# Patient Record
Sex: FEMALE | Race: WHITE | NOT HISPANIC OR LATINO | Employment: UNEMPLOYED | URBAN - METROPOLITAN AREA
[De-identification: names, ages, dates, MRNs, and addresses within clinical notes are randomized per-mention and may not be internally consistent; named-entity substitution may affect disease eponyms.]

---

## 2017-01-12 ENCOUNTER — OFFICE VISIT (OUTPATIENT)
Dept: INTERNAL MEDICINE | Facility: CLINIC | Age: 52
End: 2017-01-12

## 2017-01-12 VITALS
WEIGHT: 293 LBS | DIASTOLIC BLOOD PRESSURE: 82 MMHG | SYSTOLIC BLOOD PRESSURE: 126 MMHG | HEART RATE: 80 BPM | HEIGHT: 67 IN | OXYGEN SATURATION: 95 % | BODY MASS INDEX: 45.99 KG/M2

## 2017-01-12 DIAGNOSIS — E03.9 ACQUIRED HYPOTHYROIDISM: ICD-10-CM

## 2017-01-12 DIAGNOSIS — Z12.39 SCREENING FOR BREAST CANCER: ICD-10-CM

## 2017-01-12 DIAGNOSIS — F41.9 ANXIETY: ICD-10-CM

## 2017-01-12 DIAGNOSIS — R60.0 BILATERAL EDEMA OF LOWER EXTREMITY: ICD-10-CM

## 2017-01-12 DIAGNOSIS — I10 ESSENTIAL HYPERTENSION: ICD-10-CM

## 2017-01-12 DIAGNOSIS — Z00.00 PE (PHYSICAL EXAM), ANNUAL: Primary | ICD-10-CM

## 2017-01-12 DIAGNOSIS — K21.9 GASTROESOPHAGEAL REFLUX DISEASE, ESOPHAGITIS PRESENCE NOT SPECIFIED: ICD-10-CM

## 2017-01-12 DIAGNOSIS — Z11.59 SCREENING FOR VIRAL DISEASE: ICD-10-CM

## 2017-01-12 DIAGNOSIS — D50.9 MICROCYTIC ANEMIA: ICD-10-CM

## 2017-01-12 LAB
CHOLEST SERPL-MCNC: 225 MG/DL (ref 0–200)
FERRITIN SERPL-MCNC: 152.2 NG/ML (ref 13–150)
HDLC SERPL-MCNC: 54 MG/DL (ref 40–81)
IRON SATN MFR SERPL: 22 % (ref 20–50)
IRON SERPL-MCNC: 83 MCG/DL (ref 37–145)
LDLC SERPL CALC-MCNC: 141 MG/DL (ref 0–100)
LDLC/HDLC SERPL: 2.62 {RATIO}
LOPINAVIR ISLT PHENOTYP: 299 MCG/DL
TIBC SERPL-MCNC: 382 MCG/DL
TRIGL SERPL-MCNC: 148 MG/DL (ref 0–150)
TSH SERPL DL<=0.05 MIU/L-ACNC: 1.2 MIU/ML (ref 0.4–4.2)
VLDLC SERPL-MCNC: 29.6 MG/DL

## 2017-01-12 PROCEDURE — 99396 PREV VISIT EST AGE 40-64: CPT | Performed by: NURSE PRACTITIONER

## 2017-01-12 PROCEDURE — 84443 ASSAY THYROID STIM HORMONE: CPT | Performed by: NURSE PRACTITIONER

## 2017-01-12 PROCEDURE — 80061 LIPID PANEL: CPT | Performed by: NURSE PRACTITIONER

## 2017-01-12 RX ORDER — BUPROPION HYDROCHLORIDE 300 MG/1
300 TABLET ORAL NIGHTLY
Qty: 90 TABLET | Refills: 1 | Status: SHIPPED | OUTPATIENT
Start: 2017-01-12 | End: 2017-11-06 | Stop reason: SDUPTHER

## 2017-01-12 RX ORDER — OMEPRAZOLE 20 MG/1
20 CAPSULE, DELAYED RELEASE ORAL DAILY
Qty: 90 CAPSULE | Refills: 3 | Status: SHIPPED | OUTPATIENT
Start: 2017-01-12 | End: 2017-08-07 | Stop reason: SDUPTHER

## 2017-01-12 RX ORDER — LEVOTHYROXINE SODIUM 175 UG/1
175 TABLET ORAL DAILY
Qty: 90 TABLET | Refills: 1 | Status: SHIPPED | OUTPATIENT
Start: 2017-01-12 | End: 2017-10-01 | Stop reason: SDUPTHER

## 2017-01-12 RX ORDER — SERTRALINE HYDROCHLORIDE 100 MG/1
100 TABLET, FILM COATED ORAL NIGHTLY
Qty: 90 TABLET | Refills: 1 | Status: SHIPPED | OUTPATIENT
Start: 2017-01-12 | End: 2017-11-06 | Stop reason: SDUPTHER

## 2017-01-12 RX ORDER — LISINOPRIL 10 MG/1
10 TABLET ORAL DAILY
Qty: 90 TABLET | Refills: 3 | Status: SHIPPED | OUTPATIENT
Start: 2017-01-12 | End: 2017-07-10 | Stop reason: SDUPTHER

## 2017-01-12 NOTE — MR AVS SNAPSHOT
Carli Garcia   1/12/2017 1:00 PM   Office Visit    Dept Phone:  [phone]   Encounter #:  [id number]    Provider:  JANINE Tobar   Department:  [facility] INTERNAL MEDICINE                Your Full Care Plan              Today's Medication Changes          These changes are accurate as of: 1/12/17  1:46 PM.  If you have any questions, ask your nurse or doctor.               Stop taking medication(s)listed here:     amLODIPine 5 MG tablet   Commonly known as:  NORVASC   Stopped by:  JANINE Tobar                Where to Get Your Medications      These medications were sent to [facility] - [place], [place] - [address] [place] [address] - [phone]  - [phone] [address] [address]     Phone:  [phone]     buPROPion  MG 24 hr tablet    levothyroxine 175 MCG tablet    lisinopril 10 MG tablet    omeprazole 20 MG capsule    sertraline 100 MG tablet                  Your Updated Medication List          This list is accurate as of: 1/12/17  1:46 PM.  Always use your most recent med list.                buPROPion  MG 24 hr tablet   Commonly known as:  WELLBUTRIN XL   Take 1 tablet by mouth Every Night.       cetirizine 10 MG tablet   Commonly known as:  zyrTEC       ethacrynic acid 25 MG tablet   Commonly known as:  EDECRIN   Takes 2 and half tablet daily       ferrous sulfate 325 (65 FE) MG tablet   Take 1 tablet by mouth 2 (two) times a day.       levothyroxine 175 MCG tablet   Commonly known as:  SYNTHROID, LEVOTHROID   Take 1 tablet by mouth Daily.       lisinopril 10 MG tablet   Commonly known as:  PRINIVIL,ZESTRIL   Take 1 tablet by mouth Daily.       omeprazole 20 MG capsule   Commonly known as:  PRILOSEC   Take 1 capsule by mouth Daily.       potassium chloride 10 MEQ CR tablet   Commonly known as:  KLOR-CON   Take 1 tablet by mouth 2 (two) times a day.       sertraline 100 MG tablet   Commonly  known as:  ZOLOFT   Take 1 tablet by mouth Every Night.       warfarin 7.5 MG tablet   Commonly known as:  COUMADIN   TAKE 2 TABLETS (15 MG) EVERY DAY OR AS DIRECTED               You Were Diagnosed With        Codes Comments    PE (physical exam), annual    -  Primary ICD-10-CM: Z00.00  ICD-9-CM: V70.0     Screening for breast cancer     ICD-10-CM: Z12.39  ICD-9-CM: V76.10     Bilateral edema of lower extremity     ICD-10-CM: R60.0  ICD-9-CM: 782.3 increase Edecrin dose    Microcytic anemia     ICD-10-CM: D50.9  ICD-9-CM: 280.9     Acquired hypothyroidism     ICD-10-CM: E03.9  ICD-9-CM: 244.9     Screening for viral disease     ICD-10-CM: Z11.59  ICD-9-CM: V73.99     Gastroesophageal reflux disease, esophagitis presence not specified     ICD-10-CM: K21.9  ICD-9-CM: 530.81     Essential hypertension     ICD-10-CM: I10  ICD-9-CM: 401.9     Anxiety     ICD-10-CM: F41.9  ICD-9-CM: 300.00       Instructions     None    Patient Instructions History      Upcoming Appointments     Visit Type Date Time Department    PHYSICAL 1/12/2017  1:00 PM [facility]    COAG RN APPT 1/16/2017  3:30 PM [facility] OP INFU [facility]    LAB 1/16/2017  3:00 PM [facility] ONC CBC LAB [facility]    LAB 2/27/2017  3:00 PM [facility] ONC CBC LAB [facility]    COAG RN APPT 2/27/2017  3:30 PM [facility] OP INFU CBC [facility]    COAG RN APPT 4/10/2017  3:30 PM [facility] OP INFU CBC [facility]    LAB 4/10/2017  3:00 PM [facility] ONC CBC LAB [facility]    FOLLOW UP 4/21/2017  3:20 PM [facility] L[facility] [place]    FOLLOW UP 2 UNIT 6/5/2017  9:20 AM [facility] ONC CBC [place]    LAB 6/5/2017  8:40 AM [facility] ONC CBC LAB [facility]    FOLLOW UP 6/13/2017  1:00 PM [facility]      [facility] Sign[facility]     Our records indicate that you have an active [employer] account.    You can view your After Visit Summary by going to [employer] and logging in with your [employer] username and password.  If you don't have a [employer] username and password but a parent or guardian has access to your record, the parent or  "guardian should login with their own [employer] username and password and access your record to view the After Visit Summary.    If you have questions, you can email [email or web address] or call [phone] to talk to our [employer] staff.  Remember, [employer] is NOT to be used for urgent needs.  For medical emergencies, dial 911.               Other Info from Your Visit           Your Appointments     Jan 16, 2017  3:00 PM EST   Lab with LAB CHAIR 5 [facility] ONCOLOGY CBC LAB ([place])    [address] [place], [place] [id number] [phone]            Jan 16, 2017  3:30 PM EST   COAGULATION with COAG RN CBC [facility] INFUSION CBC ([place])    [address] [id number] [phone]            Feb 27, 2017  3:00 PM EST   Lab with LAB CHAIR 6 [facility] ONCOLOGY CBC LAB ([place])    [address] [place], [place] [id number] [phone]            Feb 27, 2017  3:30 PM EST   COAGULATION with COAG RN CBC [facility] INFUSION CBC ([place])    [address] [id number] [phone]            Apr 10, 2017  3:30 PM EDT   COAGULATION with COAG RN CBC [facility] INFUSION CBC ([place])    [facility] [id number] [phone]              Allergies     Dust Mite Extract      Losartan  Angioedema    Sulfa Antibiotics        Reason for Visit     Annual Exam           Vital Signs     Blood Pressure Pulse Height Weight Oxygen Saturation Body Mass Index    126/82 (BP Location: Left arm, Patient Position: Sitting, Cuff Size: Adult) 80 66.5\" (168.9 cm) 357 lb (162 kg) 95% 56.76 kg/m2    Smoking Status                   Never Smoker           Problems and Diagnoses Noted     Acid reflux disease    High blood pressure    Underactive thyroid    Microcytic anemia    PE (physical exam), annual    -  Primary    Breast " cancer screening        Edema of both legs        Screening for viral disease        Anxiety problem

## 2017-01-13 LAB — HCV AB S/CO SERPL IA: 0.1 S/CO RATIO (ref 0–0.9)

## 2017-01-13 NOTE — PROGRESS NOTES
Subjective   Carli Garcia is a 51 y.o. female who presents for a physical exam.    HPI Comments: She is feeling well and has started increasing her activity level, now exercising at the gym at work and walking up a flight of stairs. She reports a more positive outlook and requesting to stop Zoloft and Wellbutrin.       The following portions of the patient's history were reviewed and updated as appropriate: allergies, current medications, past social history and problem list.    Past Medical History   Diagnosis Date   • Anemia    • Angioedema      Lower lip   • Arthritis    • Depression    • Diastolic dysfunction    • DVT (deep venous thrombosis)    • GERD (gastroesophageal reflux disease)    • H/O antiphospholipid syndrome    • Hiatal hernia    • Hypertension    • Hypothyroidism    • Lupus anticoagulant disorder    • Lupus anticoagulant disorder    • Morbid obesity    • CRISTI (obstructive sleep apnea)    • PE (pulmonary embolism)      Bilateral   • Right ventricular dilation    • Thrombocytopenia          Current Outpatient Prescriptions:   •  buPROPion XL (WELLBUTRIN XL) 300 MG 24 hr tablet, Take 1 tablet by mouth Every Night., Disp: 90 tablet, Rfl: 1  •  cetirizine (ZyrTEC) 10 MG tablet, Take 10 mg by mouth daily., Disp: , Rfl:   •  ethacrynic acid (EDECRIN) 25 MG tablet, Takes 2 and half tablet daily, Disp: 225 tablet, Rfl: 1  •  ferrous sulfate 325 (65 FE) MG tablet, Take 1 tablet by mouth 2 (two) times a day., Disp: 180 tablet, Rfl: 1  •  levothyroxine (SYNTHROID, LEVOTHROID) 175 MCG tablet, Take 1 tablet by mouth Daily., Disp: 90 tablet, Rfl: 1  •  lisinopril (PRINIVIL,ZESTRIL) 10 MG tablet, Take 1 tablet by mouth Daily., Disp: 90 tablet, Rfl: 3  •  omeprazole (PRILOSEC) 20 MG capsule, Take 1 capsule by mouth Daily., Disp: 90 capsule, Rfl: 3  •  potassium chloride (KLOR-CON) 10 MEQ CR tablet, Take 1 tablet by mouth 2 (two) times a day., Disp: 90 tablet, Rfl: 3  •  sertraline (ZOLOFT) 100 MG tablet, Take 1  tablet by mouth Every Night., Disp: 90 tablet, Rfl: 1  •  warfarin (COUMADIN) 7.5 MG tablet, TAKE 2 TABLETS (15 MG) EVERY DAY OR AS DIRECTED, Disp: 90 tablet, Rfl: 0    Allergies   Allergen Reactions   • Dust Mite Extract    • Losartan Angioedema   • Sulfa Antibiotics        Review of Systems   Constitutional: Negative for activity change, appetite change, chills, diaphoresis, fatigue, fever and unexpected weight change.   HENT: Negative for congestion, dental problem, drooling, ear discharge, ear pain, facial swelling, hearing loss, mouth sores, nosebleeds, postnasal drip, rhinorrhea, sinus pressure, sore throat, tinnitus and trouble swallowing.    Eyes: Negative for photophobia, pain, discharge, redness, itching and visual disturbance.   Respiratory: Positive for shortness of breath. Negative for apnea, cough, choking, chest tightness and wheezing.    Cardiovascular: Negative for chest pain, palpitations and leg swelling.        No orthopnea, PND, VALERIO   Gastrointestinal: Negative for abdominal pain, blood in stool, constipation, diarrhea, nausea and vomiting.   Endocrine: Negative for cold intolerance, heat intolerance, polydipsia and polyuria.   Genitourinary: Negative for decreased urine volume, dysuria, enuresis, flank pain, frequency, hematuria and urgency.   Musculoskeletal: Positive for arthralgias. Negative for back pain, gait problem, joint swelling, myalgias, neck pain and neck stiffness.   Skin: Negative for color change and rash.        No hair changes, no nail changes   Allergic/Immunologic: Negative for environmental allergies, food allergies and immunocompromised state.   Neurological: Negative for dizziness, tremors, seizures, syncope, speech difficulty, weakness, light-headedness, numbness and headaches.   Hematological: Negative for adenopathy. Does not bruise/bleed easily.   Psychiatric/Behavioral: Negative for agitation, confusion, decreased concentration, dysphoric mood, sleep disturbance and  "suicidal ideas. The patient is not nervous/anxious.        Objective   Vitals:    01/12/17 1250   BP: 126/82   BP Location: Left arm   Patient Position: Sitting   Cuff Size: Adult   Pulse: 80   SpO2: 95%   Weight: (!) 357 lb (162 kg)   Height: 66.5\" (168.9 cm)     Physical Exam   Constitutional: She is oriented to person, place, and time. She appears well-developed and well-nourished. No distress.   HENT:   Right Ear: Hearing, tympanic membrane, external ear and ear canal normal.   Left Ear: Hearing, tympanic membrane, external ear and ear canal normal.   Nose: Right sinus exhibits no maxillary sinus tenderness and no frontal sinus tenderness. Left sinus exhibits no maxillary sinus tenderness and no frontal sinus tenderness.   Eyes: Conjunctivae, EOM and lids are normal. Pupils are equal, round, and reactive to light.   Neck: Trachea normal and phonation normal. Neck supple. Normal carotid pulses and no JVD present. Carotid bruit is not present. No thyroid mass and no thyromegaly present.   Cardiovascular: Normal rate, regular rhythm, S1 normal and S2 normal.  PMI is not displaced.  Exam reveals no gallop and no friction rub.    No murmur heard.  Pulses:       Carotid pulses are 2+ on the right side, and 2+ on the left side.       Radial pulses are 2+ on the right side, and 2+ on the left side.        Dorsalis pedis pulses are 2+ on the right side, and 2+ on the left side.   Pulmonary/Chest: Effort normal and breath sounds normal. She has no wheezes. She has no rhonchi. She has no rales. Chest wall is not dull to percussion.   Abdominal: Soft. Normal appearance, normal aorta and bowel sounds are normal. She exhibits no abdominal bruit and no mass. There is no hepatosplenomegaly. There is no tenderness.   Musculoskeletal: Normal range of motion. She exhibits no edema or tenderness.   Lymphadenopathy:     She has no cervical adenopathy.     She has no axillary adenopathy.        Left: No supraclavicular adenopathy " present.   Neurological: She is alert and oriented to person, place, and time. She has normal strength and normal reflexes. No cranial nerve deficit or sensory deficit. Coordination normal.   Skin: Skin is warm and dry. No rash noted.   Psychiatric: She has a normal mood and affect. Her speech is normal and behavior is normal. Judgment and thought content normal. Cognition and memory are normal. She is attentive.   Nursing note and vitals reviewed.      Assessment/Plan   Carli was seen today for annual exam.    Diagnoses and all orders for this visit:    PE (physical exam), annual  -     Lipid Panel; Future  -     Lipid Panel    Screening for breast cancer  -     Mammo Screening Bilateral With CAD; Future    Bilateral edema of lower extremity  Comments:  using Edecrin only rarely    Microcytic anemia  Comments:  she has decreased Ferrous Sulfate to 1 tablet daily  Orders:  -     Iron Profile; Future  -     Ferritin; Future  -     Iron Profile  -     Ferritin    Acquired hypothyroidism  -     TSH; Future  -     levothyroxine (SYNTHROID, LEVOTHROID) 175 MCG tablet; Take 1 tablet by mouth Daily.  -     TSH    Gastroesophageal reflux disease, esophagitis presence not specified  -     omeprazole (PRILOSEC) 20 MG capsule; Take 1 capsule by mouth Daily.    Screening for viral disease  -     Hepatitis C Antibody; Future  -     Hepatitis C Antibody    Essential hypertension  Comments:  stable on current meds  Orders:  -     lisinopril (PRINIVIL,ZESTRIL) 10 MG tablet; Take 1 tablet by mouth Daily.    Anxiety  Comments:  discussed recent meds, she may decrease Zoloft to 1/2 daily but continue Wellbutrin for now  Orders:  -     buPROPion XL (WELLBUTRIN XL) 300 MG 24 hr tablet; Take 1 tablet by mouth Every Night.  -     sertraline (ZOLOFT) 100 MG tablet; Take 1 tablet by mouth Every Night.    Will complete paperwork for [facility] and fax pending lab results.   Discussed exercise program, she has a steady weight  loss which she is encouraged to continue.

## 2017-01-16 ENCOUNTER — LAB (OUTPATIENT)
Dept: LAB | Facility: HOSPITAL | Age: 52
End: 2017-01-16

## 2017-01-16 ENCOUNTER — CLINICAL SUPPORT (OUTPATIENT)
Dept: ONCOLOGY | Facility: HOSPITAL | Age: 52
End: 2017-01-16

## 2017-01-16 DIAGNOSIS — I26.99 OTHER ACUTE PULMONARY EMBOLISM WITHOUT ACUTE COR PULMONALE (HCC): ICD-10-CM

## 2017-01-16 DIAGNOSIS — I82.409 DEEP VEIN THROMBOSIS (DVT) OF LOWER EXTREMITY, UNSPECIFIED CHRONICITY, UNSPECIFIED LATERALITY, UNSPECIFIED VEIN (HCC): Primary | ICD-10-CM

## 2017-01-16 LAB
INR PPP: 1.8 (ref 0.9–1.1)
PROTHROMBIN TIME: 22 SECONDS (ref 11–13.5)

## 2017-01-16 PROCEDURE — 85610 PROTHROMBIN TIME: CPT | Performed by: INTERNAL MEDICINE

## 2017-01-16 RX ORDER — WARFARIN SODIUM 7.5 MG/1
15 TABLET ORAL
Qty: 240 TABLET | Refills: 3 | Status: SHIPPED | OUTPATIENT
Start: 2017-01-16 | End: 2018-01-30 | Stop reason: SDUPTHER

## 2017-01-20 ENCOUNTER — TELEPHONE (OUTPATIENT)
Dept: INTERNAL MEDICINE | Facility: CLINIC | Age: 52
End: 2017-01-20

## 2017-01-20 NOTE — TELEPHONE ENCOUNTER
She c/o a dry, nonproductive cough with clear rhinorrhea. She is taking Claritin 10mg daily, advised to add Delsym at night if cough is dry and Mucinex throughout the day (bid) if cough becomes looser. RTC if sx persist/worsen.

## 2017-01-20 NOTE — TELEPHONE ENCOUNTER
----- Message from Brigid Odell sent at 1/20/2017  1:07 PM EST -----  Contact: Pt  Pt would like to be recommended something to take for a cough. Not coughing anything up, just a dry deep annoying cough.   Would like to have recommendations to take that are okay with her current ones.    Pt# [phone]

## 2017-02-27 ENCOUNTER — TELEPHONE (OUTPATIENT)
Dept: ONCOLOGY | Facility: CLINIC | Age: 52
End: 2017-02-27

## 2017-02-27 ENCOUNTER — CLINICAL SUPPORT (OUTPATIENT)
Dept: ONCOLOGY | Facility: HOSPITAL | Age: 52
End: 2017-02-27

## 2017-02-27 ENCOUNTER — LAB (OUTPATIENT)
Dept: LAB | Facility: HOSPITAL | Age: 52
End: 2017-02-27

## 2017-02-27 DIAGNOSIS — I26.99 OTHER PULMONARY EMBOLISM WITHOUT ACUTE COR PULMONALE (HCC): Primary | ICD-10-CM

## 2017-02-27 LAB
INR PPP: 4.1 (ref 0.9–1.1)
PROTHROMBIN TIME: 49.1 SECONDS (ref 11–13.5)

## 2017-02-27 PROCEDURE — 85610 PROTHROMBIN TIME: CPT | Performed by: INTERNAL MEDICINE

## 2017-02-27 NOTE — PROGRESS NOTES
Patient labs reviewed with Priya ETIENNE and SS.  Patient will take 15mg coumadin Sun,Wed. Fri and 11.25 all other days for INR 4.1.  Patient will come back in one week to have INR rechecked.  Patient SHELLY.

## 2017-03-06 ENCOUNTER — CLINICAL SUPPORT (OUTPATIENT)
Dept: ONCOLOGY | Facility: HOSPITAL | Age: 52
End: 2017-03-06

## 2017-03-06 ENCOUNTER — ANTICOAGULATION VISIT (OUTPATIENT)
Dept: LAB | Facility: HOSPITAL | Age: 52
End: 2017-03-06

## 2017-03-06 DIAGNOSIS — I82.401 ACUTE DEEP VEIN THROMBOSIS (DVT) OF RIGHT LOWER EXTREMITY, UNSPECIFIED VEIN (HCC): Primary | ICD-10-CM

## 2017-03-06 LAB
INR PPP: 2.4 (ref 0.9–1.1)
PROTHROMBIN TIME: 28.2 SECONDS (ref 11–13.5)

## 2017-03-06 PROCEDURE — 85610 PROTHROMBIN TIME: CPT | Performed by: INTERNAL MEDICINE

## 2017-03-06 NOTE — PROGRESS NOTES
INR 2.4 today.  Patient was 4.1 last week.  States she forgot her dose on Thursday.  Adjusted in ss and gave new dosing schedule.  Patient to take 11.25mg daily.  She requested to not return sooner than 1 month.  Says it is getting too hard on her to come so frequently.  Discussed s/s of a blood clot/PE and when to call.  Patient v/u.

## 2017-04-10 ENCOUNTER — APPOINTMENT (OUTPATIENT)
Dept: LAB | Facility: HOSPITAL | Age: 52
End: 2017-04-10

## 2017-04-10 ENCOUNTER — APPOINTMENT (OUTPATIENT)
Dept: ONCOLOGY | Facility: HOSPITAL | Age: 52
End: 2017-04-10

## 2017-04-11 ENCOUNTER — LAB (OUTPATIENT)
Dept: LAB | Facility: HOSPITAL | Age: 52
End: 2017-04-11

## 2017-04-11 ENCOUNTER — TELEPHONE (OUTPATIENT)
Dept: ONCOLOGY | Facility: CLINIC | Age: 52
End: 2017-04-11

## 2017-04-11 ENCOUNTER — CLINICAL SUPPORT (OUTPATIENT)
Dept: ONCOLOGY | Facility: HOSPITAL | Age: 52
End: 2017-04-11

## 2017-04-11 DIAGNOSIS — Z79.01 LONG TERM (CURRENT) USE OF ANTICOAGULANTS: Primary | ICD-10-CM

## 2017-04-11 DIAGNOSIS — I82.401 DEEP VEIN THROMBOSIS (DVT) OF RIGHT LOWER EXTREMITY, UNSPECIFIED CHRONICITY, UNSPECIFIED VEIN (HCC): Primary | ICD-10-CM

## 2017-04-11 LAB
INR PPP: 1.7 (ref 0.9–1.1)
PROTHROMBIN TIME: 20.6 SECONDS (ref 11–13.5)

## 2017-04-11 PROCEDURE — 85610 PROTHROMBIN TIME: CPT

## 2017-04-11 NOTE — PROGRESS NOTES
Pt's INR 1.7 today. Pt thinks she may have missed a dose last week but is not sure. She knows she has missed a few doses over the last few weeks. Placed one missed dose in SS. Advised pt to take same dose of 11.25 daily and we need to recheck her in 2 weeks. She v/u. Pt sent to appt desk to make appt.

## 2017-04-11 NOTE — TELEPHONE ENCOUNTER
----- Message from Karey Ospina, SHAZIA sent at 2017  3:26 PM EDT -----  : [birth year]    PLEASE SCHEDULE PT/INR AND COAG RN IN 2 WEEKS

## 2017-04-24 ENCOUNTER — ANTICOAGULATION VISIT (OUTPATIENT)
Dept: LAB | Facility: HOSPITAL | Age: 52
End: 2017-04-24

## 2017-04-24 ENCOUNTER — TELEPHONE (OUTPATIENT)
Dept: ONCOLOGY | Facility: CLINIC | Age: 52
End: 2017-04-24

## 2017-04-24 ENCOUNTER — CLINICAL SUPPORT (OUTPATIENT)
Dept: ONCOLOGY | Facility: HOSPITAL | Age: 52
End: 2017-04-24

## 2017-04-24 DIAGNOSIS — Z79.01 LONG TERM (CURRENT) USE OF ANTICOAGULANTS: ICD-10-CM

## 2017-04-24 LAB
INR PPP: 1.5 (ref 0.9–1.1)
PROTHROMBIN TIME: 18 SECONDS (ref 11–13.5)

## 2017-04-24 PROCEDURE — 85610 PROTHROMBIN TIME: CPT

## 2017-04-24 NOTE — TELEPHONE ENCOUNTER
----- Message from Karey Redd RN sent at 4/24/2017  3:38 PM EDT -----  PLEASE SCHEDULE PATIENT FOR INR WITH COAG REVIEW IN 1 WEEK

## 2017-04-24 NOTE — PROGRESS NOTES
INR 1.5 TODAY. PATIENT DENIES MISSING ANY DOSES AND NO NEW MEDICATIONS. REVIEWED WITH LUIS A MEHTA. OK TO INCREASE TO 15MG TUES/THURS/SAT AND 11.25 ALL OTHER DAYS. PATIENT TO RETURN IN 1 WEEK TO RECHECK. SENT PATIENT TO SCHEDULING TO SET UP APPT.

## 2017-05-01 ENCOUNTER — ANTICOAGULATION VISIT (OUTPATIENT)
Dept: LAB | Facility: HOSPITAL | Age: 52
End: 2017-05-01
Attending: INTERNAL MEDICINE

## 2017-05-01 ENCOUNTER — CLINICAL SUPPORT (OUTPATIENT)
Dept: ONCOLOGY | Facility: HOSPITAL | Age: 52
End: 2017-05-01

## 2017-05-01 DIAGNOSIS — I26.99 OTHER PULMONARY EMBOLISM WITHOUT ACUTE COR PULMONALE, UNSPECIFIED CHRONICITY (HCC): Primary | ICD-10-CM

## 2017-05-01 LAB
INR PPP: 2.2 (ref 0.9–1.1)
PROTHROMBIN TIME: 25.9 SECONDS (ref 11–13.5)

## 2017-05-01 PROCEDURE — 85610 PROTHROMBIN TIME: CPT | Performed by: INTERNAL MEDICINE

## 2017-06-05 ENCOUNTER — OFFICE VISIT (OUTPATIENT)
Dept: ONCOLOGY | Facility: CLINIC | Age: 52
End: 2017-06-05
Attending: INTERNAL MEDICINE

## 2017-06-05 ENCOUNTER — LAB (OUTPATIENT)
Dept: LAB | Facility: HOSPITAL | Age: 52
End: 2017-06-05
Attending: INTERNAL MEDICINE

## 2017-06-05 VITALS
HEART RATE: 68 BPM | TEMPERATURE: 98 F | BODY MASS INDEX: 45.99 KG/M2 | RESPIRATION RATE: 18 BRPM | WEIGHT: 293 LBS | OXYGEN SATURATION: 98 % | SYSTOLIC BLOOD PRESSURE: 130 MMHG | DIASTOLIC BLOOD PRESSURE: 70 MMHG | HEIGHT: 67 IN

## 2017-06-05 DIAGNOSIS — I26.99 OTHER PULMONARY EMBOLISM WITHOUT ACUTE COR PULMONALE, UNSPECIFIED CHRONICITY (HCC): Primary | ICD-10-CM

## 2017-06-05 LAB
BASOPHILS # BLD AUTO: 0.05 10*3/MM3 (ref 0–0.1)
BASOPHILS NFR BLD AUTO: 0.8 % (ref 0–1.1)
DEPRECATED RDW RBC AUTO: 40.5 FL (ref 37–49)
EOSINOPHIL # BLD AUTO: 0.14 10*3/MM3 (ref 0–0.36)
EOSINOPHIL NFR BLD AUTO: 2.1 % (ref 1–5)
ERYTHROCYTE [DISTWIDTH] IN BLOOD BY AUTOMATED COUNT: 13.8 % (ref 11.7–14.5)
HCT VFR BLD AUTO: 44.7 % (ref 34–45)
HGB BLD-MCNC: 14.3 G/DL (ref 11.5–14.9)
IMM GRANULOCYTES # BLD: 0.04 10*3/MM3 (ref 0–0.03)
IMM GRANULOCYTES NFR BLD: 0.6 % (ref 0–0.5)
INR PPP: 3.4 (ref 0.9–1.1)
LYMPHOCYTES # BLD AUTO: 1.3 10*3/MM3 (ref 1–3.5)
LYMPHOCYTES NFR BLD AUTO: 19.7 % (ref 20–49)
MCH RBC QN AUTO: 26.2 PG (ref 27–33)
MCHC RBC AUTO-ENTMCNC: 32 G/DL (ref 32–35)
MCV RBC AUTO: 81.9 FL (ref 83–97)
MONOCYTES # BLD AUTO: 0.56 10*3/MM3 (ref 0.25–0.8)
MONOCYTES NFR BLD AUTO: 8.5 % (ref 4–12)
NEUTROPHILS # BLD AUTO: 4.51 10*3/MM3 (ref 1.5–7)
NEUTROPHILS NFR BLD AUTO: 68.3 % (ref 39–75)
NRBC BLD MANUAL-RTO: 0 /100 WBC (ref 0–0)
PLATELET # BLD AUTO: 268 10*3/MM3 (ref 150–375)
PMV BLD AUTO: 10.1 FL (ref 8.9–12.1)
PROTHROMBIN TIME: 40.2 SECONDS (ref 11–13.5)
RBC # BLD AUTO: 5.46 10*6/MM3 (ref 3.9–5)
WBC NRBC COR # BLD: 6.6 10*3/MM3 (ref 4–10)

## 2017-06-05 PROCEDURE — 99214 OFFICE O/P EST MOD 30 MIN: CPT | Performed by: INTERNAL MEDICINE

## 2017-06-05 PROCEDURE — 85025 COMPLETE CBC W/AUTO DIFF WBC: CPT | Performed by: INTERNAL MEDICINE

## 2017-06-05 PROCEDURE — 36416 COLLJ CAPILLARY BLOOD SPEC: CPT | Performed by: INTERNAL MEDICINE

## 2017-06-05 PROCEDURE — 85610 PROTHROMBIN TIME: CPT | Performed by: INTERNAL MEDICINE

## 2017-06-05 NOTE — PROGRESS NOTES
REASONS FOR FOLLOWUP:  Antiphospholipid syndrome    History of Present Illness  Mrs. Garcia returns today slightly supra therapeutic Coumadin which is to be life long l she will have some repeat antiphospholipid antibodies assayed prior to her next visit in part to for that assessment..    Past Medical History:   Diagnosis Date   • Anemia    • Angioedema     Lower lip   • Arthritis    • Depression    • Diastolic dysfunction    • DVT (deep venous thrombosis)    • GERD (gastroesophageal reflux disease)    • H/O antiphospholipid syndrome    • Hiatal hernia    • Hypertension    • Hypothyroidism    • Lupus anticoagulant disorder    • Lupus anticoagulant disorder    • Morbid obesity    • CRISTI (obstructive sleep apnea)    • PE (pulmonary embolism)     Bilateral   • Right ventricular dilation    • Thrombocytopenia        ONCOLOGIC HISTORY:  (History from previous dates can be found in the separate document.)    Current Outpatient Prescriptions on File Prior to Visit   Medication Sig Dispense Refill   • buPROPion XL (WELLBUTRIN XL) 300 MG 24 hr tablet Take 1 tablet by mouth Every Night. 90 tablet 1   • cetirizine (ZyrTEC) 10 MG tablet Take 10 mg by mouth daily.     • levothyroxine (SYNTHROID, LEVOTHROID) 175 MCG tablet Take 1 tablet by mouth Daily. 90 tablet 1   • lisinopril (PRINIVIL,ZESTRIL) 10 MG tablet Take 1 tablet by mouth Daily. 90 tablet 3   • omeprazole (PRILOSEC) 20 MG capsule Take 1 capsule by mouth Daily. 90 capsule 3   • sertraline (ZOLOFT) 100 MG tablet Take 1 tablet by mouth Every Night. 90 tablet 1   • warfarin (COUMADIN) 7.5 MG tablet TAKE 2 TABLETS (15 MG) EVERY DAY OR AS DIRECTED 90 tablet 0   • warfarin (COUMADIN) 7.5 MG tablet Take 2 tablets by mouth Daily. 240 tablet 3   • [DISCONTINUED] ethacrynic acid (EDECRIN) 25 MG tablet Takes 2 and half tablet daily 225 tablet 1   • [DISCONTINUED] ferrous sulfate 325 (65 FE) MG tablet Take 1 tablet by mouth 2 (two) times a day. 180 tablet 1   • [DISCONTINUED]  "potassium chloride (KLOR-CON) 10 MEQ CR tablet Take 1 tablet by mouth 2 (two) times a day. 90 tablet 3     No current facility-administered medications on file prior to visit.        ALLERGIES:     Allergies   Allergen Reactions   • Dust Mite Extract    • Losartan Angioedema   • Sulfa Antibiotics        Social History     Social History   • Marital status: Single     Spouse name: N/A   • Number of children: 0   • Years of education: N/A     Occupational History   • Desk Work      Social History Main Topics   • Smoking status: Never Smoker   • Smokeless tobacco: Never Used   • Alcohol use Yes      Comment: occ   • Drug use: No   • Sexual activity: Not on file     Other Topics Concern   • Not on file     Social History Narrative         Cancer-related family history includes Uterine cancer in her mother.     Review of Systems  A comprehensive 14 point review of systems was performed and was negative except as mentioned.    Objective      Vitals:    06/05/17 0901   BP: 130/70   Pulse: 68   Resp: 18   Temp: 98 °F (36.7 °C)   SpO2: 98%   Weight: (!) 353 lb 6.4 oz (160 kg)   Height: 66.54\" (169 cm)   PainSc: 0-No pain     Current Status 6/5/2017   ECOG score 0       Physical Exam   GENERAL: Well-developed, well-nourished in no acute distress.   SKIN: Warm, dry without rashes, purpura or petechiae.  HEAD: Normocephalic.  EYES: Pupils equal, round and reactive to light. EOMs intact. Conjunctivae normal.  EARS: Hearing intact.  NOSE: Septum midline. No excoriations or nasal discharge.  MOUTH: Tongue is well-papillated; no stomatitis or ulcers. Lips normal.  THROAT: Oropharynx without lesions or exudates.  NECK: Supple with good range of motion; no thyromegaly or masses, no JVD.  LYMPHATICS: No cervical, supraclavicular, axillary or inguinal adenopathy.  CHEST: Lungs clear to percussion and auscultation. Good airflow.  CARDIAC: Regular rate and rhythm without murmurs, rubs or gallops. Normal S1,S2.  ABDOMEN: Soft, nontender " with no organomegaly or masses.  EXTREMITIES: No clubbing, cyanosis or edema.  NEUROLOGICAL: Cranial Nerves II-XII grossly intact. No focal neurological deficits.   PSYCHIATRIC: Normal affect and mood.      RECENT LABS:  Hematology WBC   Date Value Ref Range Status   06/05/2017 6.60 4.00 - 10.00 10*3/mm3 Final     RBC   Date Value Ref Range Status   06/05/2017 5.46 (H) 3.90 - 5.00 10*6/mm3 Final     Hemoglobin   Date Value Ref Range Status   06/05/2017 14.3 11.5 - 14.9 g/dL Final     Hematocrit   Date Value Ref Range Status   06/05/2017 44.7 34.0 - 45.0 % Final     Platelets   Date Value Ref Range Status   06/05/2017 268 150 - 375 10*3/mm3 Final        Assessment/Plan     Antiphospholipid syndrome:I have advised her to take 3 times weekly 15 mg  With the remainder at 11.25 mg)daily.  In her 1 month return for laboratory studies she will undergo anticardiolipin antibody survey as well as anti-phospholipid antibody panel and this will include lupus anticoagulant.

## 2017-06-06 ENCOUNTER — HOSPITAL ENCOUNTER (OUTPATIENT)
Dept: HOSPITAL 23 - CECH | Age: 52
Discharge: HOME | End: 2017-06-06
Attending: INTERNAL MEDICINE
Payer: COMMERCIAL

## 2017-06-06 DIAGNOSIS — G47.33: ICD-10-CM

## 2017-06-06 DIAGNOSIS — I51.7: ICD-10-CM

## 2017-06-06 DIAGNOSIS — J96.11: Primary | ICD-10-CM

## 2017-06-06 DIAGNOSIS — I26.99: ICD-10-CM

## 2017-07-10 ENCOUNTER — OFFICE VISIT (OUTPATIENT)
Dept: CARDIOLOGY | Facility: CLINIC | Age: 52
End: 2017-07-10

## 2017-07-10 ENCOUNTER — APPOINTMENT (OUTPATIENT)
Dept: LAB | Facility: HOSPITAL | Age: 52
End: 2017-07-10
Attending: INTERNAL MEDICINE

## 2017-07-10 ENCOUNTER — CLINICAL SUPPORT (OUTPATIENT)
Dept: ONCOLOGY | Facility: HOSPITAL | Age: 52
End: 2017-07-10
Attending: INTERNAL MEDICINE

## 2017-07-10 ENCOUNTER — APPOINTMENT (OUTPATIENT)
Dept: ONCOLOGY | Facility: HOSPITAL | Age: 52
End: 2017-07-10
Attending: INTERNAL MEDICINE

## 2017-07-10 ENCOUNTER — ANTICOAGULATION VISIT (OUTPATIENT)
Dept: LAB | Facility: HOSPITAL | Age: 52
End: 2017-07-10
Attending: INTERNAL MEDICINE

## 2017-07-10 VITALS
WEIGHT: 293 LBS | DIASTOLIC BLOOD PRESSURE: 98 MMHG | HEART RATE: 82 BPM | BODY MASS INDEX: 45.99 KG/M2 | HEIGHT: 67 IN | SYSTOLIC BLOOD PRESSURE: 152 MMHG

## 2017-07-10 DIAGNOSIS — K44.9 HIATAL HERNIA: ICD-10-CM

## 2017-07-10 DIAGNOSIS — I51.7 RIGHT VENTRICULAR DILATION: ICD-10-CM

## 2017-07-10 DIAGNOSIS — I10 ESSENTIAL HYPERTENSION: Primary | ICD-10-CM

## 2017-07-10 DIAGNOSIS — E66.01 MORBID OBESITY DUE TO EXCESS CALORIES (HCC): Chronic | ICD-10-CM

## 2017-07-10 DIAGNOSIS — D68.61 ANTIPHOSPHOLIPID SYNDROME (HCC): Primary | Chronic | ICD-10-CM

## 2017-07-10 DIAGNOSIS — I26.99 OTHER PULMONARY EMBOLISM WITHOUT ACUTE COR PULMONALE, UNSPECIFIED CHRONICITY (HCC): ICD-10-CM

## 2017-07-10 DIAGNOSIS — I10 ESSENTIAL HYPERTENSION: ICD-10-CM

## 2017-07-10 DIAGNOSIS — I51.89 DIASTOLIC DYSFUNCTION: ICD-10-CM

## 2017-07-10 DIAGNOSIS — I82.401 DEEP VEIN THROMBOSIS (DVT) OF RIGHT LOWER EXTREMITY, UNSPECIFIED CHRONICITY, UNSPECIFIED VEIN (HCC): ICD-10-CM

## 2017-07-10 LAB
INR PPP: 2.6 (ref 0.9–1.1)
PROTHROMBIN TIME: 30.6 SECONDS (ref 11–13.5)

## 2017-07-10 PROCEDURE — 85610 PROTHROMBIN TIME: CPT

## 2017-07-10 PROCEDURE — 93000 ELECTROCARDIOGRAM COMPLETE: CPT | Performed by: INTERNAL MEDICINE

## 2017-07-10 PROCEDURE — 99214 OFFICE O/P EST MOD 30 MIN: CPT | Performed by: INTERNAL MEDICINE

## 2017-07-10 PROCEDURE — 36415 COLL VENOUS BLD VENIPUNCTURE: CPT

## 2017-07-10 RX ORDER — LISINOPRIL 20 MG/1
20 TABLET ORAL DAILY
Qty: 90 TABLET | Refills: 3 | Status: SHIPPED | OUTPATIENT
Start: 2017-07-10 | End: 2018-08-12 | Stop reason: SDUPTHER

## 2017-07-10 NOTE — PROGRESS NOTES
Carli Garcia  1965  Date of Office Visit: 10/20/2016  Encounter Provider: Sonido Arroyo MD  Place of Service: [facility] CARDIOLOGY      CHIEF COMPLAINT:   1. Right ventricular dilation.  2. Diastolic dysfunction.     HISTORY OF PRESENT ILLNESS:  Dr. Elizondo,   I had the pleasure of seeing Carli Garcia back in follow-up today.  As you'll know she is a very pleasant 52-year-old female with morbid obesity, hypertension, hypothyroidism, and depression who presented with acute-on-chronic complaint of dyspnea 3/2016. The patient stated that over the 2 months prior that she has been dyspneic, both with exertion initially and more recently at rest. She came in for evaluation and was noted to have small pulmonary emboli bilaterally and acute hypoxic respiratory failure along with sinus tachycardia.  Her cardiac biomarkers were negative and proBNP was normal. She was admitted and started on a heparin drip. Her lower extremity Doppler did show that she  had an acute DVT in the right femoral, popliteal and calf vein.      She had an echocardiogram with mild right ventricular dilation with normal function and grade 2 diastolic dysfunction. She was placed on Coumadin therapy and was not felt to be appropriate for catheter directed therapy.  Since our last visit she has undergone pulmonary evaluation and also has underwent a transthoracic echocardiogram repeat.  Her transthoracic echocardiogram report is available, however I don't have the images at this time.  It is in a limited study but on my review of the report she has a normal ejection fraction.  Her right ventricle was poorly visualized.  Her estimated PA systolic pressure however is normal.  It is only 20 mmHg.  Since our last visit she has been noncompliant with her oxygen therapy only wears it intermittently.  She states that she does not want to wear it at all secondary to the cost.  She also reports that her blood  pressure has been mildly elevated.  She denies any headache or chest pain with that.  She still does complain of moderate dyspnea on exertion.  This comes on when she walks greater than 200 feet in distance.       Review of Systems   Constitution: Positive for malaise/fatigue. Negative for fever and weakness.   HENT: Negative for nosebleeds and sore throat.    Eyes: Negative for blurred vision and double vision.   Cardiovascular: Positive for dyspnea on exertion and leg swelling. Negative for chest pain, claudication, palpitations and syncope.   Respiratory: Negative for cough, shortness of breath and snoring.    Endocrine: Negative for cold intolerance, heat intolerance and polydipsia.   Skin: Negative for itching, poor wound healing and rash.   Musculoskeletal: Negative for joint pain, joint swelling, muscle weakness and myalgias.   Gastrointestinal: Negative for abdominal pain, melena, nausea and vomiting.   Neurological: Negative for light-headedness, loss of balance, seizures and vertigo.   Psychiatric/Behavioral: Negative for altered mental status and depression.          Past Medical History:   Diagnosis Date   • Anemia    • Angioedema     Lower lip   • Arthritis    • Depression    • Diastolic dysfunction    • DVT (deep venous thrombosis)    • GERD (gastroesophageal reflux disease)    • H/O antiphospholipid syndrome    • Hiatal hernia    • Hypertension    • Hypothyroidism    • Lupus anticoagulant disorder    • Lupus anticoagulant disorder    • Morbid obesity    • CRISTI (obstructive sleep apnea)    • PE (pulmonary embolism)     Bilateral   • Right ventricular dilation    • Thrombocytopenia        The following portions of the patient's history were reviewed and updated as appropriate: Social history , Family history and Surgical history     Current Outpatient Prescriptions on File Prior to Visit   Medication Sig Dispense Refill   • buPROPion XL (WELLBUTRIN XL) 300 MG 24 hr tablet Take 1 tablet by mouth Every  "Night. 90 tablet 1   • cetirizine (ZyrTEC) 10 MG tablet Take 10 mg by mouth daily.     • levothyroxine (SYNTHROID, LEVOTHROID) 175 MCG tablet Take 1 tablet by mouth Daily. 90 tablet 1   • omeprazole (PRILOSEC) 20 MG capsule Take 1 capsule by mouth Daily. 90 capsule 3   • sertraline (ZOLOFT) 100 MG tablet Take 1 tablet by mouth Every Night. 90 tablet 1   • warfarin (COUMADIN) 7.5 MG tablet TAKE 2 TABLETS (15 MG) EVERY DAY OR AS DIRECTED 90 tablet 0   • warfarin (COUMADIN) 7.5 MG tablet Take 2 tablets by mouth Daily. 240 tablet 3   • [DISCONTINUED] lisinopril (PRINIVIL,ZESTRIL) 10 MG tablet Take 1 tablet by mouth Daily. 90 tablet 3     No current facility-administered medications on file prior to visit.        Allergies   Allergen Reactions   • Dust Mite Extract    • Losartan Angioedema   • Sulfa Antibiotics        Vitals:    07/10/17 1240   BP: 152/98   Pulse: 82   Weight: (!) 356 lb (161 kg)   Height: 66.5\" (168.9 cm)     Physical Exam   Constitutional: She is oriented to person, place, and time. She appears well-developed and well-nourished.   HENT:   Head: Normocephalic and atraumatic.   Eyes: Conjunctivae and EOM are normal. No scleral icterus.   Neck: Normal range of motion. Neck supple. Normal carotid pulses, no hepatojugular reflux and no JVD present. Carotid bruit is not present. No tracheal deviation present. No thyromegaly present.   Cardiovascular: Normal rate and regular rhythm.  Exam reveals no gallop and no friction rub.    No murmur heard.  Pulses:       Carotid pulses are 2+ on the right side, and 2+ on the left side.       Radial pulses are 2+ on the right side, and 2+ on the left side.        Femoral pulses are 2+ on the right side, and 2+ on the left side.       Dorsalis pedis pulses are 2+ on the right side, and 2+ on the left side.        Posterior tibial pulses are 2+ on the right side, and 2+ on the left side.   Pulmonary/Chest: Breath sounds normal. No respiratory distress. She has no " decreased breath sounds. She has no wheezes. She has no rhonchi. She has no rales. She exhibits no tenderness.   Abdominal: Soft. Bowel sounds are normal. She exhibits no distension. There is no tenderness. There is no rebound.   Musculoskeletal: She exhibits no edema or deformity.   Neurological: She is alert and oriented to person, place, and time. She has normal strength. No sensory deficit.   Skin: No rash noted. No erythema.   Psychiatric: She has a normal mood and affect. Her behavior is normal.       No components found for: CBC  No results found for: CMP  No components found for: LIPID  No results found for: BMP      ECG 12 Lead  Date/Time: 7/10/2017 3:14 PM  Performed by: REBECCA AVILA  Authorized by: REBECCA AVILA   Comparison: compared with previous ECG from 10/20/2016  Similar to previous ECG  Rhythm: sinus rhythm  Rate: normal  ST Segments: ST segments normal  QRS axis: normal  Other findings: PRWP               DISCUSSION/SUMMARY  52-year-old female with a medical history of pulmonary emboli, right ventricular dilation, diastolic dysfunction, hypertension, obstructive sleep apnea, and noncompliance with CPAP who presents back for follow-up.  She also had a deep venous thrombosis.  She continues on anticoagulation and is following with [facility].  At this time, long-term anticoagulation has been recommended.  Since our last visit she has been doing well.    1.  Pulmonary emboli, DVT, hypercoagulable state:    -Continue Coumadin therapy lifelong   -Repeat transthoracic echocardiogram report from June of this year has been reviewed.  There is no evidence of pulmonary hypertension with a PA systolic pressure of 20 mmHg.  The right ventricle is reportedly not well visualized   -Patient has no clear evidence of pulmonary hypertension based on her non-invasive evaluation.  I don't think there is any benefit in repeat echocardiogram at this point in time.    2. Hypertension not at goal.  I have  recommended that she increase her lisinopril therapy to 20 mg daily.    3. Lower extremity edema as above.  I have recommended compression stockings again.  Patient does not want to wear

## 2017-07-12 LAB
B2 GLYCOPROT1 IGA SER-ACNC: 28 GPI IGA UNITS (ref 0–25)
B2 GLYCOPROT1 IGG SER-ACNC: 44 GPI IGG UNITS (ref 0–20)
B2 GLYCOPROT1 IGM SER-ACNC: <9 GPI IGM UNITS (ref 0–32)
CARDIOLIPIN IGA SER IA-ACNC: 14 APL U/ML (ref 0–11)
CARDIOLIPIN IGG SER IA-ACNC: 83 GPL U/ML (ref 0–14)
CARDIOLIPIN IGM SER IA-ACNC: <9 MPL U/ML (ref 0–12)

## 2017-07-13 LAB
PS IGA SER-ACNC: 10 APS IGA (ref 0–20)
PS IGG SER-ACNC: >100 GPS IGG (ref 0–11)
PS IGM SER-ACNC: 4 MPS IGM (ref 0–25)

## 2017-07-18 LAB
APTT HEX PL PPP: 87 SEC (ref 0–11)
DRVVT IMM 1:2 NP PPP: 87.8 SEC (ref 0–47)
LA NT DPL PPP: >200 SEC (ref 0–55)
LA NT DPL/LA NT HPL PPP-RTO: ABNORMAL RATIO
LA NT PLATELET PPP: 91.6 SEC (ref 0–51.9)
LUPUS ANTICOAGULANT REFLEX: ABNORMAL
PTT LA MIX: 75.5 SEC (ref 0–48.9)
SCREEN DRVVT: 133.2 SEC (ref 0–47)
SCREEN DRVVT: 2.1 RATIO (ref 0.8–1.2)
THROMBIN TIME: 20.3 SEC (ref 0–23)

## 2017-08-06 DIAGNOSIS — K21.9 GASTROESOPHAGEAL REFLUX DISEASE, ESOPHAGITIS PRESENCE NOT SPECIFIED: ICD-10-CM

## 2017-08-07 ENCOUNTER — OFFICE VISIT (OUTPATIENT)
Dept: ONCOLOGY | Facility: CLINIC | Age: 52
End: 2017-08-07
Attending: INTERNAL MEDICINE

## 2017-08-07 ENCOUNTER — ANTICOAGULATION VISIT (OUTPATIENT)
Dept: LAB | Facility: HOSPITAL | Age: 52
End: 2017-08-07
Attending: INTERNAL MEDICINE

## 2017-08-07 VITALS
HEART RATE: 62 BPM | WEIGHT: 293 LBS | SYSTOLIC BLOOD PRESSURE: 128 MMHG | HEIGHT: 67 IN | RESPIRATION RATE: 18 BRPM | DIASTOLIC BLOOD PRESSURE: 82 MMHG | TEMPERATURE: 97.8 F | BODY MASS INDEX: 45.99 KG/M2 | OXYGEN SATURATION: 95 %

## 2017-08-07 DIAGNOSIS — I26.92 ACUTE SADDLE PULMONARY EMBOLISM WITHOUT ACUTE COR PULMONALE (HCC): Primary | ICD-10-CM

## 2017-08-07 DIAGNOSIS — D68.61 ANTIPHOSPHOLIPID SYNDROME (HCC): Chronic | ICD-10-CM

## 2017-08-07 LAB
BASOPHILS # BLD AUTO: 0.06 10*3/MM3 (ref 0–0.1)
BASOPHILS NFR BLD AUTO: 0.8 % (ref 0–1.1)
DEPRECATED RDW RBC AUTO: 39.9 FL (ref 37–49)
EOSINOPHIL # BLD AUTO: 0.2 10*3/MM3 (ref 0–0.36)
EOSINOPHIL NFR BLD AUTO: 2.7 % (ref 1–5)
ERYTHROCYTE [DISTWIDTH] IN BLOOD BY AUTOMATED COUNT: 13.6 % (ref 11.7–14.5)
HCT VFR BLD AUTO: 42.5 % (ref 34–45)
HGB BLD-MCNC: 13.8 G/DL (ref 11.5–14.9)
IMM GRANULOCYTES # BLD: 0.05 10*3/MM3 (ref 0–0.03)
IMM GRANULOCYTES NFR BLD: 0.7 % (ref 0–0.5)
INR PPP: 1.9 (ref 0.9–1.1)
LYMPHOCYTES # BLD AUTO: 1.32 10*3/MM3 (ref 1–3.5)
LYMPHOCYTES NFR BLD AUTO: 17.8 % (ref 20–49)
MCH RBC QN AUTO: 26.3 PG (ref 27–33)
MCHC RBC AUTO-ENTMCNC: 32.5 G/DL (ref 32–35)
MCV RBC AUTO: 81.1 FL (ref 83–97)
MONOCYTES # BLD AUTO: 0.54 10*3/MM3 (ref 0.25–0.8)
MONOCYTES NFR BLD AUTO: 7.3 % (ref 4–12)
NEUTROPHILS # BLD AUTO: 5.25 10*3/MM3 (ref 1.5–7)
NEUTROPHILS NFR BLD AUTO: 70.7 % (ref 39–75)
NRBC BLD MANUAL-RTO: 0 /100 WBC (ref 0–0)
PLATELET # BLD AUTO: 241 10*3/MM3 (ref 150–375)
PMV BLD AUTO: 10.3 FL (ref 8.9–12.1)
PROTHROMBIN TIME: 23.2 SECONDS (ref 11–13.5)
RBC # BLD AUTO: 5.24 10*6/MM3 (ref 3.9–5)
WBC NRBC COR # BLD: 7.42 10*3/MM3 (ref 4–10)

## 2017-08-07 PROCEDURE — 85610 PROTHROMBIN TIME: CPT | Performed by: INTERNAL MEDICINE

## 2017-08-07 PROCEDURE — 36416 COLLJ CAPILLARY BLOOD SPEC: CPT | Performed by: INTERNAL MEDICINE

## 2017-08-07 PROCEDURE — 85025 COMPLETE CBC W/AUTO DIFF WBC: CPT | Performed by: INTERNAL MEDICINE

## 2017-08-07 PROCEDURE — 99213 OFFICE O/P EST LOW 20 MIN: CPT | Performed by: INTERNAL MEDICINE

## 2017-08-07 RX ORDER — OMEPRAZOLE 20 MG/1
CAPSULE, DELAYED RELEASE ORAL
Qty: 90 CAPSULE | Refills: 0 | Status: SHIPPED | OUTPATIENT
Start: 2017-08-07 | End: 2017-12-03 | Stop reason: SDUPTHER

## 2017-08-07 NOTE — PROGRESS NOTES
REASONS FOR FOLLOWUP:  Antiphospholipid antibody syndrome with history of pulmonary embolism March 2016    History of Present Illness    Mrs. Garcia is a 52-year-old woman with history of pulmonary embolism March 2016.  She was found to have positive lupus anticoagulant and cardiolipin antibodies at the time of her pulmonary embolism and has been on anticoagulation with Coumadin since diagnosis.  She has had no recurrent thromboembolic events while anticoagulated.  She has no bleeding problems.    She comes in today feeling well.  She denies leg swelling or shortness of breath.  She is adherent to Coumadin.  She denies bleeding.    Repeat antiphospholipid antibody labs were performed July 2017 and persistently positive for lupus anticoagulant, cardiolipin IgG, and beta-2 glycoprotein 1 IgG and low level IgA.    Past Medical History:   Diagnosis Date   • Anemia    • Angioedema     Lower lip   • Arthritis    • Depression    • Diastolic dysfunction    • DVT (deep venous thrombosis)    • GERD (gastroesophageal reflux disease)    • H/O antiphospholipid syndrome    • Hiatal hernia    • Hypertension    • Hypothyroidism    • Lupus anticoagulant disorder    • Lupus anticoagulant disorder    • Morbid obesity    • CRISTI (obstructive sleep apnea)    • PE (pulmonary embolism)     Bilateral   • Right ventricular dilation    • Thrombocytopenia        ONCOLOGIC HISTORY:  (History from previous dates can be found in the separate document.)    Current Outpatient Prescriptions on File Prior to Visit   Medication Sig Dispense Refill   • buPROPion XL (WELLBUTRIN XL) 300 MG 24 hr tablet Take 1 tablet by mouth Every Night. 90 tablet 1   • cetirizine (ZyrTEC) 10 MG tablet Take 10 mg by mouth daily.     • levothyroxine (SYNTHROID, LEVOTHROID) 175 MCG tablet Take 1 tablet by mouth Daily. 90 tablet 1   • lisinopril (PRINIVIL,ZESTRIL) 20 MG tablet Take 1 tablet by mouth Daily. 90 tablet 3   • omeprazole (priLOSEC) 20 MG capsule TAKE 1 CAPSULE  "DAILY 90 capsule 0   • sertraline (ZOLOFT) 100 MG tablet Take 1 tablet by mouth Every Night. 90 tablet 1   • warfarin (COUMADIN) 7.5 MG tablet Take 2 tablets by mouth Daily. 240 tablet 3   • [DISCONTINUED] omeprazole (PRILOSEC) 20 MG capsule Take 1 capsule by mouth Daily. 90 capsule 3   • [DISCONTINUED] warfarin (COUMADIN) 7.5 MG tablet TAKE 2 TABLETS (15 MG) EVERY DAY OR AS DIRECTED 90 tablet 0     No current facility-administered medications on file prior to visit.        ALLERGIES:     Allergies   Allergen Reactions   • Dust Mite Extract    • Losartan Angioedema   • Sulfa Antibiotics        Social History     Social History   • Marital status: Single     Spouse name: N/A   • Number of children: 0   • Years of education: N/A     Occupational History   • Desk Work      Social History Main Topics   • Smoking status: Never Smoker   • Smokeless tobacco: Never Used   • Alcohol use Yes      Comment: occ   • Drug use: No   • Sexual activity: Not on file     Other Topics Concern   • Not on file     Social History Narrative         Cancer-related family history includes Uterine cancer in her mother.     Review of Systems  A comprehensive 14 point review of systems was performed and was negative except as mentioned.    Objective      Vitals:    08/07/17 0940   BP: 128/82   Pulse: 62   Resp: 18   Temp: 97.8 °F (36.6 °C)   SpO2: 95%   Weight: (!) 358 lb 14.4 oz (163 kg)   Height: 66.54\" (169 cm)   PainSc: 0-No pain     Current Status 8/7/2017   ECOG score 0       Physical Exam   GENERAL: Well-developed, well-nourished in no acute distress.   SKIN: Warm, dry without rashes, purpura or petechiae.  NECK: Supple with good range of motion; no thyromegaly or masses, no JVD.  LYMPHATICS: No cervical, supraclavicular, axillary or inguinal adenopathy.  CHEST: Lungs clear to percussion and auscultation. Good airflow.  CARDIAC: Regular rate and rhythm without murmurs, rubs or gallops. Normal S1,S2.  ABDOMEN: Soft, nontender with no " organomegaly or masses.  EXTREMITIES: No clubbing, cyanosis or edema..   PSYCHIATRIC: Normal affect and mood.      RECENT LABS:  Hematology WBC   Date Value Ref Range Status   08/07/2017 7.42 4.00 - 10.00 10*3/mm3 Final     RBC   Date Value Ref Range Status   08/07/2017 5.24 (H) 3.90 - 5.00 10*6/mm3 Final     Hemoglobin   Date Value Ref Range Status   08/07/2017 13.8 11.5 - 14.9 g/dL Final     Hematocrit   Date Value Ref Range Status   08/07/2017 42.5 34.0 - 45.0 % Final     Platelets   Date Value Ref Range Status   08/07/2017 241 150 - 375 10*3/mm3 Final      INR. 1.9    Assessment/Plan    This patient has history of pulmonary embolism in March 2016 secondary to antiphospholipid antibody syndrome.  She has been anticoagulated with Coumadin, goal INR 2.0-3.0 since diagnosis.  She has had no recurrence of thromboembolic events on anticoagulation.  Repeat antiphospholipid antibody labs were performed July 2017 and persistently positive for lupus anticoagulant, cardiolipin IgG, and beta-2 glycoprotein 1 IgG and low level IgA.  I explained to the patient that with her persistent positive antibody titers, she will require likely lifelong anticoagulation.  She prefers Coumadin over newer generation anticoagulants.  She is interested in a home monitoring system which I will have our lab supervisor investigate with her insurance.  I have scheduled her for monthly INR until she receives a home monitoring system if feasible.  Six-month follow-up requested.

## 2017-09-06 ENCOUNTER — CLINICAL SUPPORT (OUTPATIENT)
Dept: ONCOLOGY | Facility: HOSPITAL | Age: 52
End: 2017-09-06

## 2017-09-06 ENCOUNTER — ANTICOAGULATION VISIT (OUTPATIENT)
Dept: LAB | Facility: HOSPITAL | Age: 52
End: 2017-09-06

## 2017-09-06 DIAGNOSIS — I26.92 ACUTE SADDLE PULMONARY EMBOLISM WITHOUT ACUTE COR PULMONALE (HCC): ICD-10-CM

## 2017-09-06 DIAGNOSIS — D68.61 ANTIPHOSPHOLIPID SYNDROME (HCC): Chronic | ICD-10-CM

## 2017-09-06 LAB
INR PPP: 2.6 (ref 0.9–1.1)
PROTHROMBIN TIME: 31.5 SECONDS (ref 11–13.5)

## 2017-09-06 PROCEDURE — 85610 PROTHROMBIN TIME: CPT

## 2017-10-01 DIAGNOSIS — E03.9 ACQUIRED HYPOTHYROIDISM: ICD-10-CM

## 2017-10-02 RX ORDER — LEVOTHYROXINE SODIUM 175 UG/1
TABLET ORAL
Qty: 90 TABLET | Refills: 1 | Status: SHIPPED | OUTPATIENT
Start: 2017-10-02 | End: 2018-03-13 | Stop reason: SDUPTHER

## 2017-10-04 ENCOUNTER — CLINICAL SUPPORT (OUTPATIENT)
Dept: ONCOLOGY | Facility: HOSPITAL | Age: 52
End: 2017-10-04

## 2017-10-04 ENCOUNTER — ANTICOAGULATION VISIT (OUTPATIENT)
Dept: LAB | Facility: HOSPITAL | Age: 52
End: 2017-10-04

## 2017-10-04 DIAGNOSIS — D68.61 ANTIPHOSPHOLIPID SYNDROME (HCC): Chronic | ICD-10-CM

## 2017-10-04 DIAGNOSIS — I26.92 ACUTE SADDLE PULMONARY EMBOLISM WITHOUT ACUTE COR PULMONALE (HCC): ICD-10-CM

## 2017-10-04 LAB
INR PPP: 3.5 (ref 0.9–1.1)
PROTHROMBIN TIME: 41.6 SECONDS (ref 11–13.5)

## 2017-10-04 PROCEDURE — 85610 PROTHROMBIN TIME: CPT

## 2017-11-01 ENCOUNTER — APPOINTMENT (OUTPATIENT)
Dept: LAB | Facility: HOSPITAL | Age: 52
End: 2017-11-01

## 2017-11-01 ENCOUNTER — APPOINTMENT (OUTPATIENT)
Dept: ONCOLOGY | Facility: HOSPITAL | Age: 52
End: 2017-11-01

## 2017-11-06 DIAGNOSIS — F41.9 ANXIETY: ICD-10-CM

## 2017-11-06 RX ORDER — BUPROPION HYDROCHLORIDE 300 MG/1
TABLET ORAL
Qty: 90 TABLET | Refills: 1 | Status: SHIPPED | OUTPATIENT
Start: 2017-11-06 | End: 2018-05-06 | Stop reason: SDUPTHER

## 2017-11-06 RX ORDER — SERTRALINE HYDROCHLORIDE 100 MG/1
TABLET, FILM COATED ORAL
Qty: 90 TABLET | Refills: 1 | Status: SHIPPED | OUTPATIENT
Start: 2017-11-06 | End: 2018-05-06 | Stop reason: SDUPTHER

## 2017-11-08 ENCOUNTER — ANTICOAGULATION VISIT (OUTPATIENT)
Dept: LAB | Facility: HOSPITAL | Age: 52
End: 2017-11-08

## 2017-11-08 ENCOUNTER — CLINICAL SUPPORT (OUTPATIENT)
Dept: ONCOLOGY | Facility: HOSPITAL | Age: 52
End: 2017-11-08

## 2017-11-08 DIAGNOSIS — D68.61 ANTIPHOSPHOLIPID SYNDROME (HCC): Chronic | ICD-10-CM

## 2017-11-08 DIAGNOSIS — I26.92 ACUTE SADDLE PULMONARY EMBOLISM WITHOUT ACUTE COR PULMONALE (HCC): ICD-10-CM

## 2017-11-08 LAB
INR PPP: 3.3 (ref 0.9–1.1)
PROTHROMBIN TIME: 39.1 SECONDS (ref 11–13.5)

## 2017-11-08 PROCEDURE — 85610 PROTHROMBIN TIME: CPT

## 2017-12-03 DIAGNOSIS — K21.9 GASTROESOPHAGEAL REFLUX DISEASE, ESOPHAGITIS PRESENCE NOT SPECIFIED: ICD-10-CM

## 2017-12-04 RX ORDER — OMEPRAZOLE 20 MG/1
CAPSULE, DELAYED RELEASE ORAL
Qty: 90 CAPSULE | Refills: 0 | Status: SHIPPED | OUTPATIENT
Start: 2017-12-04 | End: 2018-03-13 | Stop reason: SDUPTHER

## 2017-12-06 ENCOUNTER — ANTICOAGULATION VISIT (OUTPATIENT)
Dept: LAB | Facility: HOSPITAL | Age: 52
End: 2017-12-06

## 2017-12-06 ENCOUNTER — CLINICAL SUPPORT (OUTPATIENT)
Dept: ONCOLOGY | Facility: HOSPITAL | Age: 52
End: 2017-12-06

## 2017-12-06 ENCOUNTER — APPOINTMENT (OUTPATIENT)
Dept: ONCOLOGY | Facility: HOSPITAL | Age: 52
End: 2017-12-06

## 2017-12-06 DIAGNOSIS — D68.61 ANTIPHOSPHOLIPID SYNDROME (HCC): Chronic | ICD-10-CM

## 2017-12-06 DIAGNOSIS — I26.92 ACUTE SADDLE PULMONARY EMBOLISM WITHOUT ACUTE COR PULMONALE (HCC): ICD-10-CM

## 2017-12-06 LAB
INR PPP: 2.3 (ref 0.9–1.1)
PROTHROMBIN TIME: 27.2 SECONDS (ref 11–13.5)

## 2017-12-06 PROCEDURE — 85610 PROTHROMBIN TIME: CPT

## 2018-01-03 ENCOUNTER — APPOINTMENT (OUTPATIENT)
Dept: ONCOLOGY | Facility: HOSPITAL | Age: 53
End: 2018-01-03

## 2018-01-03 ENCOUNTER — APPOINTMENT (OUTPATIENT)
Dept: LAB | Facility: HOSPITAL | Age: 53
End: 2018-01-03

## 2018-01-10 ENCOUNTER — LAB (OUTPATIENT)
Dept: LAB | Facility: HOSPITAL | Age: 53
End: 2018-01-10

## 2018-01-10 ENCOUNTER — INFUSION (OUTPATIENT)
Dept: ONCOLOGY | Facility: HOSPITAL | Age: 53
End: 2018-01-10

## 2018-01-10 DIAGNOSIS — D68.61 ANTIPHOSPHOLIPID SYNDROME (HCC): Chronic | ICD-10-CM

## 2018-01-10 DIAGNOSIS — I26.92 ACUTE SADDLE PULMONARY EMBOLISM WITHOUT ACUTE COR PULMONALE (HCC): ICD-10-CM

## 2018-01-10 LAB
INR PPP: 3.3 (ref 0.9–1.1)
PROTHROMBIN TIME: 39.4 SECONDS (ref 11–13.5)

## 2018-01-10 PROCEDURE — 85610 PROTHROMBIN TIME: CPT

## 2018-01-10 NOTE — PROGRESS NOTES
Pt here for INR review. INR 3.3. Pt denies missed doses and has no new meds. Coumadin adjusted per SS. Copy of labs and new schedule given to pt. Pt v/u.

## 2018-01-23 ENCOUNTER — OFFICE VISIT (OUTPATIENT)
Dept: INTERNAL MEDICINE | Facility: CLINIC | Age: 53
End: 2018-01-23

## 2018-01-23 ENCOUNTER — APPOINTMENT (OUTPATIENT)
Dept: WOMENS IMAGING | Facility: HOSPITAL | Age: 53
End: 2018-01-23

## 2018-01-23 VITALS
SYSTOLIC BLOOD PRESSURE: 130 MMHG | OXYGEN SATURATION: 96 % | BODY MASS INDEX: 45.99 KG/M2 | HEIGHT: 67 IN | DIASTOLIC BLOOD PRESSURE: 82 MMHG | WEIGHT: 293 LBS | HEART RATE: 77 BPM

## 2018-01-23 DIAGNOSIS — Z12.11 SCREENING FOR COLON CANCER: ICD-10-CM

## 2018-01-23 DIAGNOSIS — Z00.00 PE (PHYSICAL EXAM), ANNUAL: Primary | ICD-10-CM

## 2018-01-23 DIAGNOSIS — Z12.31 ENCOUNTER FOR SCREENING MAMMOGRAM FOR BREAST CANCER: Primary | ICD-10-CM

## 2018-01-23 LAB
ALBUMIN SERPL-MCNC: 4.2 G/DL (ref 3.5–5.2)
ALBUMIN/GLOB SERPL: 1.1 G/DL
ALP SERPL-CCNC: 77 U/L (ref 39–117)
ALT SERPL W P-5'-P-CCNC: 19 U/L (ref 1–33)
ANION GAP SERPL CALCULATED.3IONS-SCNC: 17 MMOL/L
AST SERPL-CCNC: 16 U/L (ref 1–32)
BASOPHILS # BLD AUTO: 0.03 10*3/MM3 (ref 0–0.2)
BASOPHILS NFR BLD AUTO: 0.4 % (ref 0–2)
BILIRUB SERPL-MCNC: 0.5 MG/DL (ref 0.1–1.2)
BILIRUB UR QL STRIP: NEGATIVE
BUN BLD-MCNC: 10 MG/DL (ref 6–20)
BUN/CREAT SERPL: 13.2 (ref 7–25)
CALCIUM SPEC-SCNC: 9.5 MG/DL (ref 8.6–10.5)
CHLORIDE SERPL-SCNC: 98 MMOL/L (ref 98–107)
CHOLEST SERPL-MCNC: 212 MG/DL (ref 0–200)
CLARITY UR: CLEAR
CO2 SERPL-SCNC: 25 MMOL/L (ref 22–29)
COLOR UR: YELLOW
CREAT BLD-MCNC: 0.76 MG/DL (ref 0.57–1)
DEPRECATED RDW RBC AUTO: 40.2 FL (ref 37–54)
EOSINOPHIL # BLD AUTO: 0.17 10*3/MM3 (ref 0–0.7)
EOSINOPHIL NFR BLD AUTO: 2.2 % (ref 0–5)
ERYTHROCYTE [DISTWIDTH] IN BLOOD BY AUTOMATED COUNT: 13.5 % (ref 11.5–15)
GFR SERPL CREATININE-BSD FRML MDRD: 80 ML/MIN/1.73
GLOBULIN UR ELPH-MCNC: 3.8 GM/DL
GLUCOSE BLD-MCNC: 71 MG/DL (ref 65–99)
GLUCOSE UR STRIP-MCNC: NEGATIVE MG/DL
HCT VFR BLD AUTO: 42.2 % (ref 34.1–44.9)
HDLC SERPL-MCNC: 44 MG/DL (ref 40–60)
HGB BLD-MCNC: 13.6 G/DL (ref 11.2–15.7)
HGB UR QL STRIP.AUTO: NEGATIVE
KETONES UR QL STRIP: NEGATIVE
LDLC SERPL CALC-MCNC: 138 MG/DL (ref 0–100)
LDLC/HDLC SERPL: 3.15 {RATIO}
LEUKOCYTE ESTERASE UR QL STRIP.AUTO: NEGATIVE
LYMPHOCYTES # BLD AUTO: 1.6 10*3/MM3 (ref 0.8–7)
LYMPHOCYTES NFR BLD AUTO: 20.4 % (ref 10–60)
MCH RBC QN AUTO: 26.6 PG (ref 26–34)
MCHC RBC AUTO-ENTMCNC: 32.2 G/DL (ref 31–37)
MCV RBC AUTO: 82.6 FL (ref 80–100)
MONOCYTES # BLD AUTO: 0.75 10*3/MM3 (ref 0–1)
MONOCYTES NFR BLD AUTO: 9.6 % (ref 0–13)
NEUTROPHILS # BLD AUTO: 5.29 10*3/MM3 (ref 1–11)
NEUTROPHILS NFR BLD AUTO: 67.4 % (ref 30–85)
NITRITE UR QL STRIP: NEGATIVE
PH UR STRIP.AUTO: 5.5 [PH] (ref 5–8)
PLATELET # BLD AUTO: 291 10*3/MM3 (ref 150–450)
PMV BLD AUTO: 10.2 FL (ref 6–12)
POTASSIUM BLD-SCNC: 4 MMOL/L (ref 3.5–5.2)
PROT SERPL-MCNC: 8 G/DL (ref 6–8.5)
PROT UR QL STRIP: NEGATIVE
RBC # BLD AUTO: 5.11 10*6/MM3 (ref 3.93–5.22)
SODIUM BLD-SCNC: 140 MMOL/L (ref 136–145)
SP GR UR STRIP: 1.01 (ref 1–1.03)
TRIGL SERPL-MCNC: 148 MG/DL (ref 0–150)
TSH SERPL-ACNC: 0.97 MIU/ML (ref 0.27–4.2)
UROBILINOGEN UR QL STRIP: NORMAL
VLDLC SERPL-MCNC: 29.6 MG/DL (ref 5–40)
WBC NRBC COR # BLD: 7.84 10*3/MM3 (ref 5–10)

## 2018-01-23 PROCEDURE — 99396 PREV VISIT EST AGE 40-64: CPT | Performed by: NURSE PRACTITIONER

## 2018-01-23 PROCEDURE — 93000 ELECTROCARDIOGRAM COMPLETE: CPT | Performed by: NURSE PRACTITIONER

## 2018-01-23 PROCEDURE — 80053 COMPREHEN METABOLIC PANEL: CPT | Performed by: NURSE PRACTITIONER

## 2018-01-23 PROCEDURE — 80061 LIPID PANEL: CPT | Performed by: NURSE PRACTITIONER

## 2018-01-23 PROCEDURE — 81003 URINALYSIS AUTO W/O SCOPE: CPT | Performed by: NURSE PRACTITIONER

## 2018-01-23 PROCEDURE — 85025 COMPLETE CBC W/AUTO DIFF WBC: CPT | Performed by: NURSE PRACTITIONER

## 2018-01-23 PROCEDURE — 77067 SCR MAMMO BI INCL CAD: CPT | Performed by: NURSE PRACTITIONER

## 2018-01-23 NOTE — PROGRESS NOTES
Subjective   Carli Garcia is a 52 y.o. female.         HPI Comments: She is feeling well, has tapered off O2. She is maintained on Coumadin due to hx DVT & PE.       The following portions of the patient's history were reviewed and updated as appropriate: allergies, current medications, past social history and problem list.    Past Medical History:   Diagnosis Date   • Anemia    • Angioedema     Lower lip   • Arthritis    • Depression    • Diastolic dysfunction    • DVT (deep venous thrombosis)    • GERD (gastroesophageal reflux disease)    • H/O antiphospholipid syndrome    • Hiatal hernia    • Hypertension    • Hypothyroidism    • Lupus anticoagulant disorder    • Lupus anticoagulant disorder    • Morbid obesity    • CRISTI (obstructive sleep apnea)    • PE (pulmonary embolism)     Bilateral   • Right ventricular dilation    • Thrombocytopenia          Current Outpatient Prescriptions:   •  buPROPion XL (WELLBUTRIN XL) 300 MG 24 hr tablet, TAKE 1 TABLET EVERY NIGHT, Disp: 90 tablet, Rfl: 1  •  cetirizine (ZyrTEC) 10 MG tablet, Take 10 mg by mouth daily., Disp: , Rfl:   •  levothyroxine (SYNTHROID, LEVOTHROID) 175 MCG tablet, TAKE 1 TABLET DAILY, Disp: 90 tablet, Rfl: 1  •  lisinopril (PRINIVIL,ZESTRIL) 20 MG tablet, Take 1 tablet by mouth Daily., Disp: 90 tablet, Rfl: 3  •  omeprazole (priLOSEC) 20 MG capsule, TAKE 1 CAPSULE DAILY, Disp: 90 capsule, Rfl: 0  •  sertraline (ZOLOFT) 100 MG tablet, TAKE 1 TABLET EVERY NIGHT, Disp: 90 tablet, Rfl: 1  •  warfarin (COUMADIN) 7.5 MG tablet, Take 2 tablets by mouth Daily., Disp: 240 tablet, Rfl: 3    Allergies   Allergen Reactions   • Dust Mite Extract    • Losartan Angioedema   • Sulfa Antibiotics        Review of Systems   Constitutional: Negative for activity change, appetite change, chills, diaphoresis, fatigue, fever and unexpected weight change.   HENT: Negative for congestion, dental problem, drooling, ear discharge, ear pain, facial swelling, hearing loss, mouth  "sores, nosebleeds, postnasal drip, rhinorrhea, sinus pressure, sore throat, tinnitus and trouble swallowing.    Eyes: Negative for photophobia, pain, discharge, redness, itching and visual disturbance.   Respiratory: Negative for apnea, cough, choking, chest tightness, shortness of breath and wheezing.    Cardiovascular: Negative for chest pain, palpitations and leg swelling.        No orthopnea, PND, VALERIO   Gastrointestinal: Negative for abdominal pain, blood in stool, constipation, diarrhea, nausea and vomiting.   Endocrine: Negative for cold intolerance, heat intolerance, polydipsia and polyuria.   Genitourinary: Negative for decreased urine volume, dysuria, enuresis, flank pain, frequency, hematuria and urgency.   Musculoskeletal: Negative for arthralgias, back pain, gait problem, joint swelling, myalgias, neck pain and neck stiffness.   Skin: Negative for color change and rash.        No hair changes, no nail changes   Allergic/Immunologic: Negative for environmental allergies, food allergies and immunocompromised state.   Neurological: Negative for dizziness, tremors, seizures, syncope, speech difficulty, weakness, light-headedness, numbness and headaches.   Hematological: Negative for adenopathy. Does not bruise/bleed easily.   Psychiatric/Behavioral: Negative for agitation, confusion, decreased concentration, dysphoric mood, sleep disturbance and suicidal ideas. The patient is not nervous/anxious.        Objective   Vitals:    01/23/18 1311   BP: 130/82   BP Location: Left arm   Patient Position: Sitting   Cuff Size: Large Adult   Pulse: 77   SpO2: 96%   Weight: (!) 160 kg (352 lb)   Height: 169 cm (66.54\")     Physical Exam   Constitutional: She is oriented to person, place, and time. She appears well-developed and well-nourished. No distress.   HENT:   Right Ear: Hearing, tympanic membrane, external ear and ear canal normal.   Left Ear: Hearing, tympanic membrane, external ear and ear canal normal.   Nose: " Right sinus exhibits no maxillary sinus tenderness and no frontal sinus tenderness. Left sinus exhibits no maxillary sinus tenderness and no frontal sinus tenderness.   Eyes: Conjunctivae, EOM and lids are normal. Pupils are equal, round, and reactive to light.   Neck: Trachea normal and phonation normal. Neck supple. Normal carotid pulses and no JVD present. Carotid bruit is not present. No thyroid mass and no thyromegaly present.   Cardiovascular: Normal rate, regular rhythm, S1 normal and S2 normal.  PMI is not displaced.  Exam reveals no gallop and no friction rub.    No murmur heard.  Pulses:       Carotid pulses are 2+ on the right side, and 2+ on the left side.       Radial pulses are 2+ on the right side, and 2+ on the left side.        Dorsalis pedis pulses are 2+ on the right side, and 2+ on the left side.   Pulmonary/Chest: Effort normal and breath sounds normal. She has no wheezes. She has no rhonchi. She has no rales. Chest wall is not dull to percussion. Right breast exhibits no inverted nipple, no mass, no nipple discharge, no skin change and no tenderness. Left breast exhibits no inverted nipple, no mass, no nipple discharge, no skin change and no tenderness.   Abdominal: Soft. Normal appearance, normal aorta and bowel sounds are normal. She exhibits no abdominal bruit and no mass. There is no hepatosplenomegaly. There is no tenderness.   Musculoskeletal: Normal range of motion. She exhibits no edema or tenderness.   Lymphadenopathy:     She has no cervical adenopathy.        Right: No supraclavicular adenopathy present.        Left: No supraclavicular adenopathy present.   Neurological: She is alert and oriented to person, place, and time. She has normal strength and normal reflexes. No cranial nerve deficit or sensory deficit. Coordination normal.   Skin: Skin is warm and dry. No rash noted.   Psychiatric: She has a normal mood and affect. Her speech is normal and behavior is normal. Judgment and  thought content normal. Cognition and memory are normal. She is attentive.   Nursing note and vitals reviewed.      Assessment/Plan   Carli was seen today for annual exam.    Diagnoses and all orders for this visit:    PE (physical exam), annual  -     CBC Auto Differential; Future  -     Comprehensive Metabolic Panel; Future  -     Lipid Panel; Future  -     TSH; Future  -     Urinalysis With / Microscopic If Indicated - Urine, Clean Catch; Future  -     Nicotine Screen, Urine - Urine, Clean Catch; Future  -     CBC Auto Differential  -     Comprehensive Metabolic Panel  -     Lipid Panel  -     TSH  -     Urinalysis With / Microscopic If Indicated - Urine, Clean Catch  -     Nicotine Screen, Urine - Urine, Clean Catch    Screening for colon cancer  Comments:  she has declined screening colonoscopy despite known risks, will check home stool studies    Other orders  -     ECG 12 Lead        ECG 12 Lead  Date/Time: 1/23/2018 1:50 AM  Performed by: YAIR GREGORIO  Authorized by: ULYSSES ENGLAND   Comparison: compared with previous ECG from 7/10/2017  Similar to previous ECG  Rhythm: sinus rhythm  Rate: normal  Rate comments: 68  Conduction: conduction normal  ST Segments: ST segments normal  T Waves: T waves normal  QRS axis: normal (QRS Duration 88)  Other: no other findings  Clinical impression: normal ECG  Comments: QT Interval 406  Corrected QT Interval 432

## 2018-01-24 ENCOUNTER — TELEPHONE (OUTPATIENT)
Dept: INTERNAL MEDICINE | Facility: CLINIC | Age: 53
End: 2018-01-24

## 2018-01-24 DIAGNOSIS — Z12.11 SCREENING FOR COLON CANCER: Primary | ICD-10-CM

## 2018-01-24 LAB
COTININE UR-MCNC: NEGATIVE NG/ML
Lab: NORMAL

## 2018-01-24 NOTE — TELEPHONE ENCOUNTER
Please call patient and ask her to  hemoccult cards to check for blood in her stool. She has an appt with [facility] so she could pick them up then if she would like, please place up front for her. Thanks.

## 2018-01-25 NOTE — TELEPHONE ENCOUNTER
Faxed Health Provider Screening Form faxed to [facility].  Fax confirmation received at 8:04 a.m.   Original form up for scanning.

## 2018-01-30 RX ORDER — WARFARIN SODIUM 7.5 MG/1
TABLET ORAL
Qty: 240 TABLET | Refills: 0 | Status: SHIPPED | OUTPATIENT
Start: 2018-01-30 | End: 2018-05-06 | Stop reason: SDUPTHER

## 2018-02-12 ENCOUNTER — TELEPHONE (OUTPATIENT)
Dept: INTERNAL MEDICINE | Facility: CLINIC | Age: 53
End: 2018-02-12

## 2018-02-12 NOTE — TELEPHONE ENCOUNTER
----- Message from Aylin Mittal sent at 2/12/2018  2:19 PM EST -----  Contact: pt - Dr Elizondo' pt - RE: WYATT  Pt calling and would like a return call regarding pt in hospital 02/10/2018 for gallbladder attack. Could you please call to discuss? Please advise. Thanks        Pt # [phone]

## 2018-02-12 NOTE — TELEPHONE ENCOUNTER
Lupe-I spoke with pt.  She said she does not need a call back unless you have anything you would like her to do.  She states she feels okay today.  Was given scripts for Omeprazole, Zofran, and Hydrocodone 5-325 mg, but she has not filled any of them yet.  States she already takes Omeprazole 20 mg.    She was seen at [facility] on [facility] and I have left them a message to fax her records to clinic fax.

## 2018-02-13 NOTE — TELEPHONE ENCOUNTER
Reviewed records and called patient, she is feeling fine now (c/o epigastric pain after eating lasagna). CT abd/pelvis did not show any acute abnormalities. If sx return will consider further evaluation of gallbladder.

## 2018-02-14 ENCOUNTER — ANTICOAGULATION VISIT (OUTPATIENT)
Dept: LAB | Facility: HOSPITAL | Age: 53
End: 2018-02-14

## 2018-02-14 ENCOUNTER — OFFICE VISIT (OUTPATIENT)
Dept: ONCOLOGY | Facility: CLINIC | Age: 53
End: 2018-02-14

## 2018-02-14 VITALS
OXYGEN SATURATION: 94 % | HEART RATE: 77 BPM | WEIGHT: 293 LBS | RESPIRATION RATE: 18 BRPM | TEMPERATURE: 97.5 F | DIASTOLIC BLOOD PRESSURE: 76 MMHG | HEIGHT: 67 IN | BODY MASS INDEX: 45.99 KG/M2 | SYSTOLIC BLOOD PRESSURE: 124 MMHG

## 2018-02-14 DIAGNOSIS — Z79.01 ANTICOAGULANT LONG-TERM USE: ICD-10-CM

## 2018-02-14 DIAGNOSIS — D68.61 ANTIPHOSPHOLIPID SYNDROME (HCC): Primary | Chronic | ICD-10-CM

## 2018-02-14 DIAGNOSIS — D68.61 ANTIPHOSPHOLIPID SYNDROME (HCC): ICD-10-CM

## 2018-02-14 DIAGNOSIS — I26.92 ACUTE SADDLE PULMONARY EMBOLISM WITHOUT ACUTE COR PULMONALE (HCC): ICD-10-CM

## 2018-02-14 LAB
BASOPHILS # BLD AUTO: 0.07 10*3/MM3 (ref 0–0.1)
BASOPHILS NFR BLD AUTO: 0.8 % (ref 0–1.1)
DEPRECATED RDW RBC AUTO: 38.7 FL (ref 37–49)
EOSINOPHIL # BLD AUTO: 0.25 10*3/MM3 (ref 0–0.36)
EOSINOPHIL NFR BLD AUTO: 2.8 % (ref 1–5)
ERYTHROCYTE [DISTWIDTH] IN BLOOD BY AUTOMATED COUNT: 13.2 % (ref 11.7–14.5)
HCT VFR BLD AUTO: 43.4 % (ref 34–45)
HGB BLD-MCNC: 14 G/DL (ref 11.5–14.9)
IMM GRANULOCYTES # BLD: 0.06 10*3/MM3 (ref 0–0.03)
IMM GRANULOCYTES NFR BLD: 0.7 % (ref 0–0.5)
INR PPP: 1.7 (ref 0.9–1.1)
LYMPHOCYTES # BLD AUTO: 1.65 10*3/MM3 (ref 1–3.5)
LYMPHOCYTES NFR BLD AUTO: 18.5 % (ref 20–49)
MCH RBC QN AUTO: 26.2 PG (ref 27–33)
MCHC RBC AUTO-ENTMCNC: 32.3 G/DL (ref 32–35)
MCV RBC AUTO: 81.1 FL (ref 83–97)
MONOCYTES # BLD AUTO: 0.72 10*3/MM3 (ref 0.25–0.8)
MONOCYTES NFR BLD AUTO: 8.1 % (ref 4–12)
NEUTROPHILS # BLD AUTO: 6.18 10*3/MM3 (ref 1.5–7)
NEUTROPHILS NFR BLD AUTO: 69.1 % (ref 39–75)
NRBC BLD MANUAL-RTO: 0 /100 WBC (ref 0–0)
PLATELET # BLD AUTO: 241 10*3/MM3 (ref 150–375)
PMV BLD AUTO: 10.7 FL (ref 8.9–12.1)
PROTHROMBIN TIME: 20.5 SECONDS (ref 11–13.5)
RBC # BLD AUTO: 5.35 10*6/MM3 (ref 3.9–5)
WBC NRBC COR # BLD: 8.93 10*3/MM3 (ref 4–10)

## 2018-02-14 PROCEDURE — 99213 OFFICE O/P EST LOW 20 MIN: CPT | Performed by: INTERNAL MEDICINE

## 2018-02-14 PROCEDURE — 85610 PROTHROMBIN TIME: CPT | Performed by: INTERNAL MEDICINE

## 2018-02-14 PROCEDURE — 36416 COLLJ CAPILLARY BLOOD SPEC: CPT | Performed by: INTERNAL MEDICINE

## 2018-02-14 PROCEDURE — 85025 COMPLETE CBC W/AUTO DIFF WBC: CPT | Performed by: INTERNAL MEDICINE

## 2018-02-14 NOTE — PROGRESS NOTES
REASONS FOR FOLLOWUP:  Antiphospholipid antibody syndrome with history of pulmonary embolism March 2016    History of Present Illness    Mrs. Garcia is a 52-year-old woman with history of pulmonary embolism March 2016.  She was found to have positive lupus anticoagulant and cardiolipin antibodies at the time of her pulmonary embolism which have been persistent and has been on anticoagulation with Coumadin since diagnosis.  She has had no recurrent thromboembolic events while anticoagulated.  She has no bleeding problems.    She returns today doing fine with no significant shortness of breath or chest pain.  She tolerates Coumadin without bleeding difficulty.  We investigated a home monitoring system for the patient but financially was not feasible.    Past Medical History:   Diagnosis Date   • Anemia    • Angioedema     Lower lip   • Arthritis    • Depression    • Diastolic dysfunction    • DVT (deep venous thrombosis)    • GERD (gastroesophageal reflux disease)    • H/O antiphospholipid syndrome    • Hiatal hernia    • Hypertension    • Hypothyroidism    • Lupus anticoagulant disorder    • Lupus anticoagulant disorder    • Morbid obesity    • CRISTI (obstructive sleep apnea)    • PE (pulmonary embolism)     Bilateral   • Right ventricular dilation    • Thrombocytopenia        ONCOLOGIC HISTORY:  (History from previous dates can be found in the separate document.)    Current Outpatient Prescriptions on File Prior to Visit   Medication Sig Dispense Refill   • buPROPion XL (WELLBUTRIN XL) 300 MG 24 hr tablet TAKE 1 TABLET EVERY NIGHT 90 tablet 1   • cetirizine (ZyrTEC) 10 MG tablet Take 10 mg by mouth daily.     • levothyroxine (SYNTHROID, LEVOTHROID) 175 MCG tablet TAKE 1 TABLET DAILY 90 tablet 1   • lisinopril (PRINIVIL,ZESTRIL) 20 MG tablet Take 1 tablet by mouth Daily. 90 tablet 3   • omeprazole (priLOSEC) 20 MG capsule TAKE 1 CAPSULE DAILY 90 capsule 0   • sertraline (ZOLOFT) 100 MG tablet TAKE 1 TABLET EVERY  "NIGHT 90 tablet 1   • warfarin (COUMADIN) 7.5 MG tablet TAKE 2 TABLETS DAILY 240 tablet 0     No current facility-administered medications on file prior to visit.        ALLERGIES:     Allergies   Allergen Reactions   • Dust Mite Extract    • Losartan Angioedema   • Sulfa Antibiotics        Social History     Social History   • Marital status: Single     Spouse name: N/A   • Number of children: 0   • Years of education: N/A     Occupational History   • Desk Work [employer]     Social History Main Topics   • Smoking status: Never Smoker   • Smokeless tobacco: Never Used   • Alcohol use Yes      Comment: occ   • Drug use: No   • Sexual activity: Not on file     Other Topics Concern   • Not on file     Social History Narrative         Cancer-related family history includes Uterine cancer in her mother.     Review of Systems   Constitutional: Negative for unexpected weight change.   Respiratory: Negative for shortness of breath.    Cardiovascular: Negative for leg swelling.   Neurological: Negative for weakness.   Hematological: Does not bruise/bleed easily.     A comprehensive 14 point review of systems was performed and was negative except as mentioned.    Objective      Vitals:    02/14/18 1549   Height: 169 cm (66.54\")     Current Status 8/7/2017   ECOG score 0       Physical Exam   GENERAL: Well-developed, well-nourished in no acute distress. She is obese.  SKIN: Warm, dry without rashes, purpura or petechiae.  NECK: Supple with good range of motion; no thyromegaly or masses, no JVD.  LYMPHATICS: No cervical, supraclavicular, axillary or inguinal adenopathy.  CHEST: Lungs clear to percussion and auscultation. Good airflow.  CARDIAC: Regular rate and rhythm without murmurs, rubs or gallops. Normal S1,S2.  ABDOMEN: Soft, nontender with no organomegaly or masses.  EXTREMITIES: No clubbing, cyanosis or edema..   PSYCHIATRIC: Normal affect and mood.      RECENT LABS:  Hematology WBC   Date Value Ref Range Status "   01/23/2018 7.84 5.00 - 10.00 10*3/mm3 Final     RBC   Date Value Ref Range Status   01/23/2018 5.11 3.93 - 5.22 10*6/mm3 Final     Hemoglobin   Date Value Ref Range Status   01/23/2018 13.6 11.2 - 15.7 g/dL Final     Hematocrit   Date Value Ref Range Status   01/23/2018 42.2 34.1 - 44.9 % Final     Platelets   Date Value Ref Range Status   01/23/2018 291 150 - 450 10*3/mm3 Final      INR. 1.7    Assessment/Plan    1.   history of pulmonary embolism in March 2016 secondary to antiphospholipid antibody syndrome.  She has been anticoagulated with Coumadin, goal INR 2.0-3.0 since diagnosis.  She has had no recurrence of thromboembolic events on anticoagulation.  Repeat antiphospholipid antibody labs were performed July 2017 and persistently positive for lupus anticoagulant, cardiolipin IgG, and beta-2 glycoprotein 1 IgG and low level IgA.   Lifelong anticoagulation is therefore planned.    2.  Anticoagulation management:  The INR was subtherapeutic today 1.7 and her Coumadin was adjusted accordingly.  I recommended she continue to come monthly for INR and Coumadin adjustment.      I will see her back in 6 months.  The patient is on a high risk medication which requires frequent monitoring.

## 2018-03-13 DIAGNOSIS — E03.9 ACQUIRED HYPOTHYROIDISM: ICD-10-CM

## 2018-03-13 DIAGNOSIS — K21.9 GASTROESOPHAGEAL REFLUX DISEASE, ESOPHAGITIS PRESENCE NOT SPECIFIED: ICD-10-CM

## 2018-03-14 ENCOUNTER — INFUSION (OUTPATIENT)
Dept: ONCOLOGY | Facility: HOSPITAL | Age: 53
End: 2018-03-14

## 2018-03-14 ENCOUNTER — ANTICOAGULATION VISIT (OUTPATIENT)
Dept: LAB | Facility: HOSPITAL | Age: 53
End: 2018-03-14

## 2018-03-14 DIAGNOSIS — I26.92 ACUTE SADDLE PULMONARY EMBOLISM WITHOUT ACUTE COR PULMONALE (HCC): ICD-10-CM

## 2018-03-14 DIAGNOSIS — Z79.01 ANTICOAGULANT LONG-TERM USE: ICD-10-CM

## 2018-03-14 DIAGNOSIS — D68.61 ANTIPHOSPHOLIPID SYNDROME (HCC): Chronic | ICD-10-CM

## 2018-03-14 LAB
INR PPP: 1.8 (ref 0.9–1.1)
PROTHROMBIN TIME: 21.2 SECONDS (ref 11–13.5)

## 2018-03-14 PROCEDURE — 85610 PROTHROMBIN TIME: CPT

## 2018-03-14 RX ORDER — LEVOTHYROXINE SODIUM 175 UG/1
TABLET ORAL
Qty: 90 TABLET | Refills: 1 | Status: SHIPPED | OUTPATIENT
Start: 2018-03-14 | End: 2018-10-17 | Stop reason: SDUPTHER

## 2018-03-14 RX ORDER — OMEPRAZOLE 20 MG/1
CAPSULE, DELAYED RELEASE ORAL
Qty: 90 CAPSULE | Refills: 1 | Status: SHIPPED | OUTPATIENT
Start: 2018-03-14 | End: 2018-08-26 | Stop reason: SDUPTHER

## 2018-04-11 ENCOUNTER — INFUSION (OUTPATIENT)
Dept: ONCOLOGY | Facility: HOSPITAL | Age: 53
End: 2018-04-11

## 2018-04-11 ENCOUNTER — ANTICOAGULATION VISIT (OUTPATIENT)
Dept: LAB | Facility: HOSPITAL | Age: 53
End: 2018-04-11

## 2018-04-11 DIAGNOSIS — Z79.01 ANTICOAGULANT LONG-TERM USE: ICD-10-CM

## 2018-04-11 DIAGNOSIS — D68.61 ANTIPHOSPHOLIPID SYNDROME (HCC): Chronic | ICD-10-CM

## 2018-04-11 DIAGNOSIS — I26.92 ACUTE SADDLE PULMONARY EMBOLISM WITHOUT ACUTE COR PULMONALE (HCC): ICD-10-CM

## 2018-04-11 LAB
INR PPP: 3.2 (ref 0.9–1.1)
PROTHROMBIN TIME: 38.4 SECONDS (ref 11–13.5)

## 2018-04-11 PROCEDURE — 85610 PROTHROMBIN TIME: CPT

## 2018-04-25 ENCOUNTER — TELEPHONE (OUTPATIENT)
Dept: INTERNAL MEDICINE | Facility: CLINIC | Age: 53
End: 2018-04-25

## 2018-04-25 NOTE — TELEPHONE ENCOUNTER
----- Message from Brigid Odell sent at 4/25/2018 10:22 AM EDT -----  Contact: pt  Pt would like a call regarding right shoulder.  No injury.  Pain from repetitive motion.   Has full motion.  Has had a Cortizone injections in the past.  Not swollen or red.  More in elbow towards shoulder, more in muscle and joints.    Please call,  Pt# [phone]

## 2018-04-25 NOTE — TELEPHONE ENCOUNTER
Called pt for more info, advised pt that she would need to be seen in office to be evaluated. Pt states she will come into acute clinic this week, due to work schedule.

## 2018-04-27 ENCOUNTER — OFFICE VISIT (OUTPATIENT)
Dept: INTERNAL MEDICINE | Facility: CLINIC | Age: 53
End: 2018-04-27

## 2018-04-27 VITALS
WEIGHT: 293 LBS | HEART RATE: 83 BPM | BODY MASS INDEX: 45.99 KG/M2 | SYSTOLIC BLOOD PRESSURE: 128 MMHG | OXYGEN SATURATION: 95 % | HEIGHT: 67 IN | TEMPERATURE: 97.9 F | DIASTOLIC BLOOD PRESSURE: 82 MMHG

## 2018-04-27 DIAGNOSIS — M77.8 TENDONITIS OF ELBOW, RIGHT: Primary | ICD-10-CM

## 2018-04-27 PROCEDURE — 99213 OFFICE O/P EST LOW 20 MIN: CPT | Performed by: NURSE PRACTITIONER

## 2018-04-29 NOTE — PROGRESS NOTES
Subjective   Carli Garcia is a 53 y.o. female who presents due to right elbow pain.    She c/o pain in the left medial elbow which began ketan 2 months ago and has persisted, denies acute injury or trauma. She has a history of tendonitis and responded well in the past to an injection by ortho (states >10 years ago). She requests referral for repeat injection.  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      Arm Pain    There was no injury mechanism. The pain is present in the right elbow. The quality of the pain is described as aching. The pain radiates to the right arm. The pain is moderate. The pain has been constant since the incident. Pertinent negatives include no chest pain, numbness or tingling. The symptoms are aggravated by movement, palpation and lifting. She has tried ice for the symptoms.        The following portions of the patient's history were reviewed and updated as appropriate: allergies, current medications, past social history and problem list.    Past Medical History:   Diagnosis Date   • Anemia    • Angioedema     Lower lip   • Arthritis    • Depression    • Diastolic dysfunction    • DVT (deep venous thrombosis)    • GERD (gastroesophageal reflux disease)    • H/O antiphospholipid syndrome    • Hiatal hernia    • Hypertension    • Hypothyroidism    • Lupus anticoagulant disorder    • Lupus anticoagulant disorder    • Morbid obesity    • CRISTI (obstructive sleep apnea)    • PE (pulmonary embolism)     Bilateral   • Right ventricular dilation    • Thrombocytopenia          Current Outpatient Prescriptions:   •  buPROPion XL (WELLBUTRIN XL) 300 MG 24 hr tablet, TAKE 1 TABLET EVERY NIGHT, Disp: 90 tablet, Rfl: 1  •  cetirizine (ZyrTEC) 10 MG tablet, Take 10 mg by mouth daily., Disp: , Rfl:   •  levothyroxine (SYNTHROID, LEVOTHROID) 175 MCG tablet, TAKE 1 TABLET DAILY, Disp: 90 tablet, Rfl: 1  •  lisinopril (PRINIVIL,ZESTRIL)  "20 MG tablet, Take 1 tablet by mouth Daily., Disp: 90 tablet, Rfl: 3  •  omeprazole (priLOSEC) 20 MG capsule, TAKE 1 CAPSULE DAILY, Disp: 90 capsule, Rfl: 1  •  sertraline (ZOLOFT) 100 MG tablet, TAKE 1 TABLET EVERY NIGHT, Disp: 90 tablet, Rfl: 1  •  warfarin (COUMADIN) 7.5 MG tablet, TAKE 2 TABLETS DAILY, Disp: 240 tablet, Rfl: 0    Allergies   Allergen Reactions   • Dust Mite Extract    • Eggs Or Egg-Derived Products Nausea And Vomiting   • Losartan Angioedema   • Sulfa Antibiotics        Review of Systems   Constitutional: Negative for chills, fatigue, fever and unexpected weight change.   HENT: Negative for congestion, ear pain, postnasal drip, sinus pressure, sore throat and trouble swallowing.    Eyes: Negative for visual disturbance.   Respiratory: Negative for cough, chest tightness and wheezing.    Cardiovascular: Negative for chest pain, palpitations and leg swelling.   Gastrointestinal: Negative for abdominal pain, blood in stool, nausea and vomiting.   Genitourinary: Negative for dysuria, frequency and urgency.   Musculoskeletal: Positive for arthralgias. Negative for joint swelling.   Skin: Negative for color change.   Neurological: Negative for tingling, syncope, weakness, numbness and headaches.   Hematological: Does not bruise/bleed easily.       Objective   Vitals:    04/27/18 1420   BP: 128/82   BP Location: Left arm   Patient Position: Sitting   Cuff Size: Adult   Pulse: 83   Temp: 97.9 °F (36.6 °C)   TempSrc: Oral   SpO2: 95%   Weight: (!) 156 kg (344 lb)   Height: 168.9 cm (66.5\")     Physical Exam   Constitutional: She appears well-developed and well-nourished. She is cooperative. She does not have a sickly appearance. She does not appear ill.   HENT:   Head: Normocephalic.   Right Ear: Hearing, tympanic membrane and external ear normal. No drainage, swelling or tenderness. No mastoid tenderness. Tympanic membrane is not injected, not scarred, not erythematous and not bulging. Tympanic membrane " mobility is normal. No middle ear effusion. No decreased hearing is noted.   Left Ear: Hearing, tympanic membrane and external ear normal.   Nose: Nose normal. No mucosal edema, rhinorrhea, sinus tenderness or nasal deformity. Right sinus exhibits no maxillary sinus tenderness and no frontal sinus tenderness. Left sinus exhibits no maxillary sinus tenderness and no frontal sinus tenderness.   Mouth/Throat: Oropharynx is clear and moist and mucous membranes are normal. Normal dentition.   Eyes: Conjunctivae and lids are normal. Pupils are equal, round, and reactive to light. Right eye exhibits no discharge and no exudate. Left eye exhibits no discharge and no exudate.   Neck: Trachea normal and normal range of motion. Carotid bruit is not present. No edema present. No thyroid mass and no thyromegaly present.   Cardiovascular: Regular rhythm, normal heart sounds and normal pulses.    No murmur heard.  Pulmonary/Chest: Breath sounds normal. No respiratory distress. She has no decreased breath sounds. She has no wheezes. She has no rhonchi. She has no rales.   Musculoskeletal:        Right elbow: She exhibits normal range of motion and no swelling. Tenderness found. Medial epicondyle and lateral epicondyle tenderness noted.   Lymphadenopathy:        Head (right side): No submental, no submandibular, no tonsillar, no preauricular, no posterior auricular and no occipital adenopathy present.        Head (left side): No submental, no submandibular, no tonsillar, no preauricular, no posterior auricular and no occipital adenopathy present.   Neurological: She is alert.   Skin: Skin is warm, dry and intact. No cyanosis. Nails show no clubbing.       Assessment/Plan   Carli was seen today for arm pain.    Diagnoses and all orders for this visit:    Tendonitis of elbow, right  Comments:  discussed use of Tylenol (avoid NSAIDs due to Coumadin use) and heat, will f/u with ortho as well  Orders:  -     Ambulatory Referral to  Orthopedic Surgery    Discussed importance of minimizing overuse of right elbow; not carrying heavy purse, heavy lifting, etc.

## 2018-05-06 DIAGNOSIS — F41.9 ANXIETY: ICD-10-CM

## 2018-05-07 RX ORDER — BUPROPION HYDROCHLORIDE 300 MG/1
TABLET ORAL
Qty: 90 TABLET | Refills: 1 | Status: SHIPPED | OUTPATIENT
Start: 2018-05-07 | End: 2018-11-03 | Stop reason: SDUPTHER

## 2018-05-07 RX ORDER — WARFARIN SODIUM 7.5 MG/1
TABLET ORAL
Qty: 240 TABLET | Refills: 0 | Status: SHIPPED | OUTPATIENT
Start: 2018-05-07 | End: 2018-08-12 | Stop reason: SDUPTHER

## 2018-05-07 RX ORDER — SERTRALINE HYDROCHLORIDE 100 MG/1
TABLET, FILM COATED ORAL
Qty: 90 TABLET | Refills: 1 | Status: SHIPPED | OUTPATIENT
Start: 2018-05-07 | End: 2018-11-03 | Stop reason: SDUPTHER

## 2018-05-08 ENCOUNTER — INFUSION (OUTPATIENT)
Dept: ONCOLOGY | Facility: HOSPITAL | Age: 53
End: 2018-05-08

## 2018-05-08 ENCOUNTER — ANTICOAGULATION VISIT (OUTPATIENT)
Dept: LAB | Facility: HOSPITAL | Age: 53
End: 2018-05-08

## 2018-05-08 ENCOUNTER — APPOINTMENT (OUTPATIENT)
Dept: LAB | Facility: HOSPITAL | Age: 53
End: 2018-05-08

## 2018-05-08 ENCOUNTER — APPOINTMENT (OUTPATIENT)
Dept: ONCOLOGY | Facility: HOSPITAL | Age: 53
End: 2018-05-08

## 2018-05-08 DIAGNOSIS — I26.92 ACUTE SADDLE PULMONARY EMBOLISM WITHOUT ACUTE COR PULMONALE (HCC): ICD-10-CM

## 2018-05-08 DIAGNOSIS — Z79.01 ANTICOAGULANT LONG-TERM USE: ICD-10-CM

## 2018-05-08 DIAGNOSIS — D68.61 ANTIPHOSPHOLIPID SYNDROME (HCC): Chronic | ICD-10-CM

## 2018-05-08 LAB
INR PPP: 2.8 (ref 0.9–1.1)
PROTHROMBIN TIME: 33.5 SECONDS (ref 11–13.5)

## 2018-05-08 PROCEDURE — 85610 PROTHROMBIN TIME: CPT

## 2018-05-08 NOTE — PROGRESS NOTES
Patient currently taking 15 mg on Sunday, Tuesday, and Thursday and 11.25 all other days  INR 2.8.  Lab Results   Component Value Date    INR 2.80 (H) 05/08/2018    INR 3.20 (H) 04/11/2018    INR 1.80 (H) 03/14/2018    PROTIME 33.5 (H) 05/08/2018    PROTIME 38.4 (H) 04/11/2018    PROTIME 21.2 (H) 03/14/2018   Patient denies bleeding, missing any doses, or taking any new medications.  Dose change reviewed with   Patient.  See Coag assessment.  No s/s bleeding.  Offered patient print out of new dosing.  Pt refused.    Reviewed dosing.  Pt v/u.

## 2018-05-09 ENCOUNTER — APPOINTMENT (OUTPATIENT)
Dept: LAB | Facility: HOSPITAL | Age: 53
End: 2018-05-09

## 2018-05-09 ENCOUNTER — APPOINTMENT (OUTPATIENT)
Dept: ONCOLOGY | Facility: HOSPITAL | Age: 53
End: 2018-05-09

## 2018-06-06 ENCOUNTER — INFUSION (OUTPATIENT)
Dept: ONCOLOGY | Facility: HOSPITAL | Age: 53
End: 2018-06-06

## 2018-06-06 ENCOUNTER — ANTICOAGULATION VISIT (OUTPATIENT)
Dept: LAB | Facility: HOSPITAL | Age: 53
End: 2018-06-06

## 2018-06-06 DIAGNOSIS — I26.92 ACUTE SADDLE PULMONARY EMBOLISM WITHOUT ACUTE COR PULMONALE (HCC): ICD-10-CM

## 2018-06-06 DIAGNOSIS — Z79.01 ANTICOAGULANT LONG-TERM USE: ICD-10-CM

## 2018-06-06 DIAGNOSIS — D68.61 ANTIPHOSPHOLIPID SYNDROME (HCC): Chronic | ICD-10-CM

## 2018-06-06 LAB
INR PPP: 3 (ref 0.9–1.1)
PROTHROMBIN TIME: 36 SECONDS (ref 11–13.5)

## 2018-06-06 PROCEDURE — 85610 PROTHROMBIN TIME: CPT

## 2018-06-28 ENCOUNTER — TELEPHONE (OUTPATIENT)
Dept: INTERNAL MEDICINE | Facility: CLINIC | Age: 53
End: 2018-06-28

## 2018-06-29 ENCOUNTER — OFFICE VISIT (OUTPATIENT)
Dept: INTERNAL MEDICINE | Facility: CLINIC | Age: 53
End: 2018-06-29

## 2018-06-29 VITALS
BODY MASS INDEX: 45.99 KG/M2 | HEIGHT: 67 IN | TEMPERATURE: 98 F | HEART RATE: 75 BPM | WEIGHT: 293 LBS | OXYGEN SATURATION: 94 % | DIASTOLIC BLOOD PRESSURE: 80 MMHG | SYSTOLIC BLOOD PRESSURE: 122 MMHG

## 2018-06-29 DIAGNOSIS — J34.89 SINUS PRESSURE: Primary | ICD-10-CM

## 2018-06-29 DIAGNOSIS — I10 ESSENTIAL HYPERTENSION: ICD-10-CM

## 2018-06-29 DIAGNOSIS — R42 DIZZINESS: ICD-10-CM

## 2018-06-29 DIAGNOSIS — E03.9 ACQUIRED HYPOTHYROIDISM: ICD-10-CM

## 2018-06-29 LAB
ANION GAP SERPL CALCULATED.3IONS-SCNC: 14.4 MMOL/L
BASOPHILS # BLD AUTO: 0.03 10*3/MM3 (ref 0–0.2)
BASOPHILS NFR BLD AUTO: 0.4 % (ref 0–2)
BUN BLD-MCNC: 13 MG/DL (ref 6–20)
BUN/CREAT SERPL: 16 (ref 7–25)
CALCIUM SPEC-SCNC: 9.7 MG/DL (ref 8.6–10.5)
CHLORIDE SERPL-SCNC: 101 MMOL/L (ref 98–107)
CO2 SERPL-SCNC: 25.6 MMOL/L (ref 22–29)
CREAT BLD-MCNC: 0.81 MG/DL (ref 0.57–1)
DEPRECATED RDW RBC AUTO: 43.3 FL (ref 37–54)
EOSINOPHIL # BLD AUTO: 0.12 10*3/MM3 (ref 0–0.7)
EOSINOPHIL NFR BLD AUTO: 1.5 % (ref 0–5)
ERYTHROCYTE [DISTWIDTH] IN BLOOD BY AUTOMATED COUNT: 14.4 % (ref 11.5–15)
GFR SERPL CREATININE-BSD FRML MDRD: 74 ML/MIN/1.73
GLUCOSE BLD-MCNC: 121 MG/DL (ref 65–99)
HCT VFR BLD AUTO: 43.2 % (ref 34.1–44.9)
HGB BLD-MCNC: 13.8 G/DL (ref 11.2–15.7)
LYMPHOCYTES # BLD AUTO: 1.39 10*3/MM3 (ref 0.8–7)
LYMPHOCYTES NFR BLD AUTO: 17.6 % (ref 10–60)
MCH RBC QN AUTO: 26.3 PG (ref 26–34)
MCHC RBC AUTO-ENTMCNC: 31.9 G/DL (ref 31–37)
MCV RBC AUTO: 82.4 FL (ref 80–100)
MONOCYTES # BLD AUTO: 0.42 10*3/MM3 (ref 0–1)
MONOCYTES NFR BLD AUTO: 5.3 % (ref 0–13)
NEUTROPHILS # BLD AUTO: 5.92 10*3/MM3 (ref 1–11)
NEUTROPHILS NFR BLD AUTO: 75.2 % (ref 30–85)
PLATELET # BLD AUTO: 294 10*3/MM3 (ref 150–450)
PMV BLD AUTO: 10.3 FL (ref 6–12)
POTASSIUM BLD-SCNC: 4.4 MMOL/L (ref 3.5–5.2)
RBC # BLD AUTO: 5.24 10*6/MM3 (ref 3.93–5.22)
SODIUM BLD-SCNC: 141 MMOL/L (ref 136–145)
TSH SERPL DL<=0.05 MIU/L-ACNC: 1.73 MIU/ML (ref 0.27–4.2)
WBC NRBC COR # BLD: 7.88 10*3/MM3 (ref 5–10)

## 2018-06-29 PROCEDURE — 84443 ASSAY THYROID STIM HORMONE: CPT | Performed by: NURSE PRACTITIONER

## 2018-06-29 PROCEDURE — 80048 BASIC METABOLIC PNL TOTAL CA: CPT | Performed by: NURSE PRACTITIONER

## 2018-06-29 PROCEDURE — 99999 PR OFFICE/OUTPT VISIT,PROCEDURE ONLY: CPT | Performed by: NURSE PRACTITIONER

## 2018-06-29 PROCEDURE — 99213 OFFICE O/P EST LOW 20 MIN: CPT | Performed by: NURSE PRACTITIONER

## 2018-06-29 PROCEDURE — 85025 COMPLETE CBC W/AUTO DIFF WBC: CPT | Performed by: NURSE PRACTITIONER

## 2018-06-29 RX ORDER — GUAIFENESIN 600 MG/1
600 TABLET, EXTENDED RELEASE ORAL EVERY 12 HOURS SCHEDULED
Qty: 14 TABLET
Start: 2018-06-29 | End: 2018-07-06

## 2018-06-29 RX ORDER — FLUTICASONE PROPIONATE 50 MCG
2 SPRAY, SUSPENSION (ML) NASAL DAILY
Qty: 1 BOTTLE | Refills: 3 | Status: SHIPPED | OUTPATIENT
Start: 2018-06-29 | End: 2022-04-01 | Stop reason: SDUPTHER

## 2018-06-29 RX ORDER — CETIRIZINE HYDROCHLORIDE 10 MG/1
10 TABLET ORAL DAILY
Qty: 10 TABLET
Start: 2018-06-29 | End: 2018-07-09

## 2018-06-29 RX ORDER — MECLIZINE HYDROCHLORIDE 25 MG/1
25 TABLET ORAL 3 TIMES DAILY PRN
Qty: 30 TABLET | Refills: 0 | Status: SHIPPED | OUTPATIENT
Start: 2018-06-29 | End: 2019-04-23 | Stop reason: HOSPADM

## 2018-06-30 NOTE — PROGRESS NOTES
Subjective   Carli Garcia is a 53 y.o. female who presents due to dizziness.    She was bending over Thursday morning and when she stood upright she c/o sudden onset of feeling lightheaded. She c/o persistence of symptoms since then, mild improvement today. She c/o sensation that she is veering to the left with walking.  She does c/o persistent sinus drainage with pressure in bilateral ears. Denies fever/chills.      Dizziness   This is a new problem. The current episode started in the past 7 days. The problem occurs constantly. The problem has been unchanged. Associated symptoms include congestion, fatigue and a sore throat. Pertinent negatives include no abdominal pain, chest pain, chills, coughing, fever, headaches, nausea, neck pain, rash, visual change, vomiting or weakness. Exacerbated by: turning head, walking.        The following portions of the patient's history were reviewed and updated as appropriate: allergies, current medications, past social history and problem list.    Past Medical History:   Diagnosis Date   • Anemia    • Angioedema     Lower lip   • Arthritis    • Depression    • Diastolic dysfunction    • DVT (deep venous thrombosis)    • GERD (gastroesophageal reflux disease)    • H/O antiphospholipid syndrome    • Hiatal hernia    • Hypertension    • Hypothyroidism    • Lupus anticoagulant disorder    • Lupus anticoagulant disorder    • Morbid obesity    • CRISTI (obstructive sleep apnea)    • PE (pulmonary embolism)     Bilateral   • Right ventricular dilation    • Thrombocytopenia          Current Outpatient Prescriptions:   •  buPROPion XL (WELLBUTRIN XL) 300 MG 24 hr tablet, TAKE 1 TABLET EVERY NIGHT, Disp: 90 tablet, Rfl: 1  •  levothyroxine (SYNTHROID, LEVOTHROID) 175 MCG tablet, TAKE 1 TABLET DAILY, Disp: 90 tablet, Rfl: 1  •  lisinopril (PRINIVIL,ZESTRIL) 20 MG tablet, Take 1 tablet by mouth Daily., Disp: 90 tablet, Rfl: 3  •  omeprazole (priLOSEC) 20 MG capsule, TAKE 1 CAPSULE DAILY,  "Disp: 90 capsule, Rfl: 1  •  sertraline (ZOLOFT) 100 MG tablet, TAKE 1 TABLET EVERY NIGHT, Disp: 90 tablet, Rfl: 1  •  warfarin (COUMADIN) 7.5 MG tablet, TAKE 2 TABLETS DAILY, Disp: 240 tablet, Rfl: 0  •  cetirizine (zyrTEC) 10 MG tablet, Take 1 tablet by mouth Daily for 10 days., Disp: 10 tablet, Rfl:   •  fluticasone (FLONASE) 50 MCG/ACT nasal spray, 2 sprays into each nostril Daily for 30 days., Disp: 1 bottle, Rfl: 3  •  guaiFENesin (MUCINEX) 600 MG 12 hr tablet, Take 1 tablet by mouth Every 12 (Twelve) Hours for 7 days., Disp: 14 tablet, Rfl:   •  meclizine (ANTIVERT) 25 MG tablet, Take 1 tablet by mouth 3 (Three) Times a Day As Needed for dizziness., Disp: 30 tablet, Rfl: 0    Allergies   Allergen Reactions   • Dust Mite Extract    • Eggs Or Egg-Derived Products Nausea And Vomiting   • Losartan Angioedema   • Sulfa Antibiotics        Review of Systems   Constitutional: Positive for fatigue. Negative for appetite change, chills and fever.   HENT: Positive for congestion, ear pain, postnasal drip, rhinorrhea, sinus pressure and sore throat. Negative for ear discharge, facial swelling, sneezing and tinnitus.    Respiratory: Negative for cough, chest tightness, shortness of breath and wheezing.    Cardiovascular: Negative for chest pain, palpitations and leg swelling.   Gastrointestinal: Negative for abdominal pain, diarrhea, nausea and vomiting.   Musculoskeletal: Negative for neck pain and neck stiffness.   Skin: Negative for rash.   Neurological: Positive for dizziness. Negative for weakness and headaches.   Hematological: Negative for adenopathy.       Objective   Vitals:    06/29/18 0958   BP: 122/80   BP Location: Left arm   Patient Position: Sitting   Cuff Size: Adult   Pulse: 75   Temp: 98 °F (36.7 °C)   TempSrc: Oral   SpO2: 94%   Weight: (!) 154 kg (339 lb)   Height: 168.9 cm (66.5\")     Physical Exam   Constitutional: She appears well-developed and well-nourished. She is cooperative. She does not have a " sickly appearance. She does not appear ill.   HENT:   Head: Normocephalic.   Right Ear: Hearing and external ear normal. No drainage, swelling or tenderness. Tympanic membrane is bulging. Tympanic membrane is not erythematous. No middle ear effusion. No decreased hearing is noted.   Left Ear: Hearing and external ear normal. No drainage, swelling or tenderness. Tympanic membrane is bulging. Tympanic membrane is not erythematous.  No middle ear effusion. No decreased hearing is noted.   Nose: Nose normal. No mucosal edema, rhinorrhea, sinus tenderness or nasal deformity. Right sinus exhibits no maxillary sinus tenderness and no frontal sinus tenderness. Left sinus exhibits no maxillary sinus tenderness and no frontal sinus tenderness.   Mouth/Throat: Mucous membranes are normal. Posterior oropharyngeal erythema present.   Eyes: Conjunctivae and lids are normal.   Neck: Trachea normal.   Cardiovascular: Regular rhythm, normal heart sounds and normal pulses.    No murmur heard.  Pulmonary/Chest: Breath sounds normal. No respiratory distress. She has no decreased breath sounds. She has no wheezes. She has no rhonchi. She has no rales.   Lymphadenopathy:     She has no cervical adenopathy.   Neurological: She is alert.   Skin: Skin is warm, dry and intact.   Nursing note and vitals reviewed.      Assessment/Plan   Carli was seen today for dizziness.    Diagnoses and all orders for this visit:    Sinus pressure  -     fluticasone (FLONASE) 50 MCG/ACT nasal spray; 2 sprays into each nostril Daily for 30 days.  -     cetirizine (zyrTEC) 10 MG tablet; Take 1 tablet by mouth Daily for 10 days.  -     guaiFENesin (MUCINEX) 600 MG 12 hr tablet; Take 1 tablet by mouth Every 12 (Twelve) Hours for 7 days.    Dizziness  -     CBC Auto Differential; Future  -     Basic Metabolic Panel; Future  -     meclizine (ANTIVERT) 25 MG tablet; Take 1 tablet by mouth 3 (Three) Times a Day As Needed for dizziness.  -     CBC Auto  Differential  -     Basic Metabolic Panel    Acquired hypothyroidism  Comments:  recheck TSH  Orders:  -     TSH; Future  -     TSH    Essential hypertension  Comments:  stable on current meds    Sx appear more related to sinus/inner ear drainage, will treat and continue to monitor. Will also check labs to ensure Synthroid is at therapeutic level. Discussed PT evaluation which she has declined for now, continue to monitor.

## 2018-08-12 DIAGNOSIS — I10 ESSENTIAL HYPERTENSION: ICD-10-CM

## 2018-08-13 RX ORDER — WARFARIN SODIUM 7.5 MG/1
TABLET ORAL
Qty: 240 TABLET | Refills: 0 | Status: SHIPPED | OUTPATIENT
Start: 2018-08-13 | End: 2018-11-03 | Stop reason: SDUPTHER

## 2018-08-13 RX ORDER — LISINOPRIL 20 MG/1
TABLET ORAL
Qty: 90 TABLET | Refills: 3 | Status: SHIPPED | OUTPATIENT
Start: 2018-08-13 | End: 2019-01-25 | Stop reason: SDUPTHER

## 2018-08-16 ENCOUNTER — APPOINTMENT (OUTPATIENT)
Dept: ONCOLOGY | Facility: CLINIC | Age: 53
End: 2018-08-16

## 2018-08-16 ENCOUNTER — APPOINTMENT (OUTPATIENT)
Dept: LAB | Facility: HOSPITAL | Age: 53
End: 2018-08-16

## 2018-08-20 ENCOUNTER — OFFICE VISIT (OUTPATIENT)
Dept: ONCOLOGY | Facility: CLINIC | Age: 53
End: 2018-08-20

## 2018-08-20 ENCOUNTER — ANTICOAGULATION VISIT (OUTPATIENT)
Dept: LAB | Facility: HOSPITAL | Age: 53
End: 2018-08-20

## 2018-08-20 VITALS
WEIGHT: 293 LBS | RESPIRATION RATE: 16 BRPM | DIASTOLIC BLOOD PRESSURE: 78 MMHG | TEMPERATURE: 98.6 F | OXYGEN SATURATION: 94 % | SYSTOLIC BLOOD PRESSURE: 116 MMHG | HEIGHT: 66 IN | BODY MASS INDEX: 47.09 KG/M2 | HEART RATE: 76 BPM

## 2018-08-20 DIAGNOSIS — D68.61 ANTIPHOSPHOLIPID SYNDROME (HCC): ICD-10-CM

## 2018-08-20 DIAGNOSIS — I26.99 OTHER ACUTE PULMONARY EMBOLISM WITHOUT ACUTE COR PULMONALE (HCC): Primary | ICD-10-CM

## 2018-08-20 DIAGNOSIS — D50.9 MICROCYTIC ANEMIA: ICD-10-CM

## 2018-08-20 DIAGNOSIS — Z79.01 ANTICOAGULANT LONG-TERM USE: ICD-10-CM

## 2018-08-20 DIAGNOSIS — I26.92 ACUTE SADDLE PULMONARY EMBOLISM WITHOUT ACUTE COR PULMONALE (HCC): ICD-10-CM

## 2018-08-20 LAB
BASOPHILS # BLD AUTO: 0.05 10*3/MM3 (ref 0–0.1)
BASOPHILS NFR BLD AUTO: 0.5 % (ref 0–1.1)
DEPRECATED RDW RBC AUTO: 41.7 FL (ref 37–49)
EOSINOPHIL # BLD AUTO: 0.13 10*3/MM3 (ref 0–0.36)
EOSINOPHIL NFR BLD AUTO: 1.4 % (ref 1–5)
ERYTHROCYTE [DISTWIDTH] IN BLOOD BY AUTOMATED COUNT: 13.9 % (ref 11.7–14.5)
HCT VFR BLD AUTO: 42 % (ref 34–45)
HGB BLD-MCNC: 13.2 G/DL (ref 11.5–14.9)
IMM GRANULOCYTES # BLD: 0.04 10*3/MM3 (ref 0–0.03)
IMM GRANULOCYTES NFR BLD: 0.4 % (ref 0–0.5)
INR PPP: 1.8 (ref 0.9–1.1)
LYMPHOCYTES # BLD AUTO: 1.97 10*3/MM3 (ref 1–3.5)
LYMPHOCYTES NFR BLD AUTO: 21.1 % (ref 20–49)
MCH RBC QN AUTO: 25.9 PG (ref 27–33)
MCHC RBC AUTO-ENTMCNC: 31.4 G/DL (ref 32–35)
MCV RBC AUTO: 82.4 FL (ref 83–97)
MONOCYTES # BLD AUTO: 0.62 10*3/MM3 (ref 0.25–0.8)
MONOCYTES NFR BLD AUTO: 6.6 % (ref 4–12)
NEUTROPHILS # BLD AUTO: 6.53 10*3/MM3 (ref 1.5–7)
NEUTROPHILS NFR BLD AUTO: 70 % (ref 39–75)
NRBC BLD MANUAL-RTO: 0 /100 WBC (ref 0–0)
PLATELET # BLD AUTO: 235 10*3/MM3 (ref 150–375)
PMV BLD AUTO: 10.7 FL (ref 8.9–12.1)
PROTHROMBIN TIME: 21.4 SECONDS (ref 11–13.5)
RBC # BLD AUTO: 5.1 10*6/MM3 (ref 3.9–5)
WBC NRBC COR # BLD: 9.34 10*3/MM3 (ref 4–10)

## 2018-08-20 PROCEDURE — 36415 COLL VENOUS BLD VENIPUNCTURE: CPT | Performed by: INTERNAL MEDICINE

## 2018-08-20 PROCEDURE — 99213 OFFICE O/P EST LOW 20 MIN: CPT | Performed by: INTERNAL MEDICINE

## 2018-08-20 PROCEDURE — 85610 PROTHROMBIN TIME: CPT | Performed by: INTERNAL MEDICINE

## 2018-08-20 PROCEDURE — 85025 COMPLETE CBC W/AUTO DIFF WBC: CPT | Performed by: INTERNAL MEDICINE

## 2018-08-20 NOTE — PROGRESS NOTES
REASONS FOR FOLLOWUP:  Antiphospholipid antibody syndrome with history of pulmonary embolism March 2016    History of Present Illness    Mrs. Garcia is a 52-year-old woman with history of pulmonary embolism March 2016.  She was found to have positive lupus anticoagulant and cardiolipin antibodies at the time of her pulmonary embolism which have been persistent and has been on anticoagulation with Coumadin since diagnosis.  She has had no recurrent thromboembolic events while anticoagulated.  She has no bleeding problems.    The patient returned today for follow-up.  She is doing well.  She reports dyspnea with exertion when she ambulates to work, probably more related to her habitus than history of PE.  The patient reports never having had a colonoscopy.    Past Medical History:   Diagnosis Date   • Anemia    • Angioedema     Lower lip   • Arthritis    • Depression    • Diastolic dysfunction    • DVT (deep venous thrombosis) (CMS/HCC)    • GERD (gastroesophageal reflux disease)    • H/O antiphospholipid syndrome    • Hiatal hernia    • Hypertension    • Hypothyroidism    • Lupus anticoagulant disorder (CMS/HCC)    • Lupus anticoagulant disorder (CMS/HCC)    • Morbid obesity (CMS/HCC)    • CRISTI (obstructive sleep apnea)    • PE (pulmonary embolism)     Bilateral   • Right ventricular dilation    • Thrombocytopenia (CMS/HCC)        ONCOLOGIC HISTORY:  (History from previous dates can be found in the separate document.)    Current Outpatient Prescriptions on File Prior to Visit   Medication Sig Dispense Refill   • buPROPion XL (WELLBUTRIN XL) 300 MG 24 hr tablet TAKE 1 TABLET EVERY NIGHT 90 tablet 1   • levothyroxine (SYNTHROID, LEVOTHROID) 175 MCG tablet TAKE 1 TABLET DAILY 90 tablet 1   • lisinopril (PRINIVIL,ZESTRIL) 20 MG tablet TAKE 1 TABLET DAILY 90 tablet 3   • omeprazole (priLOSEC) 20 MG capsule TAKE 1 CAPSULE DAILY 90 capsule 1   • sertraline (ZOLOFT) 100 MG tablet TAKE 1 TABLET EVERY NIGHT 90 tablet 1   •  "warfarin (COUMADIN) 7.5 MG tablet TAKE 2 TABLETS DAILY 240 tablet 0   • meclizine (ANTIVERT) 25 MG tablet Take 1 tablet by mouth 3 (Three) Times a Day As Needed for dizziness. 30 tablet 0     No current facility-administered medications on file prior to visit.        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     Review of Systems   Constitutional: Negative for unexpected weight change.   HENT: Negative.    Respiratory: Positive for shortness of breath (Exertion, chronic).    Cardiovascular: Negative for leg swelling.   Neurological: Negative for weakness.   Hematological: Does not bruise/bleed easily.         Objective      Vitals:    08/20/18 1622   BP: 116/78   Pulse: 76   Resp: 16   Temp: 98.6 °F (37 °C)   TempSrc: Oral   SpO2: 94%   Weight: (!) 157 kg (346 lb 9.6 oz)   Height: 167 cm (65.75\")   PainSc: 0-No pain     Current Status 8/20/2018   ECOG score 0       Physical Exam   GENERAL: Well-developed, morbid obese woman  SKIN: Warm, dry without rashes, purpura or petechiae.  NECK: Supple with good range of motion; no thyromegaly or masses, no JVD.  LYMPHATICS: No cervical, supraclavicular, axillary or inguinal adenopathy.  CHEST: Lungs clear to percussion and auscultation. Good airflow.  CARDIAC: Regular rate and rhythm without murmurs, " rubs or gallops. Normal S1,S2.  ABDOMEN: Soft, nontender with no organomegaly or masses.  EXTREMITIES: No clubbing, cyanosis or edema..   PSYCHIATRIC: Normal affect and mood.      RECENT LABS:  Hematology WBC   Date Value Ref Range Status   08/20/2018 9.34 4.00 - 10.00 10*3/mm3 Final     RBC   Date Value Ref Range Status   08/20/2018 5.10 (H) 3.90 - 5.00 10*6/mm3 Final     Hemoglobin   Date Value Ref Range Status   08/20/2018 13.2 11.5 - 14.9 g/dL Final     Hematocrit   Date Value Ref Range Status   08/20/2018 42.0 34.0 - 45.0 % Final     Platelets   Date Value Ref Range Status   08/20/2018 235 150 - 375 10*3/mm3 Final      INR. 1.8    Assessment/Plan    1.   history of pulmonary embolism in March 2016 secondary to antiphospholipid antibody syndrome.  She has been anticoagulated with Coumadin, goal INR 2.0-3.0 since diagnosis.  She has had no recurrence of thromboembolic events on anticoagulation.  Repeat antiphospholipid antibody labs were performed July 2017 and persistently positive for lupus anticoagulant, cardiolipin IgG, and beta-2 glycoprotein 1 IgG and low level IgA.   Lifelong anticoagulation is therefore planned.    2.  Anticoagulation management:  The INR was subtherapeutic today 1.8 and her Coumadin was adjusted accordingly.  I recommended she continue to have a monthly  INR and Coumadin adjustment.     3.  Microcytosis: I will check a ferritin and iron profile her next visit.  Her menstrual cycles her light.  She reports never having had a colonoscopy and I recommended that she do so.  Given history of antiphospholipid antibody syndrome patient would need bridging with Lovenox while off Coumadin for procedures.  Once she has a date for her colonoscopy scheduled, I asked her to call our office for instructions regarding Coumadin management and Lovenox bridging.

## 2018-08-26 DIAGNOSIS — K21.9 GASTROESOPHAGEAL REFLUX DISEASE, ESOPHAGITIS PRESENCE NOT SPECIFIED: ICD-10-CM

## 2018-08-27 RX ORDER — OMEPRAZOLE 20 MG/1
CAPSULE, DELAYED RELEASE ORAL
Qty: 90 CAPSULE | Refills: 1 | Status: SHIPPED | OUTPATIENT
Start: 2018-08-27 | End: 2019-01-25 | Stop reason: SDUPTHER

## 2018-10-17 DIAGNOSIS — E03.9 ACQUIRED HYPOTHYROIDISM: ICD-10-CM

## 2018-10-17 RX ORDER — LEVOTHYROXINE SODIUM 175 UG/1
TABLET ORAL
Qty: 90 TABLET | Refills: 1 | Status: SHIPPED | OUTPATIENT
Start: 2018-10-17 | End: 2019-01-25 | Stop reason: SDUPTHER

## 2018-10-24 ENCOUNTER — TELEPHONE (OUTPATIENT)
Dept: ONCOLOGY | Facility: HOSPITAL | Age: 53
End: 2018-10-24

## 2018-10-24 LAB
EXTERNAL INR: 2 (ref 0.8–1.2)
EXTERNAL PROTHROMBIN TIME (PT): 20.2 SECONDS (ref 9.1–12)
EXTERNAL RESULTING LABORATORY: ABNORMAL

## 2018-10-24 NOTE — TELEPHONE ENCOUNTER
INr 2. Pt has not had her PT/INR taken since August. She denies any missed doses or new meds. Per SS pt is to take 11.25 mg on Mon, Fri and 15 mg all other days. She will have it checked again next week. Verbal order given to [facility]

## 2018-11-02 ENCOUNTER — TELEPHONE (OUTPATIENT)
Dept: ONCOLOGY | Facility: HOSPITAL | Age: 53
End: 2018-11-02

## 2018-11-02 LAB
EXTERNAL INR: 2
EXTERNAL PROTHROMBIN TIME (PT): 20.1 SECONDS
EXTERNAL RESULTING LABORATORY: NORMAL

## 2018-11-02 NOTE — TELEPHONE ENCOUNTER
INR 2.0. Message left for pt regarding coumadin dosing. Held in SS and waiting for a return call.

## 2018-11-02 NOTE — TELEPHONE ENCOUNTER
INR 2. Pt denies any missed doses, new meds or diet changes. Per Alanna LAO NP pt is to take 11.25 mg of Coumadin on Wed and 15 mg all other days. PT V/U

## 2018-11-03 DIAGNOSIS — F41.9 ANXIETY: ICD-10-CM

## 2018-11-05 RX ORDER — WARFARIN SODIUM 7.5 MG/1
TABLET ORAL
Qty: 180 TABLET | Refills: 0 | Status: SHIPPED | OUTPATIENT
Start: 2018-11-05 | End: 2019-01-25 | Stop reason: SDUPTHER

## 2018-11-05 RX ORDER — SERTRALINE HYDROCHLORIDE 100 MG/1
TABLET, FILM COATED ORAL
Qty: 90 TABLET | Refills: 1 | Status: SHIPPED | OUTPATIENT
Start: 2018-11-05 | End: 2019-01-25 | Stop reason: SDUPTHER

## 2018-11-05 RX ORDER — BUPROPION HYDROCHLORIDE 300 MG/1
TABLET ORAL
Qty: 90 TABLET | Refills: 1 | Status: SHIPPED | OUTPATIENT
Start: 2018-11-05 | End: 2019-01-25 | Stop reason: SDUPTHER

## 2018-11-12 ENCOUNTER — TELEPHONE (OUTPATIENT)
Dept: ONCOLOGY | Facility: HOSPITAL | Age: 53
End: 2018-11-12

## 2018-11-12 LAB
EXTERNAL INR: 1.9 (ref 0.8–1.2)
EXTERNAL PROTHROMBIN TIME (PT): 19.3 SECONDS (ref 9.1–12)
EXTERNAL RESULTING LABORATORY: ABNORMAL

## 2018-11-12 NOTE — TELEPHONE ENCOUNTER
INR 1.9. Pt denies any missed doses, new meds, or diet changes. Reviewed with Dr. Guerrier. Per Dr. Guerrier pt is to take 15 mg of coumadin daily.  With Pt's permission, dosing was left on VM.

## 2018-12-18 ENCOUNTER — TELEPHONE (OUTPATIENT)
Dept: ONCOLOGY | Facility: CLINIC | Age: 53
End: 2018-12-18

## 2018-12-18 LAB
EXTERNAL INR: 2.6
EXTERNAL PROTHROMBIN TIME (PT): 26.3 SECONDS
EXTERNAL RESULTING LABORATORY: NORMAL

## 2018-12-18 NOTE — TELEPHONE ENCOUNTER
INR 2.6. Pt denies any new meds, diet changes or missed doses. Per Ss pt is to take 15 mg of Coumadin daily. She will check again in one month. Pt V/U.

## 2019-01-09 ENCOUNTER — TELEPHONE (OUTPATIENT)
Dept: INTERNAL MEDICINE | Facility: CLINIC | Age: 54
End: 2019-01-09

## 2019-01-18 ENCOUNTER — TELEPHONE (OUTPATIENT)
Dept: ONCOLOGY | Facility: HOSPITAL | Age: 54
End: 2019-01-18

## 2019-01-18 LAB
EXTERNAL INR: 2.1 (ref 0.8–1.2)
EXTERNAL PROTHROMBIN TIME (PT): 21.4 SECONDS (ref 9.1–12)
EXTERNAL RESULTING LABORATORY: ABNORMAL

## 2019-01-18 NOTE — TELEPHONE ENCOUNTER
Received INR from [facility]. INR 2.1 today. Pt confirms that she has been taking 15mg daily. Denies missed dose in the past week. Placed in SS. It wanted to increase more than what pt was comfortable with. D/W Alanna Romeo NP. Per Alanna, have pt take 18.75mg Friday and 15mg all other days. Pt to recheck in 2 weeks. Informed pt and she v/u.

## 2019-01-25 DIAGNOSIS — F41.9 ANXIETY: ICD-10-CM

## 2019-01-25 DIAGNOSIS — I10 ESSENTIAL HYPERTENSION: ICD-10-CM

## 2019-01-25 DIAGNOSIS — E03.9 ACQUIRED HYPOTHYROIDISM: ICD-10-CM

## 2019-01-25 DIAGNOSIS — K21.9 GASTROESOPHAGEAL REFLUX DISEASE, ESOPHAGITIS PRESENCE NOT SPECIFIED: ICD-10-CM

## 2019-01-25 RX ORDER — SERTRALINE HYDROCHLORIDE 100 MG/1
100 TABLET, FILM COATED ORAL DAILY
Qty: 90 TABLET | Refills: 1 | Status: SHIPPED | OUTPATIENT
Start: 2019-01-25 | End: 2019-07-26 | Stop reason: SDUPTHER

## 2019-01-25 RX ORDER — WARFARIN SODIUM 7.5 MG/1
TABLET ORAL
Qty: 180 TABLET | Refills: 0 | Status: SHIPPED | OUTPATIENT
Start: 2019-01-25 | End: 2019-04-17 | Stop reason: SDUPTHER

## 2019-01-25 RX ORDER — OMEPRAZOLE 20 MG/1
20 CAPSULE, DELAYED RELEASE ORAL DAILY
Qty: 90 CAPSULE | Refills: 1 | Status: SHIPPED | OUTPATIENT
Start: 2019-01-25 | End: 2019-07-26 | Stop reason: SDUPTHER

## 2019-01-25 RX ORDER — LISINOPRIL 20 MG/1
20 TABLET ORAL DAILY
Qty: 90 TABLET | Refills: 3 | Status: SHIPPED | OUTPATIENT
Start: 2019-01-25 | End: 2019-02-21 | Stop reason: SINTOL

## 2019-01-25 RX ORDER — BUPROPION HYDROCHLORIDE 300 MG/1
300 TABLET ORAL EVERY MORNING
Qty: 90 TABLET | Refills: 1 | Status: SHIPPED | OUTPATIENT
Start: 2019-01-25 | End: 2019-08-06 | Stop reason: SDUPTHER

## 2019-01-25 RX ORDER — LEVOTHYROXINE SODIUM 175 UG/1
175 TABLET ORAL DAILY
Qty: 90 TABLET | Refills: 1 | Status: SHIPPED | OUTPATIENT
Start: 2019-01-25 | End: 2019-07-26 | Stop reason: SDUPTHER

## 2019-01-29 ENCOUNTER — TELEPHONE (OUTPATIENT)
Dept: ONCOLOGY | Facility: HOSPITAL | Age: 54
End: 2019-01-29

## 2019-01-29 DIAGNOSIS — I26.99 OTHER ACUTE PULMONARY EMBOLISM WITHOUT ACUTE COR PULMONALE (HCC): Primary | ICD-10-CM

## 2019-01-29 LAB
EXTERNAL INR: 2.4
EXTERNAL PROTHROMBIN TIME (PT): 24.4 SECONDS
EXTERNAL RESULTING LABORATORY: NORMAL

## 2019-01-29 NOTE — TELEPHONE ENCOUNTER
Received INR from [facility]. INR 2.4 today. Pt confirms previous dosing. Denies missed doses or new medication. Placed in SS. Per SS, pt will remain on same dose of 18.75mg Fri and 15mg all other days. Informed pt and she v/u. We will recheck her when pt comes in for MD visit next month.

## 2019-02-15 ENCOUNTER — TRANSCRIBE ORDERS (OUTPATIENT)
Dept: ADMINISTRATIVE | Facility: HOSPITAL | Age: 54
End: 2019-02-15

## 2019-02-15 DIAGNOSIS — R94.2 RESTRICTIVE VENTILATORY DEFECT: Primary | ICD-10-CM

## 2019-02-15 DIAGNOSIS — R06.09 DYSPNEA ON EXERTION: ICD-10-CM

## 2019-02-21 ENCOUNTER — TELEPHONE (OUTPATIENT)
Dept: INTERNAL MEDICINE | Facility: CLINIC | Age: 54
End: 2019-02-21

## 2019-02-21 DIAGNOSIS — I10 ESSENTIAL HYPERTENSION: Primary | ICD-10-CM

## 2019-02-21 RX ORDER — AMLODIPINE BESYLATE 5 MG/1
5 TABLET ORAL DAILY
Qty: 30 TABLET | Refills: 3 | Status: SHIPPED | OUTPATIENT
Start: 2019-02-21 | End: 2019-03-11 | Stop reason: SDUPTHER

## 2019-02-21 NOTE — TELEPHONE ENCOUNTER
LMVM with pt that Lupe has sent in new RX into pharmacy for blood pressure, and for her to watch BP as she may need to adjust the dose. Advised to call with any questions or concerns.

## 2019-02-22 ENCOUNTER — OFFICE VISIT (OUTPATIENT)
Dept: ONCOLOGY | Facility: CLINIC | Age: 54
End: 2019-02-22

## 2019-02-22 ENCOUNTER — HOSPITAL ENCOUNTER (OUTPATIENT)
Dept: RESPIRATORY THERAPY | Facility: HOSPITAL | Age: 54
Discharge: HOME | End: 2019-02-22
Admitting: INTERNAL MEDICINE

## 2019-02-22 ENCOUNTER — HOSPITAL ENCOUNTER (OUTPATIENT)
Dept: GENERAL RADIOLOGY | Facility: HOSPITAL | Age: 54
Discharge: HOME | End: 2019-02-22

## 2019-02-22 ENCOUNTER — LAB (OUTPATIENT)
Dept: LAB | Facility: HOSPITAL | Age: 54
End: 2019-02-22

## 2019-02-22 ENCOUNTER — APPOINTMENT (OUTPATIENT)
Dept: ONCOLOGY | Facility: CLINIC | Age: 54
End: 2019-02-22

## 2019-02-22 ENCOUNTER — APPOINTMENT (OUTPATIENT)
Dept: LAB | Facility: HOSPITAL | Age: 54
End: 2019-02-22

## 2019-02-22 VITALS
DIASTOLIC BLOOD PRESSURE: 72 MMHG | WEIGHT: 293 LBS | BODY MASS INDEX: 47.09 KG/M2 | TEMPERATURE: 97.6 F | HEIGHT: 66 IN | HEART RATE: 105 BPM | SYSTOLIC BLOOD PRESSURE: 151 MMHG | RESPIRATION RATE: 20 BRPM | OXYGEN SATURATION: 90 %

## 2019-02-22 DIAGNOSIS — I26.99 OTHER ACUTE PULMONARY EMBOLISM WITHOUT ACUTE COR PULMONALE (HCC): ICD-10-CM

## 2019-02-22 DIAGNOSIS — D68.61 ANTIPHOSPHOLIPID SYNDROME (HCC): Chronic | ICD-10-CM

## 2019-02-22 DIAGNOSIS — D64.9 ANEMIA, UNSPECIFIED TYPE: ICD-10-CM

## 2019-02-22 DIAGNOSIS — R94.2 RESTRICTIVE VENTILATORY DEFECT: ICD-10-CM

## 2019-02-22 DIAGNOSIS — I26.99 OTHER ACUTE PULMONARY EMBOLISM WITHOUT ACUTE COR PULMONALE (HCC): Primary | ICD-10-CM

## 2019-02-22 DIAGNOSIS — Z79.01 ANTICOAGULANT LONG-TERM USE: ICD-10-CM

## 2019-02-22 DIAGNOSIS — R06.09 DYSPNEA ON EXERTION: ICD-10-CM

## 2019-02-22 DIAGNOSIS — D50.9 MICROCYTIC ANEMIA: ICD-10-CM

## 2019-02-22 LAB
ANION GAP SERPL CALCULATED.3IONS-SCNC: 11.9 MMOL/L
BASOPHILS # BLD AUTO: 0.06 10*3/MM3 (ref 0–0.2)
BASOPHILS NFR BLD AUTO: 0.8 % (ref 0–1.5)
BUN BLD-MCNC: 12 MG/DL (ref 6–20)
BUN/CREAT SERPL: 14 (ref 7.3–30)
CALCIUM SPEC-SCNC: 9.1 MG/DL (ref 8.5–10.2)
CHLORIDE SERPL-SCNC: 102 MMOL/L (ref 98–107)
CO2 SERPL-SCNC: 27.1 MMOL/L (ref 22–29)
CREAT BLD-MCNC: 0.86 MG/DL (ref 0.6–1.1)
DEPRECATED RDW RBC AUTO: 44.1 FL (ref 37–54)
EOSINOPHIL # BLD AUTO: 0.09 10*3/MM3 (ref 0–0.4)
EOSINOPHIL NFR BLD AUTO: 1.1 % (ref 0.3–6.2)
ERYTHROCYTE [DISTWIDTH] IN BLOOD BY AUTOMATED COUNT: 14.5 % (ref 12.3–15.4)
FERRITIN SERPL-MCNC: 56 NG/ML (ref 11–207)
GFR SERPL CREATININE-BSD FRML MDRD: 69 ML/MIN/1.73
GLUCOSE BLD-MCNC: 97 MG/DL (ref 74–124)
HCT VFR BLD AUTO: 42.6 % (ref 34–46.6)
HGB BLD-MCNC: 13.1 G/DL (ref 12–15.9)
IMM GRANULOCYTES # BLD AUTO: 0.03 10*3/MM3 (ref 0–0.05)
IMM GRANULOCYTES NFR BLD AUTO: 0.4 % (ref 0–0.5)
INR PPP: 3.8 (ref 0.9–1.1)
IRON 24H UR-MRATE: 64 MCG/DL (ref 37–145)
IRON SATN MFR SERPL: 18 % (ref 14–48)
LYMPHOCYTES # BLD AUTO: 1.58 10*3/MM3 (ref 0.7–3.1)
LYMPHOCYTES NFR BLD AUTO: 19.8 % (ref 19.6–45.3)
MCH RBC QN AUTO: 26 PG (ref 26.6–33)
MCHC RBC AUTO-ENTMCNC: 30.8 G/DL (ref 31.5–35.7)
MCV RBC AUTO: 84.5 FL (ref 79–97)
MONOCYTES # BLD AUTO: 0.75 10*3/MM3 (ref 0.1–0.9)
MONOCYTES NFR BLD AUTO: 9.4 % (ref 5–12)
NEUTROPHILS # BLD AUTO: 5.48 10*3/MM3 (ref 1.4–7)
NEUTROPHILS NFR BLD AUTO: 68.5 % (ref 42.7–76)
NRBC BLD AUTO-RTO: 0 /100 WBC (ref 0–0)
PLATELET # BLD AUTO: 296 10*3/MM3 (ref 140–450)
PMV BLD AUTO: 10.1 FL (ref 6–12)
POTASSIUM BLD-SCNC: 4 MMOL/L (ref 3.5–4.7)
PROTHROMBIN TIME: 45.1 SECONDS (ref 11–13.5)
RBC # BLD AUTO: 5.04 10*6/MM3 (ref 3.77–5.28)
SODIUM BLD-SCNC: 141 MMOL/L (ref 134–145)
TIBC SERPL-MCNC: 353 MCG/DL (ref 249–505)
TRANSFERRIN SERPL-MCNC: 252 MG/DL (ref 200–360)
WBC NRBC COR # BLD: 7.99 10*3/MM3 (ref 3.4–10.8)

## 2019-02-22 PROCEDURE — 83540 ASSAY OF IRON: CPT | Performed by: INTERNAL MEDICINE

## 2019-02-22 PROCEDURE — 94640 AIRWAY INHALATION TREATMENT: CPT

## 2019-02-22 PROCEDURE — 84466 ASSAY OF TRANSFERRIN: CPT | Performed by: INTERNAL MEDICINE

## 2019-02-22 PROCEDURE — 71046 X-RAY EXAM CHEST 2 VIEWS: CPT

## 2019-02-22 PROCEDURE — 94060 EVALUATION OF WHEEZING: CPT

## 2019-02-22 PROCEDURE — 94726 PLETHYSMOGRAPHY LUNG VOLUMES: CPT

## 2019-02-22 PROCEDURE — 80048 BASIC METABOLIC PNL TOTAL CA: CPT | Performed by: INTERNAL MEDICINE

## 2019-02-22 PROCEDURE — 99214 OFFICE O/P EST MOD 30 MIN: CPT | Performed by: INTERNAL MEDICINE

## 2019-02-22 PROCEDURE — 82728 ASSAY OF FERRITIN: CPT | Performed by: INTERNAL MEDICINE

## 2019-02-22 PROCEDURE — 36415 COLL VENOUS BLD VENIPUNCTURE: CPT | Performed by: INTERNAL MEDICINE

## 2019-02-22 PROCEDURE — 85025 COMPLETE CBC W/AUTO DIFF WBC: CPT | Performed by: INTERNAL MEDICINE

## 2019-02-22 PROCEDURE — 85610 PROTHROMBIN TIME: CPT | Performed by: INTERNAL MEDICINE

## 2019-02-22 PROCEDURE — 94729 DIFFUSING CAPACITY: CPT

## 2019-02-22 RX ORDER — ALBUTEROL SULFATE 2.5 MG/3ML
2.5 SOLUTION RESPIRATORY (INHALATION) ONCE
Status: COMPLETED | OUTPATIENT
Start: 2019-02-22 | End: 2019-02-22

## 2019-02-22 RX ORDER — HYDROCHLOROTHIAZIDE 25 MG/1
25 TABLET ORAL DAILY
Qty: 7 TABLET | Refills: 0 | Status: SHIPPED | OUTPATIENT
Start: 2019-02-22 | End: 2019-03-11

## 2019-02-22 RX ADMIN — ALBUTEROL SULFATE 2.5 MG: 2.5 SOLUTION RESPIRATORY (INHALATION) at 12:24

## 2019-02-22 NOTE — PROGRESS NOTES
REASONS FOR FOLLOWUP:  Antiphospholipid antibody syndrome with history of pulmonary embolism March 2016    History of Present Illness    Mrs. Garcia is a 53-year-old woman with history of pulmonary embolism March 2016.  She was found to have positive lupus anticoagulant and cardiolipin antibodies at the time of her pulmonary embolism which have been persistent and has been on anticoagulation with Coumadin since diagnosis.  She has had no recurrent thromboembolic events while anticoagulated.  She has no bleeding problems.    The patient returned today for follow-up.  For the past 2-3 weeks, the patient has noted increased dyspnea on exertion.  She has episodic chest pain.  She also complains of previously elevated heart rate.  Her oxygen sometimes drops to 88% when she checks on her own monitor.  She complains of increased lower extremity edema and weight gain.  She has seen pulmonary medicine and undergoing evaluation.    Past Medical History:   Diagnosis Date   • Anemia    • Angioedema     Lower lip   • Arthritis    • Depression    • Diastolic dysfunction    • DVT (deep venous thrombosis) (CMS/HCC)    • GERD (gastroesophageal reflux disease)    • H/O antiphospholipid syndrome    • Hiatal hernia    • Hypertension    • Hypothyroidism    • Lupus anticoagulant disorder (CMS/HCC)    • Lupus anticoagulant disorder (CMS/HCC)    • Morbid obesity (CMS/HCC)    • CRISTI (obstructive sleep apnea)    • PE (pulmonary embolism)     Bilateral   • Right ventricular dilation    • Thrombocytopenia (CMS/HCC)        ONCOLOGIC HISTORY:  (History from previous dates can be found in the separate document.)    Current Outpatient Medications on File Prior to Visit   Medication Sig Dispense Refill   • amLODIPine (NORVASC) 5 MG tablet Take 1 tablet by mouth Daily. 30 tablet 3   • buPROPion XL (WELLBUTRIN XL) 300 MG 24 hr tablet Take 1 tablet by mouth Every Morning. 90 tablet 1   • levothyroxine (SYNTHROID, LEVOTHROID) 175 MCG tablet Take 1  tablet by mouth Daily. 90 tablet 1   • meclizine (ANTIVERT) 25 MG tablet Take 1 tablet by mouth 3 (Three) Times a Day As Needed for dizziness. 30 tablet 0   • omeprazole (priLOSEC) 20 MG capsule Take 1 capsule by mouth Daily. 90 capsule 1   • sertraline (ZOLOFT) 100 MG tablet Take 1 tablet by mouth Daily. 90 tablet 1   • warfarin (COUMADIN) 7.5 MG tablet Take 11.25 mg on Wednesday and 15 mg the remaining days unless prescribed by physician. 180 tablet 0     Current Facility-Administered Medications on File Prior to Visit   Medication Dose Route Frequency Provider Last Rate Last Dose   • [COMPLETED] albuterol (PROVENTIL) nebulizer solution 0.083% 2.5 mg/3mL  2.5 mg Nebulization Once Sabas Lebron MD   2.5 mg at 02/22/19 1224       ALLERGIES:     Allergies   Allergen Reactions   • Dust Mite Extract    • Eggs Or Egg-Derived Products Nausea And Vomiting   • Losartan Angioedema   • Sulfa Antibiotics        Social History     Socioeconomic History   • Marital status: Single     Spouse name: Not on file   • Number of children: 0   • Years of education: Not on file   • Highest education level: Not on file   Social Needs   • Financial resource strain: Not on file   • Food insecurity - worry: Not on file   • Food insecurity - inability: Not on file   • Transportation needs - medical: Not on file   • Transportation needs - non-medical: Not on file   Occupational History   • Occupation: Desk Work     Employer: [employer]   Tobacco Use   • Smoking status: Never Smoker   • Smokeless tobacco: Never Used   Substance and Sexual Activity   • Alcohol use: Yes     Comment: occ   • Drug use: No   • Sexual activity: Not on file   Other Topics Concern   • Not on file   Social History Narrative   • Not on file         Cancer-related family history includes Uterine cancer in her mother.     Review of Systems   Constitutional: Positive for activity change and unexpected weight change (gain). Negative for appetite change.  "  HENT: Negative.    Respiratory: Positive for shortness of breath (Exertion, chronic).    Cardiovascular: Positive for palpitations and leg swelling.   Genitourinary: Negative.    Musculoskeletal: Negative.    Neurological: Negative for weakness.   Hematological: Does not bruise/bleed easily.   Psychiatric/Behavioral: Negative.          Objective      Vitals:    02/22/19 1535   BP: 151/72   Pulse: 105   Resp: 20   Temp: 97.6 °F (36.4 °C)   TempSrc: Oral   SpO2: 90%   Weight: (!) 172 kg (379 lb 1.6 oz)   Height: 167 cm (65.75\")   PainSc: 0-No pain     Current Status 2/22/2019   ECOG score 1       Physical Exam   GENERAL: Well-developed, morbid obese woman  SKIN: Warm, dry without rashes, purpura or petechiae.  NECK: Supple with good range of motion; no thyromegaly or masses, no JVD.  LYMPHATICS: No cervical, supraclavicular, axillary or inguinal adenopathy.  CHEST: Lungs clear to percussion and auscultation. Good airflow.  She, however is notably short of breath with minimal exertion  CARDIAC: Regular rate and rhythm without murmurs, rubs or gallops. Normal S1,S2.  ABDOMEN: Soft, nontender with no organomegaly or masses.  EXTREMITIES: No clubbing, cyanosis.  Trace to 1+ ankle edema bilaterally   PSYCHIATRIC: Normal affect and mood.      RECENT LABS:  Hematology WBC   Date Value Ref Range Status   02/22/2019 7.99 3.40 - 10.80 10*3/mm3 Final     RBC   Date Value Ref Range Status   02/22/2019 5.04 3.77 - 5.28 10*6/mm3 Final     Hemoglobin   Date Value Ref Range Status   02/22/2019 13.1 12.0 - 15.9 g/dL Final     Hematocrit   Date Value Ref Range Status   02/22/2019 42.6 34.0 - 46.6 % Final     Platelets   Date Value Ref Range Status   02/22/2019 296 140 - 450 10*3/mm3 Final      INR. 3.8    Assessment/Plan    1.   history of pulmonary embolism in March 2016 secondary to antiphospholipid antibody syndrome.  She has been anticoagulated with Coumadin, goal INR 2.0-3.0 since diagnosis.  She has had no recurrence of " thromboembolic events on anticoagulation.  Repeat antiphospholipid antibody labs were performed July 2017 and persistently positive for lupus anticoagulant, cardiolipin IgG, and beta-2 glycoprotein 1 IgG and low level IgA.   Lifelong anticoagulation is therefore planned.    2.  Anticoagulation management:  The INR was subtherapeutic today 3.8.  I changed her Coumadin dose to 7.5 mg tablets take 2 tablets every day except 1.5 tablets on Friday.  Repeat INR 1 week.    3.  Microcytosis: Iron profile and ferritin pending.      4.  Dyspnea: Chronic but worsening associated with lower extremity edema and palpitations.  She is short of breath with minimal activity.  Suspicion for recurrent PE low given the supratherapeutic INR but her O2 sat is lower than usual at 90% and given her history of antiphospholipid antibody syndrome I recommended and ordered a CT angiogram just to be sure there is no evidence of PE.  She just had a chest x-ray today which I personally reviewed and see no evidence of effusions or infiltrates.  The heart looks generous in size.  Given her dyspnea, palpitations and edema I recommended she follow-up with her cardiologist also.  I prescribed hydrochlorothiazide 25 mg daily for 7 days to see if a little diuresis might help.    Addendum: CT angiogram of the chest reviewed and discussed with interpreting radiologist.  There are new small peripheral pulmonary emboli age indeterminate compared to 2016.  The patient has at times been subtherapeutic on Coumadin.  I am hesitant to change her to a new generation anticoagulant b because of her BMI.  I called and discussed with the patient; I recommended we continue Coumadin but increase her goal INR up to 2.5-3.5.  Her CT shows evidence of pulmonary artery hypertension and elevated LV ratio.  I think this is most likely the cause of her increased shortness of breath recently.  She is already seeing a pulmonologist and I also recommended she see her  cardiologist Dr. Arroyo as soon as possible for further evaluation.  I will cc cardiology.

## 2019-02-26 ENCOUNTER — HOSPITAL ENCOUNTER (OUTPATIENT)
Dept: CT IMAGING | Facility: HOSPITAL | Age: 54
Discharge: HOME | End: 2019-02-26
Admitting: INTERNAL MEDICINE

## 2019-02-26 DIAGNOSIS — I26.99 OTHER ACUTE PULMONARY EMBOLISM WITHOUT ACUTE COR PULMONALE (HCC): ICD-10-CM

## 2019-02-26 DIAGNOSIS — D64.9 ANEMIA, UNSPECIFIED TYPE: ICD-10-CM

## 2019-02-26 DIAGNOSIS — D68.61 ANTIPHOSPHOLIPID SYNDROME (HCC): Chronic | ICD-10-CM

## 2019-02-26 DIAGNOSIS — Z79.01 ANTICOAGULANT LONG-TERM USE: ICD-10-CM

## 2019-02-26 LAB — CREAT BLDA-MCNC: 1 MG/DL (ref 0.6–1.3)

## 2019-02-26 PROCEDURE — 0 IOPAMIDOL PER 1 ML: Performed by: INTERNAL MEDICINE

## 2019-02-26 PROCEDURE — 82565 ASSAY OF CREATININE: CPT

## 2019-02-26 PROCEDURE — 71275 CT ANGIOGRAPHY CHEST: CPT

## 2019-02-26 RX ADMIN — IOPAMIDOL 100 ML: 755 INJECTION, SOLUTION INTRAVENOUS at 15:00

## 2019-02-27 ENCOUNTER — DOCUMENTATION (OUTPATIENT)
Dept: ONCOLOGY | Facility: HOSPITAL | Age: 54
End: 2019-02-27

## 2019-02-27 ENCOUNTER — TELEPHONE (OUTPATIENT)
Dept: CARDIOLOGY | Facility: CLINIC | Age: 54
End: 2019-02-27

## 2019-02-27 ENCOUNTER — TELEPHONE (OUTPATIENT)
Dept: ONCOLOGY | Facility: HOSPITAL | Age: 54
End: 2019-02-27

## 2019-02-27 NOTE — TELEPHONE ENCOUNTER
Ms. Garcia called and stated that Dr Guerrier @ [facility] wants her to get in to see Dr Dina JAMESON because she has 2 bloodclots in her lungs.  An appointment later in the day works best for her.  Thank you,  Nithya

## 2019-02-27 NOTE — TELEPHONE ENCOUNTER
Called pt advising. We put her on Friday 3/1/19 with CS. I asked for a call back about who her pulmonary doctor is, so I can get in contact with them.    Massiel

## 2019-02-27 NOTE — TELEPHONE ENCOUNTER
Dr. Pratt dictation from Friday 2/22/19 was faxed to Dr. Arroyo's office. INR goal range changes to 2.5-3.5.

## 2019-02-27 NOTE — TELEPHONE ENCOUNTER
She needs to see pulmonary.  We also should get her in to see either myself or Jenny in the next couple of weeks.  She is already on anticoagulation.

## 2019-02-27 NOTE — TELEPHONE ENCOUNTER
----- Message from Juanito Guerrier MD sent at 2/27/2019 10:32 AM EST -----  Please fax my office note from Friday to Dr. Aroryo cardiology.  She is on coumadin--please change her goal INR to 2.5-3.5.

## 2019-02-28 NOTE — TELEPHONE ENCOUNTER
I put Dr. Lebron's note in your inbox. She saw him the same day as the CT Chest, prior to having it. I did fax over the CT to his assistant when she faxed their note for him to see as well. She will be seeing us tomorrow.    Massiel

## 2019-03-01 ENCOUNTER — TELEPHONE (OUTPATIENT)
Dept: ONCOLOGY | Facility: HOSPITAL | Age: 54
End: 2019-03-01

## 2019-03-01 ENCOUNTER — LAB (OUTPATIENT)
Dept: LAB | Facility: HOSPITAL | Age: 54
End: 2019-03-01

## 2019-03-01 ENCOUNTER — OFFICE VISIT (OUTPATIENT)
Dept: CARDIOLOGY | Facility: CLINIC | Age: 54
End: 2019-03-01

## 2019-03-01 VITALS
DIASTOLIC BLOOD PRESSURE: 72 MMHG | HEIGHT: 67 IN | WEIGHT: 293 LBS | BODY MASS INDEX: 45.99 KG/M2 | HEART RATE: 101 BPM | SYSTOLIC BLOOD PRESSURE: 138 MMHG

## 2019-03-01 DIAGNOSIS — I26.99 OTHER ACUTE PULMONARY EMBOLISM WITHOUT ACUTE COR PULMONALE (HCC): ICD-10-CM

## 2019-03-01 DIAGNOSIS — D64.9 ANEMIA, UNSPECIFIED TYPE: ICD-10-CM

## 2019-03-01 DIAGNOSIS — I51.7 RIGHT VENTRICULAR DILATION: ICD-10-CM

## 2019-03-01 DIAGNOSIS — I26.92 ACUTE SADDLE PULMONARY EMBOLISM WITHOUT ACUTE COR PULMONALE (HCC): Primary | ICD-10-CM

## 2019-03-01 DIAGNOSIS — I10 ESSENTIAL HYPERTENSION: Chronic | ICD-10-CM

## 2019-03-01 LAB
BASOPHILS # BLD AUTO: 0.05 10*3/MM3 (ref 0–0.2)
BASOPHILS NFR BLD AUTO: 0.6 % (ref 0–1.5)
DEPRECATED RDW RBC AUTO: 42.6 FL (ref 37–54)
EOSINOPHIL # BLD AUTO: 0.1 10*3/MM3 (ref 0–0.4)
EOSINOPHIL NFR BLD AUTO: 1.1 % (ref 0.3–6.2)
ERYTHROCYTE [DISTWIDTH] IN BLOOD BY AUTOMATED COUNT: 14.1 % (ref 12.3–15.4)
HCT VFR BLD AUTO: 47.6 % (ref 34–46.6)
HGB BLD-MCNC: 14.5 G/DL (ref 12–15.9)
IMM GRANULOCYTES # BLD AUTO: 0.04 10*3/MM3 (ref 0–0.05)
IMM GRANULOCYTES NFR BLD AUTO: 0.5 % (ref 0–0.5)
INR PPP: 2.63 (ref 0.9–1.1)
LYMPHOCYTES # BLD AUTO: 1.45 10*3/MM3 (ref 0.7–3.1)
LYMPHOCYTES NFR BLD AUTO: 16.6 % (ref 19.6–45.3)
MCH RBC QN AUTO: 25.5 PG (ref 26.6–33)
MCHC RBC AUTO-ENTMCNC: 30.5 G/DL (ref 31.5–35.7)
MCV RBC AUTO: 83.8 FL (ref 79–97)
MONOCYTES # BLD AUTO: 0.76 10*3/MM3 (ref 0.1–0.9)
MONOCYTES NFR BLD AUTO: 8.7 % (ref 5–12)
NEUTROPHILS # BLD AUTO: 6.31 10*3/MM3 (ref 1.4–7)
NEUTROPHILS NFR BLD AUTO: 72.5 % (ref 42.7–76)
NRBC BLD AUTO-RTO: 0 /100 WBC (ref 0–0)
NT-PROBNP SERPL-MCNC: 6.2 PG/ML (ref 0–900)
PLATELET # BLD AUTO: 315 10*3/MM3 (ref 140–450)
PMV BLD AUTO: 10.2 FL (ref 6–12)
PROTHROMBIN TIME: 27.7 SECONDS (ref 11.7–14.2)
RBC # BLD AUTO: 5.68 10*6/MM3 (ref 3.77–5.28)
TROPONIN T SERPL-MCNC: <0.01 NG/ML (ref 0–0.03)
WBC NRBC COR # BLD: 8.71 10*3/MM3 (ref 3.4–10.8)

## 2019-03-01 PROCEDURE — 93000 ELECTROCARDIOGRAM COMPLETE: CPT | Performed by: INTERNAL MEDICINE

## 2019-03-01 PROCEDURE — 99214 OFFICE O/P EST MOD 30 MIN: CPT | Performed by: INTERNAL MEDICINE

## 2019-03-01 PROCEDURE — 85025 COMPLETE CBC W/AUTO DIFF WBC: CPT

## 2019-03-01 PROCEDURE — 36415 COLL VENOUS BLD VENIPUNCTURE: CPT | Performed by: INTERNAL MEDICINE

## 2019-03-01 PROCEDURE — 84484 ASSAY OF TROPONIN QUANT: CPT

## 2019-03-01 PROCEDURE — 83880 ASSAY OF NATRIURETIC PEPTIDE: CPT | Performed by: INTERNAL MEDICINE

## 2019-03-01 PROCEDURE — 85610 PROTHROMBIN TIME: CPT

## 2019-03-01 NOTE — PROGRESS NOTES
Carli Garcia  1965  Date of Office Visit: 3/1/19  Encounter Provider: Sonido Arroyo MD  Place of Service: [facility] CARDIOLOGY      CHIEF COMPLAINT:   1. Right ventricular dilation.  2. Diastolic dysfunction.     HISTORY OF PRESENT ILLNESS:  Dr. Elizondo,   I had the pleasure of seeing Carli Garcia back in follow-up today.  As you'll know she is a very pleasant 53-year-old female with morbid obesity, hypertension, hypothyroidism, and depression who presented with acute-on-chronic complaint of dyspnea 3/2016. The patient stated that over the 2 months prior that she has been dyspneic, both with exertion initially and more recently at rest. She came in for evaluation and was noted to have small pulmonary emboli bilaterally and acute hypoxic respiratory failure along with sinus tachycardia.  Her cardiac biomarkers were negative and proBNP was normal. She was admitted and started on a heparin drip. Her lower extremity Doppler did show that she had an acute DVT in the right femoral, popliteal and calf vein. She had an echocardiogram with mild right ventricular dilation with normal function and grade 2 diastolic dysfunction. She was placed on Coumadin therapy and was not felt to be appropriate for catheter directed therapy.      Since our last visit she has followed with both Dr. Guerrier and her pulmonary physician. She was noted to have increasing dyspnea on exertion over the past 2-3 months. This reportedly was moderate to severe in intensity. She also noted that her oxygen would drop to the high 80s on her own monitor. She was evaluated and did have a CT angiogram of her chest that was ordered which showed new small peripheral pulmonary emboli compared to her scan in 2016. She has been subtherapeutic intermittently with her Coumadin, however she has not been changed to a direct oral anticoagulant secondary to her size.       Review of Systems   Constitution: Positive for  malaise/fatigue. Negative for fever and weakness.   HENT: Negative for nosebleeds and sore throat.    Eyes: Negative for blurred vision and double vision.   Cardiovascular: Positive for dyspnea on exertion and leg swelling. Negative for chest pain, claudication, palpitations and syncope.   Respiratory: Negative for cough, shortness of breath and snoring.    Endocrine: Negative for cold intolerance, heat intolerance and polydipsia.   Skin: Negative for itching, poor wound healing and rash.   Musculoskeletal: Negative for joint pain, joint swelling, muscle weakness and myalgias.   Gastrointestinal: Negative for abdominal pain, melena, nausea and vomiting.   Neurological: Negative for light-headedness, loss of balance, seizures and vertigo.   Psychiatric/Behavioral: Negative for altered mental status and depression.          Past Medical History:   Diagnosis Date   • Anemia    • Angioedema     Lower lip   • Arthritis    • Depression    • Diastolic dysfunction    • DVT (deep venous thrombosis) (CMS/HCC)    • GERD (gastroesophageal reflux disease)    • H/O antiphospholipid syndrome    • Hiatal hernia    • Hypertension    • Hypothyroidism    • Lupus anticoagulant disorder (CMS/HCC)    • Lupus anticoagulant disorder (CMS/HCC)    • Morbid obesity (CMS/HCC)    • CRISTI (obstructive sleep apnea)    • PE (pulmonary embolism)     Bilateral   • Right ventricular dilation    • Thrombocytopenia (CMS/HCC)        The following portions of the patient's history were reviewed and updated as appropriate: Social history , Family history and Surgical history     Current Outpatient Medications on File Prior to Visit   Medication Sig Dispense Refill   • amLODIPine (NORVASC) 5 MG tablet Take 1 tablet by mouth Daily. 30 tablet 3   • buPROPion XL (WELLBUTRIN XL) 300 MG 24 hr tablet Take 1 tablet by mouth Every Morning. 90 tablet 1   • hydrochlorothiazide (HYDRODIURIL) 25 MG tablet Take 1 tablet by mouth Daily. 7 tablet 0   • levothyroxine  "(SYNTHROID, LEVOTHROID) 175 MCG tablet Take 1 tablet by mouth Daily. 90 tablet 1   • omeprazole (priLOSEC) 20 MG capsule Take 1 capsule by mouth Daily. 90 capsule 1   • sertraline (ZOLOFT) 100 MG tablet Take 1 tablet by mouth Daily. 90 tablet 1   • warfarin (COUMADIN) 7.5 MG tablet Take 11.25 mg on Wednesday and 15 mg the remaining days unless prescribed by physician. 180 tablet 0   • meclizine (ANTIVERT) 25 MG tablet Take 1 tablet by mouth 3 (Three) Times a Day As Needed for dizziness. 30 tablet 0     No current facility-administered medications on file prior to visit.        Allergies   Allergen Reactions   • Dust Mite Extract    • Eggs Or Egg-Derived Products Nausea And Vomiting   • Lasix [Furosemide] Other (See Comments)     Patient does not recall   • Losartan Angioedema   • Sulfa Antibiotics        Vitals:    03/01/19 1401   BP: 138/72   BP Location: Right arm   Patient Position: Sitting   Pulse: 101   Weight: (!) 168 kg (370 lb)   Height: 170.2 cm (67\")     Physical Exam   Constitutional: She is oriented to person, place, and time. She appears well-developed and well-nourished.   HENT:   Head: Normocephalic and atraumatic.   Eyes: Conjunctivae and EOM are normal. No scleral icterus.   Neck: Normal range of motion. Neck supple. Normal carotid pulses, no hepatojugular reflux and no JVD present. Carotid bruit is not present. No tracheal deviation present. No thyromegaly present.   Cardiovascular: Normal rate and regular rhythm. Exam reveals no gallop and no friction rub.   No murmur heard.  Pulses:       Carotid pulses are 2+ on the right side, and 2+ on the left side.       Radial pulses are 2+ on the right side, and 2+ on the left side.        Femoral pulses are 2+ on the right side, and 2+ on the left side.       Dorsalis pedis pulses are 2+ on the right side, and 2+ on the left side.        Posterior tibial pulses are 2+ on the right side, and 2+ on the left side.   Pulmonary/Chest: Breath sounds normal. No " respiratory distress. She has no decreased breath sounds. She has no wheezes. She has no rhonchi. She has no rales. She exhibits no tenderness.   Abdominal: Soft. Bowel sounds are normal. She exhibits no distension. There is no tenderness. There is no rebound.   Musculoskeletal: She exhibits no edema or deformity.   Neurological: She is alert and oriented to person, place, and time. She has normal strength. No sensory deficit.   Skin: No rash noted. No erythema.   Psychiatric: She has a normal mood and affect. Her behavior is normal.       No components found for: CBC  No results found for: CMP  No components found for: LIPID  No results found for: BMP      ECG 12 Lead  Date/Time: 3/1/2019 2:35 PM  Performed by: Sonido Arroyo MD  Authorized by: Sonido Arroyo MD   Comparison: compared with previous ECG from 7/10/2017  Comparison to previous ECG: Heart rate has increased  Rhythm: sinus tachycardia  Rate: tachycardic  QRS axis: normal    Clinical impression: abnormal EKG               DISCUSSION/SUMMARY  53-year-old female with a medical history of pulmonary emboli, right ventricular dilation, diastolic dysfunction, hypertension, obstructive sleep apnea, and noncompliance with CPAP who presents back for follow-up.  She also had a deep venous thrombosis.  She continues on anticoagulation and is following with [facility].    The patient had a new finding of pulmonary embolus on her more recent CAT scan.  This was not present on her 2016 scan.  She also has worsening shortness of breath.  It is likely that her morbid obesity and fluctuations in her therapeutic levels of her anticoagulant have led to her having this pulmonary embolus.  I am unclear whether she also is starting to develop CTEPH  but I will plan on starting out with an echocardiogram and assessing the right ventricular size, function and RVSP on that.  She may need a right heart catheterization at some point time    1.  Pulmonary emboli,  DVT, hypercoagulable state:    -Continue Coumadin therapy lifelong.  INR goal has been increased and a half.   -Repeat transthoracic echocardiogram.  If we get poor quality images or there is evidence of RVSP elevation we will plan on a right heart cath.  She has ongoing dyspnea and may need a more thorough  evaluation for chronic thromboembolic pulmonary hypertension    2. Hypertension not at goal.  Continue current therapy

## 2019-03-01 NOTE — TELEPHONE ENCOUNTER
INR received from hospital 2.63, called and spoke with pt, she denies missed doses or new meds.  She reports that on 2 days during the week she took one and one-half tablets, and the remaining days she took 2 tablets.  Dosing adjusted per SS, pt will now take 2 tablets all days except on Wednesday (pt preference) she will only take one and one-half tablets.  She is going to retest on Thursday as her labs are collected via [facility] with hopes that results will be to our office by Friday.

## 2019-03-08 ENCOUNTER — HOSPITAL ENCOUNTER (OUTPATIENT)
Dept: CARDIOLOGY | Facility: HOSPITAL | Age: 54
Discharge: HOME | End: 2019-03-08
Admitting: INTERNAL MEDICINE

## 2019-03-08 ENCOUNTER — TELEPHONE (OUTPATIENT)
Dept: CARDIOLOGY | Facility: CLINIC | Age: 54
End: 2019-03-08

## 2019-03-08 ENCOUNTER — TELEPHONE (OUTPATIENT)
Dept: ONCOLOGY | Facility: HOSPITAL | Age: 54
End: 2019-03-08

## 2019-03-08 ENCOUNTER — LAB (OUTPATIENT)
Dept: LAB | Facility: HOSPITAL | Age: 54
End: 2019-03-08

## 2019-03-08 DIAGNOSIS — I26.92 ACUTE SADDLE PULMONARY EMBOLISM WITHOUT ACUTE COR PULMONALE (HCC): ICD-10-CM

## 2019-03-08 DIAGNOSIS — I26.99 OTHER ACUTE PULMONARY EMBOLISM WITHOUT ACUTE COR PULMONALE (HCC): ICD-10-CM

## 2019-03-08 DIAGNOSIS — I51.7 RIGHT VENTRICULAR DILATION: ICD-10-CM

## 2019-03-08 DIAGNOSIS — I10 ESSENTIAL HYPERTENSION: ICD-10-CM

## 2019-03-08 LAB
BH CV LOW VAS RIGHT MID FEMORAL SPONT: 1
BH CV LOW VAS RIGHT POPLITEAL SPONT: 1
BH CV LOWER VASCULAR LEFT COMMON FEMORAL AUGMENT: NORMAL
BH CV LOWER VASCULAR LEFT COMMON FEMORAL COMPETENT: NORMAL
BH CV LOWER VASCULAR LEFT COMMON FEMORAL COMPRESS: NORMAL
BH CV LOWER VASCULAR LEFT COMMON FEMORAL PHASIC: NORMAL
BH CV LOWER VASCULAR LEFT COMMON FEMORAL SPONT: NORMAL
BH CV LOWER VASCULAR LEFT DISTAL FEMORAL COMPRESS: NORMAL
BH CV LOWER VASCULAR LEFT GASTRONEMIUS COMPRESS: NORMAL
BH CV LOWER VASCULAR LEFT GREATER SAPH AK COMPRESS: NORMAL
BH CV LOWER VASCULAR LEFT GREATER SAPH BK COMPRESS: NORMAL
BH CV LOWER VASCULAR LEFT LESSER SAPH COMPRESS: NORMAL
BH CV LOWER VASCULAR LEFT MID FEMORAL AUGMENT: NORMAL
BH CV LOWER VASCULAR LEFT MID FEMORAL COMPETENT: NORMAL
BH CV LOWER VASCULAR LEFT MID FEMORAL COMPRESS: NORMAL
BH CV LOWER VASCULAR LEFT MID FEMORAL PHASIC: NORMAL
BH CV LOWER VASCULAR LEFT MID FEMORAL SPONT: NORMAL
BH CV LOWER VASCULAR LEFT PERONEAL COMPRESS: NORMAL
BH CV LOWER VASCULAR LEFT POPLITEAL AUGMENT: NORMAL
BH CV LOWER VASCULAR LEFT POPLITEAL COMPETENT: NORMAL
BH CV LOWER VASCULAR LEFT POPLITEAL COMPRESS: NORMAL
BH CV LOWER VASCULAR LEFT POPLITEAL PHASIC: NORMAL
BH CV LOWER VASCULAR LEFT POPLITEAL SPONT: NORMAL
BH CV LOWER VASCULAR LEFT POSTERIOR TIBIAL COMPRESS: NORMAL
BH CV LOWER VASCULAR LEFT PROXIMAL FEMORAL COMPRESS: NORMAL
BH CV LOWER VASCULAR LEFT SAPHENOFEMORAL JUNCTION AUGMENT: NORMAL
BH CV LOWER VASCULAR LEFT SAPHENOFEMORAL JUNCTION COMPETENT: NORMAL
BH CV LOWER VASCULAR LEFT SAPHENOFEMORAL JUNCTION COMPRESS: NORMAL
BH CV LOWER VASCULAR LEFT SAPHENOFEMORAL JUNCTION PHASIC: NORMAL
BH CV LOWER VASCULAR LEFT SAPHENOFEMORAL JUNCTION SPONT: NORMAL
BH CV LOWER VASCULAR RIGHT COMMON FEMORAL AUGMENT: NORMAL
BH CV LOWER VASCULAR RIGHT COMMON FEMORAL COMPETENT: NORMAL
BH CV LOWER VASCULAR RIGHT COMMON FEMORAL COMPRESS: NORMAL
BH CV LOWER VASCULAR RIGHT COMMON FEMORAL PHASIC: NORMAL
BH CV LOWER VASCULAR RIGHT COMMON FEMORAL SPONT: NORMAL
BH CV LOWER VASCULAR RIGHT DISTAL FEMORAL COMPRESS: NORMAL
BH CV LOWER VASCULAR RIGHT GASTRONEMIUS COMPRESS: NORMAL
BH CV LOWER VASCULAR RIGHT GREATER SAPH AK COMPRESS: NORMAL
BH CV LOWER VASCULAR RIGHT GREATER SAPH BK COMPRESS: NORMAL
BH CV LOWER VASCULAR RIGHT LESSER SAPH COMPRESS: NORMAL
BH CV LOWER VASCULAR RIGHT MID FEMORAL AUGMENT: NORMAL
BH CV LOWER VASCULAR RIGHT MID FEMORAL COMPETENT: NORMAL
BH CV LOWER VASCULAR RIGHT MID FEMORAL COMPRESS: NORMAL
BH CV LOWER VASCULAR RIGHT MID FEMORAL PHASIC: NORMAL
BH CV LOWER VASCULAR RIGHT MID FEMORAL SPONT: NORMAL
BH CV LOWER VASCULAR RIGHT MID FEMORAL THROMBUS: NORMAL
BH CV LOWER VASCULAR RIGHT PERONEAL COMPRESS: NORMAL
BH CV LOWER VASCULAR RIGHT POPLITEAL AUGMENT: NORMAL
BH CV LOWER VASCULAR RIGHT POPLITEAL COMPETENT: NORMAL
BH CV LOWER VASCULAR RIGHT POPLITEAL COMPRESS: NORMAL
BH CV LOWER VASCULAR RIGHT POPLITEAL PHASIC: NORMAL
BH CV LOWER VASCULAR RIGHT POPLITEAL SPONT: NORMAL
BH CV LOWER VASCULAR RIGHT POPLITEAL THROMBUS: NORMAL
BH CV LOWER VASCULAR RIGHT POSTERIOR TIBIAL COMPRESS: NORMAL
BH CV LOWER VASCULAR RIGHT PROXIMAL FEMORAL COMPRESS: NORMAL
BH CV LOWER VASCULAR RIGHT SAPHENOFEMORAL JUNCTION AUGMENT: NORMAL
BH CV LOWER VASCULAR RIGHT SAPHENOFEMORAL JUNCTION COMPETENT: NORMAL
BH CV LOWER VASCULAR RIGHT SAPHENOFEMORAL JUNCTION COMPRESS: NORMAL
BH CV LOWER VASCULAR RIGHT SAPHENOFEMORAL JUNCTION PHASIC: NORMAL
BH CV LOWER VASCULAR RIGHT SAPHENOFEMORAL JUNCTION SPONT: NORMAL
INR PPP: 2.93 (ref 0.9–1.1)
PROTHROMBIN TIME: 30.1 SECONDS (ref 11.7–14.2)

## 2019-03-08 PROCEDURE — 36415 COLL VENOUS BLD VENIPUNCTURE: CPT

## 2019-03-08 PROCEDURE — 85610 PROTHROMBIN TIME: CPT

## 2019-03-08 PROCEDURE — 93970 EXTREMITY STUDY: CPT

## 2019-03-08 NOTE — TELEPHONE ENCOUNTER
03/08/19  3:20 PM  Carli Garcia  1965    Home Phone [phone]   Mobile [phone]       Carli Garcia is a patient of Dr. Arroyo, and vascular from [facility] called to inform that the pt  Is (+) for old RL DVT and (-) for LL DVT.    Dolores West  Triage RN

## 2019-03-08 NOTE — PROGRESS NOTES
bilateral lower venous doppler complete and preliminary is positive for old right leg dvt and left leg is negative for dvt to Dolores white

## 2019-03-08 NOTE — TELEPHONE ENCOUNTER
INR 2.93. Pt denies any new , diet changes or missed doses. Per SS pt is to continues current dose of 11.25 mg on Sun and 15 mg all other days, she will have her PT/INr rechecked in one week. Pt V/U.

## 2019-03-11 ENCOUNTER — OFFICE VISIT (OUTPATIENT)
Dept: INTERNAL MEDICINE | Facility: CLINIC | Age: 54
End: 2019-03-11

## 2019-03-11 ENCOUNTER — HOSPITAL ENCOUNTER (OUTPATIENT)
Dept: CARDIOLOGY | Facility: HOSPITAL | Age: 54
Discharge: HOME | End: 2019-03-11
Admitting: INTERNAL MEDICINE

## 2019-03-11 VITALS
WEIGHT: 293 LBS | BODY MASS INDEX: 45.99 KG/M2 | SYSTOLIC BLOOD PRESSURE: 170 MMHG | DIASTOLIC BLOOD PRESSURE: 80 MMHG | HEIGHT: 67 IN | HEART RATE: 113 BPM

## 2019-03-11 VITALS
BODY MASS INDEX: 45.99 KG/M2 | SYSTOLIC BLOOD PRESSURE: 130 MMHG | OXYGEN SATURATION: 97 % | HEIGHT: 67 IN | WEIGHT: 293 LBS | DIASTOLIC BLOOD PRESSURE: 82 MMHG | HEART RATE: 99 BPM

## 2019-03-11 DIAGNOSIS — I26.92 ACUTE SADDLE PULMONARY EMBOLISM WITHOUT ACUTE COR PULMONALE (HCC): ICD-10-CM

## 2019-03-11 DIAGNOSIS — I51.7 RIGHT VENTRICULAR DILATION: ICD-10-CM

## 2019-03-11 DIAGNOSIS — Z00.00 PE (PHYSICAL EXAM), ANNUAL: Primary | ICD-10-CM

## 2019-03-11 DIAGNOSIS — I10 ESSENTIAL HYPERTENSION: ICD-10-CM

## 2019-03-11 DIAGNOSIS — I26.99 OTHER ACUTE PULMONARY EMBOLISM WITHOUT ACUTE COR PULMONALE (HCC): ICD-10-CM

## 2019-03-11 DIAGNOSIS — Z12.11 SCREENING FOR COLON CANCER: ICD-10-CM

## 2019-03-11 DIAGNOSIS — I10 ESSENTIAL HYPERTENSION: Chronic | ICD-10-CM

## 2019-03-11 DIAGNOSIS — Z12.4 SCREENING FOR CERVICAL CANCER: ICD-10-CM

## 2019-03-11 LAB
ASCENDING AORTA: 2.9 CM
BH CV ECHO MEAS - ACS: 2 CM
BH CV ECHO MEAS - AO MAX PG (FULL): 4.8 MMHG
BH CV ECHO MEAS - AO MAX PG: 9.4 MMHG
BH CV ECHO MEAS - AO MEAN PG (FULL): 2.7 MMHG
BH CV ECHO MEAS - AO MEAN PG: 5.8 MMHG
BH CV ECHO MEAS - AO ROOT AREA (BSA CORRECTED): 1.1
BH CV ECHO MEAS - AO ROOT AREA: 6.5 CM^2
BH CV ECHO MEAS - AO ROOT DIAM: 2.9 CM
BH CV ECHO MEAS - AO V2 MAX: 153.2 CM/SEC
BH CV ECHO MEAS - AO V2 MEAN: 115.6 CM/SEC
BH CV ECHO MEAS - AO V2 VTI: 30.2 CM
BH CV ECHO MEAS - AVA(I,A): 1.8 CM^2
BH CV ECHO MEAS - AVA(I,D): 1.8 CM^2
BH CV ECHO MEAS - AVA(V,A): 1.8 CM^2
BH CV ECHO MEAS - AVA(V,D): 1.8 CM^2
BH CV ECHO MEAS - BSA(HAYCOCK): 2.9 M^2
BH CV ECHO MEAS - BSA: 2.6 M^2
BH CV ECHO MEAS - BZI_BMI: 58 KILOGRAMS/M^2
BH CV ECHO MEAS - BZI_METRIC_HEIGHT: 170.2 CM
BH CV ECHO MEAS - BZI_METRIC_WEIGHT: 167.8 KG
BH CV ECHO MEAS - EDV(MOD-SP2): 49 ML
BH CV ECHO MEAS - EDV(MOD-SP4): 78 ML
BH CV ECHO MEAS - EDV(TEICH): 74.3 ML
BH CV ECHO MEAS - EF(CUBED): 64.5 %
BH CV ECHO MEAS - EF(MOD-BP): 68 %
BH CV ECHO MEAS - EF(MOD-SP2): 71.4 %
BH CV ECHO MEAS - EF(MOD-SP4): 67.9 %
BH CV ECHO MEAS - EF(TEICH): 56.5 %
BH CV ECHO MEAS - ESV(MOD-SP2): 14 ML
BH CV ECHO MEAS - ESV(MOD-SP4): 25 ML
BH CV ECHO MEAS - ESV(TEICH): 32.3 ML
BH CV ECHO MEAS - FS: 29.2 %
BH CV ECHO MEAS - IVS/LVPW: 0.96
BH CV ECHO MEAS - IVSD: 1.1 CM
BH CV ECHO MEAS - LAT PEAK E' VEL: 11 CM/SEC
BH CV ECHO MEAS - LV DIASTOLIC VOL/BSA (35-75): 29.7 ML/M^2
BH CV ECHO MEAS - LV MASS(C)D: 157.3 GRAMS
BH CV ECHO MEAS - LV MASS(C)DI: 59.9 GRAMS/M^2
BH CV ECHO MEAS - LV MAX PG: 4.6 MMHG
BH CV ECHO MEAS - LV MEAN PG: 3.1 MMHG
BH CV ECHO MEAS - LV SYSTOLIC VOL/BSA (12-30): 9.5 ML/M^2
BH CV ECHO MEAS - LV V1 MAX: 106.7 CM/SEC
BH CV ECHO MEAS - LV V1 MEAN: 85.7 CM/SEC
BH CV ECHO MEAS - LV V1 VTI: 21.1 CM
BH CV ECHO MEAS - LVIDD: 4.1 CM
BH CV ECHO MEAS - LVIDS: 2.9 CM
BH CV ECHO MEAS - LVLD AP2: 7.3 CM
BH CV ECHO MEAS - LVLD AP4: 8 CM
BH CV ECHO MEAS - LVLS AP2: 6.4 CM
BH CV ECHO MEAS - LVLS AP4: 6.2 CM
BH CV ECHO MEAS - LVOT AREA (M): 2.5 CM^2
BH CV ECHO MEAS - LVOT AREA: 2.6 CM^2
BH CV ECHO MEAS - LVOT DIAM: 1.8 CM
BH CV ECHO MEAS - LVPWD: 1.2 CM
BH CV ECHO MEAS - MED PEAK E' VEL: 8 CM/SEC
BH CV ECHO MEAS - MV A DUR: 0.08 SEC
BH CV ECHO MEAS - MV A MAX VEL: 80.5 CM/SEC
BH CV ECHO MEAS - MV DEC SLOPE: 188.3 CM/SEC^2
BH CV ECHO MEAS - MV DEC TIME: 0.31 SEC
BH CV ECHO MEAS - MV E MAX VEL: 58.7 CM/SEC
BH CV ECHO MEAS - MV E/A: 0.73
BH CV ECHO MEAS - MV MAX PG: 3.2 MMHG
BH CV ECHO MEAS - MV MEAN PG: 1.4 MMHG
BH CV ECHO MEAS - MV P1/2T MAX VEL: 59.2 CM/SEC
BH CV ECHO MEAS - MV P1/2T: 92 MSEC
BH CV ECHO MEAS - MV V2 MAX: 89.2 CM/SEC
BH CV ECHO MEAS - MV V2 MEAN: 55.7 CM/SEC
BH CV ECHO MEAS - MV V2 VTI: 22.1 CM
BH CV ECHO MEAS - MVA P1/2T LCG: 3.7 CM^2
BH CV ECHO MEAS - MVA(P1/2T): 2.4 CM^2
BH CV ECHO MEAS - MVA(VTI): 2.4 CM^2
BH CV ECHO MEAS - PA ACC TIME: 0.11 SEC
BH CV ECHO MEAS - PA MAX PG (FULL): 2.7 MMHG
BH CV ECHO MEAS - PA MAX PG: 4.3 MMHG
BH CV ECHO MEAS - PA PR(ACCEL): 27.9 MMHG
BH CV ECHO MEAS - PA V2 MAX: 103.7 CM/SEC
BH CV ECHO MEAS - PULM A REVS DUR: 0.09 SEC
BH CV ECHO MEAS - PULM A REVS VEL: 49.3 CM/SEC
BH CV ECHO MEAS - PULM DIAS VEL: 44.7 CM/SEC
BH CV ECHO MEAS - PULM S/D: 1.7
BH CV ECHO MEAS - PULM SYS VEL: 74.6 CM/SEC
BH CV ECHO MEAS - PVA(V,A): 2.3 CM^2
BH CV ECHO MEAS - PVA(V,D): 2.3 CM^2
BH CV ECHO MEAS - QP/QS: 0.81
BH CV ECHO MEAS - RAP SYSTOLE: 3 MMHG
BH CV ECHO MEAS - RV MAX PG: 1.6 MMHG
BH CV ECHO MEAS - RV MEAN PG: 0.96 MMHG
BH CV ECHO MEAS - RV V1 MAX: 62.6 CM/SEC
BH CV ECHO MEAS - RV V1 MEAN: 46.3 CM/SEC
BH CV ECHO MEAS - RV V1 VTI: 11.6 CM
BH CV ECHO MEAS - RVOT AREA: 3.8 CM^2
BH CV ECHO MEAS - RVOT DIAM: 2.2 CM
BH CV ECHO MEAS - SI(AO): 74.5 ML/M^2
BH CV ECHO MEAS - SI(CUBED): 17 ML/M^2
BH CV ECHO MEAS - SI(LVOT): 20.6 ML/M^2
BH CV ECHO MEAS - SI(MOD-SP2): 13.3 ML/M^2
BH CV ECHO MEAS - SI(MOD-SP4): 20.2 ML/M^2
BH CV ECHO MEAS - SI(TEICH): 16 ML/M^2
BH CV ECHO MEAS - SUP REN AO DIAM: 2.2 CM
BH CV ECHO MEAS - SV(AO): 195.6 ML
BH CV ECHO MEAS - SV(CUBED): 44.5 ML
BH CV ECHO MEAS - SV(LVOT): 54 ML
BH CV ECHO MEAS - SV(MOD-SP2): 35 ML
BH CV ECHO MEAS - SV(MOD-SP4): 53 ML
BH CV ECHO MEAS - SV(RVOT): 43.8 ML
BH CV ECHO MEAS - SV(TEICH): 42 ML
BH CV ECHO MEAS - TAPSE (>1.6): 2.2 CM2
BH CV ECHO MEASUREMENTS AVERAGE E/E' RATIO: 6.18
BH CV XLRA - RV BASE: 3.2 CM
BH CV XLRA - TDI S': 15 CM/SEC
HEMOCCULT STL QL IA: NEGATIVE
LEFT ATRIUM VOLUME INDEX: 15 ML/M2
LV EF 2D ECHO EST: 68 %
MAXIMAL PREDICTED HEART RATE: 166 BPM
SINUS: 3 CM
STJ: 2.4 CM
STRESS TARGET HR: 141 BPM

## 2019-03-11 PROCEDURE — 99396 PREV VISIT EST AGE 40-64: CPT | Performed by: NURSE PRACTITIONER

## 2019-03-11 PROCEDURE — 93306 TTE W/DOPPLER COMPLETE: CPT | Performed by: INTERNAL MEDICINE

## 2019-03-11 PROCEDURE — 93306 TTE W/DOPPLER COMPLETE: CPT

## 2019-03-11 PROCEDURE — 82274 ASSAY TEST FOR BLOOD FECAL: CPT | Performed by: NURSE PRACTITIONER

## 2019-03-11 RX ORDER — HYDROCHLOROTHIAZIDE 25 MG/1
25 TABLET ORAL DAILY
Qty: 90 TABLET | Refills: 1 | Status: SHIPPED | OUTPATIENT
Start: 2019-03-11 | End: 2019-04-23 | Stop reason: HOSPADM

## 2019-03-11 RX ORDER — AMLODIPINE BESYLATE 5 MG/1
5 TABLET ORAL DAILY
Qty: 90 TABLET | Refills: 1 | Status: SHIPPED | OUTPATIENT
Start: 2019-03-11 | End: 2019-07-26 | Stop reason: SDUPTHER

## 2019-03-11 NOTE — PROGRESS NOTES
Subjective   Carli Garcia is a 53 y.o. female who presents for a physical exam.    Her Lisinopril was recently discontinued due to concerns over a recurrent cough, feels as though her BP may be elevated since starting Amlodipine. She recently had an echo which showed normal right ventricular function. Her CT angiogram of chest showed chronic PE along with some new PE, INR goal increased to 2.5 to 3.5. No new DVTs noted on doppler.         The following portions of the patient's history were reviewed and updated as appropriate: allergies, current medications, past social history and problem list.    Past Medical History:   Diagnosis Date   • Anemia    • Angioedema     Lower lip   • Arthritis    • Depression    • Diastolic dysfunction    • DVT (deep venous thrombosis) (CMS/HCC)    • GERD (gastroesophageal reflux disease)    • H/O antiphospholipid syndrome    • Hiatal hernia    • Hypertension    • Hypothyroidism    • Lupus anticoagulant disorder (CMS/HCC)    • Lupus anticoagulant disorder (CMS/HCC)    • Morbid obesity (CMS/HCC)    • CRISTI (obstructive sleep apnea)    • PE (pulmonary embolism)     Bilateral   • Right ventricular dilation    • Thrombocytopenia (CMS/HCC)          Current Outpatient Medications:   •  amLODIPine (NORVASC) 5 MG tablet, Take 1 tablet by mouth Daily., Disp: 90 tablet, Rfl: 1  •  buPROPion XL (WELLBUTRIN XL) 300 MG 24 hr tablet, Take 1 tablet by mouth Every Morning., Disp: 90 tablet, Rfl: 1  •  levothyroxine (SYNTHROID, LEVOTHROID) 175 MCG tablet, Take 1 tablet by mouth Daily., Disp: 90 tablet, Rfl: 1  •  meclizine (ANTIVERT) 25 MG tablet, Take 1 tablet by mouth 3 (Three) Times a Day As Needed for dizziness., Disp: 30 tablet, Rfl: 0  •  omeprazole (priLOSEC) 20 MG capsule, Take 1 capsule by mouth Daily., Disp: 90 capsule, Rfl: 1  •  sertraline (ZOLOFT) 100 MG tablet, Take 1 tablet by mouth Daily., Disp: 90 tablet, Rfl: 1  •  warfarin (COUMADIN) 7.5 MG tablet, Take 11.25 mg on Wednesday and 15  mg the remaining days unless prescribed by physician., Disp: 180 tablet, Rfl: 0  •  hydrochlorothiazide (HYDRODIURIL) 25 MG tablet, Take 1 tablet by mouth Daily., Disp: 90 tablet, Rfl: 1    Allergies   Allergen Reactions   • Dust Mite Extract    • Eggs Or Egg-Derived Products Nausea And Vomiting   • Lasix [Furosemide] Other (See Comments)     Patient does not recall   • Losartan Angioedema   • Sulfa Antibiotics        Review of Systems   Constitutional: Positive for fatigue. Negative for activity change, appetite change, chills, diaphoresis, fever and unexpected weight change.   HENT: Negative for congestion, dental problem, drooling, ear discharge, ear pain, facial swelling, hearing loss, mouth sores, nosebleeds, postnasal drip, rhinorrhea, sinus pressure, sore throat, tinnitus and trouble swallowing.    Eyes: Negative for photophobia, pain, discharge, redness, itching and visual disturbance.   Respiratory: Positive for cough and shortness of breath. Negative for apnea, choking, chest tightness and wheezing.    Cardiovascular: Positive for leg swelling. Negative for chest pain and palpitations.        No orthopnea, PND, VALERIO   Gastrointestinal: Negative for abdominal pain, blood in stool, constipation, diarrhea, nausea and vomiting.   Endocrine: Negative for cold intolerance, heat intolerance, polydipsia and polyuria.   Genitourinary: Negative for decreased urine volume, dysuria, enuresis, flank pain, frequency, hematuria and urgency.   Musculoskeletal: Negative for arthralgias, back pain, gait problem, joint swelling, myalgias, neck pain and neck stiffness.   Skin: Negative for color change and rash.        No hair changes, no nail changes   Allergic/Immunologic: Negative for environmental allergies, food allergies and immunocompromised state.   Neurological: Negative for dizziness, tremors, seizures, syncope, speech difficulty, weakness, light-headedness, numbness and headaches.   Hematological: Negative for  "adenopathy. Does not bruise/bleed easily.   Psychiatric/Behavioral: Negative for agitation, confusion, decreased concentration, dysphoric mood, sleep disturbance and suicidal ideas. The patient is not nervous/anxious.        Objective   Vitals:    03/11/19 1258   BP: 130/82   BP Location: Left arm   Patient Position: Sitting   Cuff Size: Adult   Pulse: 99   SpO2: 97%   Weight: (!) 170 kg (374 lb)   Height: 170.2 cm (67\")     Physical Exam   Constitutional: She appears well-developed and well-nourished.   HENT:   Right Ear: Hearing, tympanic membrane, external ear and ear canal normal.   Left Ear: Hearing, tympanic membrane, external ear and ear canal normal.   Nose: Right sinus exhibits no maxillary sinus tenderness and no frontal sinus tenderness. Left sinus exhibits no maxillary sinus tenderness and no frontal sinus tenderness.   Eyes: Conjunctivae, EOM and lids are normal. Pupils are equal, round, and reactive to light.   Neck: Trachea normal. Neck supple. No JVD present. Carotid bruit is not present. No tracheal deviation present. No thyroid mass and no thyromegaly present.   Cardiovascular: Normal rate, regular rhythm, S1 normal and S2 normal. Exam reveals no gallop and no friction rub.   No murmur heard.  Pulses:       Carotid pulses are 2+ on the right side, and 2+ on the left side.       Radial pulses are 2+ on the right side, and 2+ on the left side.        Dorsalis pedis pulses are 2+ on the right side, and 2+ on the left side.        Posterior tibial pulses are 2+ on the right side, and 2+ on the left side.   Pulmonary/Chest: Effort normal and breath sounds normal. Chest wall is not dull to percussion. Right breast exhibits no inverted nipple, no mass, no nipple discharge, no skin change and no tenderness. Left breast exhibits no inverted nipple, no mass, no nipple discharge, no skin change and no tenderness.   Abdominal: Soft. Normal aorta and bowel sounds are normal. She exhibits no abdominal bruit. " There is no hepatosplenomegaly. There is no tenderness. There is no rebound and no guarding. No hernia.   Genitourinary: Rectum normal, vagina normal and uterus normal. Rectal exam shows guaiac negative stool. No labial fusion. There is no rash, tenderness, lesion or injury on the right labia. There is no rash, tenderness, lesion or injury on the left labia. Cervix exhibits no discharge. Right adnexum displays no mass, no tenderness and no fullness. Left adnexum displays no mass, no tenderness and no fullness.   Musculoskeletal: Normal range of motion. She exhibits no edema.   Lymphadenopathy:     She has no cervical adenopathy.     She has no axillary adenopathy.        Right: No supraclavicular adenopathy present.        Left: No supraclavicular adenopathy present.   Neurological: She is alert. She has normal strength. No cranial nerve deficit or sensory deficit. She displays a negative Romberg sign.   Reflex Scores:       Patellar reflexes are 2+ on the right side and 2+ on the left side.  Skin: Skin is warm and dry.   Nursing note and vitals reviewed.      Assessment/Plan   Carli was seen today for annual exam.    Diagnoses and all orders for this visit:    PE (physical exam), annual  -     CBC Auto Differential; Future  -     Comprehensive Metabolic Panel; Future  -     Lipid Panel; Future  -     TSH; Future  -     Urinalysis With Microscopic If Indicated (No Culture) - Urine, Clean Catch; Future  -     Urinalysis With Microscopic If Indicated (No Culture) - Urine, Clean Catch; Future    Screening for colon cancer  -     POC FECAL OCCULT BLOOD BY IMMUNOASSAY    Essential hypertension  -     hydrochlorothiazide (HYDRODIURIL) 25 MG tablet; Take 1 tablet by mouth Daily.  -     amLODIPine (NORVASC) 5 MG tablet; Take 1 tablet by mouth Daily.    Screening for cervical cancer  -     Pap IG, HPV-hr; Future    Risk assessment:  She has a hx of antiphospholipid syndrome-IgG cardiolipin with chronic DVT & PE, new CT  angiogram showed new PE. She is currently managed on Coumadin. She does c/o dyspnea which is worse with exertion (O2 drops in high 80s) but has declined O2 therapy for now (has had in the past).  She had a hx of obesity, discussed diet and exercise program with patient. Declined Contrave therapy.    Prevention:  She has not had a screening colonoscopy (has declined in past) but will hold off for now due to new PE and need to hold Coumadin prior to scope. She requested info on ColoGuard, she will check with insurance regarding coverage.  She is due for a mammogram which she will schedule.  Discussed Shingrix vaccine, she will check with pharmacy regarding coverage and availability.  Tdap and influenza vaccines are current.

## 2019-03-15 ENCOUNTER — TELEPHONE (OUTPATIENT)
Dept: INTERNAL MEDICINE | Facility: CLINIC | Age: 54
End: 2019-03-15

## 2019-03-15 DIAGNOSIS — I10 ESSENTIAL HYPERTENSION: Primary | ICD-10-CM

## 2019-03-15 LAB
CHROM ANALY OVERALL INTERP-IMP: NORMAL
CONV .: NORMAL
CONV PERFORMED BY:: NORMAL
DX ICD CODE: NORMAL
HIV 1 & 2 AB SER-IMP: NORMAL
HPV I/H RISK 1 DNA CVX QL PROBE+SIG AMP: NORMAL
HPV I/H RISK 4 DNA CVX QL PROBE+SIG AMP: NEGATIVE
REF LAB TEST METHOD: NORMAL
STAT OF ADQ CVX/VAG CYTO-IMP: NORMAL

## 2019-03-15 RX ORDER — METOPROLOL SUCCINATE 50 MG/1
50 TABLET, EXTENDED RELEASE ORAL DAILY
Qty: 90 TABLET | Refills: 1 | Status: SHIPPED | OUTPATIENT
Start: 2019-03-15 | End: 2019-04-05 | Stop reason: SINTOL

## 2019-03-15 NOTE — TELEPHONE ENCOUNTER
Regarding: FW: Visit Follow-Up Question  Contact: [phone]  Please see message below thanks      ----- Message -----  From: Carli Garcia  Sent: 3/13/2019   8:33 AM  To: [facility]  Subject: Visit Follow-Up Question                         ----- Message from Mychart, Generic sent at 3/13/2019  8:33 AM EDT -----    Ajit Andrade,  I finally figured this out I think, you wanted to know about my B/P.  2/24/19-730a-152/82  2/25-6a-163/94  2/25-6p-162/84  2/26-8a-140/81  2/26-6p-161/92  2/27-8p-143/83  3/2-8a-140/85  3/3-5p-143/84  3/11-6p-146/91  these were all taken at resting stage, before eating and not after just waking up.  Take Care  Catherine    Discussed BP readings with patient-will add Toprol XL 50mg for further BP and heart rate lowering. F/u with BP readings in 2-3 weeks.

## 2019-03-22 ENCOUNTER — TELEPHONE (OUTPATIENT)
Dept: ONCOLOGY | Facility: HOSPITAL | Age: 54
End: 2019-03-22

## 2019-03-22 NOTE — TELEPHONE ENCOUNTER
Pt had her INR checked on 3/14. Faxed never given to RN for dosing. Pt called today for dosing. We cannot dose pt with a PT/INR from one week ago. Pt will schedule a PT/INR for Monday. Message sent to scheduling. Pt V/U.

## 2019-03-26 ENCOUNTER — CLINICAL SUPPORT (OUTPATIENT)
Dept: ONCOLOGY | Facility: HOSPITAL | Age: 54
End: 2019-03-26

## 2019-03-26 ENCOUNTER — LAB (OUTPATIENT)
Dept: LAB | Facility: HOSPITAL | Age: 54
End: 2019-03-26

## 2019-03-26 DIAGNOSIS — I82.402 ACUTE DEEP VEIN THROMBOSIS (DVT) OF LEFT LOWER EXTREMITY, UNSPECIFIED VEIN (HCC): Primary | ICD-10-CM

## 2019-03-26 LAB
INR PPP: 3.4 (ref 0.9–1.1)
PROTHROMBIN TIME: 40.5 SECONDS (ref 11–13.5)

## 2019-03-26 PROCEDURE — 85610 PROTHROMBIN TIME: CPT

## 2019-03-26 NOTE — PROGRESS NOTES
PT HAD LEFT OFFICE BEFORE SEEING RN. CALLED PT AND WENT OVER COUMADIN DOSING. ASKED PT TO RTN IN 3 WKS AS PT DOESN'T HAVE APPT TO RTN UNTIL 7 WKS FROM NOW. PT AGREED W/ PLAN AND HAD REQUESTED THAT WE LEAVE HER COUMADIN DOSING THE SAME AS PREVIOUS. APPT DESK MESSAGED TO SET UP RTN APPT IN 3 WKS.

## 2019-03-27 ENCOUNTER — APPOINTMENT (OUTPATIENT)
Dept: CT IMAGING | Facility: HOSPITAL | Age: 54
End: 2019-03-27

## 2019-03-27 ENCOUNTER — HOSPITAL ENCOUNTER (EMERGENCY)
Facility: HOSPITAL | Age: 54
Discharge: HOME | End: 2019-03-27
Attending: EMERGENCY MEDICINE | Admitting: NURSE PRACTITIONER

## 2019-03-27 ENCOUNTER — OFFICE VISIT (OUTPATIENT)
Dept: INTERNAL MEDICINE | Facility: CLINIC | Age: 54
End: 2019-03-27

## 2019-03-27 ENCOUNTER — TELEPHONE (OUTPATIENT)
Dept: INTERNAL MEDICINE | Facility: CLINIC | Age: 54
End: 2019-03-27

## 2019-03-27 VITALS
BODY MASS INDEX: 45.99 KG/M2 | TEMPERATURE: 97.6 F | SYSTOLIC BLOOD PRESSURE: 125 MMHG | DIASTOLIC BLOOD PRESSURE: 71 MMHG | OXYGEN SATURATION: 98 % | HEART RATE: 59 BPM | WEIGHT: 293 LBS | HEIGHT: 67 IN | RESPIRATION RATE: 18 BRPM

## 2019-03-27 VITALS
OXYGEN SATURATION: 99 % | HEIGHT: 67 IN | HEART RATE: 68 BPM | BODY MASS INDEX: 45.99 KG/M2 | DIASTOLIC BLOOD PRESSURE: 84 MMHG | SYSTOLIC BLOOD PRESSURE: 124 MMHG | TEMPERATURE: 97.5 F | WEIGHT: 293 LBS

## 2019-03-27 DIAGNOSIS — R07.89 ATYPICAL CHEST PAIN: Primary | ICD-10-CM

## 2019-03-27 DIAGNOSIS — R07.2 SUBSTERNAL CHEST PAIN: Primary | ICD-10-CM

## 2019-03-27 LAB
ALBUMIN SERPL-MCNC: 4.1 G/DL (ref 3.5–5.2)
ALBUMIN/GLOB SERPL: 1.2 G/DL
ALP SERPL-CCNC: 64 U/L (ref 39–117)
ALT SERPL W P-5'-P-CCNC: 23 U/L (ref 1–33)
ANION GAP SERPL CALCULATED.3IONS-SCNC: 10.2 MMOL/L
APTT PPP: 58.9 SECONDS (ref 22.7–35.4)
AST SERPL-CCNC: 19 U/L (ref 1–32)
BASOPHILS # BLD AUTO: 0.05 10*3/MM3 (ref 0–0.2)
BASOPHILS NFR BLD AUTO: 0.8 % (ref 0–1.5)
BILIRUB SERPL-MCNC: 0.3 MG/DL (ref 0.2–1.2)
BUN BLD-MCNC: 13 MG/DL (ref 6–20)
BUN/CREAT SERPL: 14.4 (ref 7–25)
CALCIUM SPEC-SCNC: 9.3 MG/DL (ref 8.6–10.5)
CHLORIDE SERPL-SCNC: 103 MMOL/L (ref 98–107)
CO2 SERPL-SCNC: 25.8 MMOL/L (ref 22–29)
CREAT BLD-MCNC: 0.9 MG/DL (ref 0.57–1)
DEPRECATED RDW RBC AUTO: 42.6 FL (ref 37–54)
EOSINOPHIL # BLD AUTO: 0.08 10*3/MM3 (ref 0–0.4)
EOSINOPHIL NFR BLD AUTO: 1.3 % (ref 0.3–6.2)
ERYTHROCYTE [DISTWIDTH] IN BLOOD BY AUTOMATED COUNT: 14 % (ref 12.3–15.4)
GFR SERPL CREATININE-BSD FRML MDRD: 65 ML/MIN/1.73
GLOBULIN UR ELPH-MCNC: 3.3 GM/DL
GLUCOSE BLD-MCNC: 101 MG/DL (ref 65–99)
HCT VFR BLD AUTO: 43.7 % (ref 34–46.6)
HGB BLD-MCNC: 13.1 G/DL (ref 12–15.9)
HOLD SPECIMEN: NORMAL
HOLD SPECIMEN: NORMAL
IMM GRANULOCYTES # BLD AUTO: 0.03 10*3/MM3 (ref 0–0.05)
IMM GRANULOCYTES NFR BLD AUTO: 0.5 % (ref 0–0.5)
INR PPP: 2.98 (ref 0.9–1.1)
LYMPHOCYTES # BLD AUTO: 1.69 10*3/MM3 (ref 0.7–3.1)
LYMPHOCYTES NFR BLD AUTO: 27.2 % (ref 19.6–45.3)
MCH RBC QN AUTO: 24.9 PG (ref 26.6–33)
MCHC RBC AUTO-ENTMCNC: 30 G/DL (ref 31.5–35.7)
MCV RBC AUTO: 83.1 FL (ref 79–97)
MONOCYTES # BLD AUTO: 0.59 10*3/MM3 (ref 0.1–0.9)
MONOCYTES NFR BLD AUTO: 9.5 % (ref 5–12)
NEUTROPHILS # BLD AUTO: 3.78 10*3/MM3 (ref 1.4–7)
NEUTROPHILS NFR BLD AUTO: 60.7 % (ref 42.7–76)
NRBC BLD AUTO-RTO: 0 /100 WBC (ref 0–0)
PLATELET # BLD AUTO: 334 10*3/MM3 (ref 140–450)
PMV BLD AUTO: 10.6 FL (ref 6–12)
POTASSIUM BLD-SCNC: 4.5 MMOL/L (ref 3.5–5.2)
PROT SERPL-MCNC: 7.4 G/DL (ref 6–8.5)
PROTHROMBIN TIME: 30.5 SECONDS (ref 11.7–14.2)
RBC # BLD AUTO: 5.26 10*6/MM3 (ref 3.77–5.28)
SODIUM BLD-SCNC: 139 MMOL/L (ref 136–145)
TROPONIN T SERPL-MCNC: <0.01 NG/ML (ref 0–0.03)
WBC NRBC COR # BLD: 6.22 10*3/MM3 (ref 3.4–10.8)
WHOLE BLOOD HOLD SPECIMEN: NORMAL
WHOLE BLOOD HOLD SPECIMEN: NORMAL

## 2019-03-27 PROCEDURE — 84484 ASSAY OF TROPONIN QUANT: CPT | Performed by: NURSE PRACTITIONER

## 2019-03-27 PROCEDURE — 99213 OFFICE O/P EST LOW 20 MIN: CPT | Performed by: NURSE PRACTITIONER

## 2019-03-27 PROCEDURE — 71275 CT ANGIOGRAPHY CHEST: CPT

## 2019-03-27 PROCEDURE — 93005 ELECTROCARDIOGRAM TRACING: CPT

## 2019-03-27 PROCEDURE — 80053 COMPREHEN METABOLIC PANEL: CPT | Performed by: NURSE PRACTITIONER

## 2019-03-27 PROCEDURE — 85610 PROTHROMBIN TIME: CPT | Performed by: NURSE PRACTITIONER

## 2019-03-27 PROCEDURE — 93010 ELECTROCARDIOGRAM REPORT: CPT | Performed by: INTERNAL MEDICINE

## 2019-03-27 PROCEDURE — 93005 ELECTROCARDIOGRAM TRACING: CPT | Performed by: EMERGENCY MEDICINE

## 2019-03-27 PROCEDURE — 85025 COMPLETE CBC W/AUTO DIFF WBC: CPT | Performed by: NURSE PRACTITIONER

## 2019-03-27 PROCEDURE — 0 IOPAMIDOL PER 1 ML: Performed by: EMERGENCY MEDICINE

## 2019-03-27 PROCEDURE — 85730 THROMBOPLASTIN TIME PARTIAL: CPT | Performed by: NURSE PRACTITIONER

## 2019-03-27 PROCEDURE — 93000 ELECTROCARDIOGRAM COMPLETE: CPT | Performed by: NURSE PRACTITIONER

## 2019-03-27 PROCEDURE — 99284 EMERGENCY DEPT VISIT MOD MDM: CPT

## 2019-03-27 RX ORDER — SODIUM CHLORIDE 0.9 % (FLUSH) 0.9 %
10 SYRINGE (ML) INJECTION AS NEEDED
Status: DISCONTINUED | OUTPATIENT
Start: 2019-03-27 | End: 2019-03-27 | Stop reason: HOSPADM

## 2019-03-27 RX ADMIN — IOPAMIDOL 95 ML: 755 INJECTION, SOLUTION INTRAVENOUS at 11:23

## 2019-03-27 NOTE — TELEPHONE ENCOUNTER
I left pt a message to see if she would like to come in at 11.  Gave her my direct number to call.

## 2019-03-27 NOTE — TELEPHONE ENCOUNTER
She had previously declined O2 but can get if needed. I can work her in sooner than the acute clinic this afternoon if needed (11?). Thanks.

## 2019-03-27 NOTE — PROGRESS NOTES
Subjective   Carli Garcia is a 54 y.o. female who presents due to substernal chest pain.    She presents due to substernal chest pressure with nausea and indigestion over the past 24 hours. She also c/o dyspnea.   She has a hx of DVT and bilateral PE which were originally diagnosed in 2016, last CT scan of chest 2/2019 showed bilateral PE (chronic and new). She is managed on Coumadin, INR yesterday was 3.4.         The following portions of the patient's history were reviewed and updated as appropriate: allergies, current medications, past social history and problem list.    Past Medical History:   Diagnosis Date   • Anemia    • Angioedema     Lower lip   • Arthritis    • Depression    • Diastolic dysfunction    • DVT (deep venous thrombosis) (CMS/HCC)    • GERD (gastroesophageal reflux disease)    • H/O antiphospholipid syndrome    • Hiatal hernia    • Hypertension    • Hypothyroidism    • Lupus anticoagulant disorder (CMS/HCC)    • Lupus anticoagulant disorder (CMS/HCC)    • Morbid obesity (CMS/HCC)    • CRISTI (obstructive sleep apnea)    • PE (pulmonary embolism)     Bilateral   • Right ventricular dilation    • Thrombocytopenia (CMS/HCC)          Current Outpatient Medications:   •  amLODIPine (NORVASC) 5 MG tablet, Take 1 tablet by mouth Daily., Disp: 90 tablet, Rfl: 1  •  buPROPion XL (WELLBUTRIN XL) 300 MG 24 hr tablet, Take 1 tablet by mouth Every Morning., Disp: 90 tablet, Rfl: 1  •  hydrochlorothiazide (HYDRODIURIL) 25 MG tablet, Take 1 tablet by mouth Daily., Disp: 90 tablet, Rfl: 1  •  levothyroxine (SYNTHROID, LEVOTHROID) 175 MCG tablet, Take 1 tablet by mouth Daily., Disp: 90 tablet, Rfl: 1  •  meclizine (ANTIVERT) 25 MG tablet, Take 1 tablet by mouth 3 (Three) Times a Day As Needed for dizziness., Disp: 30 tablet, Rfl: 0  •  metoprolol succinate XL (TOPROL-XL) 50 MG 24 hr tablet, Take 1 tablet by mouth Daily., Disp: 90 tablet, Rfl: 1  •  omeprazole (priLOSEC) 20 MG capsule, Take 1 capsule by mouth  "Daily., Disp: 90 capsule, Rfl: 1  •  sertraline (ZOLOFT) 100 MG tablet, Take 1 tablet by mouth Daily., Disp: 90 tablet, Rfl: 1  •  warfarin (COUMADIN) 7.5 MG tablet, Take 11.25 mg on Wednesday and 15 mg the remaining days unless prescribed by physician., Disp: 180 tablet, Rfl: 0    Allergies   Allergen Reactions   • Dust Mite Extract    • Eggs Or Egg-Derived Products Nausea And Vomiting   • Lasix [Furosemide] Other (See Comments)     Patient does not recall   • Losartan Angioedema   • Sulfa Antibiotics        Review of Systems   Constitutional: Negative for chills, fatigue, fever and unexpected weight change.   HENT: Negative for congestion, ear pain, postnasal drip, sinus pressure, sore throat and trouble swallowing.    Eyes: Negative for visual disturbance.   Respiratory: Positive for shortness of breath. Negative for cough, chest tightness and wheezing.    Cardiovascular: Positive for chest pain. Negative for palpitations and leg swelling.   Gastrointestinal: Negative for abdominal pain, blood in stool, nausea and vomiting.        Reports increased indigestion, flatulence   Genitourinary: Negative for dysuria, frequency and urgency.   Musculoskeletal: Negative for arthralgias and joint swelling.   Skin: Negative for color change.   Neurological: Negative for syncope, weakness and headaches.   Hematological: Does not bruise/bleed easily.       Objective   Vitals:    03/27/19 0913   BP: 124/84   BP Location: Left arm   Patient Position: Sitting   Cuff Size: Large Adult   Pulse: 68   Temp: 97.5 °F (36.4 °C)   TempSrc: Oral   SpO2: 99%   Weight: (!) 169 kg (373 lb)   Height: 170.2 cm (67\")     Physical Exam   Constitutional: She appears well-developed and well-nourished. She is cooperative. She does not have a sickly appearance. She does not appear ill.   +dyspneic   HENT:   Head: Normocephalic.   Right Ear: Hearing, tympanic membrane and external ear normal.   Left Ear: Hearing, tympanic membrane and external ear " normal.   Nose: Nose normal. No mucosal edema, rhinorrhea, sinus tenderness or nasal deformity. Right sinus exhibits no maxillary sinus tenderness and no frontal sinus tenderness. Left sinus exhibits no maxillary sinus tenderness and no frontal sinus tenderness.   Mouth/Throat: Oropharynx is clear and moist and mucous membranes are normal. Normal dentition.   Eyes: Conjunctivae and lids are normal. Pupils are equal, round, and reactive to light. Right eye exhibits no discharge and no exudate. Left eye exhibits no discharge and no exudate.   Neck: Trachea normal and normal range of motion. Carotid bruit is not present. No edema present. No thyroid mass and no thyromegaly present.   Cardiovascular: Regular rhythm, normal heart sounds and normal pulses.   No murmur heard.  Pulmonary/Chest: Breath sounds normal. No respiratory distress. She has no decreased breath sounds. She has no wheezes. She has no rhonchi. She has no rales.   Lymphadenopathy:        Head (right side): No submental, no submandibular, no tonsillar, no preauricular, no posterior auricular and no occipital adenopathy present.        Head (left side): No submental, no submandibular, no tonsillar, no preauricular, no posterior auricular and no occipital adenopathy present.   Neurological: She is alert.   Skin: Skin is warm, dry and intact. No cyanosis. Nails show no clubbing.       Assessment/Plan   Carli was seen today for chest pain.    Diagnoses and all orders for this visit:    Substernal chest pain  -     ECG 12 Lead      ECG 12 Lead  Date/Time: 3/27/2019 9:21 AM  Performed by: Alanna Rodriguez MA  Authorized by: Lupe Simeon APRN   Comparison: compared with previous ECG from 3/1/2019  Similar to previous ECG  Rhythm: sinus rhythm  Rate: normal  BPM: 64  QRS axis: normal  Other: no other findings    Clinical impression: abnormal EKG

## 2019-03-27 NOTE — TELEPHONE ENCOUNTER
Regarding: Non-Urgent Medical Question  Contact: [phone]  ----- Message from Mychart, Generic sent at 3/27/2019  8:13 AM EDT -----    Lupe,   I am experiencing pressure in my chest, it is almost impossible to move very much, this started yesterday afternoon.  I called in work today.  What do I need to do to get some oxygen?  Catherine

## 2019-03-27 NOTE — TELEPHONE ENCOUNTER
I have called pt and advised if still having chest pain to report to ER , pt states she is better and wants to come into acute clinic this afternoon, please advise thanks.

## 2019-04-02 ENCOUNTER — OFFICE VISIT (OUTPATIENT)
Dept: INTERNAL MEDICINE | Facility: CLINIC | Age: 54
End: 2019-04-02

## 2019-04-02 VITALS
WEIGHT: 293 LBS | DIASTOLIC BLOOD PRESSURE: 84 MMHG | BODY MASS INDEX: 57.95 KG/M2 | OXYGEN SATURATION: 96 % | SYSTOLIC BLOOD PRESSURE: 140 MMHG | HEART RATE: 107 BPM

## 2019-04-02 DIAGNOSIS — Z86.711 HISTORY OF PULMONARY EMBOLUS (PE): ICD-10-CM

## 2019-04-02 DIAGNOSIS — R06.09 DYSPNEA ON EXERTION: ICD-10-CM

## 2019-04-02 DIAGNOSIS — D68.61 ANTIPHOSPHOLIPID SYNDROME (HCC): Chronic | ICD-10-CM

## 2019-04-02 DIAGNOSIS — I10 ESSENTIAL HYPERTENSION: Primary | ICD-10-CM

## 2019-04-02 DIAGNOSIS — R09.02 HYPOXIA: ICD-10-CM

## 2019-04-02 PROCEDURE — 99214 OFFICE O/P EST MOD 30 MIN: CPT | Performed by: NURSE PRACTITIONER

## 2019-04-05 NOTE — PROGRESS NOTES
Subjective   Carli Garcia is a 54 y.o. female who presents for f/u regarding dyspnea and HTN.    Her  CT angiogram of the chest last week did not reveal any new PEs. She stopped her Metoprolol and states within 1-2 days the dyspnea had improved significantly. She continues to c/o dyspnea with exertion which she states really limits her ability to walk far distances without stopping and resting. She uses nighttime O2 and is wondering if she needs it during the day.  Her BP has been 126/80 at home, doing well with current meds. Reports improvement in lower extremity swelling with compression socks and HCTZ.      Hypertension   This is a chronic problem. The current episode started more than 1 year ago. The problem is unchanged. Associated symptoms include shortness of breath. Pertinent negatives include no chest pain, headaches, malaise/fatigue or palpitations. Current antihypertension treatment includes calcium channel blockers and diuretics. The current treatment provides moderate improvement. There are no compliance problems.         The following portions of the patient's history were reviewed and updated as appropriate: allergies, current medications, past social history and problem list.    Past Medical History:   Diagnosis Date   • Anemia    • Angioedema     Lower lip   • Arthritis    • Depression    • Diastolic dysfunction    • DVT (deep venous thrombosis) (CMS/HCC)    • GERD (gastroesophageal reflux disease)    • H/O antiphospholipid syndrome    • Hiatal hernia    • Hypertension    • Hypothyroidism    • Lupus anticoagulant disorder (CMS/HCC)    • Lupus anticoagulant disorder (CMS/HCC)    • Morbid obesity (CMS/HCC)    • CIRSTI (obstructive sleep apnea)    • PE (pulmonary embolism)     Bilateral   • Right ventricular dilation    • Thrombocytopenia (CMS/HCC)          Current Outpatient Medications:   •  amLODIPine (NORVASC) 5 MG tablet, Take 1 tablet by mouth Daily., Disp: 90 tablet, Rfl: 1  •  buPROPion XL  (WELLBUTRIN XL) 300 MG 24 hr tablet, Take 1 tablet by mouth Every Morning., Disp: 90 tablet, Rfl: 1  •  hydrochlorothiazide (HYDRODIURIL) 25 MG tablet, Take 1 tablet by mouth Daily., Disp: 90 tablet, Rfl: 1  •  levothyroxine (SYNTHROID, LEVOTHROID) 175 MCG tablet, Take 1 tablet by mouth Daily., Disp: 90 tablet, Rfl: 1  •  meclizine (ANTIVERT) 25 MG tablet, Take 1 tablet by mouth 3 (Three) Times a Day As Needed for dizziness., Disp: 30 tablet, Rfl: 0  •  omeprazole (priLOSEC) 20 MG capsule, Take 1 capsule by mouth Daily., Disp: 90 capsule, Rfl: 1  •  sertraline (ZOLOFT) 100 MG tablet, Take 1 tablet by mouth Daily., Disp: 90 tablet, Rfl: 1  •  warfarin (COUMADIN) 7.5 MG tablet, Take 11.25 mg on Wednesday and 15 mg the remaining days unless prescribed by physician., Disp: 180 tablet, Rfl: 0    Allergies   Allergen Reactions   • Dust Mite Extract    • Eggs Or Egg-Derived Products Nausea And Vomiting   • Lasix [Furosemide] Other (See Comments)     Patient does not recall   • Losartan Angioedema   • Sulfa Antibiotics        Review of Systems   Constitutional: Positive for fatigue. Negative for chills, fever, malaise/fatigue and unexpected weight change.   HENT: Negative for congestion, ear pain, postnasal drip, sinus pressure, sore throat and trouble swallowing.    Eyes: Negative for visual disturbance.   Respiratory: Positive for chest tightness and shortness of breath. Negative for cough and wheezing.    Cardiovascular: Negative for chest pain, palpitations and leg swelling.   Gastrointestinal: Negative for abdominal pain, blood in stool, nausea and vomiting.   Genitourinary: Negative for dysuria, frequency and urgency.   Musculoskeletal: Negative for arthralgias and joint swelling.   Skin: Negative for color change.   Neurological: Negative for syncope, weakness and headaches.   Hematological: Does not bruise/bleed easily.       Objective   Vitals:    04/02/19 1332 04/02/19 1417   BP: 124/80 140/84   BP Location: Left  arm Left arm   Patient Position: Sitting    Cuff Size: Adult    Pulse: 107    SpO2: 96%    Weight: (!) 168 kg (370 lb)      Physical Exam   Constitutional: She appears well-developed and well-nourished. She is cooperative. She does not have a sickly appearance. She does not appear ill.   HENT:   Head: Normocephalic.   Right Ear: Hearing, tympanic membrane and external ear normal.   Left Ear: Hearing, tympanic membrane and external ear normal.   Nose: Nose normal. No mucosal edema, rhinorrhea, sinus tenderness or nasal deformity. Right sinus exhibits no maxillary sinus tenderness and no frontal sinus tenderness. Left sinus exhibits no maxillary sinus tenderness and no frontal sinus tenderness.   Mouth/Throat: Oropharynx is clear and moist and mucous membranes are normal. Normal dentition.   Eyes: Conjunctivae and lids are normal. Right eye exhibits no discharge and no exudate. Left eye exhibits no discharge and no exudate.   Neck: Trachea normal. Carotid bruit is not present. No edema present. No thyroid mass and no thyromegaly present.   Cardiovascular: Regular rhythm, normal heart sounds and normal pulses.   No murmur heard.  Pulmonary/Chest: Breath sounds normal. No respiratory distress. She has no decreased breath sounds. She has no wheezes. She has no rhonchi. She has no rales.   Abdominal: Soft.   Lymphadenopathy:        Head (right side): No submental, no submandibular, no tonsillar, no preauricular, no posterior auricular and no occipital adenopathy present.        Head (left side): No submental, no submandibular, no tonsillar, no preauricular, no posterior auricular and no occipital adenopathy present.   Neurological: She is alert.   Skin: Skin is warm, dry and intact. No cyanosis. Nails show no clubbing.       Assessment/Plan   Carli was seen today for follow-up.    Diagnoses and all orders for this visit:    Essential hypertension  Comments:  continue current meds and email or call with readings in 2  weeks    Dyspnea on exertion  -     Walking Oximetry    Hypoxia  -     Oxygen Therapy    History of pulmonary embolus (PE)  Comments:  CT angiogram 3/27/19 did not show any PE, no acute abnormalities    Antiphospholipid syndrome-IgG cardiolipin   Comments:  managed on Coumadin    Her walking O2 showed a drop in her saturation to 87% in 2 minutes, will order supplemental O2 for PRN daytime use. Discussed goal of gradual increase in activity level for improved weight loss.  She has brought paperwork for her short term disability which I will complete and send.

## 2019-04-15 ENCOUNTER — TELEPHONE (OUTPATIENT)
Dept: CARDIOLOGY | Facility: CLINIC | Age: 54
End: 2019-04-15

## 2019-04-15 NOTE — TELEPHONE ENCOUNTER
See below.  I called and left a message for the pt about her SOA. Asking is it at rest, or exertion, or both. Any Chest Pain. Also what her O2 sats are running as well as her BP and HR.  She also sees Dr. Lebron for Pulmonary.  FYI, until I get more info.    Massiel

## 2019-04-15 NOTE — TELEPHONE ENCOUNTER
Regarding: Non-Urgent Medical Question  Contact: [phone]  ----- Message from Mychart, Generic sent at 4/15/2019  2:00 PM EDT -----    Dina Sanabria,  I am so SOA and the O2 doesn't seem to be helping,  it seems to be getting worse.  Would it benefit me to get a Heart Cath done?  I went to the ER a couple of weeks ago and they said everything was fine and my PE's were gone.  I just feel like something is going on with my heart.  Please Advise.  Thanks for your time  Denise

## 2019-04-16 NOTE — TELEPHONE ENCOUNTER
She sent back more info. See below. Please advise.    Massiel    ----- Message from Mychart, Generic sent at 4/16/2019  9:13 AM EDT -----     Massiel,   Thank you for calling me back, I was driving when you called but you had asked me some questions. Today after I walked 600 steps and my B/P was 184/95, pulse 126, O2 stats with 2 LPM of 02 was 88-90.  At rest my pulse was 75 O2 stats were 98, B/P was 150/86.  Could I benefit from a PET scan???  I feel like I have done everything there is to do on the Pulmonary side.  I need some answers, I can't live like this.   Sincerely,   Denise      On 3/1/19:  -Repeat transthoracic echocardiogram.  If we get poor quality images or there is evidence of RVSP elevation we will plan on a right heart cath.  She has ongoing dyspnea and may need a more thorough       evaluation for chronic thromboembolic pulmonary hypertension

## 2019-04-17 ENCOUNTER — CLINICAL SUPPORT (OUTPATIENT)
Dept: ONCOLOGY | Facility: HOSPITAL | Age: 54
End: 2019-04-17

## 2019-04-17 ENCOUNTER — LAB (OUTPATIENT)
Dept: LAB | Facility: HOSPITAL | Age: 54
End: 2019-04-17

## 2019-04-17 ENCOUNTER — TELEPHONE (OUTPATIENT)
Dept: ONCOLOGY | Facility: CLINIC | Age: 54
End: 2019-04-17

## 2019-04-17 DIAGNOSIS — I82.402 DEEP VEIN THROMBOSIS (DVT) OF LEFT LOWER EXTREMITY, UNSPECIFIED CHRONICITY, UNSPECIFIED VEIN (HCC): Primary | ICD-10-CM

## 2019-04-17 LAB
INR PPP: 2.4 (ref 0.9–1.1)
PROTHROMBIN TIME: 28.7 SECONDS (ref 11–13.5)

## 2019-04-17 PROCEDURE — 85610 PROTHROMBIN TIME: CPT

## 2019-04-17 RX ORDER — WARFARIN SODIUM 7.5 MG/1
TABLET ORAL
Qty: 174 TABLET | Refills: 0 | Status: ON HOLD | OUTPATIENT
Start: 2019-04-17 | End: 2019-04-23 | Stop reason: SDUPTHER

## 2019-04-17 NOTE — TELEPHONE ENCOUNTER
----- Message from Karey Ospina, RN sent at 4/17/2019  4:04 PM EDT -----  PT/INR and Coag RN in 1 week. Please call pt to schedule

## 2019-04-17 NOTE — PROGRESS NOTES
Pt's INR 2.4 today. Pt confirmed previous dosing. There was a note from pt's cardiologist about pt possibly needing a cardiac cath. They have been trying to contact the pt about this. D/W Alanna Romeo NP. She advised to leave dosing the same (11.25mg Sun, 15mg all other days) and recheck in 1 week. Also, let pt know that cardiology is trying to get ahold of her. Informed pt of this and she v/u. Advised pt that she should call and let us know when she speaks with cardiology about what they would like done and when. She v/u.

## 2019-04-17 NOTE — TELEPHONE ENCOUNTER
I called and left a message for the patient.  I think that she needs a left and right heart catheterization.  I did not leave that on the message and I told her to call us back.  Please make sure that we have this loop closed by the end of the week.

## 2019-04-18 ENCOUNTER — TELEPHONE (OUTPATIENT)
Dept: INTERNAL MEDICINE | Facility: CLINIC | Age: 54
End: 2019-04-18

## 2019-04-18 ENCOUNTER — DOCUMENTATION (OUTPATIENT)
Dept: CARDIOLOGY | Facility: CLINIC | Age: 54
End: 2019-04-18

## 2019-04-18 DIAGNOSIS — I20.0 UNSTABLE ANGINA (HCC): Primary | ICD-10-CM

## 2019-04-18 DIAGNOSIS — R06.09 DOE (DYSPNEA ON EXERTION): ICD-10-CM

## 2019-04-18 NOTE — TELEPHONE ENCOUNTER
Regarding: Non-Urgent Medical Question  Contact: [phone]  ----- Message from Evelia Parisi sent at 4/16/2019 11:07 AM EDT -----       ----- Message from Carli Garcia to Lupe Simeon APRN sent at 4/16/2019  9:16 AM -----   WYATT Andrade...I sent Dr Arroyo a message and ask if I could benefit from a heart Cath or PET scan, my breathing is not getting any better with the oxygen, I feel like I am getting worse.  Here is the message I sent him.    Massiel,  Thank you for calling me back, I was driving when you called but you had asked me some questions. Today after I walked 600 steps and my B/P was 184/95, pulse 126, O2 stats with 2 LPM of 02 was 88-90.  At rest my pulse was 75 O2 stats were 98, B/P was 150/86.  Could I benefit from a PET scan???  I feel like I have done everything there is to do on the Pulmonary side.  I need some answers, I can't live like this.    Discussed with patient-she continues to c/o dyspnea, heart cath has been ordered to further evaluate.

## 2019-04-18 NOTE — PROGRESS NOTES
Patient call me with worsening dyspnea on exertion.  She is unable to walk 50 feet.  She also has chest pain both with that level of exertion and at rest.  She has unstable angina and no alternative etiology for her dyspnea.  She needs a left and right heart catheterization.  Order will be placed.

## 2019-04-19 ENCOUNTER — TRANSCRIBE ORDERS (OUTPATIENT)
Dept: CARDIOLOGY | Facility: CLINIC | Age: 54
End: 2019-04-19

## 2019-04-19 ENCOUNTER — TELEPHONE (OUTPATIENT)
Dept: CARDIOLOGY | Facility: CLINIC | Age: 54
End: 2019-04-19

## 2019-04-19 ENCOUNTER — LAB (OUTPATIENT)
Dept: LAB | Facility: HOSPITAL | Age: 54
End: 2019-04-19

## 2019-04-19 DIAGNOSIS — R06.09 DYSPNEA ON EXERTION: ICD-10-CM

## 2019-04-19 DIAGNOSIS — I20.0 UNSTABLE ANGINA (HCC): Primary | ICD-10-CM

## 2019-04-19 DIAGNOSIS — I20.0 UNSTABLE ANGINA (HCC): ICD-10-CM

## 2019-04-19 LAB
ANION GAP SERPL CALCULATED.3IONS-SCNC: 12.6 MMOL/L
BASOPHILS # BLD AUTO: 0.03 10*3/MM3 (ref 0–0.2)
BASOPHILS NFR BLD AUTO: 0.4 % (ref 0–1.5)
BUN BLD-MCNC: 13 MG/DL (ref 6–20)
BUN/CREAT SERPL: 14 (ref 7–25)
CALCIUM SPEC-SCNC: 9.2 MG/DL (ref 8.6–10.5)
CHLORIDE SERPL-SCNC: 102 MMOL/L (ref 98–107)
CO2 SERPL-SCNC: 26.4 MMOL/L (ref 22–29)
CREAT BLD-MCNC: 0.93 MG/DL (ref 0.57–1)
DEPRECATED RDW RBC AUTO: 43.3 FL (ref 37–54)
EOSINOPHIL # BLD AUTO: 0.12 10*3/MM3 (ref 0–0.4)
EOSINOPHIL NFR BLD AUTO: 1.5 % (ref 0.3–6.2)
ERYTHROCYTE [DISTWIDTH] IN BLOOD BY AUTOMATED COUNT: 14.2 % (ref 12.3–15.4)
GFR SERPL CREATININE-BSD FRML MDRD: 63 ML/MIN/1.73
GLUCOSE BLD-MCNC: 104 MG/DL (ref 65–99)
HCT VFR BLD AUTO: 43.5 % (ref 34–46.6)
HGB BLD-MCNC: 13.3 G/DL (ref 12–15.9)
IMM GRANULOCYTES # BLD AUTO: 0.04 10*3/MM3 (ref 0–0.05)
IMM GRANULOCYTES NFR BLD AUTO: 0.5 % (ref 0–0.5)
LYMPHOCYTES # BLD AUTO: 1.47 10*3/MM3 (ref 0.7–3.1)
LYMPHOCYTES NFR BLD AUTO: 17.9 % (ref 19.6–45.3)
MCH RBC QN AUTO: 25.8 PG (ref 26.6–33)
MCHC RBC AUTO-ENTMCNC: 30.6 G/DL (ref 31.5–35.7)
MCV RBC AUTO: 84.5 FL (ref 79–97)
MONOCYTES # BLD AUTO: 0.76 10*3/MM3 (ref 0.1–0.9)
MONOCYTES NFR BLD AUTO: 9.3 % (ref 5–12)
NEUTROPHILS # BLD AUTO: 5.79 10*3/MM3 (ref 1.7–7)
NEUTROPHILS NFR BLD AUTO: 70.4 % (ref 42.7–76)
NRBC BLD AUTO-RTO: 0 /100 WBC (ref 0–0.2)
PLATELET # BLD AUTO: 301 10*3/MM3 (ref 140–450)
PMV BLD AUTO: 10.4 FL (ref 6–12)
POTASSIUM BLD-SCNC: 5.1 MMOL/L (ref 3.5–5.2)
RBC # BLD AUTO: 5.15 10*6/MM3 (ref 3.77–5.28)
SODIUM BLD-SCNC: 141 MMOL/L (ref 136–145)
WBC NRBC COR # BLD: 8.21 10*3/MM3 (ref 3.4–10.8)

## 2019-04-19 PROCEDURE — 80048 BASIC METABOLIC PNL TOTAL CA: CPT

## 2019-04-19 PROCEDURE — 36415 COLL VENOUS BLD VENIPUNCTURE: CPT

## 2019-04-19 PROCEDURE — 85025 COMPLETE CBC W/AUTO DIFF WBC: CPT

## 2019-04-19 NOTE — TELEPHONE ENCOUNTER
----- Message from Noam Torres sent at 4/19/2019 12:56 PM EDT -----  Regarding: DEREK Rankin,   Could you please call patient about the Lovenox she needs to take on Monday. She said she doesn't have any Lovenox. Not sure how she can go about getting it. Warfarin instructions were given to her. Told her you maybe calling her. She can be reached at [phone].  Thanks,  [facility]  ----- Message -----  From: Sonido Arroyo MD  Sent: 4/19/2019  12:46 PM  To: Massiel Sheldon MA, Noam Torres    This patient's last dose should be on Saturday.  She should take Lovenox on Monday and restart Thursday.  She should continue Lovenox until the following Tuesday and then have an INR checked.    She can restart her warfarin the night of the procedure.  ----- Message -----  From: Noam Torres  Sent: 4/19/2019  11:03 AM  To: MD Dr. Dina Najera,   Patient has been scheduled for cath with you on 4/23. She's on warfarin 7.5mg could you please give the hold instructions?  [facility]

## 2019-04-19 NOTE — TELEPHONE ENCOUNTER
Per Dr. Arroyo he is going to have her just start the Lovenox after the procedure and the rest of the instructions stand.  I called the pt and advised.    Massiel

## 2019-04-23 ENCOUNTER — HOSPITAL ENCOUNTER (OUTPATIENT)
Facility: HOSPITAL | Age: 54
Setting detail: HOSPITAL OUTPATIENT SURGERY
Discharge: HOME | End: 2019-04-23
Attending: INTERNAL MEDICINE | Admitting: INTERNAL MEDICINE

## 2019-04-23 VITALS
SYSTOLIC BLOOD PRESSURE: 137 MMHG | OXYGEN SATURATION: 96 % | TEMPERATURE: 98.4 F | HEIGHT: 67 IN | RESPIRATION RATE: 18 BRPM | DIASTOLIC BLOOD PRESSURE: 69 MMHG | WEIGHT: 293 LBS | HEART RATE: 71 BPM | BODY MASS INDEX: 45.99 KG/M2

## 2019-04-23 DIAGNOSIS — I20.0 UNSTABLE ANGINA (HCC): ICD-10-CM

## 2019-04-23 DIAGNOSIS — R06.09 DOE (DYSPNEA ON EXERTION): ICD-10-CM

## 2019-04-23 LAB
INR PPP: 1.6 (ref 0.8–1.2)
INR PPP: 1.6 (ref 0.9–1.1)
PROTHROMBIN TIME: 18.5 SECONDS
PROTHROMBIN TIME: 18.5 SECONDS (ref 12.8–15.2)

## 2019-04-23 PROCEDURE — 85018 HEMOGLOBIN: CPT

## 2019-04-23 PROCEDURE — 85610 PROTHROMBIN TIME: CPT

## 2019-04-23 PROCEDURE — 93460 R&L HRT ART/VENTRICLE ANGIO: CPT | Performed by: INTERNAL MEDICINE

## 2019-04-23 PROCEDURE — 25010000002 FENTANYL CITRATE (PF) 100 MCG/2ML SOLUTION: Performed by: INTERNAL MEDICINE

## 2019-04-23 PROCEDURE — C1769 GUIDE WIRE: HCPCS | Performed by: INTERNAL MEDICINE

## 2019-04-23 PROCEDURE — 25010000002 MIDAZOLAM PER 1 MG: Performed by: INTERNAL MEDICINE

## 2019-04-23 PROCEDURE — S0260 H&P FOR SURGERY: HCPCS | Performed by: INTERNAL MEDICINE

## 2019-04-23 PROCEDURE — 25010000002 HEPARIN (PORCINE) PER 1000 UNITS: Performed by: INTERNAL MEDICINE

## 2019-04-23 PROCEDURE — 0 IOPAMIDOL PER 1 ML: Performed by: INTERNAL MEDICINE

## 2019-04-23 PROCEDURE — 85014 HEMATOCRIT: CPT

## 2019-04-23 PROCEDURE — C1894 INTRO/SHEATH, NON-LASER: HCPCS | Performed by: INTERNAL MEDICINE

## 2019-04-23 RX ORDER — MORPHINE SULFATE 2 MG/ML
1 INJECTION, SOLUTION INTRAMUSCULAR; INTRAVENOUS EVERY 4 HOURS PRN
Status: CANCELLED | OUTPATIENT
Start: 2019-04-23 | End: 2019-05-03

## 2019-04-23 RX ORDER — LIDOCAINE HYDROCHLORIDE 10 MG/ML
0.1 INJECTION, SOLUTION EPIDURAL; INFILTRATION; INTRACAUDAL; PERINEURAL ONCE AS NEEDED
Status: DISCONTINUED | OUTPATIENT
Start: 2019-04-23 | End: 2019-04-23 | Stop reason: HOSPADM

## 2019-04-23 RX ORDER — SODIUM CHLORIDE 0.9 % (FLUSH) 0.9 %
3-10 SYRINGE (ML) INJECTION AS NEEDED
Status: DISCONTINUED | OUTPATIENT
Start: 2019-04-23 | End: 2019-04-23 | Stop reason: HOSPADM

## 2019-04-23 RX ORDER — HYDROCODONE BITARTRATE AND ACETAMINOPHEN 5; 325 MG/1; MG/1
1 TABLET ORAL EVERY 4 HOURS PRN
Status: CANCELLED | OUTPATIENT
Start: 2019-04-23

## 2019-04-23 RX ORDER — MIDAZOLAM HYDROCHLORIDE 1 MG/ML
INJECTION INTRAMUSCULAR; INTRAVENOUS AS NEEDED
Status: DISCONTINUED | OUTPATIENT
Start: 2019-04-23 | End: 2019-04-23 | Stop reason: HOSPADM

## 2019-04-23 RX ORDER — HYDROCHLOROTHIAZIDE 12.5 MG/1
25 CAPSULE, GELATIN COATED ORAL DAILY
COMMUNITY
End: 2019-07-29 | Stop reason: DRUGHIGH

## 2019-04-23 RX ORDER — NALOXONE HCL 0.4 MG/ML
0.4 VIAL (ML) INJECTION
Status: CANCELLED | OUTPATIENT
Start: 2019-04-23

## 2019-04-23 RX ORDER — ONDANSETRON 2 MG/ML
4 INJECTION INTRAMUSCULAR; INTRAVENOUS EVERY 6 HOURS PRN
Status: CANCELLED | OUTPATIENT
Start: 2019-04-23

## 2019-04-23 RX ORDER — ACETAMINOPHEN 325 MG/1
650 TABLET ORAL EVERY 4 HOURS PRN
Status: DISCONTINUED | OUTPATIENT
Start: 2019-04-23 | End: 2019-04-23 | Stop reason: HOSPADM

## 2019-04-23 RX ORDER — ONDANSETRON 4 MG/1
4 TABLET, FILM COATED ORAL EVERY 6 HOURS PRN
Status: CANCELLED | OUTPATIENT
Start: 2019-04-23

## 2019-04-23 RX ORDER — FENTANYL CITRATE 50 UG/ML
INJECTION, SOLUTION INTRAMUSCULAR; INTRAVENOUS AS NEEDED
Status: DISCONTINUED | OUTPATIENT
Start: 2019-04-23 | End: 2019-04-23 | Stop reason: HOSPADM

## 2019-04-23 RX ORDER — CETIRIZINE HYDROCHLORIDE 10 MG/1
10 TABLET ORAL DAILY
COMMUNITY
End: 2021-04-09

## 2019-04-23 RX ORDER — SODIUM CHLORIDE 9 MG/ML
75 INJECTION, SOLUTION INTRAVENOUS CONTINUOUS
Status: DISCONTINUED | OUTPATIENT
Start: 2019-04-23 | End: 2019-04-23 | Stop reason: HOSPADM

## 2019-04-23 RX ORDER — LIDOCAINE HYDROCHLORIDE 20 MG/ML
INJECTION, SOLUTION INFILTRATION; PERINEURAL AS NEEDED
Status: DISCONTINUED | OUTPATIENT
Start: 2019-04-23 | End: 2019-04-23 | Stop reason: HOSPADM

## 2019-04-23 RX ORDER — SODIUM CHLORIDE 9 MG/ML
100 INJECTION, SOLUTION INTRAVENOUS CONTINUOUS
Status: DISCONTINUED | OUTPATIENT
Start: 2019-04-23 | End: 2019-04-23 | Stop reason: HOSPADM

## 2019-04-23 RX ORDER — SODIUM CHLORIDE 0.9 % (FLUSH) 0.9 %
3 SYRINGE (ML) INJECTION EVERY 12 HOURS SCHEDULED
Status: DISCONTINUED | OUTPATIENT
Start: 2019-04-23 | End: 2019-04-23 | Stop reason: HOSPADM

## 2019-04-23 RX ORDER — WARFARIN SODIUM 7.5 MG/1
7.5 TABLET ORAL NIGHTLY
Start: 2019-04-23 | End: 2019-11-12 | Stop reason: SDUPTHER

## 2019-04-23 RX ADMIN — SODIUM CHLORIDE 75 ML/HR: 9 INJECTION, SOLUTION INTRAVENOUS at 09:31

## 2019-04-23 NOTE — H&P
Referring Provider:       Patient Care Team:  Lupe Simeon APRN as PCP - General (Internal Medicine)  Lupe Simeon APRN as Nurse Practitioner (Internal Medicine)  Del Oliveira MD as Consulting Physician (Hematology and Oncology)  Juanito Guerrier MD as Consulting Physician (Hematology and Oncology)  Lupe Simeon APRN as Referring Physician (Internal Medicine)  Sonido Arroyo MD as Consulting Physician (Cardiology)      Chief complaint  VALERIO    History of present illness:     53-year-old female with morbid obesity, hypertension, hypothyroidism, and depression who presented with acute-on-chronic complaint of dyspnea 3/2016. The patient stated that over the 2 months prior that she has been dyspneic, both with exertion initially and more recently at rest. She came in for evaluation and was noted to have small pulmonary emboli bilaterally and acute hypoxic respiratory failure along with sinus tachycardia.  Her cardiac biomarkers were negative and proBNP was normal. She was admitted and started on a heparin drip. Her lower extremity Doppler did show that she had an acute DVT in the right femoral, popliteal and calf vein. She had an echocardiogram with mild right ventricular dilation with normal function and grade 2 diastolic dysfunction. She was placed on Coumadin therapy and was not felt to be appropriate for catheter directed therapy.     The patient presents to me with worsening dyspnea.  She states she is only out significant shortness of breath.      Review of Systems  All other systems reviewed and negative.     Past Medical History:   Past Medical History:   Diagnosis Date   • Anemia    • Angioedema     Lower lip   • Arthritis    • Depression    • Diastolic dysfunction    • DVT (deep venous thrombosis) (CMS/[facility])    • GERD (gastroesophageal reflux disease)    • H/O antiphospholipid syndrome    • Hiatal hernia    • Hypertension    • Hypothyroidism    • Lupus anticoagulant disorder  (CMS/HCC)    • Lupus anticoagulant disorder (CMS/HCC)    • Morbid obesity (CMS/HCC)    • CRISTI (obstructive sleep apnea)    • PE (pulmonary embolism)     Bilateral   • Right ventricular dilation    • Thrombocytopenia (CMS/HCC)        Past Surgical History:   Past Surgical History:   Procedure Laterality Date   • NOSE SURGERY     • TONSILLECTOMY         Family History:   Family History   Problem Relation Age of Onset   • Other Mother         varicose veins   • Uterine cancer Mother    • COPD Mother    • Depression Mother    • Alcohol abuse Father    • Cirrhosis Father    • Diabetes Brother    • Depression Brother        Social History:   Social History     Tobacco Use   • Smoking status: Never Smoker   • Smokeless tobacco: Never Used   Substance Use Topics   • Alcohol use: No     Frequency: Never   • Drug use: No       Home Medications:   Medications Prior to Admission   Medication Sig Dispense Refill Last Dose   • amLODIPine (NORVASC) 5 MG tablet Take 1 tablet by mouth Daily. 90 tablet 1 4/23/2019 at Unknown time   • buPROPion XL (WELLBUTRIN XL) 300 MG 24 hr tablet Take 1 tablet by mouth Every Morning. 90 tablet 1 4/22/2019 at Unknown time   • cetirizine (zyrTEC) 10 MG tablet Take 10 mg by mouth Daily.   4/19/2019   • hydrochlorothiazide (MICROZIDE) 12.5 MG capsule Take 25 mg by mouth Daily.   4/20/2019   • levothyroxine (SYNTHROID, LEVOTHROID) 175 MCG tablet Take 1 tablet by mouth Daily. 90 tablet 1 4/23/2019 at Unknown time   • omeprazole (priLOSEC) 20 MG capsule Take 1 capsule by mouth Daily. 90 capsule 1 4/22/2019 at Unknown time   • sertraline (ZOLOFT) 100 MG tablet Take 1 tablet by mouth Daily. 90 tablet 1 4/22/2019 at Unknown time   • hydrochlorothiazide (HYDRODIURIL) 25 MG tablet Take 1 tablet by mouth Daily. 90 tablet 1 Taking   • meclizine (ANTIVERT) 25 MG tablet Take 1 tablet by mouth 3 (Three) Times a Day As Needed for dizziness. 30 tablet 0 Taking   • [DISCONTINUED] warfarin (JANTOVEN) 7.5 MG tablet FOR  "DIRECTIONS ON HOW TO   TAKE THIS MEDICINE, READ   THE ENCLOSED MEDICATION    INFORMATION FORM (Patient taking differently: Take 7.5 mg by mouth Every Night. FOR DIRECTIONS ON HOW TO   TAKE THIS MEDICINE, READ   THE ENCLOSED MEDICATION    INFORMATION FORM) 174 tablet 0 4/20/2019       Current Medications:   Current Facility-Administered Medications:   •  acetaminophen (TYLENOL) tablet 650 mg, 650 mg, Oral, Q4H PRN, Sonido Arroyo MD  •  sodium chloride 0.9 % infusion, 75 mL/hr, Intravenous, Continuous, Sonido Arroyo MD, Last Rate: 75 mL/hr at 04/23/19 0931, 75 mL/hr at 04/23/19 0931  •  sodium chloride 0.9 % infusion, 100 mL/hr, Intravenous, Continuous, Sonido Arroyo MD     Allergies: Toprol xl [metoprolol tartrate]; Dust mite extract; Eggs or egg-derived products; Losartan; and Sulfa antibiotics      Vital Signs   Temp:  [98.4 °F (36.9 °C)] 98.4 °F (36.9 °C)  Heart Rate:  [71-83] 72  Resp:  [18-22] 18  BP: (131-141)/(75-87) 139/81  Flowsheet Rows      First Filed Value   Admission Height  170.2 cm (67\") Documented at 04/23/2019 0916   Admission Weight  169 kg (372 lb 4 oz)  (Abnormal)  Documented at 04/23/2019 0916          General Appearance:    Alert, cooperative, in no acute distress.  Obese   Head:    Normocephalic, without obvious abnormality, atraumatic       Neck:   No adenopathy, supple, no thyromegaly, no carotid bruit, no    JVD   Lungs:     Clear to auscultation bilaterally, no wheezes, rales, or     rhonchi    Heart:    Normal rate, regular rhythm,  No murmur, rub, or gallop   Chest Wall:    No abnormalities observed   Abdomen:     Normal bowel sounds, soft, non-tender, non-distended,            no rebound tenderness   Extremities:   No cyanosis, clubbing, or edema   Pulses:   Pulses palpable and equal bilaterally   Skin:   No bleeding or rash       Neurologic:   Cranial nerves 2 - 12 grossly intact, sensation intact               Results Review: I personally viewed and " interpreted the patient's EKG/Telemetry data    Results from last 7 days   Lab Units 04/19/19  1508   WBC 10*3/mm3 8.21   HEMOGLOBIN g/dL 13.3   HEMATOCRIT % 43.5   PLATELETS 10*3/mm3 301     Results from last 7 days   Lab Units 04/19/19  1508   SODIUM mmol/L 141   POTASSIUM mmol/L 5.1   CHLORIDE mmol/L 102   CO2 mmol/L 26.4   BUN mg/dL 13   CREATININE mg/dL 0.93   GLUCOSE mg/dL 104*   CALCIUM mg/dL 9.2     Lab Results   Lab Value Date/Time    TROPONINT <0.010 03/27/2019 0948    TROPONINT <0.010 03/01/2019 1459    TROPONINT <0.01 03/01/2016 1130           Assessment/Plan   Dyspnea on exertion  History of pulmonary embolus  DVT  Right ventricular dilation    Left and right heart catheterization today.    I discussed the patients findings and my recommendations with the patient

## 2019-04-23 NOTE — DISCHARGE INSTRUCTIONS
[facility]  [address] [place] [address]    Coronary Angiogram (Radial/Ulnar Approach) After Care    Refer to this sheet in the next few weeks. These instructions provide you with information on caring for yourself after your procedure. Your caregiver may also give you more specific instructions. Your treatment has been planned according to current medical practices, but problems sometimes occur. Call your caregiver if you have any problems or questions after your procedure.    Home Care Instructions:  · You may shower the day after the procedure. Remove the bandage (dressing) and gently wash the site with plain soap and water. Gently pat the site dry. You may apply a band aid daily for 2 days if desired.    · Do not apply powder or lotion to the site.  · Do not submerge the affected site in water for 3 to 5 days or until the site is completely healed.   · Do not lift, push or pull anything over 10 pounds for 2 days after your procedure.  · Inspect the site at least twice daily. You may notice some bruising at the site and it may be tender for 1 to 2 weeks.     · Increase your fluid intake for the next 2 days.    · Keep arm elevated for 24 hours. For the remainder of the day, keep your arm in “Pledge of Allegiance” position when up and about.     · You may drive 24 hours after the procedure unless otherwise instructed by your caregiver.  · Do not operate machinery or power tools for 24 hours.  · A responsible adult should be with you for the first 24 hours after you arrive home. Do not make any important legal decisions or sign legal papers for 24 hours.  Do not drink alcohol for 24 hours.    · Metformin or any medications containing Metformin should not be taken for 48 hours after your procedure.      Call Your Doctor if:   · You have unusual pain at the radial/ulnar (wrist) site.  · You have redness, warmth, swelling, or pain at the radial/ulnar (wrist) site.  · You have drainage (other  than a small amount of blood on the dressing).  · You have chills or a fever > 101.  · Your arm becomes pale or dark, cool, tingly, or numb.  · You have heavy bleeding from the site, hold pressure on the site for 20 minutes.  If the bleeding stops, apply a fresh bandage and call your cardiologist.  However, if you continue to have bleeding, call 911.

## 2019-04-24 ENCOUNTER — APPOINTMENT (OUTPATIENT)
Dept: LAB | Facility: HOSPITAL | Age: 54
End: 2019-04-24

## 2019-04-24 ENCOUNTER — APPOINTMENT (OUTPATIENT)
Dept: ONCOLOGY | Facility: HOSPITAL | Age: 54
End: 2019-04-24

## 2019-04-24 LAB
HCT VFR BLDA CALC: 36 % (ref 38–51)
HCT VFR BLDA CALC: 38 % (ref 38–51)
HCT VFR BLDA CALC: 39 % (ref 38–51)
HGB BLDA-MCNC: 12.2 G/DL (ref 12–17)
HGB BLDA-MCNC: 12.9 G/DL (ref 12–17)
HGB BLDA-MCNC: 13.3 G/DL (ref 12–17)
SAO2 % BLDA: 58 % (ref 95–98)
SAO2 % BLDA: 68 % (ref 95–98)
SAO2 % BLDA: 89 % (ref 95–98)

## 2019-05-01 ENCOUNTER — CLINICAL SUPPORT (OUTPATIENT)
Dept: ONCOLOGY | Facility: HOSPITAL | Age: 54
End: 2019-05-01

## 2019-05-01 ENCOUNTER — LAB (OUTPATIENT)
Dept: LAB | Facility: HOSPITAL | Age: 54
End: 2019-05-01

## 2019-05-01 DIAGNOSIS — Z00.00 PE (PHYSICAL EXAM), ANNUAL: ICD-10-CM

## 2019-05-01 DIAGNOSIS — I82.402 ACUTE DEEP VEIN THROMBOSIS (DVT) OF LEFT LOWER EXTREMITY, UNSPECIFIED VEIN (HCC): Primary | ICD-10-CM

## 2019-05-01 LAB
INR PPP: 1.6 (ref 0.9–1.1)
PROTHROMBIN TIME: 19.5 SECONDS (ref 11–13.5)

## 2019-05-01 PROCEDURE — 85610 PROTHROMBIN TIME: CPT

## 2019-05-01 NOTE — PROGRESS NOTES
INR 1.6. Pt had a heart Cath and held Coumadin. She has been back on it for one week. Prior dose confirmed. Per Shirley Penn Follow [facility] and bring pt back for PT/INR with review in one week. Per [facility] pt will take 15 mg of Coumadin on M,F and 18.75 mg all other days. Pt has no complaints.  Copy of labs given to pt and f/u appt reviewed. Pt is instructed to call with concerns prior to next visit. Pt V/U. Message sent to scheduling.   Lab Results   Component Value Date    INR 1.60 (H) 05/01/2019    INR 1.6 (A) 04/23/2019    INR 1.6 (H) 04/23/2019    PROTIME 19.5 (H) 05/01/2019    PROTIME 18.5 04/23/2019    PROTIME 18.5 (H) 04/23/2019

## 2019-05-10 ENCOUNTER — CLINICAL SUPPORT (OUTPATIENT)
Dept: ONCOLOGY | Facility: HOSPITAL | Age: 54
End: 2019-05-10

## 2019-05-10 ENCOUNTER — TELEPHONE (OUTPATIENT)
Dept: ONCOLOGY | Facility: HOSPITAL | Age: 54
End: 2019-05-10

## 2019-05-10 ENCOUNTER — LAB (OUTPATIENT)
Dept: LAB | Facility: HOSPITAL | Age: 54
End: 2019-05-10

## 2019-05-10 DIAGNOSIS — Z79.01 ANTICOAGULANT LONG-TERM USE: Primary | ICD-10-CM

## 2019-05-10 LAB
INR PPP: 3.71 (ref 0.9–1.1)
PROTHROMBIN TIME: 36.5 SECONDS (ref 11.7–14.2)

## 2019-05-10 PROCEDURE — 85610 PROTHROMBIN TIME: CPT | Performed by: INTERNAL MEDICINE

## 2019-05-10 PROCEDURE — 36415 COLL VENOUS BLD VENIPUNCTURE: CPT | Performed by: INTERNAL MEDICINE

## 2019-05-10 NOTE — PROGRESS NOTES
HPI:    Alecia Cruz is a 60-year-old female who presents to the clinic with caregiver complaining of blood pressure elevated to the of colonoscopy.    Patient has history of anxiety and is legally blind noted to have tachycardia today. denies chest pain, shortness of breath or palpitations. No presyncope or syncopal episodes. She admits to feeling anxious some. She continues to follow-up with psychiatry for major depression psychosis and anxiety. Her caregiver reports that patient feels anxious all of the time.    The patient has hypothyroidism. Taking levothyroxine regularly. Denies recent change of weight. No fatigue.    PMHX:  Past Medical History:   Diagnosis Date   • Anorexia    • Blind    • Essential hypertension    • Gastroesophageal reflux disease without esophagitis 3/7/2017   • Glaucoma    • Hypokalemia    • Hypothyroidism 3/7/2017   • Major depression     Dr Saini   • Psychosis    • Use of cane as ambulatory aid        MEDICATIONS:   Current Outpatient Prescriptions   Medication Sig Dispense Refill   • hydrochlorothiazide (HYDRODIURIL) 12.5 MG tablet Take 12.5 mg by mouth daily.     • B Complex-C-Folic Acid (VITABEE/C) Tab Take 1 tablet by mouth daily.      • acetaminophen (TYLENOL) 325 MG tablet Take 325 mg by mouth every 4 hours as needed for Pain.     • amLODIPine (NORVASC) 5 MG tablet Take 5 mg by mouth daily.     • carbamide peroxide (DEBROX) 6.5 % OTIC solution 5 drops 2 times daily.     • clonazePAM (KLONOPIN) 0.5 MG tablet Take 0.5 mg by mouth 2 times daily as needed for Anxiety.     • docusate calcium (SURFAK) 240 MG capsule Take 240 mg by mouth nightly.      • famotidine (PEPCID) 20 MG tablet Take 20 mg by mouth 2 times daily.     • levothyroxine (SYNTHROID, LEVOTHROID) 50 MCG tablet Take 50 mcg by mouth daily.     • lisinopril (ZESTRIL) 10 MG tablet Take 10 mg by mouth daily.     • loratadine (CLARITIN) 10 MG tablet Take 10 mg by mouth daily.     • mirtazapine (REMERON) 45 MG tablet Take 45  Pt fingerstick INR 5.0.Pt denies any bleeding, missed doses, new meds or diet changes. Venipuncture lab sent to [facility]. Pt will be called with dosing after its resulted   mg by mouth nightly.     • potassium chloride (K-DUR, KLOR-CON) 10 MEQ CR tablet Take 20 mEq by mouth daily. 2 tabs = 20 mEq daily AM     • trifluoperazine (STELAZINE) 10 MG tablet Take 10 mg by mouth nightly.      • carvedilol (COREG) 6.25 MG tablet Take 1 tablet by mouth 2 times daily (with meals). 60 tablet 1   • benzonatate (TESSALON PERLES) 100 MG capsule Take 100 mg by mouth 3 times daily as needed for Cough.       No current facility-administered medications for this visit.        ALLERGIES:   ALLERGIES:   Allergen Reactions   • Amoxicillin Other (See Comments)   • Penicillins Other (See Comments)       SOCIAL HISTORY:  Social History     Social History   • Marital status: Single     Spouse name: N/A   • Number of children: N/A   • Years of education: N/A     Occupational History   • Not on file.     Social History Main Topics   • Smoking status: Never Smoker   • Smokeless tobacco: Never Used   • Alcohol use No   • Drug use: No   • Sexual activity: No     Other Topics Concern   • Not on file     Social History Narrative   • No narrative on file     ROS:    CONSTITUTIONAL: Denies unintentional weight change,fatigue,fever,problematic headaches.   EYES:  Patient is legally blind  ENT/o/p: Denies chronic sinus or nasal problems,dysphagia.   CV:  Denies chest discomfort with exertion,SOB,COLLINS,orthopnea,palpitations. Reports: Has hypertension  RESPIRATORY: Denies cough,SOB,change in  exercise tolerance.  GI:  Denies abdominal pain,nausea,change in  bowel habits,BRBPR,hematochezia,melena.   : Denies frequency,dysuria.  MSK: Denies joint pain,muscle aches.   SKIN:  Denies concerns,changing moles.   NEURO:  Denies weakness or clumsiness of one limb, part of a limb or side of body,transient receptive or expressive aphasia,numbness or tingling.   PSYCH: She has history of depression anxiety and psychosis.  ENDOCRINE:  Denies cold intolerance,heat intolerance,polyphagia,polydipsia,polyuria.   HEME/LYMPH:  Denies  abnormal bruising or bleeding,lumps or bumps.  ALLERGY/IMMUN: Denies chronic sinus problems,chronic nasal problems.       PHYSICAL EXAMINATION:   GENERAL: The patient is alert, awake, oriented, in no acute distress.   VITAL SIGNS:  Visit Vitals  /86   Pulse 120   Temp 98.8 °F (37.1 °C) (Oral)   Resp 19   Ht 4' 8\" (1.422 m)   Wt 52.6 kg   SpO2 98%   BMI 26.01 kg/m²       HEENT: Pupils equal, round and reactive to light and accommodation. Extraocular muscles intact. Anicteric sclerae. Tympanic membranes clear. Throat clear.   NECK: Supple with full range of motion, no submandibular, cervical, supraclavicular lymphadenopathy, no thyromegaly, no jugular venous distension or audible bruit.   HEART: Regular rate and rhythm, no S3 or S4, no murmurs.   LUNGS: Clear to auscultation bilaterally. No rhonchi or rales. Adequate air entry. Normal respiratory effort.   ABDOMEN: Soft, nontender, nondistended with audible bowel sounds. No hepatosplenomegaly, no masses.   EXTREMITIES: No edema, clubbing or cyanosis. Dorsalis pedis 2+ bilaterally.   Psychiatric: Anxious but does not seem to be depressed    Lab Services on 01/05/2018   Component Date Value   • WBC 01/05/2018 9.3    • RBC 01/05/2018 5.04    • HGB 01/05/2018 15.2    • HCT 01/05/2018 44.2    • MCV 01/05/2018 87.7    • MCH 01/05/2018 30.2    • MCHC 01/05/2018 34.4    • RDW-CV 01/05/2018 13.1    • PLT 01/05/2018 313    • DIFF TYPE 01/05/2018 AUTOMATED DIFFERENTIAL    • Neutrophil 01/05/2018 58    • LYMPH 01/05/2018 31    • MONO 01/05/2018 8    • EOSIN 01/05/2018 3    • BASO 01/05/2018 0    • Absolute Neutrophil 01/05/2018 5.5    • Absolute Lymph 01/05/2018 2.9    • Absolute Mono 01/05/2018 0.7    • Absolute Eos 01/05/2018 0.2    • Absolute Baso 01/05/2018 0.0    • Fasting Status 01/05/2018 NON FASTING    • Sodium 01/05/2018 136    • Potassium 01/05/2018 4.0    • Chloride 01/05/2018 99    • Carbon Dioxide 01/05/2018 27    • Anion Gap 01/05/2018 14    • Glucose  01/05/2018 121*   • BUN 01/05/2018 13    • Creatinine 01/05/2018 1.00*   • GFR Estimate,  Am* 01/05/2018 70    • GFR Estimate, Non Jaquelin* 01/05/2018 61    • BUN/Creatinine Ratio 01/05/2018 13    • CALCIUM 01/05/2018 10.0    • D Dimer Quantitative 01/05/2018 <0.19    • TSH 01/05/2018 4.173    • TROPONIN I 01/05/2018 <0.10        Assessment and plan   (I10) Essential hypertension  (primary encounter diagnosis)  Comment: With occasional elevation probably related to anxiety  Plan: carvedilol (COREG) 6.25 MG tablet  Stop hydrochlorothiazide    (R00.0) Tachycardia  Comment: Probably related to anxiety  Plan: ELECTROCARDIOGRAM 12-LEAD, CBC & AUTO         DIFFERENTIAL, BASIC METABOLIC PANEL, D DIMER         QUANTITATIVE, TROPONIN I RAPID, carvedilol         (COREG) 6.25 MG tablet  Further consult with her psychiatrist regarding her anxiety    (E03.9) Hypothyroidism, unspecified type  Plan: THYROID STIMULATING HORMONE  Continue current dose of levothyroxine    Follow-up in one month. Sooner if needed. Check blood pressure and pulse in couple of weeks.

## 2019-05-10 NOTE — TELEPHONE ENCOUNTER
[facility] INR 3.7. Pt denies any new meds, diet changes or missed doses. Prior dose confirmed. Per [facility] pt is to take 18.75 mg on Sun,Tue,Thur and 15 mg all other days. Pt V/U and will call with any questions or concerns.

## 2019-05-17 ENCOUNTER — OFFICE VISIT (OUTPATIENT)
Dept: ONCOLOGY | Facility: CLINIC | Age: 54
End: 2019-05-17

## 2019-05-17 ENCOUNTER — LAB (OUTPATIENT)
Dept: LAB | Facility: HOSPITAL | Age: 54
End: 2019-05-17

## 2019-05-17 VITALS
DIASTOLIC BLOOD PRESSURE: 65 MMHG | TEMPERATURE: 97.7 F | BODY MASS INDEX: 45.99 KG/M2 | SYSTOLIC BLOOD PRESSURE: 135 MMHG | HEIGHT: 67 IN | RESPIRATION RATE: 16 BRPM | WEIGHT: 293 LBS | HEART RATE: 93 BPM | OXYGEN SATURATION: 93 %

## 2019-05-17 DIAGNOSIS — I26.99 OTHER ACUTE PULMONARY EMBOLISM WITHOUT ACUTE COR PULMONALE (HCC): Primary | ICD-10-CM

## 2019-05-17 DIAGNOSIS — I82.401 DEEP VEIN THROMBOSIS (DVT) OF RIGHT LOWER EXTREMITY, UNSPECIFIED CHRONICITY, UNSPECIFIED VEIN (HCC): Primary | ICD-10-CM

## 2019-05-17 DIAGNOSIS — Z79.01 ANTICOAGULANT LONG-TERM USE: ICD-10-CM

## 2019-05-17 LAB
BASOPHILS # BLD AUTO: 0.06 10*3/MM3 (ref 0–0.2)
BASOPHILS NFR BLD AUTO: 0.7 % (ref 0–1.5)
DEPRECATED RDW RBC AUTO: 40.8 FL (ref 37–54)
EOSINOPHIL # BLD AUTO: 0.09 10*3/MM3 (ref 0–0.4)
EOSINOPHIL NFR BLD AUTO: 1 % (ref 0.3–6.2)
ERYTHROCYTE [DISTWIDTH] IN BLOOD BY AUTOMATED COUNT: 13.8 % (ref 12.3–15.4)
HCT VFR BLD AUTO: 41.6 % (ref 34–46.6)
HGB BLD-MCNC: 13.3 G/DL (ref 12–15.9)
IMM GRANULOCYTES # BLD AUTO: 0.1 10*3/MM3 (ref 0–0.05)
IMM GRANULOCYTES NFR BLD AUTO: 1.1 % (ref 0–0.5)
INR PPP: 3.6 (ref 0.9–1.1)
LYMPHOCYTES # BLD AUTO: 1.58 10*3/MM3 (ref 0.7–3.1)
LYMPHOCYTES NFR BLD AUTO: 17.8 % (ref 19.6–45.3)
MCH RBC QN AUTO: 25.8 PG (ref 26.6–33)
MCHC RBC AUTO-ENTMCNC: 32 G/DL (ref 31.5–35.7)
MCV RBC AUTO: 80.8 FL (ref 79–97)
MONOCYTES # BLD AUTO: 0.85 10*3/MM3 (ref 0.1–0.9)
MONOCYTES NFR BLD AUTO: 9.6 % (ref 5–12)
NEUTROPHILS # BLD AUTO: 6.18 10*3/MM3 (ref 1.7–7)
NEUTROPHILS NFR BLD AUTO: 69.8 % (ref 42.7–76)
NRBC BLD AUTO-RTO: 0 /100 WBC (ref 0–0.2)
PLATELET # BLD AUTO: 205 10*3/MM3 (ref 140–450)
PMV BLD AUTO: 11.4 FL (ref 6–12)
PROTHROMBIN TIME: 43.5 SECONDS (ref 11–13.5)
RBC # BLD AUTO: 5.15 10*6/MM3 (ref 3.77–5.28)
WBC NRBC COR # BLD: 8.86 10*3/MM3 (ref 3.4–10.8)

## 2019-05-17 PROCEDURE — 36415 COLL VENOUS BLD VENIPUNCTURE: CPT | Performed by: INTERNAL MEDICINE

## 2019-05-17 PROCEDURE — 85025 COMPLETE CBC W/AUTO DIFF WBC: CPT | Performed by: INTERNAL MEDICINE

## 2019-05-17 PROCEDURE — 85610 PROTHROMBIN TIME: CPT | Performed by: INTERNAL MEDICINE

## 2019-05-17 PROCEDURE — 99213 OFFICE O/P EST LOW 20 MIN: CPT | Performed by: INTERNAL MEDICINE

## 2019-05-17 NOTE — PROGRESS NOTES
REASONS FOR FOLLOWUP:  Antiphospholipid antibody syndrome with history of pulmonary embolism March 2016    History of Present Illness    Mrs. Garcia is a 53-year-old woman with history of pulmonary embolism March 2016.  She was found to have positive lupus anticoagulant and cardiolipin antibodies at the time of her pulmonary embolism which have been persistent and has been on anticoagulation with Coumadin since diagnosis.  She has had no recurrent thromboembolic events while anticoagulated.  She has no bleeding problems.    When the patient was seen in February 2019 she complained of increasing dyspnea and a CT angiogram of the chest was performed showing bilateral pulmonary emboli some of which were chronic and some new compared to 2016.  There was elevation of the LV ratio in the main pulmonary artery was dilated.  Coumadin was continued with goal INR increased to 2.5-3.5.  She had lower extremity Doppler ultrasound performed on 3/8/2019 which showed chronic DVT in the mid femoral and popliteal veins.  Another CT angiogram of the chest was performed 3/27/2019 which showed no evidence of pulmonary emboli in the RV: LV ratio now less than 1.0.  Since I saw her she is also undergone heart catheterization which appeared to be fairly unremarkable.  She continues to have significant dyspnea on exertion.  Past Medical History:   Diagnosis Date   • Anemia    • Angioedema     Lower lip   • Arthritis    • Depression    • Diastolic dysfunction    • DVT (deep venous thrombosis) (CMS/HCC)    • GERD (gastroesophageal reflux disease)    • H/O antiphospholipid syndrome    • Hiatal hernia    • Hypertension    • Hypothyroidism    • Lupus anticoagulant disorder (CMS/HCC)    • Lupus anticoagulant disorder (CMS/HCC)    • Morbid obesity (CMS/HCC)    • CRISTI (obstructive sleep apnea)    • PE (pulmonary embolism)     Bilateral   • Right ventricular dilation    • Thrombocytopenia (CMS/HCC)        ONCOLOGIC HISTORY:  (History from previous  dates can be found in the separate document.)    Current Outpatient Medications on File Prior to Visit   Medication Sig Dispense Refill   • amLODIPine (NORVASC) 5 MG tablet Take 1 tablet by mouth Daily. 90 tablet 1   • buPROPion XL (WELLBUTRIN XL) 300 MG 24 hr tablet Take 1 tablet by mouth Every Morning. 90 tablet 1   • cetirizine (zyrTEC) 10 MG tablet Take 10 mg by mouth Daily.     • hydrochlorothiazide (MICROZIDE) 12.5 MG capsule Take 25 mg by mouth Daily.     • levothyroxine (SYNTHROID, LEVOTHROID) 175 MCG tablet Take 1 tablet by mouth Daily. 90 tablet 1   • omeprazole (priLOSEC) 20 MG capsule Take 1 capsule by mouth Daily. 90 capsule 1   • sertraline (ZOLOFT) 100 MG tablet Take 1 tablet by mouth Daily. 90 tablet 1   • warfarin (JANTOVEN) 7.5 MG tablet Take 1 tablet by mouth Every Night. FOR DIRECTIONS ON HOW TO   TAKE THIS MEDICINE, READ   THE ENCLOSED MEDICATION    INFORMATION FORM       No current facility-administered medications on file prior to visit.        ALLERGIES:     Allergies   Allergen Reactions   • Toprol Xl [Metoprolol Tartrate] Shortness Of Breath   • Dust Mite Extract    • Eggs Or Egg-Derived Products Nausea And Vomiting   • Losartan Angioedema   • Sulfa Antibiotics        Social History     Socioeconomic History   • Marital status: Single     Spouse name: Not on file   • Number of children: 0   • Years of education: Not on file   • Highest education level: Not on file   Occupational History   • Occupation: Desk Work     Employer: [employer]   Tobacco Use   • Smoking status: Never Smoker   • Smokeless tobacco: Never Used   Substance and Sexual Activity   • Alcohol use: No     Frequency: Never   • Drug use: No   • Sexual activity: Defer         Cancer-related family history includes Uterine cancer in her mother.     Review of Systems   Constitutional: Positive for activity change and unexpected weight change (gain). Negative for appetite change.   HENT: Negative.    Respiratory: Positive  "for shortness of breath (Exertion, chronic).    Cardiovascular: Positive for palpitations and leg swelling.   Genitourinary: Negative.    Musculoskeletal: Negative.    Neurological: Negative for weakness.   Hematological: Does not bruise/bleed easily.   Psychiatric/Behavioral: Positive for dysphoric mood.         Objective      Vitals:    05/17/19 1624   BP: 135/65   Pulse: 93   Resp: 16   Temp: 97.7 °F (36.5 °C)   TempSrc: Oral   SpO2: 93%   Weight: (!) 172 kg (379 lb 3.2 oz)   Height: 170.2 cm (67.01\")   PainSc: 0-No pain     Current Status 5/17/2019   ECOG score 0       Physical Exam   GENERAL: Well-developed, morbid obese woman  SKIN: Warm, dry without rashes, purpura or petechiae.  NECK: Supple with good range of motion; no thyromegaly or masses, no JVD.  LYMPHATICS: No cervical, supraclavicular, axillary or inguinal adenopathy.  CHEST: Lungs clear to percussion and auscultation. Good airflow.  She, however is notably short of breath with minimal exertion  CARDIAC: Regular rate and rhythm without murmurs, rubs or gallops. Normal S1,S2.  ABDOMEN: Soft, nontender with no organomegaly or masses.  EXTREMITIES: No clubbing, cyanosis.  Trace to 1+ ankle edema bilaterally   PSYCHIATRIC: Normal affect and mood.    Exam is unchanged 5/17/2019  RECENT LABS:  Hematology WBC   Date Value Ref Range Status   05/17/2019 8.86 3.40 - 10.80 10*3/mm3 Final     RBC   Date Value Ref Range Status   05/17/2019 5.15 3.77 - 5.28 10*6/mm3 Final     Hemoglobin   Date Value Ref Range Status   05/17/2019 13.3 12.0 - 15.9 g/dL Final     Hematocrit   Date Value Ref Range Status   05/17/2019 41.6 34.0 - 46.6 % Final     Platelets   Date Value Ref Range Status   05/17/2019 205 140 - 450 10*3/mm3 Final      INR. 3.6    Assessment/Plan    1.   history of pulmonary embolism in March 2016 secondary to antiphospholipid antibody syndrome.   Imaging in February 2019 showed age indeterminate pulmonary emboli she complained of increased shortness of " breath so her INR goal was changed to 2.5-3.5.  INR 3.6 today and her dose was adjusted.  She will continue monthly INR check and nurse review.  She is followed by cardiology and pulmonary medicine as well for chronic dyspnea.      2.  Microcytosis: Hemoglobin is normal 13.3.  Her MCV is borderline low 80.8.  Iron studies in February showed no significant iron deficiency although stores were borderline low.    3.  We discussed today at length the importance of weight loss which appears to be her biggest medical threat at this time.  We discussed possibly using weight watchers, ways of increasing her activity, pros and cons of gastric banding etc.  She is going to work really hard toward weight reduction.

## 2019-06-14 ENCOUNTER — APPOINTMENT (OUTPATIENT)
Dept: ONCOLOGY | Facility: HOSPITAL | Age: 54
End: 2019-06-14

## 2019-06-14 ENCOUNTER — APPOINTMENT (OUTPATIENT)
Dept: LAB | Facility: HOSPITAL | Age: 54
End: 2019-06-14

## 2019-06-14 ENCOUNTER — INFUSION (OUTPATIENT)
Dept: ONCOLOGY | Facility: HOSPITAL | Age: 54
End: 2019-06-14

## 2019-06-14 ENCOUNTER — LAB (OUTPATIENT)
Dept: LAB | Facility: HOSPITAL | Age: 54
End: 2019-06-14

## 2019-06-14 DIAGNOSIS — I26.99 OTHER ACUTE PULMONARY EMBOLISM WITHOUT ACUTE COR PULMONALE (HCC): ICD-10-CM

## 2019-06-14 DIAGNOSIS — Z79.01 ANTICOAGULANT LONG-TERM USE: ICD-10-CM

## 2019-06-14 LAB
INR PPP: 4.1 (ref 0.9–1.1)
PROTHROMBIN TIME: 49.4 SECONDS (ref 11–13.5)

## 2019-06-14 PROCEDURE — 85610 PROTHROMBIN TIME: CPT

## 2019-06-14 NOTE — PROGRESS NOTES
Pt here for Coag Review, INR 4.1  Denies missed dose or new medications.  Dose adjusted through SS, pt to take 15 mg daily.  She will return in 2 weeks rather than one month due to elevated INR.  Message sent to scheduling desk

## 2019-06-28 ENCOUNTER — LAB (OUTPATIENT)
Dept: LAB | Facility: HOSPITAL | Age: 54
End: 2019-06-28

## 2019-06-28 ENCOUNTER — CLINICAL SUPPORT (OUTPATIENT)
Dept: ONCOLOGY | Facility: HOSPITAL | Age: 54
End: 2019-06-28

## 2019-06-28 DIAGNOSIS — Z79.01 ANTICOAGULANT LONG-TERM USE: Primary | ICD-10-CM

## 2019-06-28 LAB
INR PPP: 2.6 (ref 0.9–1.1)
PROTHROMBIN TIME: 31.7 SECONDS (ref 11–13.5)

## 2019-06-28 PROCEDURE — 85610 PROTHROMBIN TIME: CPT

## 2019-06-28 NOTE — PROGRESS NOTES
Pt left before being seen. Called pt and left a message regarding her Coumadin dosing. Waiting for a return call. Dosing held in [facility].    INR 2.6. Pt denies any new meds or diet changes. She did miss her dose on Wednesday. She will continue her current dose of 15 mg of Coumadin daily. Pt V/U.

## 2019-07-12 ENCOUNTER — CLINICAL SUPPORT (OUTPATIENT)
Dept: ONCOLOGY | Facility: HOSPITAL | Age: 54
End: 2019-07-12

## 2019-07-12 ENCOUNTER — LAB (OUTPATIENT)
Dept: LAB | Facility: HOSPITAL | Age: 54
End: 2019-07-12

## 2019-07-12 DIAGNOSIS — I26.99 OTHER ACUTE PULMONARY EMBOLISM WITHOUT ACUTE COR PULMONALE (HCC): ICD-10-CM

## 2019-07-12 DIAGNOSIS — Z79.01 ANTICOAGULANT LONG-TERM USE: ICD-10-CM

## 2019-07-12 LAB
INR PPP: 3.5 (ref 0.9–1.1)
PROTHROMBIN TIME: 42.3 SECONDS (ref 11–13.5)

## 2019-07-12 PROCEDURE — 85610 PROTHROMBIN TIME: CPT

## 2019-07-12 NOTE — PROGRESS NOTES
INR 3.5 Prior dose confirmed. Per [facility], patient is to take 11.25 mg on Friday and 15 all other days of the week. Pt has no complaints. F/U appt reviewed. Pt is instructed to call the office with any concerns or new symptoms prior to next visit. Pt vu

## 2019-07-26 DIAGNOSIS — I10 ESSENTIAL HYPERTENSION: Chronic | ICD-10-CM

## 2019-07-26 DIAGNOSIS — K21.9 GASTROESOPHAGEAL REFLUX DISEASE, ESOPHAGITIS PRESENCE NOT SPECIFIED: ICD-10-CM

## 2019-07-26 DIAGNOSIS — E03.9 ACQUIRED HYPOTHYROIDISM: ICD-10-CM

## 2019-07-26 DIAGNOSIS — F41.9 ANXIETY: ICD-10-CM

## 2019-07-29 RX ORDER — AMLODIPINE BESYLATE 5 MG/1
5 TABLET ORAL DAILY
Qty: 90 TABLET | Refills: 0 | Status: SHIPPED | OUTPATIENT
Start: 2019-07-29 | End: 2019-11-12 | Stop reason: SDUPTHER

## 2019-07-29 RX ORDER — WARFARIN SODIUM 7.5 MG/1
TABLET ORAL
Qty: 174 TABLET | Refills: 0 | OUTPATIENT
Start: 2019-07-29

## 2019-07-29 RX ORDER — OMEPRAZOLE 20 MG/1
20 CAPSULE, DELAYED RELEASE ORAL DAILY
Qty: 90 CAPSULE | Refills: 0 | Status: SHIPPED | OUTPATIENT
Start: 2019-07-29 | End: 2019-11-12 | Stop reason: SDUPTHER

## 2019-07-29 RX ORDER — SERTRALINE HYDROCHLORIDE 100 MG/1
100 TABLET, FILM COATED ORAL DAILY
Qty: 90 TABLET | Refills: 0 | Status: SHIPPED | OUTPATIENT
Start: 2019-07-29 | End: 2019-11-12 | Stop reason: SDUPTHER

## 2019-07-29 RX ORDER — LEVOTHYROXINE SODIUM 175 UG/1
175 TABLET ORAL DAILY
Qty: 90 TABLET | Refills: 0 | Status: SHIPPED | OUTPATIENT
Start: 2019-07-29 | End: 2019-10-29 | Stop reason: SDUPTHER

## 2019-07-29 RX ORDER — HYDROCHLOROTHIAZIDE 25 MG/1
25 TABLET ORAL DAILY
Qty: 90 TABLET | Refills: 0 | Status: SHIPPED | OUTPATIENT
Start: 2019-07-29 | End: 2019-11-12 | Stop reason: SDUPTHER

## 2019-07-29 NOTE — TELEPHONE ENCOUNTER
Denied refill request for Jantoven, patient need to contact office. We have never prescribed this medication and patient get INR checked at different providers office.

## 2019-08-06 ENCOUNTER — TELEPHONE (OUTPATIENT)
Dept: INTERNAL MEDICINE | Facility: CLINIC | Age: 54
End: 2019-08-06

## 2019-08-06 DIAGNOSIS — F41.9 ANXIETY: ICD-10-CM

## 2019-08-06 RX ORDER — BUPROPION HYDROCHLORIDE 300 MG/1
300 TABLET ORAL EVERY MORNING
Qty: 90 TABLET | Refills: 1 | Status: SHIPPED | OUTPATIENT
Start: 2019-08-06 | End: 2019-11-12 | Stop reason: SDUPTHER

## 2019-08-06 NOTE — TELEPHONE ENCOUNTER
----- Message from Melly Ch sent at 8/6/2019  2:10 PM EDT -----  Contact: [facility] calling for refill on     buPROPion XL (WELLBUTRIN XL) 300 MG 24 hr tablet.  Please advise.     [facility] - [place], [place] - [address] AT Portal to [facility] - [phone]  - [phone]    OR    Direct Callback #:  [phone], option 2    Reference #:  [id number]

## 2019-08-09 ENCOUNTER — LAB (OUTPATIENT)
Dept: LAB | Facility: HOSPITAL | Age: 54
End: 2019-08-09

## 2019-08-09 ENCOUNTER — CLINICAL SUPPORT (OUTPATIENT)
Dept: ONCOLOGY | Facility: HOSPITAL | Age: 54
End: 2019-08-09

## 2019-08-09 DIAGNOSIS — Z79.01 ANTICOAGULANT LONG-TERM USE: ICD-10-CM

## 2019-08-09 DIAGNOSIS — I26.99 OTHER ACUTE PULMONARY EMBOLISM WITHOUT ACUTE COR PULMONALE (HCC): ICD-10-CM

## 2019-08-09 LAB
INR PPP: 4.1 (ref 0.9–1.1)
PROTHROMBIN TIME: 48.9 SECONDS (ref 11–13.5)

## 2019-08-09 PROCEDURE — 85610 PROTHROMBIN TIME: CPT

## 2019-08-09 NOTE — PROGRESS NOTES
INR 4.1. Pt has a small amount of vaginal bleeding. She eaten less salads this week and denies any new meds. D/W Dr. Guerrier. Per Dr. Guerrier follow [facility] and return in 2 weeks. Per [facility] pt is to take 15 mg of Coumadin on Sun, Tue, Thur and 11.25 mg all other days. Pt is here for lab with RN review.   Copy of labs given to pt and f/u appt reviewed. Pt is instructed to call the office with any concerns or new symptoms prior to next visit. Pt vu

## 2019-08-23 ENCOUNTER — CLINICAL SUPPORT (OUTPATIENT)
Dept: ONCOLOGY | Facility: HOSPITAL | Age: 54
End: 2019-08-23

## 2019-08-23 ENCOUNTER — LAB (OUTPATIENT)
Dept: LAB | Facility: HOSPITAL | Age: 54
End: 2019-08-23

## 2019-08-23 DIAGNOSIS — Z79.01 ANTICOAGULANT LONG-TERM USE: Primary | ICD-10-CM

## 2019-08-23 LAB
INR PPP: 3.5 (ref 0.9–1.1)
PROTHROMBIN TIME: 42.2 SECONDS (ref 11–13.5)

## 2019-08-23 PROCEDURE — 85610 PROTHROMBIN TIME: CPT

## 2019-08-23 NOTE — PROGRESS NOTES
INR 3.5 today.  Pts goal is 2.5-3.5.  Pt requested to keep her coumadin dose the same this week despite [facility] wanting to lower it.  She will monitor her symptoms and notify us for bleeding or bruising changes.  She will return in 2 weeks instead of 1 month to reevaluate dosing.

## 2019-09-06 ENCOUNTER — LAB (OUTPATIENT)
Dept: LAB | Facility: HOSPITAL | Age: 54
End: 2019-09-06

## 2019-09-06 ENCOUNTER — CLINICAL SUPPORT (OUTPATIENT)
Dept: ONCOLOGY | Facility: HOSPITAL | Age: 54
End: 2019-09-06

## 2019-09-06 DIAGNOSIS — I26.99 OTHER ACUTE PULMONARY EMBOLISM WITHOUT ACUTE COR PULMONALE (HCC): ICD-10-CM

## 2019-09-06 DIAGNOSIS — Z79.01 ANTICOAGULANT LONG-TERM USE: ICD-10-CM

## 2019-09-06 LAB
INR PPP: 3.6 (ref 0.9–1.1)
PROTHROMBIN TIME: 42.9 SECONDS (ref 11–13.5)

## 2019-09-06 PROCEDURE — 85610 PROTHROMBIN TIME: CPT

## 2019-09-06 RX ORDER — WARFARIN SODIUM 7.5 MG/1
TABLET ORAL
Qty: 174 TABLET | Refills: 0 | Status: SHIPPED | OUTPATIENT
Start: 2019-09-06 | End: 2019-11-12 | Stop reason: SDUPTHER

## 2019-09-06 NOTE — PROGRESS NOTES
INR 3.6. Prior dose confirmed. Per [facility] Pt is to take 15 mg Sun, Thur and 11.25 all other days. Pt has no complaints.  Copy of labs given to pt and f/u appt reviewed. Pt is instructed to call the office with any concerns or new symptoms prior to next visit. Pt vu.

## 2019-10-04 ENCOUNTER — LAB (OUTPATIENT)
Dept: LAB | Facility: HOSPITAL | Age: 54
End: 2019-10-04

## 2019-10-04 ENCOUNTER — CLINICAL SUPPORT (OUTPATIENT)
Dept: ONCOLOGY | Facility: HOSPITAL | Age: 54
End: 2019-10-04

## 2019-10-04 DIAGNOSIS — I26.99 OTHER ACUTE PULMONARY EMBOLISM WITHOUT ACUTE COR PULMONALE (HCC): ICD-10-CM

## 2019-10-04 DIAGNOSIS — Z79.01 ANTICOAGULANT LONG-TERM USE: ICD-10-CM

## 2019-10-04 LAB
INR PPP: 2.7 (ref 0.9–1.1)
PROTHROMBIN TIME: 32.1 SECONDS (ref 11–13.5)

## 2019-10-04 PROCEDURE — 85610 PROTHROMBIN TIME: CPT

## 2019-10-04 NOTE — PROGRESS NOTES
INR 2.7. Prior dose confirmed. Per [facility] pt is to take 15 mg of Coumadin on Sun,Tue,Thur and 11.25 mg all other days. Pt CV/U.

## 2019-10-29 DIAGNOSIS — F41.9 ANXIETY: ICD-10-CM

## 2019-10-29 DIAGNOSIS — K21.9 GASTROESOPHAGEAL REFLUX DISEASE, ESOPHAGITIS PRESENCE NOT SPECIFIED: ICD-10-CM

## 2019-10-29 DIAGNOSIS — I10 ESSENTIAL HYPERTENSION: ICD-10-CM

## 2019-10-29 DIAGNOSIS — E03.9 ACQUIRED HYPOTHYROIDISM: ICD-10-CM

## 2019-10-29 DIAGNOSIS — I10 ESSENTIAL HYPERTENSION: Chronic | ICD-10-CM

## 2019-10-29 RX ORDER — AMLODIPINE BESYLATE 5 MG/1
TABLET ORAL
Qty: 90 TABLET | Refills: 1 | OUTPATIENT
Start: 2019-10-29

## 2019-10-29 RX ORDER — WARFARIN SODIUM 7.5 MG/1
TABLET ORAL
Qty: 174 TABLET | Refills: 1 | OUTPATIENT
Start: 2019-10-29

## 2019-10-29 RX ORDER — LISINOPRIL 20 MG/1
TABLET ORAL
Qty: 90 TABLET | Refills: 1 | OUTPATIENT
Start: 2019-10-29

## 2019-10-29 RX ORDER — LEVOTHYROXINE SODIUM 175 MCG
TABLET ORAL
Qty: 90 TABLET | Refills: 1 | Status: SHIPPED | OUTPATIENT
Start: 2019-10-29 | End: 2019-11-12 | Stop reason: SDUPTHER

## 2019-10-29 RX ORDER — BUPROPION HYDROCHLORIDE 300 MG/1
TABLET ORAL
Qty: 90 TABLET | Refills: 1 | OUTPATIENT
Start: 2019-10-29

## 2019-10-29 RX ORDER — OMEPRAZOLE 20 MG/1
CAPSULE, DELAYED RELEASE ORAL
Qty: 90 CAPSULE | Refills: 1 | OUTPATIENT
Start: 2019-10-29

## 2019-10-29 RX ORDER — SERTRALINE HYDROCHLORIDE 100 MG/1
TABLET, FILM COATED ORAL
Qty: 90 TABLET | Refills: 1 | OUTPATIENT
Start: 2019-10-29

## 2019-10-29 RX ORDER — HYDROCHLOROTHIAZIDE 25 MG/1
TABLET ORAL
Qty: 90 TABLET | Refills: 0 | OUTPATIENT
Start: 2019-10-29

## 2019-11-08 ENCOUNTER — LAB (OUTPATIENT)
Dept: LAB | Facility: HOSPITAL | Age: 54
End: 2019-11-08

## 2019-11-08 ENCOUNTER — OFFICE VISIT (OUTPATIENT)
Dept: ONCOLOGY | Facility: CLINIC | Age: 54
End: 2019-11-08

## 2019-11-08 VITALS
HEIGHT: 67 IN | SYSTOLIC BLOOD PRESSURE: 145 MMHG | HEART RATE: 85 BPM | DIASTOLIC BLOOD PRESSURE: 78 MMHG | RESPIRATION RATE: 12 BRPM | WEIGHT: 293 LBS | TEMPERATURE: 97.4 F | BODY MASS INDEX: 45.99 KG/M2 | OXYGEN SATURATION: 91 %

## 2019-11-08 DIAGNOSIS — I26.99 OTHER ACUTE PULMONARY EMBOLISM WITHOUT ACUTE COR PULMONALE (HCC): ICD-10-CM

## 2019-11-08 DIAGNOSIS — I82.401 DEEP VEIN THROMBOSIS (DVT) OF RIGHT LOWER EXTREMITY, UNSPECIFIED CHRONICITY, UNSPECIFIED VEIN (HCC): ICD-10-CM

## 2019-11-08 DIAGNOSIS — D68.61 ANTIPHOSPHOLIPID SYNDROME (HCC): Primary | Chronic | ICD-10-CM

## 2019-11-08 DIAGNOSIS — Z79.01 ANTICOAGULANT LONG-TERM USE: ICD-10-CM

## 2019-11-08 LAB
BASOPHILS # BLD AUTO: 0.06 10*3/MM3 (ref 0–0.2)
BASOPHILS NFR BLD AUTO: 0.7 % (ref 0–1.5)
DEPRECATED RDW RBC AUTO: 42.3 FL (ref 37–54)
EOSINOPHIL # BLD AUTO: 0.11 10*3/MM3 (ref 0–0.4)
EOSINOPHIL NFR BLD AUTO: 1.3 % (ref 0.3–6.2)
ERYTHROCYTE [DISTWIDTH] IN BLOOD BY AUTOMATED COUNT: 14.4 % (ref 12.3–15.4)
HCT VFR BLD AUTO: 44 % (ref 34–46.6)
HGB BLD-MCNC: 14.2 G/DL (ref 12–15.9)
IMM GRANULOCYTES # BLD AUTO: 0.03 10*3/MM3 (ref 0–0.05)
IMM GRANULOCYTES NFR BLD AUTO: 0.4 % (ref 0–0.5)
INR PPP: 3.1 (ref 0.9–1.1)
LYMPHOCYTES # BLD AUTO: 1.62 10*3/MM3 (ref 0.7–3.1)
LYMPHOCYTES NFR BLD AUTO: 19.4 % (ref 19.6–45.3)
MCH RBC QN AUTO: 26.2 PG (ref 26.6–33)
MCHC RBC AUTO-ENTMCNC: 32.3 G/DL (ref 31.5–35.7)
MCV RBC AUTO: 81.2 FL (ref 79–97)
MONOCYTES # BLD AUTO: 0.8 10*3/MM3 (ref 0.1–0.9)
MONOCYTES NFR BLD AUTO: 9.6 % (ref 5–12)
NEUTROPHILS # BLD AUTO: 5.72 10*3/MM3 (ref 1.7–7)
NEUTROPHILS NFR BLD AUTO: 68.6 % (ref 42.7–76)
NRBC BLD AUTO-RTO: 0 /100 WBC (ref 0–0.2)
PLATELET # BLD AUTO: 295 10*3/MM3 (ref 140–450)
PMV BLD AUTO: 9.5 FL (ref 6–12)
PROTHROMBIN TIME: 36.9 SECONDS (ref 11–13.5)
RBC # BLD AUTO: 5.42 10*6/MM3 (ref 3.77–5.28)
WBC NRBC COR # BLD: 8.34 10*3/MM3 (ref 3.4–10.8)

## 2019-11-08 PROCEDURE — 36416 COLLJ CAPILLARY BLOOD SPEC: CPT

## 2019-11-08 PROCEDURE — G0463 HOSPITAL OUTPT CLINIC VISIT: HCPCS | Performed by: INTERNAL MEDICINE

## 2019-11-08 PROCEDURE — 85610 PROTHROMBIN TIME: CPT

## 2019-11-08 PROCEDURE — 99213 OFFICE O/P EST LOW 20 MIN: CPT | Performed by: INTERNAL MEDICINE

## 2019-11-08 PROCEDURE — 85025 COMPLETE CBC W/AUTO DIFF WBC: CPT

## 2019-11-08 NOTE — PROGRESS NOTES
REASONS FOR FOLLOWUP:  Antiphospholipid antibody syndrome with history of pulmonary embolism March 2016    History of Present Illness    Mrs. Garcia is a 53-year-old woman with history of pulmonary embolism March 2016.  She was found to have positive lupus anticoagulant and cardiolipin antibodies at the time of her pulmonary embolism which have been persistent and has been on anticoagulation with Coumadin since diagnosis.  She has had no recurrent thromboembolic events while anticoagulated.  She has no bleeding problems.    When the patient was seen in February 2019 she complained of increasing dyspnea and a CT angiogram of the chest was performed showing bilateral pulmonary emboli some of which were chronic and some new compared to 2016.  There was elevation of the LV ratio in the main pulmonary artery was dilated.  Coumadin was continued with goal INR increased to 2.5-3.5.  She had lower extremity Doppler ultrasound performed on 3/8/2019 which showed chronic DVT in the mid femoral and popliteal veins.  Another CT angiogram of the chest was performed 3/27/2019 which showed no evidence of pulmonary emboli in the RV: LV ratio now less than 1.0.      In return today the patient is doing well with no significant increase in shortness of breath, chest pain or leg swelling.  She denies excessive bleeding or bruising on anticoagulation.    Past Medical History:   Diagnosis Date   • Anemia    • Angioedema     Lower lip   • Arthritis    • Depression    • Diastolic dysfunction    • DVT (deep venous thrombosis) (CMS/HCC)    • GERD (gastroesophageal reflux disease)    • H/O antiphospholipid syndrome    • Hiatal hernia    • Hypertension    • Hypothyroidism    • Lupus anticoagulant disorder (CMS/HCC)    • Lupus anticoagulant disorder (CMS/HCC)    • Morbid obesity (CMS/HCC)    • CRISTI (obstructive sleep apnea)    • PE (pulmonary embolism)     Bilateral   • Right ventricular dilation    • Thrombocytopenia (CMS/HCC)         ONCOLOGIC HISTORY:  (History from previous dates can be found in the separate document.)    Current Outpatient Medications on File Prior to Visit   Medication Sig Dispense Refill   • amLODIPine (NORVASC) 5 MG tablet Take 1 tablet by mouth Daily. LAST REFILL-APPOINTMENT IS DUE NOW 7/29/19 90 tablet 0   • buPROPion XL (WELLBUTRIN XL) 300 MG 24 hr tablet Take 1 tablet by mouth Every Morning. 90 tablet 1   • cetirizine (zyrTEC) 10 MG tablet Take 10 mg by mouth Daily.     • hydrochlorothiazide (HYDRODIURIL) 25 MG tablet Take 1 tablet by mouth Daily. LAST REFILL-APPOINTMENT IS DUE NOW 7/29/19 90 tablet 0   • omeprazole (priLOSEC) 20 MG capsule Take 1 capsule by mouth Daily. LAST REFILL-APPOINTMENT IS DUE NOW 7/29/19 90 capsule 0   • sertraline (ZOLOFT) 100 MG tablet Take 1 tablet by mouth Daily. LAST REFILL-APPOINTMENT IS DUE NOW 7/29/19 90 tablet 0   • SYNTHROID 175 MCG tablet TAKE 1 TABLET DAILY (LAST  REFILL-APPOINTMENT IS DUE  NOW 7/29/19) 90 tablet 1   • warfarin (JANTOVEN) 7.5 MG tablet Take 1 tablet by mouth Every Night. FOR DIRECTIONS ON HOW TO   TAKE THIS MEDICINE, READ   THE ENCLOSED MEDICATION    INFORMATION FORM     • warfarin (JANTOVEN) 7.5 MG tablet FOR DIRECTIONS ON HOW TO   TAKE THIS MEDICINE, READ   THE ENCLOSED MEDICATION    INFORMATION FORM 174 tablet 0     No current facility-administered medications on file prior to visit.        ALLERGIES:     Allergies   Allergen Reactions   • Toprol Xl [Metoprolol Tartrate] Shortness Of Breath   • Dust Mite Extract    • Eggs Or Egg-Derived Products Nausea And Vomiting   • Losartan Angioedema   • Sulfa Antibiotics        Social History     Socioeconomic History   • Marital status: Single     Spouse name: Not on file   • Number of children: 0   • Years of education: Not on file   • Highest education level: Not on file   Occupational History   • Occupation: Desk Work     Employer: [employer]   Tobacco Use   • Smoking status: Never Smoker   • Smokeless  "tobacco: Never Used   Substance and Sexual Activity   • Alcohol use: No     Frequency: Never   • Drug use: No   • Sexual activity: Defer         Cancer-related family history includes Uterine cancer in her mother.     Review of Systems   Constitutional: Positive for unexpected weight change (gain). Negative for activity change and appetite change.   HENT: Negative.    Respiratory: Positive for shortness of breath (Exertion, chronic).    Cardiovascular: Positive for leg swelling. Negative for palpitations.   Genitourinary: Negative.    Musculoskeletal: Negative.    Neurological: Negative for weakness.   Hematological: Does not bruise/bleed easily.   Psychiatric/Behavioral: Positive for dysphoric mood.         Objective      Vitals:    11/08/19 1550   BP: 145/78   Pulse: 85   Resp: 12   Temp: 97.4 °F (36.3 °C)   TempSrc: Oral   SpO2: 91%   Weight: (!) 160 kg (353 lb 9.6 oz)   Height: 170.2 cm (67.01\")   PainSc:   5   PainLoc: Knee     Current Status 11/8/2019   ECOG score 0       Physical Exam   GENERAL: Well-developed, morbid obese woman  SKIN: Warm, dry without rashes, purpura or petechiae.  NECK: Supple with good range of motion; no thyromegaly or masses, no JVD.  LYMPHATICS: No cervical, supraclavicular, axillary or inguinal adenopathy.  CHEST: Lungs clear to percussion and auscultation. Good airflow.    CARDIAC: Regular rate and rhythm without murmurs, rubs or gallops. Normal S1,S2.  ABDOMEN: Soft, nontender with no organomegaly or masses.  EXTREMITIES: No clubbing, cyanosis.  Trace to 1+ ankle edema bilaterally   PSYCHIATRIC: Normal affect and mood.      RECENT LABS:  Hematology WBC   Date Value Ref Range Status   11/08/2019 8.34 3.40 - 10.80 10*3/mm3 Final     RBC   Date Value Ref Range Status   11/08/2019 5.42 (H) 3.77 - 5.28 10*6/mm3 Final     Hemoglobin   Date Value Ref Range Status   11/08/2019 14.2 12.0 - 15.9 g/dL Final     Hematocrit   Date Value Ref Range Status   11/08/2019 44.0 34.0 - 46.6 % Final "     Platelets   Date Value Ref Range Status   11/08/2019 295 140 - 450 10*3/mm3 Final      INR. 3.1    Assessment/Plan    1.   history of pulmonary embolism in March 2016 secondary to antiphospholipid antibody syndrome.   Imaging in February 2019 showed age indeterminate pulmonary emboli she complained of increased shortness of breath so her INR goal was changed to 2.5-3.5.  INR 3.6 today and her dose was adjusted.  She will continue monthly INR check and nurse review.  She is followed by cardiology and pulmonary medicine as well for chronic dyspnea.

## 2019-11-12 DIAGNOSIS — K21.9 GASTROESOPHAGEAL REFLUX DISEASE, ESOPHAGITIS PRESENCE NOT SPECIFIED: ICD-10-CM

## 2019-11-12 DIAGNOSIS — F41.9 ANXIETY: ICD-10-CM

## 2019-11-12 DIAGNOSIS — E03.9 ACQUIRED HYPOTHYROIDISM: ICD-10-CM

## 2019-11-12 DIAGNOSIS — I10 ESSENTIAL HYPERTENSION: Primary | ICD-10-CM

## 2019-11-12 DIAGNOSIS — I10 ESSENTIAL HYPERTENSION: ICD-10-CM

## 2019-11-12 RX ORDER — OMEPRAZOLE 20 MG/1
20 CAPSULE, DELAYED RELEASE ORAL DAILY
Qty: 90 CAPSULE | Refills: 1 | Status: SHIPPED | OUTPATIENT
Start: 2019-11-12 | End: 2020-03-26 | Stop reason: SDUPTHER

## 2019-11-12 RX ORDER — HYDROCHLOROTHIAZIDE 25 MG/1
25 TABLET ORAL DAILY
Qty: 90 TABLET | Refills: 0 | Status: SHIPPED | OUTPATIENT
Start: 2019-11-12 | End: 2019-11-12 | Stop reason: SDUPTHER

## 2019-11-12 RX ORDER — AMLODIPINE BESYLATE 5 MG/1
5 TABLET ORAL DAILY
Qty: 90 TABLET | Refills: 1 | Status: SHIPPED | OUTPATIENT
Start: 2019-11-12 | End: 2020-03-26 | Stop reason: SDUPTHER

## 2019-11-12 RX ORDER — BUPROPION HYDROCHLORIDE 300 MG/1
300 TABLET ORAL EVERY MORNING
Qty: 90 TABLET | Refills: 0 | Status: SHIPPED | OUTPATIENT
Start: 2019-11-12 | End: 2019-11-12 | Stop reason: SDUPTHER

## 2019-11-12 RX ORDER — HYDROCHLOROTHIAZIDE 25 MG/1
25 TABLET ORAL DAILY
Qty: 90 TABLET | Refills: 1 | Status: SHIPPED | OUTPATIENT
Start: 2019-11-12 | End: 2020-03-26 | Stop reason: SDUPTHER

## 2019-11-12 RX ORDER — WARFARIN SODIUM 7.5 MG/1
7.5 TABLET ORAL NIGHTLY
Qty: 90 TABLET | Refills: 1 | Status: SHIPPED | OUTPATIENT
Start: 2019-11-12 | End: 2020-03-26 | Stop reason: SDUPTHER

## 2019-11-12 RX ORDER — SERTRALINE HYDROCHLORIDE 100 MG/1
100 TABLET, FILM COATED ORAL DAILY
Qty: 90 TABLET | Refills: 1 | Status: SHIPPED | OUTPATIENT
Start: 2019-11-12 | End: 2020-03-02 | Stop reason: SDUPTHER

## 2019-11-12 RX ORDER — SERTRALINE HYDROCHLORIDE 100 MG/1
100 TABLET, FILM COATED ORAL DAILY
Qty: 90 TABLET | Refills: 0 | Status: SHIPPED | OUTPATIENT
Start: 2019-11-12 | End: 2019-11-12 | Stop reason: SDUPTHER

## 2019-11-12 RX ORDER — LEVOTHYROXINE SODIUM 175 UG/1
175 TABLET ORAL DAILY
Qty: 90 TABLET | Refills: 1 | Status: SHIPPED | OUTPATIENT
Start: 2019-11-12 | End: 2020-03-02 | Stop reason: SDUPTHER

## 2019-11-12 RX ORDER — LEVOTHYROXINE SODIUM 175 UG/1
175 TABLET ORAL DAILY
Qty: 90 TABLET | Refills: 0 | Status: SHIPPED | OUTPATIENT
Start: 2019-11-12 | End: 2019-11-12 | Stop reason: SDUPTHER

## 2019-11-12 RX ORDER — BUPROPION HYDROCHLORIDE 300 MG/1
300 TABLET ORAL EVERY MORNING
Qty: 90 TABLET | Refills: 1 | Status: SHIPPED | OUTPATIENT
Start: 2019-11-12 | End: 2020-03-02 | Stop reason: SDUPTHER

## 2019-11-12 RX ORDER — OMEPRAZOLE 20 MG/1
20 CAPSULE, DELAYED RELEASE ORAL DAILY
Qty: 90 CAPSULE | Refills: 0 | Status: SHIPPED | OUTPATIENT
Start: 2019-11-12 | End: 2019-11-12 | Stop reason: SDUPTHER

## 2019-11-12 RX ORDER — AMLODIPINE BESYLATE 5 MG/1
5 TABLET ORAL DAILY
Qty: 90 TABLET | Refills: 0 | Status: SHIPPED | OUTPATIENT
Start: 2019-11-12 | End: 2019-11-12 | Stop reason: SDUPTHER

## 2019-11-19 DIAGNOSIS — I10 ESSENTIAL HYPERTENSION: ICD-10-CM

## 2019-11-20 RX ORDER — LISINOPRIL 20 MG/1
TABLET ORAL
Qty: 90 TABLET | Refills: 3 | Status: SHIPPED | OUTPATIENT
Start: 2019-11-20 | End: 2020-03-12

## 2019-11-20 RX ORDER — WARFARIN SODIUM 7.5 MG/1
TABLET ORAL
Qty: 174 TABLET | Refills: 3 | Status: SHIPPED | OUTPATIENT
Start: 2019-11-20 | End: 2020-01-22 | Stop reason: SDUPTHER

## 2019-12-13 ENCOUNTER — LAB (OUTPATIENT)
Dept: LAB | Facility: HOSPITAL | Age: 54
End: 2019-12-13

## 2019-12-13 ENCOUNTER — CLINICAL SUPPORT (OUTPATIENT)
Dept: ONCOLOGY | Facility: HOSPITAL | Age: 54
End: 2019-12-13

## 2019-12-13 DIAGNOSIS — I82.401 DEEP VEIN THROMBOSIS (DVT) OF RIGHT LOWER EXTREMITY, UNSPECIFIED CHRONICITY, UNSPECIFIED VEIN (HCC): ICD-10-CM

## 2019-12-13 DIAGNOSIS — D50.9 MICROCYTIC ANEMIA: Primary | ICD-10-CM

## 2019-12-13 DIAGNOSIS — D68.61 ANTIPHOSPHOLIPID SYNDROME (HCC): Chronic | ICD-10-CM

## 2019-12-13 LAB
BASOPHILS # BLD AUTO: 0.05 10*3/MM3 (ref 0–0.2)
BASOPHILS NFR BLD AUTO: 0.6 % (ref 0–1.5)
DEPRECATED RDW RBC AUTO: 42.1 FL (ref 37–54)
EOSINOPHIL # BLD AUTO: 0.07 10*3/MM3 (ref 0–0.4)
EOSINOPHIL NFR BLD AUTO: 0.9 % (ref 0.3–6.2)
ERYTHROCYTE [DISTWIDTH] IN BLOOD BY AUTOMATED COUNT: 14.1 % (ref 12.3–15.4)
HCT VFR BLD AUTO: 42.9 % (ref 34–46.6)
HGB BLD-MCNC: 13.6 G/DL (ref 12–15.9)
IMM GRANULOCYTES # BLD AUTO: 0.04 10*3/MM3 (ref 0–0.05)
IMM GRANULOCYTES NFR BLD AUTO: 0.5 % (ref 0–0.5)
INR PPP: 3 (ref 0.9–1.1)
LYMPHOCYTES # BLD AUTO: 1.83 10*3/MM3 (ref 0.7–3.1)
LYMPHOCYTES NFR BLD AUTO: 22.8 % (ref 19.6–45.3)
MCH RBC QN AUTO: 26 PG (ref 26.6–33)
MCHC RBC AUTO-ENTMCNC: 31.7 G/DL (ref 31.5–35.7)
MCV RBC AUTO: 82 FL (ref 79–97)
MONOCYTES # BLD AUTO: 0.68 10*3/MM3 (ref 0.1–0.9)
MONOCYTES NFR BLD AUTO: 8.5 % (ref 5–12)
NEUTROPHILS # BLD AUTO: 5.35 10*3/MM3 (ref 1.7–7)
NEUTROPHILS NFR BLD AUTO: 66.7 % (ref 42.7–76)
NRBC BLD AUTO-RTO: 0 /100 WBC (ref 0–0.2)
PLATELET # BLD AUTO: 196 10*3/MM3 (ref 140–450)
PMV BLD AUTO: 10.8 FL (ref 6–12)
PROTHROMBIN TIME: 36.1 SECONDS (ref 11–13.5)
RBC # BLD AUTO: 5.23 10*6/MM3 (ref 3.77–5.28)
WBC NRBC COR # BLD: 8.02 10*3/MM3 (ref 3.4–10.8)

## 2019-12-13 PROCEDURE — 36415 COLL VENOUS BLD VENIPUNCTURE: CPT

## 2019-12-13 PROCEDURE — 85025 COMPLETE CBC W/AUTO DIFF WBC: CPT

## 2019-12-13 PROCEDURE — 85610 PROTHROMBIN TIME: CPT

## 2019-12-13 NOTE — PROGRESS NOTES
Pt is here for lab with RN review.  CBC reviewed with pt, counts are stable for this pt at this time. INR 3.0. Prior dose confirmed. Per [facility] pt is to take 15 mg of Coumadin on Sun,Tue,Thur and 11.25 mg all other days.  Pt has no complaints.  Copy of labs given to pt and f/u appt reviewed. Pt is instructed to call the office with any concerns or new symptoms prior to next visit. Pt vu.   Lab Results   Component Value Date    WBC 8.02 12/13/2019    HGB 13.6 12/13/2019    HCT 42.9 12/13/2019    MCV 82.0 12/13/2019     12/13/2019

## 2020-01-10 ENCOUNTER — LAB (OUTPATIENT)
Dept: LAB | Facility: HOSPITAL | Age: 55
End: 2020-01-10

## 2020-01-10 ENCOUNTER — CLINICAL SUPPORT (OUTPATIENT)
Dept: ONCOLOGY | Facility: HOSPITAL | Age: 55
End: 2020-01-10

## 2020-01-10 DIAGNOSIS — D64.9 ANEMIA, UNSPECIFIED TYPE: Primary | ICD-10-CM

## 2020-01-10 DIAGNOSIS — D68.61 ANTIPHOSPHOLIPID SYNDROME (HCC): Chronic | ICD-10-CM

## 2020-01-10 DIAGNOSIS — I82.401 DEEP VEIN THROMBOSIS (DVT) OF RIGHT LOWER EXTREMITY, UNSPECIFIED CHRONICITY, UNSPECIFIED VEIN (HCC): ICD-10-CM

## 2020-01-10 LAB
BASOPHILS # BLD AUTO: 0.06 10*3/MM3 (ref 0–0.2)
BASOPHILS NFR BLD AUTO: 0.7 % (ref 0–1.5)
DEPRECATED RDW RBC AUTO: 40.5 FL (ref 37–54)
EOSINOPHIL # BLD AUTO: 0.12 10*3/MM3 (ref 0–0.4)
EOSINOPHIL NFR BLD AUTO: 1.4 % (ref 0.3–6.2)
ERYTHROCYTE [DISTWIDTH] IN BLOOD BY AUTOMATED COUNT: 13.8 % (ref 12.3–15.4)
HCT VFR BLD AUTO: 40.7 % (ref 34–46.6)
HGB BLD-MCNC: 13 G/DL (ref 12–15.9)
IMM GRANULOCYTES # BLD AUTO: 0.04 10*3/MM3 (ref 0–0.05)
IMM GRANULOCYTES NFR BLD AUTO: 0.5 % (ref 0–0.5)
INR PPP: 2.9 (ref 0.9–1.1)
LYMPHOCYTES # BLD AUTO: 1.47 10*3/MM3 (ref 0.7–3.1)
LYMPHOCYTES NFR BLD AUTO: 17.4 % (ref 19.6–45.3)
MCH RBC QN AUTO: 26.2 PG (ref 26.6–33)
MCHC RBC AUTO-ENTMCNC: 31.9 G/DL (ref 31.5–35.7)
MCV RBC AUTO: 82.1 FL (ref 79–97)
MONOCYTES # BLD AUTO: 0.73 10*3/MM3 (ref 0.1–0.9)
MONOCYTES NFR BLD AUTO: 8.7 % (ref 5–12)
NEUTROPHILS # BLD AUTO: 6.01 10*3/MM3 (ref 1.7–7)
NEUTROPHILS NFR BLD AUTO: 71.3 % (ref 42.7–76)
NRBC BLD AUTO-RTO: 0 /100 WBC (ref 0–0.2)
PLATELET # BLD AUTO: 147 10*3/MM3 (ref 140–450)
PMV BLD AUTO: 10.4 FL (ref 6–12)
PROTHROMBIN TIME: 35.1 SECONDS (ref 11–13.5)
RBC # BLD AUTO: 4.96 10*6/MM3 (ref 3.77–5.28)
WBC NRBC COR # BLD: 8.43 10*3/MM3 (ref 3.4–10.8)

## 2020-01-10 PROCEDURE — 85025 COMPLETE CBC W/AUTO DIFF WBC: CPT

## 2020-01-10 PROCEDURE — 85610 PROTHROMBIN TIME: CPT

## 2020-01-10 PROCEDURE — 36415 COLL VENOUS BLD VENIPUNCTURE: CPT

## 2020-01-10 NOTE — PROGRESS NOTES
Lab Results   Component Value Date    WBC 8.43 01/10/2020    HGB 13.0 01/10/2020    HCT 40.7 01/10/2020    MCV 82.1 01/10/2020     01/10/2020     CBC RESULTS R/W PT. RESULTS ARE WNL. PT DENIES ANY C/O. COPY OF RESULTS GIVEN TO PT.

## 2020-01-22 ENCOUNTER — OFFICE VISIT (OUTPATIENT)
Dept: INTERNAL MEDICINE | Facility: CLINIC | Age: 55
End: 2020-01-22

## 2020-01-22 VITALS
HEART RATE: 82 BPM | BODY MASS INDEX: 45.99 KG/M2 | SYSTOLIC BLOOD PRESSURE: 130 MMHG | HEIGHT: 67 IN | DIASTOLIC BLOOD PRESSURE: 82 MMHG | WEIGHT: 293 LBS | TEMPERATURE: 97.7 F | OXYGEN SATURATION: 95 %

## 2020-01-22 DIAGNOSIS — J06.9 ACUTE URI: Primary | ICD-10-CM

## 2020-01-22 PROCEDURE — 99213 OFFICE O/P EST LOW 20 MIN: CPT | Performed by: NURSE PRACTITIONER

## 2020-01-22 RX ORDER — GUAIFENESIN 600 MG/1
600 TABLET, EXTENDED RELEASE ORAL EVERY 12 HOURS SCHEDULED
Qty: 14 TABLET
Start: 2020-01-22 | End: 2020-01-29

## 2020-01-22 RX ORDER — CEFDINIR 300 MG/1
300 CAPSULE ORAL 2 TIMES DAILY
Qty: 14 CAPSULE | Refills: 0 | Status: SHIPPED | OUTPATIENT
Start: 2020-01-22 | End: 2020-01-29

## 2020-01-23 NOTE — PROGRESS NOTES
Subjective   Carli Garcia is a 54 y.o. female who presents due to respiratory symptoms.    She presents due to worsening cough and chest congestion, denies dyspnea. No fever/chills.    URI    This is a new problem. The current episode started 1 to 4 weeks ago (sx began ketan 1 week ago). The problem has been gradually worsening. There has been no fever. Associated symptoms include congestion, coughing, ear pain, headaches, rhinorrhea and a sore throat. Pertinent negatives include no abdominal pain, chest pain, diarrhea, nausea, neck pain, sneezing, vomiting or wheezing. She has tried acetaminophen (Mucinex, Tylenol) for the symptoms. The treatment provided mild relief.   Cough   Associated symptoms include ear pain, headaches, postnasal drip, rhinorrhea and a sore throat. Pertinent negatives include no chest pain, chills, fever, shortness of breath or wheezing.        The following portions of the patient's history were reviewed and updated as appropriate: allergies, current medications, past social history and problem list.    Past Medical History:   Diagnosis Date   • Anemia    • Angioedema     Lower lip   • Arthritis    • Depression    • Diastolic dysfunction    • DVT (deep venous thrombosis) (CMS/HCC)    • GERD (gastroesophageal reflux disease)    • H/O antiphospholipid syndrome    • Hiatal hernia    • Hypertension    • Hypothyroidism    • Lupus anticoagulant disorder (CMS/HCC)    • Lupus anticoagulant disorder (CMS/HCC)    • Morbid obesity (CMS/HCC)    • CRISTI (obstructive sleep apnea)    • PE (pulmonary embolism)     Bilateral   • Right ventricular dilation    • Thrombocytopenia (CMS/HCC)          Current Outpatient Medications:   •  amLODIPine (NORVASC) 5 MG tablet, Take 1 tablet by mouth Daily. LAST REFILL-APPOINTMENT IS DUE NOW 7/29/19, Disp: 90 tablet, Rfl: 1  •  buPROPion XL (WELLBUTRIN XL) 300 MG 24 hr tablet, Take 1 tablet by mouth Every Morning., Disp: 90 tablet, Rfl: 1  •  cetirizine (zyrTEC) 10 MG  tablet, Take 10 mg by mouth Daily., Disp: , Rfl:   •  hydroCHLOROthiazide (HYDRODIURIL) 25 MG tablet, Take 1 tablet by mouth Daily. LAST REFILL-APPOINTMENT IS DUE NOW 7/29/19, Disp: 90 tablet, Rfl: 1  •  levothyroxine (SYNTHROID) 175 MCG tablet, Take 1 tablet by mouth Daily., Disp: 90 tablet, Rfl: 1  •  lisinopril (PRINIVIL,ZESTRIL) 20 MG tablet, TAKE 1 TABLET DAILY, Disp: 90 tablet, Rfl: 3  •  omeprazole (priLOSEC) 20 MG capsule, Take 1 capsule by mouth Daily. LAST REFILL-APPOINTMENT IS DUE NOW 7/29/19, Disp: 90 capsule, Rfl: 1  •  sertraline (ZOLOFT) 100 MG tablet, Take 1 tablet by mouth Daily. LAST REFILL-APPOINTMENT IS DUE NOW 7/29/19, Disp: 90 tablet, Rfl: 1  •  warfarin (JANTOVEN) 7.5 MG tablet, Take 1 tablet by mouth Every Night. FOR DIRECTIONS ON HOW TO   TAKE THIS MEDICINE, READ   THE ENCLOSED MEDICATION    INFORMATION FORM, Disp: 90 tablet, Rfl: 1  •  cefdinir (OMNICEF) 300 MG capsule, Take 1 capsule by mouth 2 (Two) Times a Day for 7 days., Disp: 14 capsule, Rfl: 0  •  guaiFENesin (MUCINEX) 600 MG 12 hr tablet, Take 1 tablet by mouth Every 12 (Twelve) Hours for 7 days., Disp: 14 tablet, Rfl:     Allergies   Allergen Reactions   • Toprol Xl [Metoprolol Tartrate] Shortness Of Breath   • Dust Mite Extract    • Eggs Or Egg-Derived Products Nausea And Vomiting   • Losartan Angioedema   • Sulfa Antibiotics        Review of Systems   Constitutional: Negative for appetite change, chills, fatigue and fever.   HENT: Positive for congestion, ear pain, postnasal drip, rhinorrhea, sinus pressure and sore throat. Negative for ear discharge, facial swelling, sneezing and tinnitus.    Respiratory: Positive for cough. Negative for chest tightness, shortness of breath and wheezing.    Cardiovascular: Negative for chest pain, palpitations and leg swelling.   Gastrointestinal: Negative for abdominal pain, diarrhea, nausea and vomiting.   Musculoskeletal: Negative for neck pain and neck stiffness.   Neurological: Positive for  "headaches.   Hematological: Negative for adenopathy.       Objective   Vitals:    01/22/20 1442   BP: 130/82   BP Location: Left arm   Patient Position: Sitting   Cuff Size: Adult   Pulse: 82   Temp: 97.7 °F (36.5 °C)   TempSrc: Oral   SpO2: 95%   Weight: (!) 160 kg (353 lb)   Height: 170.2 cm (67\")     Body mass index is 55.29 kg/m².  Physical Exam   Constitutional: She appears well-developed and well-nourished. She is cooperative. She does not have a sickly appearance. She does not appear ill.   HENT:   Head: Normocephalic.   Right Ear: Hearing and external ear normal. No drainage, swelling or tenderness. Tympanic membrane is bulging. Tympanic membrane is not erythematous. No middle ear effusion. No decreased hearing is noted.   Left Ear: Hearing and external ear normal. No drainage, swelling or tenderness. Tympanic membrane is bulging. Tympanic membrane is not erythematous.  No middle ear effusion. No decreased hearing is noted.   Nose: Nose normal. No mucosal edema, rhinorrhea, sinus tenderness or nasal deformity. Right sinus exhibits no maxillary sinus tenderness and no frontal sinus tenderness. Left sinus exhibits no maxillary sinus tenderness and no frontal sinus tenderness.   Mouth/Throat: Mucous membranes are normal. Posterior oropharyngeal erythema present.   Eyes: Conjunctivae and lids are normal.   Neck: Trachea normal.   Cardiovascular: Regular rhythm, normal heart sounds and normal pulses.   No murmur heard.  Pulmonary/Chest: Breath sounds normal. No respiratory distress. She has no decreased breath sounds. She has no wheezes. She has no rhonchi. She has no rales.   Lymphadenopathy:     She has no cervical adenopathy.   Neurological: She is alert.   Skin: Skin is warm, dry and intact.   Nursing note and vitals reviewed.      Assessment/Plan   Carli was seen today for uri and cough.    Diagnoses and all orders for this visit:    Acute URI  -     guaiFENesin (MUCINEX) 600 MG 12 hr tablet; Take 1 tablet " by mouth Every 12 (Twelve) Hours for 7 days.  -     cefdinir (OMNICEF) 300 MG capsule; Take 1 capsule by mouth 2 (Two) Times a Day for 7 days.    She will continue Zyrtec daily for increased postnasal drainage.  She is scheduled for a physical examination next month with fasting labs.

## 2020-02-14 ENCOUNTER — HOSPITAL ENCOUNTER (OUTPATIENT)
Dept: CT IMAGING | Facility: HOSPITAL | Age: 55
Discharge: HOME | End: 2020-02-14
Admitting: INTERNAL MEDICINE

## 2020-02-14 ENCOUNTER — CLINICAL SUPPORT (OUTPATIENT)
Dept: ONCOLOGY | Facility: HOSPITAL | Age: 55
End: 2020-02-14

## 2020-02-14 ENCOUNTER — LAB (OUTPATIENT)
Dept: LAB | Facility: HOSPITAL | Age: 55
End: 2020-02-14

## 2020-02-14 ENCOUNTER — TELEPHONE (OUTPATIENT)
Dept: ONCOLOGY | Facility: CLINIC | Age: 55
End: 2020-02-14

## 2020-02-14 DIAGNOSIS — D64.9 ANEMIA, UNSPECIFIED TYPE: Primary | ICD-10-CM

## 2020-02-14 DIAGNOSIS — Z79.01 ANTICOAGULANT LONG-TERM USE: ICD-10-CM

## 2020-02-14 DIAGNOSIS — D68.61 ANTIPHOSPHOLIPID SYNDROME (HCC): Chronic | ICD-10-CM

## 2020-02-14 DIAGNOSIS — I82.401 DEEP VEIN THROMBOSIS (DVT) OF RIGHT LOWER EXTREMITY, UNSPECIFIED CHRONICITY, UNSPECIFIED VEIN (HCC): ICD-10-CM

## 2020-02-14 DIAGNOSIS — R06.02 SHORTNESS OF BREATH: ICD-10-CM

## 2020-02-14 DIAGNOSIS — Z79.01 ANTICOAGULANT LONG-TERM USE: Primary | ICD-10-CM

## 2020-02-14 LAB
BASOPHILS # BLD AUTO: 0.06 10*3/MM3 (ref 0–0.2)
BASOPHILS NFR BLD AUTO: 0.7 % (ref 0–1.5)
CREAT BLDA-MCNC: 0.8 MG/DL (ref 0.6–1.3)
DEPRECATED RDW RBC AUTO: 41.8 FL (ref 37–54)
EOSINOPHIL # BLD AUTO: 0.1 10*3/MM3 (ref 0–0.4)
EOSINOPHIL NFR BLD AUTO: 1.2 % (ref 0.3–6.2)
ERYTHROCYTE [DISTWIDTH] IN BLOOD BY AUTOMATED COUNT: 14.2 % (ref 12.3–15.4)
HCT VFR BLD AUTO: 42.3 % (ref 34–46.6)
HGB BLD-MCNC: 13.6 G/DL (ref 12–15.9)
IMM GRANULOCYTES # BLD AUTO: 0.04 10*3/MM3 (ref 0–0.05)
IMM GRANULOCYTES NFR BLD AUTO: 0.5 % (ref 0–0.5)
INR PPP: 3.6 (ref 0.9–1.1)
LYMPHOCYTES # BLD AUTO: 1.7 10*3/MM3 (ref 0.7–3.1)
LYMPHOCYTES NFR BLD AUTO: 20 % (ref 19.6–45.3)
MCH RBC QN AUTO: 26.3 PG (ref 26.6–33)
MCHC RBC AUTO-ENTMCNC: 32.2 G/DL (ref 31.5–35.7)
MCV RBC AUTO: 81.7 FL (ref 79–97)
MONOCYTES # BLD AUTO: 0.75 10*3/MM3 (ref 0.1–0.9)
MONOCYTES NFR BLD AUTO: 8.8 % (ref 5–12)
NEUTROPHILS # BLD AUTO: 5.83 10*3/MM3 (ref 1.7–7)
NEUTROPHILS NFR BLD AUTO: 68.8 % (ref 42.7–76)
NRBC BLD AUTO-RTO: 0 /100 WBC (ref 0–0.2)
PLATELET # BLD AUTO: 250 10*3/MM3 (ref 140–450)
PMV BLD AUTO: 10.2 FL (ref 6–12)
PROTHROMBIN TIME: 43.2 SECONDS (ref 11–13.5)
RBC # BLD AUTO: 5.18 10*6/MM3 (ref 3.77–5.28)
WBC NRBC COR # BLD: 8.48 10*3/MM3 (ref 3.4–10.8)

## 2020-02-14 PROCEDURE — 85610 PROTHROMBIN TIME: CPT

## 2020-02-14 PROCEDURE — 71275 CT ANGIOGRAPHY CHEST: CPT

## 2020-02-14 PROCEDURE — 36415 COLL VENOUS BLD VENIPUNCTURE: CPT

## 2020-02-14 PROCEDURE — 82565 ASSAY OF CREATININE: CPT

## 2020-02-14 PROCEDURE — 25010000002 IOPAMIDOL 61 % SOLUTION: Performed by: INTERNAL MEDICINE

## 2020-02-14 PROCEDURE — 85025 COMPLETE CBC W/AUTO DIFF WBC: CPT

## 2020-02-14 RX ADMIN — IOPAMIDOL 85 ML: 612 INJECTION, SOLUTION INTRAVENOUS at 16:50

## 2020-02-14 NOTE — NURSING NOTE
Stat hold & call CTA chest R/O PE called to Dr Fleming . Patient may discharge home Per Dr Fleming call office on Monday. IV d/c'd intact. No problems or concerns noted at this time. Patient ambulated to main entrance for discharge.

## 2020-02-14 NOTE — PROGRESS NOTES
INR 3.6 today. Pt complains of SOA, more than usual, persisting for a few weeks. She states she feels like she did when she had a PE. D/W Dr. Guerrier. Per Dr. Guerrier, order a CT angiogram stat. OK to adjust coumadin per [facility].     Pt mentioned that she was having more swelling in RLE. RN assessed. Called Carline Solo NP and she came to chairside to assess pt. Right leg more swollen than left. Pt denies pain or redness. It was decided that pt should wear compression stockings and try to keep her feet elevated but if it progresses, then we need to order a doppler. Pt to return next week for recheck.     Pt sent to scheduling to make apt for CT angiogram stat.

## 2020-02-14 NOTE — TELEPHONE ENCOUNTER
----- Message from Karey Ospina, RN sent at 2/14/2020  4:07 PM EST -----  Please schedule pt for PT/INR and coag RN in 1 week

## 2020-02-19 ENCOUNTER — TELEPHONE (OUTPATIENT)
Dept: ONCOLOGY | Facility: CLINIC | Age: 55
End: 2020-02-19

## 2020-02-20 ENCOUNTER — APPOINTMENT (OUTPATIENT)
Dept: ONCOLOGY | Facility: HOSPITAL | Age: 55
End: 2020-02-20

## 2020-02-20 ENCOUNTER — APPOINTMENT (OUTPATIENT)
Dept: LAB | Facility: HOSPITAL | Age: 55
End: 2020-02-20

## 2020-03-02 DIAGNOSIS — E03.9 ACQUIRED HYPOTHYROIDISM: ICD-10-CM

## 2020-03-02 DIAGNOSIS — F41.9 ANXIETY: ICD-10-CM

## 2020-03-02 RX ORDER — SERTRALINE HYDROCHLORIDE 100 MG/1
100 TABLET, FILM COATED ORAL DAILY
Qty: 90 TABLET | Refills: 1 | Status: SHIPPED | OUTPATIENT
Start: 2020-03-02 | End: 2020-03-12

## 2020-03-02 RX ORDER — BUPROPION HYDROCHLORIDE 300 MG/1
300 TABLET ORAL EVERY MORNING
Qty: 90 TABLET | Refills: 1 | Status: SHIPPED | OUTPATIENT
Start: 2020-03-02 | End: 2020-03-12 | Stop reason: DRUGHIGH

## 2020-03-02 RX ORDER — LEVOTHYROXINE SODIUM 175 UG/1
175 TABLET ORAL DAILY
Qty: 90 TABLET | Refills: 1 | Status: SHIPPED | OUTPATIENT
Start: 2020-03-02 | End: 2020-08-26

## 2020-03-12 ENCOUNTER — CLINICAL SUPPORT (OUTPATIENT)
Dept: ONCOLOGY | Facility: HOSPITAL | Age: 55
End: 2020-03-12

## 2020-03-12 ENCOUNTER — APPOINTMENT (OUTPATIENT)
Dept: WOMENS IMAGING | Facility: HOSPITAL | Age: 55
End: 2020-03-12

## 2020-03-12 ENCOUNTER — OFFICE VISIT (OUTPATIENT)
Dept: INTERNAL MEDICINE | Facility: CLINIC | Age: 55
End: 2020-03-12

## 2020-03-12 ENCOUNTER — LAB (OUTPATIENT)
Dept: LAB | Facility: HOSPITAL | Age: 55
End: 2020-03-12

## 2020-03-12 VITALS
DIASTOLIC BLOOD PRESSURE: 84 MMHG | RESPIRATION RATE: 14 BRPM | SYSTOLIC BLOOD PRESSURE: 122 MMHG | WEIGHT: 293 LBS | HEIGHT: 67 IN | TEMPERATURE: 97.4 F | BODY MASS INDEX: 45.99 KG/M2

## 2020-03-12 DIAGNOSIS — Z12.31 ENCOUNTER FOR SCREENING MAMMOGRAM FOR BREAST CANCER: Primary | ICD-10-CM

## 2020-03-12 DIAGNOSIS — F41.9 ANXIETY: ICD-10-CM

## 2020-03-12 DIAGNOSIS — D49.89: ICD-10-CM

## 2020-03-12 DIAGNOSIS — I82.401 DEEP VEIN THROMBOSIS (DVT) OF RIGHT LOWER EXTREMITY, UNSPECIFIED CHRONICITY, UNSPECIFIED VEIN (HCC): ICD-10-CM

## 2020-03-12 DIAGNOSIS — D68.61 ANTIPHOSPHOLIPID SYNDROME (HCC): Chronic | ICD-10-CM

## 2020-03-12 DIAGNOSIS — E03.9 ACQUIRED HYPOTHYROIDISM: ICD-10-CM

## 2020-03-12 DIAGNOSIS — Z00.00 PE (PHYSICAL EXAM), ANNUAL: Primary | ICD-10-CM

## 2020-03-12 DIAGNOSIS — D64.9 ANEMIA, UNSPECIFIED TYPE: Primary | ICD-10-CM

## 2020-03-12 DIAGNOSIS — I10 ESSENTIAL HYPERTENSION: ICD-10-CM

## 2020-03-12 LAB
ALBUMIN SERPL-MCNC: 4.1 G/DL (ref 3.5–5.2)
ALBUMIN/GLOB SERPL: 1.3 G/DL
ALP SERPL-CCNC: 76 U/L (ref 39–117)
ALT SERPL-CCNC: 13 U/L (ref 1–33)
APPEARANCE UR: CLEAR
AST SERPL-CCNC: 8 U/L (ref 1–32)
BACTERIA #/AREA URNS HPF: NORMAL /HPF
BASOPHILS # BLD AUTO: 0.06 10*3/MM3 (ref 0–0.2)
BASOPHILS NFR BLD AUTO: 0.8 % (ref 0–1.5)
BILIRUB SERPL-MCNC: 0.3 MG/DL (ref 0.2–1.2)
BILIRUB UR QL STRIP: NEGATIVE
BUN SERPL-MCNC: 13 MG/DL (ref 6–20)
BUN/CREAT SERPL: 15.9 (ref 7–25)
CALCIUM SERPL-MCNC: 9.2 MG/DL (ref 8.6–10.5)
CASTS URNS MICRO: NORMAL
CHLORIDE SERPL-SCNC: 101 MMOL/L (ref 98–107)
CHOLEST SERPL-MCNC: 224 MG/DL (ref 0–200)
CO2 SERPL-SCNC: 28.5 MMOL/L (ref 22–29)
COLOR UR: YELLOW
CREAT SERPL-MCNC: 0.82 MG/DL (ref 0.57–1)
DEPRECATED RDW RBC AUTO: 41.9 FL (ref 37–54)
EOSINOPHIL # BLD AUTO: 0.12 10*3/MM3 (ref 0–0.4)
EOSINOPHIL NFR BLD AUTO: 1.5 % (ref 0.3–6.2)
EPI CELLS #/AREA URNS HPF: NORMAL /HPF
ERYTHROCYTE [DISTWIDTH] IN BLOOD BY AUTOMATED COUNT: 14.1 % (ref 12.3–15.4)
GLOBULIN SER CALC-MCNC: 3.1 GM/DL
GLUCOSE SERPL-MCNC: 94 MG/DL (ref 65–99)
GLUCOSE UR QL: NEGATIVE
HCT VFR BLD AUTO: 42.6 % (ref 34–46.6)
HDLC SERPL-MCNC: 50 MG/DL (ref 40–60)
HGB BLD-MCNC: 13.6 G/DL (ref 12–15.9)
HGB UR QL STRIP: ABNORMAL
IMM GRANULOCYTES # BLD AUTO: 0.04 10*3/MM3 (ref 0–0.05)
IMM GRANULOCYTES NFR BLD AUTO: 0.5 % (ref 0–0.5)
INR PPP: 4.2 (ref 0.9–1.1)
KETONES UR QL STRIP: NEGATIVE
LDLC SERPL CALC-MCNC: 153 MG/DL (ref 0–100)
LEUKOCYTE ESTERASE UR QL STRIP: NEGATIVE
LYMPHOCYTES # BLD AUTO: 1.46 10*3/MM3 (ref 0.7–3.1)
LYMPHOCYTES NFR BLD AUTO: 18.7 % (ref 19.6–45.3)
MCH RBC QN AUTO: 26.2 PG (ref 26.6–33)
MCHC RBC AUTO-ENTMCNC: 31.9 G/DL (ref 31.5–35.7)
MCV RBC AUTO: 81.9 FL (ref 79–97)
MONOCYTES # BLD AUTO: 0.64 10*3/MM3 (ref 0.1–0.9)
MONOCYTES NFR BLD AUTO: 8.2 % (ref 5–12)
NEUTROPHILS # BLD AUTO: 5.47 10*3/MM3 (ref 1.7–7)
NEUTROPHILS NFR BLD AUTO: 70.3 % (ref 42.7–76)
NITRITE UR QL STRIP: NEGATIVE
NRBC BLD AUTO-RTO: 0 /100 WBC (ref 0–0.2)
PH UR STRIP: 6 [PH] (ref 5–8)
PLATELET # BLD AUTO: 294 10*3/MM3 (ref 140–450)
PMV BLD AUTO: 9.7 FL (ref 6–12)
POTASSIUM SERPL-SCNC: 4.6 MMOL/L (ref 3.5–5.2)
PROT SERPL-MCNC: 7.2 G/DL (ref 6–8.5)
PROT UR QL STRIP: NEGATIVE
PROTHROMBIN TIME: 50.2 SECONDS (ref 11–13.5)
RBC # BLD AUTO: 5.2 10*6/MM3 (ref 3.77–5.28)
RBC #/AREA URNS HPF: NORMAL /HPF
SODIUM SERPL-SCNC: 141 MMOL/L (ref 136–145)
SP GR UR: 1.02 (ref 1–1.03)
TRIGL SERPL-MCNC: 105 MG/DL (ref 0–150)
TSH SERPL DL<=0.005 MIU/L-ACNC: 1.98 UIU/ML (ref 0.27–4.2)
UROBILINOGEN UR STRIP-MCNC: ABNORMAL MG/DL
VLDLC SERPL CALC-MCNC: 21 MG/DL
WBC #/AREA URNS HPF: NORMAL /HPF
WBC NRBC COR # BLD: 7.79 10*3/MM3 (ref 3.4–10.8)

## 2020-03-12 PROCEDURE — 77067 SCR MAMMO BI INCL CAD: CPT | Performed by: NURSE PRACTITIONER

## 2020-03-12 PROCEDURE — 36415 COLL VENOUS BLD VENIPUNCTURE: CPT

## 2020-03-12 PROCEDURE — 99396 PREV VISIT EST AGE 40-64: CPT | Performed by: NURSE PRACTITIONER

## 2020-03-12 PROCEDURE — G0463 HOSPITAL OUTPT CLINIC VISIT: HCPCS

## 2020-03-12 PROCEDURE — 85025 COMPLETE CBC W/AUTO DIFF WBC: CPT

## 2020-03-12 PROCEDURE — 85610 PROTHROMBIN TIME: CPT

## 2020-03-12 PROCEDURE — 77067 SCR MAMMO BI INCL CAD: CPT | Performed by: RADIOLOGY

## 2020-03-12 RX ORDER — BUPROPION HYDROCHLORIDE 150 MG/1
150 TABLET ORAL DAILY
Qty: 90 TABLET | Refills: 1 | Status: SHIPPED | OUTPATIENT
Start: 2020-03-12 | End: 2020-08-26 | Stop reason: DRUGHIGH

## 2020-03-12 RX ORDER — SERTRALINE HYDROCHLORIDE 100 MG/1
100 TABLET, FILM COATED ORAL DAILY
Qty: 90 TABLET | Refills: 1
Start: 2020-03-12 | End: 2020-08-26

## 2020-03-12 NOTE — PROGRESS NOTES
Subjective   Carli Garcia is a 55 y.o. female who presents for a physical exam.    History of Present Illness     The following portions of the patient's history were reviewed and updated as appropriate: allergies, current medications, past social history and problem list.    Past Medical History:   Diagnosis Date   • Anemia    • Angioedema     Lower lip   • Arthritis    • Depression    • Diastolic dysfunction    • DVT (deep venous thrombosis) (CMS/HCC)    • GERD (gastroesophageal reflux disease)    • H/O antiphospholipid syndrome    • Hiatal hernia    • Hypertension    • Hypothyroidism    • Lupus anticoagulant disorder (CMS/HCC)    • Lupus anticoagulant disorder (CMS/HCC)    • Morbid obesity (CMS/HCC)    • CRISTI (obstructive sleep apnea)    • PE (pulmonary embolism)     Bilateral   • Right ventricular dilation    • Thrombocytopenia (CMS/HCC)          Current Outpatient Medications:   •  amLODIPine (NORVASC) 5 MG tablet, Take 1 tablet by mouth Daily. LAST REFILL-APPOINTMENT IS DUE NOW 7/29/19, Disp: 90 tablet, Rfl: 1  •  cetirizine (zyrTEC) 10 MG tablet, Take 10 mg by mouth Daily., Disp: , Rfl:   •  hydroCHLOROthiazide (HYDRODIURIL) 25 MG tablet, Take 1 tablet by mouth Daily. LAST REFILL-APPOINTMENT IS DUE NOW 7/29/19, Disp: 90 tablet, Rfl: 1  •  levothyroxine (SYNTHROID) 175 MCG tablet, Take 1 tablet by mouth Daily., Disp: 90 tablet, Rfl: 1  •  omeprazole (priLOSEC) 20 MG capsule, Take 1 capsule by mouth Daily. LAST REFILL-APPOINTMENT IS DUE NOW 7/29/19, Disp: 90 capsule, Rfl: 1  •  sertraline (ZOLOFT) 100 MG tablet, Take 1 tablet by mouth Daily. Cut tablet in half, Disp: 90 tablet, Rfl: 1  •  warfarin (JANTOVEN) 7.5 MG tablet, Take 1 tablet by mouth Every Night. FOR DIRECTIONS ON HOW TO   TAKE THIS MEDICINE, READ   THE ENCLOSED MEDICATION    INFORMATION FORM, Disp: 90 tablet, Rfl: 1  •  atorvastatin (LIPITOR) 10 MG tablet, Take 1 tablet by mouth Daily., Disp: 90 tablet, Rfl: 1  •  buPROPion XL (WELLBUTRIN XL) 150  MG 24 hr tablet, Take 1 tablet by mouth Daily., Disp: 90 tablet, Rfl: 1    Allergies   Allergen Reactions   • Toprol Xl [Metoprolol Tartrate] Shortness Of Breath   • Dust Mite Extract    • Eggs Or Egg-Derived Products Nausea And Vomiting   • Losartan Angioedema   • Sulfa Antibiotics        Review of Systems   Constitutional: Negative for activity change, appetite change, chills, diaphoresis, fatigue, fever and unexpected weight change.   HENT: Negative for congestion, dental problem, drooling, ear discharge, ear pain, facial swelling, hearing loss, mouth sores, nosebleeds, postnasal drip, rhinorrhea, sinus pressure, sore throat, tinnitus and trouble swallowing.    Eyes: Negative for photophobia, pain, discharge, redness, itching and visual disturbance.   Respiratory: Negative for apnea, cough, choking, chest tightness, shortness of breath and wheezing.         Denies shortness of breath, no longer requiring oxygen   Cardiovascular: Negative for chest pain, palpitations and leg swelling.        No orthopnea, PND, VALERIO   Gastrointestinal: Negative for abdominal pain, blood in stool, constipation, diarrhea, nausea and vomiting.   Endocrine: Negative for cold intolerance, heat intolerance, polydipsia and polyuria.   Genitourinary: Negative for decreased urine volume, dysuria, enuresis, flank pain, frequency, hematuria and urgency.   Musculoskeletal: Positive for arthralgias (bilateral knee pain and stiffness). Negative for back pain, gait problem, joint swelling, myalgias, neck pain and neck stiffness.   Skin: Negative for color change and rash.        No hair changes, no nail changes   Allergic/Immunologic: Negative for environmental allergies, food allergies and immunocompromised state.   Neurological: Negative for dizziness, tremors, seizures, syncope, speech difficulty, weakness, light-headedness, numbness and headaches.   Hematological: Negative for adenopathy. Does not bruise/bleed easily.   Psychiatric/Behavioral:  "Negative for agitation, confusion, decreased concentration, dysphoric mood, sleep disturbance and suicidal ideas. The patient is not nervous/anxious.         Reports anxiety has improved over past several years       Objective   Vitals:    03/12/20 0800   BP: 122/84   BP Location: Left arm   Patient Position: Sitting   Cuff Size: Adult   Resp: 14   Temp: 97.4 °F (36.3 °C)   Weight: (!) 161 kg (356 lb)   Height: 170.2 cm (67\")     Body mass index is 55.76 kg/m².    Physical Exam   Constitutional: She is oriented to person, place, and time. She appears well-developed and well-nourished. No distress.   HENT:   Right Ear: Hearing, tympanic membrane, external ear and ear canal normal.   Left Ear: Hearing, tympanic membrane, external ear and ear canal normal.   Nose: Right sinus exhibits no maxillary sinus tenderness and no frontal sinus tenderness. Left sinus exhibits no maxillary sinus tenderness and no frontal sinus tenderness.   Eyes: Pupils are equal, round, and reactive to light. Conjunctivae, EOM and lids are normal.   Neck: Trachea normal and phonation normal. Neck supple. Normal carotid pulses and no JVD present. Carotid bruit is not present. No thyroid mass and no thyromegaly present.   Cardiovascular: Normal rate, regular rhythm, S1 normal and S2 normal. PMI is not displaced. Exam reveals no gallop and no friction rub.   No murmur heard.  Pulses:       Carotid pulses are 2+ on the right side, and 2+ on the left side.       Radial pulses are 2+ on the right side, and 2+ on the left side.        Dorsalis pedis pulses are 2+ on the right side, and 2+ on the left side.   Pulmonary/Chest: Effort normal and breath sounds normal. She has no wheezes. She has no rhonchi. She has no rales. Right breast exhibits no inverted nipple, no mass, no nipple discharge, no skin change and no tenderness. Left breast exhibits no inverted nipple, no mass, no nipple discharge, no skin change and no tenderness.   Abdominal: Soft. " Normal appearance, normal aorta and bowel sounds are normal. She exhibits no abdominal bruit and no mass. There is no hepatosplenomegaly. There is no tenderness.   Musculoskeletal: Normal range of motion. She exhibits no edema or tenderness.   Lymphadenopathy:     She has no cervical adenopathy.        Right: No supraclavicular adenopathy present.        Left: No supraclavicular adenopathy present.   Neurological: She is alert and oriented to person, place, and time. She has normal strength and normal reflexes. No cranial nerve deficit or sensory deficit. Coordination normal.   Skin: Skin is warm and dry. No rash noted.   There is a papule on the left axillary area which is dome shaped, darkening in the center. It has clear borders but lighter edges.   Psychiatric: She has a normal mood and affect. Her speech is normal and behavior is normal. Judgment and thought content normal. Cognition and memory are normal. She is attentive.   Nursing note and vitals reviewed.      Assessment/Plan   Carli was seen today for annual exam.    Diagnoses and all orders for this visit:    PE (physical exam), annual  -     Cancel: CBC Auto Differential  -     Comprehensive Metabolic Panel  -     Lipid Panel  -     TSH  -     Urinalysis With Microscopic If Indicated (No Culture) - Urine, Clean Catch  -     ECG 12 Lead    Essential hypertension  -     ECG 12 Lead    Acquired hypothyroidism    Anxiety  -     sertraline (ZOLOFT) 100 MG tablet; Take 1 tablet by mouth Daily. Cut tablet in half  -     buPROPion XL (WELLBUTRIN XL) 150 MG 24 hr tablet; Take 1 tablet by mouth Daily.    Neoplasm of left upper arm    Other orders  -     Microscopic Examination -      ECG 12 Lead  Date/Time: 3/15/2020 5:40 PM  Performed by: Eunice Altamirano MA  Authorized by: Lupe Simeon APRN   Comparison: compared with previous ECG from 3/27/2019  Similar to previous ECG  Rhythm: sinus rhythm  Rate: normal  BPM: 79  Conduction: conduction normal  ST  Segments: ST segments normal  T Waves: T waves normal  QRS axis: normal    Clinical impression: abnormal EKG  Comments: Poor R wave progression with no acute changes        Risk assessment:  She would like to taper off medications for anxiety, will lower dose of Sertraline and Wellbutrin and continue to monitor. If she is doing well we will continue to titrate dosage.  She has a family hx (brother) of DM2, check fasting glucose today.  Her BMI is elevated at 55.8, discussed diet and exercise program with the goal of weight loss.  She has hx of DVT and PE, currently managed on Coumadin with regular protimes through CBC.  I am concerned about the color changes and darkening in the lesion on the left axillary, she will contact dermatologist for further evaluation.    Prevention:  She has declined a screening colonoscopy, discussed ColoGuard which she is interested in. She will check with insurance regarding coverage.  Discussed Shingrix vaccine, she will check with pharmacy regarding coverage and availability.  Mammogram is done today. She is current on her pap smear.

## 2020-03-12 NOTE — PROGRESS NOTES
INR 4.2 today. She denies missed doses or new medications. States she has not been eating as many salads as she typically does. She denies bleeding. MD not available and NP's available. Advised her I would call with new dosing instructions. She v/u.     D/W Alanna Romeo NP. Per Alanna, have pt take 15mg Sun and 11.25mg all other days. Recheck in 2 weeks. Called pt and informed her. She v/u.

## 2020-03-15 PROBLEM — I20.0 UNSTABLE ANGINA: Status: RESOLVED | Noted: 2019-04-19 | Resolved: 2020-03-15

## 2020-03-15 PROCEDURE — 93000 ELECTROCARDIOGRAM COMPLETE: CPT | Performed by: NURSE PRACTITIONER

## 2020-03-15 NOTE — PATIENT INSTRUCTIONS

## 2020-03-16 ENCOUNTER — PATIENT MESSAGE (OUTPATIENT)
Dept: INTERNAL MEDICINE | Facility: CLINIC | Age: 55
End: 2020-03-16

## 2020-03-16 DIAGNOSIS — E78.00 HYPERCHOLESTEREMIA: Primary | ICD-10-CM

## 2020-03-16 DIAGNOSIS — E78.00 HYPERCHOLESTEREMIA: ICD-10-CM

## 2020-03-16 RX ORDER — ATORVASTATIN CALCIUM 10 MG/1
10 TABLET, FILM COATED ORAL DAILY
Qty: 90 TABLET | Refills: 1 | Status: SHIPPED | OUTPATIENT
Start: 2020-03-16 | End: 2020-03-16 | Stop reason: SDUPTHER

## 2020-03-16 RX ORDER — ATORVASTATIN CALCIUM 10 MG/1
10 TABLET, FILM COATED ORAL DAILY
Qty: 90 TABLET | Refills: 1 | Status: SHIPPED | OUTPATIENT
Start: 2020-03-16 | End: 2020-11-24

## 2020-03-26 DIAGNOSIS — K21.9 GASTROESOPHAGEAL REFLUX DISEASE, ESOPHAGITIS PRESENCE NOT SPECIFIED: ICD-10-CM

## 2020-03-26 DIAGNOSIS — I10 ESSENTIAL HYPERTENSION: ICD-10-CM

## 2020-03-26 RX ORDER — WARFARIN SODIUM 7.5 MG/1
7.5 TABLET ORAL NIGHTLY
Qty: 90 TABLET | Refills: 1 | Status: SHIPPED | OUTPATIENT
Start: 2020-03-26 | End: 2020-03-30 | Stop reason: SDUPTHER

## 2020-03-26 RX ORDER — OMEPRAZOLE 20 MG/1
20 CAPSULE, DELAYED RELEASE ORAL DAILY
Qty: 90 CAPSULE | Refills: 1 | Status: SHIPPED | OUTPATIENT
Start: 2020-03-26 | End: 2020-03-30 | Stop reason: SDUPTHER

## 2020-03-26 RX ORDER — HYDROCHLOROTHIAZIDE 25 MG/1
25 TABLET ORAL DAILY
Qty: 90 TABLET | Refills: 1 | Status: SHIPPED | OUTPATIENT
Start: 2020-03-26 | End: 2020-03-30 | Stop reason: SDUPTHER

## 2020-03-26 RX ORDER — AMLODIPINE BESYLATE 5 MG/1
5 TABLET ORAL DAILY
Qty: 90 TABLET | Refills: 1 | Status: SHIPPED | OUTPATIENT
Start: 2020-03-26 | End: 2020-03-30 | Stop reason: SDUPTHER

## 2020-03-30 ENCOUNTER — CLINICAL SUPPORT (OUTPATIENT)
Dept: ONCOLOGY | Facility: HOSPITAL | Age: 55
End: 2020-03-30

## 2020-03-30 ENCOUNTER — LAB (OUTPATIENT)
Dept: LAB | Facility: HOSPITAL | Age: 55
End: 2020-03-30

## 2020-03-30 DIAGNOSIS — I82.401 DEEP VEIN THROMBOSIS (DVT) OF RIGHT LOWER EXTREMITY, UNSPECIFIED CHRONICITY, UNSPECIFIED VEIN (HCC): ICD-10-CM

## 2020-03-30 DIAGNOSIS — I10 ESSENTIAL HYPERTENSION: ICD-10-CM

## 2020-03-30 DIAGNOSIS — K21.9 GASTROESOPHAGEAL REFLUX DISEASE, ESOPHAGITIS PRESENCE NOT SPECIFIED: ICD-10-CM

## 2020-03-30 DIAGNOSIS — D68.61 ANTIPHOSPHOLIPID SYNDROME (HCC): Chronic | ICD-10-CM

## 2020-03-30 LAB
INR PPP: 3.3 (ref 0.9–1.1)
PROTHROMBIN TIME: 39.6 SECONDS (ref 11–13.5)

## 2020-03-30 PROCEDURE — G0463 HOSPITAL OUTPT CLINIC VISIT: HCPCS

## 2020-03-30 PROCEDURE — 85610 PROTHROMBIN TIME: CPT

## 2020-03-30 RX ORDER — HYDROCHLOROTHIAZIDE 25 MG/1
25 TABLET ORAL DAILY
Qty: 90 TABLET | Refills: 1 | Status: SHIPPED | OUTPATIENT
Start: 2020-03-30 | End: 2020-08-26

## 2020-03-30 RX ORDER — OMEPRAZOLE 20 MG/1
20 CAPSULE, DELAYED RELEASE ORAL DAILY
Qty: 90 CAPSULE | Refills: 1 | Status: SHIPPED | OUTPATIENT
Start: 2020-03-30 | End: 2020-10-13

## 2020-03-30 RX ORDER — WARFARIN SODIUM 7.5 MG/1
7.5 TABLET ORAL NIGHTLY
Qty: 90 TABLET | Refills: 1 | Status: SHIPPED | OUTPATIENT
Start: 2020-03-30 | End: 2020-04-02 | Stop reason: SDUPTHER

## 2020-03-30 RX ORDER — AMLODIPINE BESYLATE 5 MG/1
5 TABLET ORAL DAILY
Qty: 90 TABLET | Refills: 1 | Status: SHIPPED | OUTPATIENT
Start: 2020-03-30 | End: 2020-10-05

## 2020-03-30 NOTE — PROGRESS NOTES
INR 3.3 today. Pt denies missed doses or new medications. Confirmed previous dosing. Placed in [facility]. Per [facility], pt will now take 11.25mg daily. Informed pt and she v/u.     Pt asked about getting home monitoring for her coumadin. D/W Dr. Guerrier. He is OK with this. Message sent to Nesha Esteban to set up.     Pt requested refill on Coumadin. This was already called in by her PCP. Informed pt and she v/u.

## 2020-04-02 RX ORDER — WARFARIN SODIUM 7.5 MG/1
TABLET ORAL
Qty: 90 TABLET | Refills: 1 | Status: SHIPPED | OUTPATIENT
Start: 2020-04-02 | End: 2020-10-13

## 2020-04-09 ENCOUNTER — TELEPHONE (OUTPATIENT)
Dept: ONCOLOGY | Facility: CLINIC | Age: 55
End: 2020-04-09

## 2020-04-09 NOTE — TELEPHONE ENCOUNTER
DUE TO VIRUS, PT CANCELLED HER 4/10/20 LAB AND RN REVIEW APPTS.    SHE WILL COME TO HER NEXT APPT ON 5/8/20.    KAILEY  [phone]

## 2020-04-10 ENCOUNTER — APPOINTMENT (OUTPATIENT)
Dept: LAB | Facility: HOSPITAL | Age: 55
End: 2020-04-10

## 2020-04-10 ENCOUNTER — APPOINTMENT (OUTPATIENT)
Dept: ONCOLOGY | Facility: HOSPITAL | Age: 55
End: 2020-04-10

## 2020-05-08 ENCOUNTER — LAB (OUTPATIENT)
Dept: LAB | Facility: HOSPITAL | Age: 55
End: 2020-05-08

## 2020-05-08 ENCOUNTER — OFFICE VISIT (OUTPATIENT)
Dept: ONCOLOGY | Facility: CLINIC | Age: 55
End: 2020-05-08

## 2020-05-08 VITALS
OXYGEN SATURATION: 90 % | RESPIRATION RATE: 18 BRPM | DIASTOLIC BLOOD PRESSURE: 86 MMHG | WEIGHT: 293 LBS | SYSTOLIC BLOOD PRESSURE: 154 MMHG | BODY MASS INDEX: 45.99 KG/M2 | HEART RATE: 90 BPM | TEMPERATURE: 98.2 F | HEIGHT: 67 IN

## 2020-05-08 DIAGNOSIS — D68.61 ANTIPHOSPHOLIPID SYNDROME (HCC): ICD-10-CM

## 2020-05-08 DIAGNOSIS — Z79.01 ANTICOAGULANT LONG-TERM USE: ICD-10-CM

## 2020-05-08 DIAGNOSIS — I82.401 DEEP VEIN THROMBOSIS (DVT) OF RIGHT LOWER EXTREMITY, UNSPECIFIED CHRONICITY, UNSPECIFIED VEIN (HCC): ICD-10-CM

## 2020-05-08 DIAGNOSIS — I82.401 DEEP VEIN THROMBOSIS (DVT) OF RIGHT LOWER EXTREMITY, UNSPECIFIED CHRONICITY, UNSPECIFIED VEIN (HCC): Primary | ICD-10-CM

## 2020-05-08 DIAGNOSIS — D68.61 ANTIPHOSPHOLIPID SYNDROME (HCC): Chronic | ICD-10-CM

## 2020-05-08 LAB
BASOPHILS # BLD AUTO: 0.07 10*3/MM3 (ref 0–0.2)
BASOPHILS NFR BLD AUTO: 0.8 % (ref 0–1.5)
DEPRECATED RDW RBC AUTO: 39.9 FL (ref 37–54)
EOSINOPHIL # BLD AUTO: 0.06 10*3/MM3 (ref 0–0.4)
EOSINOPHIL NFR BLD AUTO: 0.7 % (ref 0.3–6.2)
ERYTHROCYTE [DISTWIDTH] IN BLOOD BY AUTOMATED COUNT: 13.6 % (ref 12.3–15.4)
HCT VFR BLD AUTO: 43.6 % (ref 34–46.6)
HGB BLD-MCNC: 13.9 G/DL (ref 12–15.9)
IMM GRANULOCYTES # BLD AUTO: 0.03 10*3/MM3 (ref 0–0.05)
IMM GRANULOCYTES NFR BLD AUTO: 0.4 % (ref 0–0.5)
INR PPP: 3.39 (ref 0.9–1.1)
LYMPHOCYTES # BLD AUTO: 1.77 10*3/MM3 (ref 0.7–3.1)
LYMPHOCYTES NFR BLD AUTO: 21.4 % (ref 19.6–45.3)
MCH RBC QN AUTO: 25.8 PG (ref 26.6–33)
MCHC RBC AUTO-ENTMCNC: 31.9 G/DL (ref 31.5–35.7)
MCV RBC AUTO: 80.9 FL (ref 79–97)
MONOCYTES # BLD AUTO: 0.64 10*3/MM3 (ref 0.1–0.9)
MONOCYTES NFR BLD AUTO: 7.7 % (ref 5–12)
NEUTROPHILS # BLD AUTO: 5.72 10*3/MM3 (ref 1.7–7)
NEUTROPHILS NFR BLD AUTO: 69 % (ref 42.7–76)
NRBC BLD AUTO-RTO: 0 /100 WBC (ref 0–0.2)
PLATELET # BLD AUTO: 284 10*3/MM3 (ref 140–450)
PMV BLD AUTO: 9.7 FL (ref 6–12)
PROTHROMBIN TIME: 34 SECONDS (ref 11.7–14.2)
RBC # BLD AUTO: 5.39 10*6/MM3 (ref 3.77–5.28)
WBC NRBC COR # BLD: 8.29 10*3/MM3 (ref 3.4–10.8)

## 2020-05-08 PROCEDURE — 85025 COMPLETE CBC W/AUTO DIFF WBC: CPT

## 2020-05-08 PROCEDURE — 99213 OFFICE O/P EST LOW 20 MIN: CPT | Performed by: INTERNAL MEDICINE

## 2020-05-08 PROCEDURE — 36415 COLL VENOUS BLD VENIPUNCTURE: CPT

## 2020-05-08 PROCEDURE — 85610 PROTHROMBIN TIME: CPT | Performed by: INTERNAL MEDICINE

## 2020-05-08 NOTE — PROGRESS NOTES
REASONS FOR FOLLOWUP:  Antiphospholipid antibody syndrome with history of pulmonary embolism March 2016    History of Present Illness    Mrs. Garcia is a 53-year-old woman with history of pulmonary embolism March 2016.  She was found to have positive lupus anticoagulant and cardiolipin antibodies at the time of her pulmonary embolism which have been persistent and has been on anticoagulation with Coumadin since diagnosis.  She has had no recurrent thromboembolic events while anticoagulated.  She has no bleeding problems.    When the patient was seen in February 2019 she complained of increasing dyspnea and a CT angiogram of the chest was performed showing bilateral pulmonary emboli some of which were chronic and some new compared to 2016.  There was elevation of the LV ratio in the main pulmonary artery was dilated.  Coumadin was continued with goal INR increased to 2.5-3.5.  She had lower extremity Doppler ultrasound performed on 3/8/2019 which showed chronic DVT in the mid femoral and popliteal veins.  Another CT angiogram of the chest was performed 3/27/2019 which showed no evidence of pulmonary emboli in the RV: LV ratio now less than 1.0.      In return today the patient is doing reasonably well.  She has chronic dyspnea on exertion.  She has unfortunately gained weight during the COVID pandemic not being as active and worse diet.  She has not been eating as many salads as usual.  INR is supratherapeutic today and being retested at the hospital lab.  She denies bleeding or bruising.    Past Medical History:   Diagnosis Date   • Anemia    • Angioedema     Lower lip   • Arthritis    • Depression    • Diastolic dysfunction    • DVT (deep venous thrombosis) (CMS/HCC)    • GERD (gastroesophageal reflux disease)    • H/O antiphospholipid syndrome    • Hiatal hernia    • Hypertension    • Hypothyroidism    • Lupus anticoagulant disorder (CMS/HCC)    • Lupus anticoagulant disorder (CMS/HCC)    • Morbid obesity  (CMS/HCC)    • CRISTI (obstructive sleep apnea)    • PE (pulmonary embolism)     Bilateral   • Right ventricular dilation    • Thrombocytopenia (CMS/HCC)        ONCOLOGIC HISTORY:  (History from previous dates can be found in the separate document.)    Current Outpatient Medications on File Prior to Visit   Medication Sig Dispense Refill   • amLODIPine (NORVASC) 5 MG tablet Take 1 tablet by mouth Daily. 90 tablet 1   • atorvastatin (LIPITOR) 10 MG tablet Take 1 tablet by mouth Daily. 90 tablet 1   • buPROPion XL (WELLBUTRIN XL) 150 MG 24 hr tablet Take 1 tablet by mouth Daily. 90 tablet 1   • cetirizine (zyrTEC) 10 MG tablet Take 10 mg by mouth Daily.     • hydroCHLOROthiazide (HYDRODIURIL) 25 MG tablet Take 1 tablet by mouth Daily. 90 tablet 1   • levothyroxine (SYNTHROID) 175 MCG tablet Take 1 tablet by mouth Daily. 90 tablet 1   • omeprazole (priLOSEC) 20 MG capsule Take 1 capsule by mouth Daily. 90 capsule 1   • sertraline (ZOLOFT) 100 MG tablet Take 1 tablet by mouth Daily. Cut tablet in half 90 tablet 1   • warfarin (Jantoven) 7.5 MG tablet FOR DIRECTIONS ON HOW TO   TAKE THIS MEDICINE, READ   THE ENCLOSED MEDICATION    INFORMATION FORM 90 tablet 1     No current facility-administered medications on file prior to visit.        ALLERGIES:     Allergies   Allergen Reactions   • Toprol Xl [Metoprolol Tartrate] Shortness Of Breath   • Dust Mite Extract Unknown - Low Severity   • Eggs Or Egg-Derived Products Nausea And Vomiting   • Losartan Angioedema   • Sulfa Antibiotics Unknown - Low Severity       Social History     Socioeconomic History   • Marital status: Single     Spouse name: Not on file   • Number of children: 0   • Years of education: Not on file   • Highest education level: Not on file   Occupational History   • Occupation: Desk Work     Employer: [employer]   Tobacco Use   • Smoking status: Never Smoker   • Smokeless tobacco: Never Used   Substance and Sexual Activity   • Alcohol use: No      "Frequency: Never   • Drug use: No   • Sexual activity: Defer         Cancer-related family history includes Uterine cancer in her mother.     Review of Systems   Constitutional: Positive for unexpected weight change (gain). Negative for activity change and appetite change.   HENT: Negative.    Respiratory: Positive for shortness of breath (Exertion, chronic).    Cardiovascular: Positive for leg swelling. Negative for palpitations.   Genitourinary: Negative.    Musculoskeletal: Negative.    Neurological: Negative for weakness.   Hematological: Does not bruise/bleed easily.   Psychiatric/Behavioral: Positive for dysphoric mood.   ROS unchanged- 5/8/2020      Objective      Vitals:    05/08/20 1503   BP: 154/86   Pulse: 90   Resp: 18   Temp: 98.2 °F (36.8 °C)   TempSrc: Oral   SpO2: 90%   Weight: (!) 166 kg (366 lb 4.8 oz)   Height: 170.2 cm (67.01\")   PainSc: 0-No pain     Current Status 5/8/2020   ECOG score 0       Physical Exam   GENERAL: Well-developed, morbid obese woman  SKIN: Warm, dry without rashes, purpura or petechiae.  NECK: Supple with good range of motion; no thyromegaly or masses, no JVD.  LYMPHATICS: No cervical, supraclavicular, axillary or inguinal adenopathy.  CHEST: Lungs clear to percussion and auscultation. Good airflow.    CARDIAC: Regular rate and rhythm without murmurs, rubs or gallops. Normal S1,S2.  ABDOMEN: Soft, nontender with no organomegaly or masses.  EXTREMITIES: No clubbing, cyanosis.  Trace to 1+ ankle edema bilaterally   PSYCHIATRIC: Normal affect and mood.    Exam unchanged as 5/8/2020    RECENT LABS:  Hematology WBC   Date Value Ref Range Status   05/08/2020 8.29 3.40 - 10.80 10*3/mm3 Final     RBC   Date Value Ref Range Status   05/08/2020 5.39 (H) 3.77 - 5.28 10*6/mm3 Final     Hemoglobin   Date Value Ref Range Status   05/08/2020 13.9 12.0 - 15.9 g/dL Final     Hematocrit   Date Value Ref Range Status   05/08/2020 43.6 34.0 - 46.6 % Final     Platelets   Date Value Ref Range " Status   05/08/2020 284 140 - 450 10*3/mm3 Final      INR 3.39    Assessment/Plan    1.   history of pulmonary embolism in March 2016 secondary to antiphospholipid antibody syndrome.   Imaging in February 2019 showed age indeterminate pulmonary emboli and she complained of increased shortness of breath so her INR goal was changed to 2.5-3.5.  Her INR is being retested on the hospital instrument today; our nurse will call the patient when resulted for Coumadin directions.  Continue monthly INR checks.  RTC 6 months.

## 2020-05-08 NOTE — PROGRESS NOTES
INR from hospital ws 3.39. Pt confirmed taking 11.25mg every day with no missed does or new meds. OK per Dr. Guerrier to continue [facility] recommendations of 7.5mg on Sunday and 11.25mg all other days. Pt can return in one month for INR and RN review. Called pt with dosing instructions. Pt r&v.

## 2020-06-05 ENCOUNTER — LAB (OUTPATIENT)
Dept: LAB | Facility: HOSPITAL | Age: 55
End: 2020-06-05

## 2020-06-05 ENCOUNTER — CLINICAL SUPPORT (OUTPATIENT)
Dept: ONCOLOGY | Facility: HOSPITAL | Age: 55
End: 2020-06-05

## 2020-06-05 DIAGNOSIS — Z79.01 ANTICOAGULANT LONG-TERM USE: ICD-10-CM

## 2020-06-05 DIAGNOSIS — I82.401 DEEP VEIN THROMBOSIS (DVT) OF RIGHT LOWER EXTREMITY, UNSPECIFIED CHRONICITY, UNSPECIFIED VEIN (HCC): ICD-10-CM

## 2020-06-05 DIAGNOSIS — D68.61 ANTIPHOSPHOLIPID SYNDROME (HCC): Chronic | ICD-10-CM

## 2020-06-05 LAB
INR PPP: 2.5 (ref 0.9–1.1)
PROTHROMBIN TIME: 29.9 SECONDS (ref 11–13.5)

## 2020-06-05 PROCEDURE — 85610 PROTHROMBIN TIME: CPT

## 2020-06-05 PROCEDURE — G0463 HOSPITAL OUTPT CLINIC VISIT: HCPCS

## 2020-06-05 NOTE — PROGRESS NOTES
Pt here for INR review. INR was 2.5. Goal is 2.5-3.5. Pt reports missing a dose last Tuesday and taking 7.5mg Sunday and 11.25mg aod. C/o bleeding from possible urethra or vagina--pt unsure. [facility] recommends pt taking 11.25mg all days. Pt insist on coming in next month for INR recheck instead of next week. Sent Dr. Guerrier a message to clarify.

## 2020-07-10 ENCOUNTER — CLINICAL SUPPORT (OUTPATIENT)
Dept: ONCOLOGY | Facility: HOSPITAL | Age: 55
End: 2020-07-10

## 2020-07-10 ENCOUNTER — LAB (OUTPATIENT)
Dept: LAB | Facility: HOSPITAL | Age: 55
End: 2020-07-10

## 2020-07-10 DIAGNOSIS — I82.401 DEEP VEIN THROMBOSIS (DVT) OF RIGHT LOWER EXTREMITY, UNSPECIFIED CHRONICITY, UNSPECIFIED VEIN (HCC): ICD-10-CM

## 2020-07-10 DIAGNOSIS — D68.61 ANTIPHOSPHOLIPID SYNDROME (HCC): Chronic | ICD-10-CM

## 2020-07-10 DIAGNOSIS — Z79.01 ANTICOAGULANT LONG-TERM USE: ICD-10-CM

## 2020-07-10 LAB
INR PPP: 2.6 (ref 0.9–1.1)
PROTHROMBIN TIME: 31.1 SECONDS (ref 11–13.5)

## 2020-07-10 PROCEDURE — 85610 PROTHROMBIN TIME: CPT

## 2020-07-10 PROCEDURE — G0463 HOSPITAL OUTPT CLINIC VISIT: HCPCS

## 2020-07-10 NOTE — PROGRESS NOTES
Pt INR 2.6 today. Pt confirms taking 11.25mg every day. No missed doses or new meds or diet changes. Increased to 15mg on Sunday and 11.25mg all other days, pt will return in one month. Advised to call with any concerns

## 2020-08-07 ENCOUNTER — APPOINTMENT (OUTPATIENT)
Dept: ONCOLOGY | Facility: HOSPITAL | Age: 55
End: 2020-08-07

## 2020-08-07 ENCOUNTER — APPOINTMENT (OUTPATIENT)
Dept: LAB | Facility: HOSPITAL | Age: 55
End: 2020-08-07

## 2020-08-07 ENCOUNTER — TELEPHONE (OUTPATIENT)
Dept: ONCOLOGY | Facility: CLINIC | Age: 55
End: 2020-08-07

## 2020-08-10 ENCOUNTER — TELEPHONE (OUTPATIENT)
Dept: ONCOLOGY | Facility: CLINIC | Age: 55
End: 2020-08-10

## 2020-08-11 ENCOUNTER — APPOINTMENT (OUTPATIENT)
Dept: LAB | Facility: HOSPITAL | Age: 55
End: 2020-08-11

## 2020-08-11 ENCOUNTER — APPOINTMENT (OUTPATIENT)
Dept: ONCOLOGY | Facility: HOSPITAL | Age: 55
End: 2020-08-11

## 2020-08-14 ENCOUNTER — LAB (OUTPATIENT)
Dept: LAB | Facility: HOSPITAL | Age: 55
End: 2020-08-14

## 2020-08-14 ENCOUNTER — INFUSION (OUTPATIENT)
Dept: ONCOLOGY | Facility: HOSPITAL | Age: 55
End: 2020-08-14

## 2020-08-14 DIAGNOSIS — D68.61 ANTIPHOSPHOLIPID SYNDROME (HCC): Chronic | ICD-10-CM

## 2020-08-14 DIAGNOSIS — I82.401 DEEP VEIN THROMBOSIS (DVT) OF RIGHT LOWER EXTREMITY, UNSPECIFIED CHRONICITY, UNSPECIFIED VEIN (HCC): ICD-10-CM

## 2020-08-14 DIAGNOSIS — Z79.01 ANTICOAGULANT LONG-TERM USE: ICD-10-CM

## 2020-08-14 LAB
INR PPP: 3.3 (ref 0.9–1.1)
PROTHROMBIN TIME: 39.9 SECONDS (ref 11–13.5)

## 2020-08-14 PROCEDURE — 85610 PROTHROMBIN TIME: CPT

## 2020-08-14 NOTE — NURSING NOTE
Pt here for an INR and RN review. Pt's INR is 3.3 today. Pt takes Coumadin 15 mg on Sunday and 11.25 mg all other days of the week. Pt denies missed doses, new meds or dietary changes. Pts goal range is 2.5 - 3.5. Per [facility] pt's new Coumadin dose will be 11.25 mg daily. Pt notified by phone because she left before labs were resulted. Pt v/u.

## 2020-08-24 DIAGNOSIS — E03.9 ACQUIRED HYPOTHYROIDISM: ICD-10-CM

## 2020-08-24 DIAGNOSIS — I10 ESSENTIAL HYPERTENSION: ICD-10-CM

## 2020-08-24 DIAGNOSIS — F41.9 ANXIETY: ICD-10-CM

## 2020-08-26 ENCOUNTER — TELEPHONE (OUTPATIENT)
Dept: INTERNAL MEDICINE | Facility: CLINIC | Age: 55
End: 2020-08-26

## 2020-08-26 RX ORDER — SERTRALINE HYDROCHLORIDE 100 MG/1
TABLET, FILM COATED ORAL
Qty: 90 TABLET | Refills: 0 | Status: SHIPPED | OUTPATIENT
Start: 2020-08-26 | End: 2020-09-08

## 2020-08-26 RX ORDER — HYDROCHLOROTHIAZIDE 25 MG/1
TABLET ORAL
Qty: 90 TABLET | Refills: 0 | Status: SHIPPED | OUTPATIENT
Start: 2020-08-26 | End: 2020-11-24

## 2020-08-26 RX ORDER — LEVOTHYROXINE SODIUM 175 UG/1
TABLET ORAL
Qty: 90 TABLET | Refills: 0 | Status: SHIPPED | OUTPATIENT
Start: 2020-08-26 | End: 2020-11-24

## 2020-08-26 RX ORDER — BUPROPION HYDROCHLORIDE 300 MG/1
TABLET ORAL
Qty: 90 TABLET | Refills: 0 | Status: SHIPPED | OUTPATIENT
Start: 2020-08-26 | End: 2020-11-24

## 2020-08-26 NOTE — TELEPHONE ENCOUNTER
----- Message from JANINE Tobar sent at 8/25/2020  8:05 AM EDT -----  Regarding: Wellbutrin dose  I received a refill request for Wellbutrin 300mg but at her PE we discussed tapering down to 150mg daily, will you clarify which dose she is taking? Thanks.

## 2020-08-26 NOTE — TELEPHONE ENCOUNTER
Patient states she takes wellbutrin 300 mg. She has not tried to decrease her wellbutrin, she did try to decrease the zoloft from 100mg to 50mg as discussed in her last visit she states and it was not successful. She states she will have to keep her medication dosages the same. Please advise

## 2020-09-04 ENCOUNTER — LAB (OUTPATIENT)
Dept: LAB | Facility: HOSPITAL | Age: 55
End: 2020-09-04

## 2020-09-04 ENCOUNTER — CLINICAL SUPPORT (OUTPATIENT)
Dept: ONCOLOGY | Facility: HOSPITAL | Age: 55
End: 2020-09-04

## 2020-09-04 DIAGNOSIS — D68.61 ANTIPHOSPHOLIPID SYNDROME (HCC): Chronic | ICD-10-CM

## 2020-09-04 DIAGNOSIS — Z79.01 ANTICOAGULANT LONG-TERM USE: ICD-10-CM

## 2020-09-04 DIAGNOSIS — I82.401 DEEP VEIN THROMBOSIS (DVT) OF RIGHT LOWER EXTREMITY, UNSPECIFIED CHRONICITY, UNSPECIFIED VEIN (HCC): ICD-10-CM

## 2020-09-04 LAB
INR PPP: 3.2 (ref 0.9–1.1)
PROTHROMBIN TIME: 38.4 SECONDS (ref 11–13.5)

## 2020-09-04 PROCEDURE — 85610 PROTHROMBIN TIME: CPT

## 2020-09-06 DIAGNOSIS — F41.9 ANXIETY: ICD-10-CM

## 2020-09-08 RX ORDER — SERTRALINE HYDROCHLORIDE 100 MG/1
TABLET, FILM COATED ORAL
Qty: 90 TABLET | Refills: 1 | Status: SHIPPED | OUTPATIENT
Start: 2020-09-08 | End: 2021-03-18 | Stop reason: SDUPTHER

## 2020-09-11 ENCOUNTER — OFFICE VISIT (OUTPATIENT)
Dept: INTERNAL MEDICINE | Facility: CLINIC | Age: 55
End: 2020-09-11

## 2020-09-11 VITALS
SYSTOLIC BLOOD PRESSURE: 140 MMHG | HEART RATE: 71 BPM | DIASTOLIC BLOOD PRESSURE: 84 MMHG | BODY MASS INDEX: 45.99 KG/M2 | OXYGEN SATURATION: 94 % | HEIGHT: 67 IN | WEIGHT: 293 LBS | TEMPERATURE: 98.1 F

## 2020-09-11 DIAGNOSIS — E03.9 ACQUIRED HYPOTHYROIDISM: ICD-10-CM

## 2020-09-11 DIAGNOSIS — K21.9 GASTROESOPHAGEAL REFLUX DISEASE, ESOPHAGITIS PRESENCE NOT SPECIFIED: ICD-10-CM

## 2020-09-11 DIAGNOSIS — E78.00 HYPERCHOLESTEREMIA: ICD-10-CM

## 2020-09-11 DIAGNOSIS — Z23 NEED FOR INFLUENZA VACCINATION: ICD-10-CM

## 2020-09-11 DIAGNOSIS — I10 ESSENTIAL HYPERTENSION: Primary | ICD-10-CM

## 2020-09-11 DIAGNOSIS — E66.01 MORBID OBESITY (HCC): ICD-10-CM

## 2020-09-11 PROCEDURE — 99214 OFFICE O/P EST MOD 30 MIN: CPT | Performed by: NURSE PRACTITIONER

## 2020-09-11 PROCEDURE — 90471 IMMUNIZATION ADMIN: CPT | Performed by: NURSE PRACTITIONER

## 2020-09-11 PROCEDURE — 90686 IIV4 VACC NO PRSV 0.5 ML IM: CPT | Performed by: NURSE PRACTITIONER

## 2020-09-12 LAB
ALBUMIN SERPL-MCNC: 4.1 G/DL (ref 3.8–4.9)
ALBUMIN/GLOB SERPL: 1.4 {RATIO} (ref 1.2–2.2)
ALP SERPL-CCNC: 79 IU/L (ref 39–117)
ALT SERPL-CCNC: 12 IU/L (ref 0–32)
AST SERPL-CCNC: 14 IU/L (ref 0–40)
BASOPHILS # BLD AUTO: 0.1 X10E3/UL (ref 0–0.2)
BASOPHILS NFR BLD AUTO: 1 %
BILIRUB SERPL-MCNC: 0.2 MG/DL (ref 0–1.2)
BUN SERPL-MCNC: 13 MG/DL (ref 6–24)
BUN/CREAT SERPL: 16 (ref 9–23)
CALCIUM SERPL-MCNC: 9 MG/DL (ref 8.7–10.2)
CHLORIDE SERPL-SCNC: 103 MMOL/L (ref 96–106)
CHOLEST SERPL-MCNC: 134 MG/DL (ref 100–199)
CO2 SERPL-SCNC: 22 MMOL/L (ref 20–29)
CREAT SERPL-MCNC: 0.8 MG/DL (ref 0.57–1)
EOSINOPHIL # BLD AUTO: 0.1 X10E3/UL (ref 0–0.4)
EOSINOPHIL NFR BLD AUTO: 1 %
ERYTHROCYTE [DISTWIDTH] IN BLOOD BY AUTOMATED COUNT: 14.3 % (ref 11.7–15.4)
GLOBULIN SER CALC-MCNC: 2.9 G/DL (ref 1.5–4.5)
GLUCOSE SERPL-MCNC: NORMAL MG/DL
HCT VFR BLD AUTO: 43.2 % (ref 34–46.6)
HDLC SERPL-MCNC: 47 MG/DL
HGB BLD-MCNC: 13.7 G/DL (ref 11.1–15.9)
IMM GRANULOCYTES # BLD AUTO: 0 X10E3/UL (ref 0–0.1)
IMM GRANULOCYTES NFR BLD AUTO: 1 %
LDLC SERPL CALC-MCNC: 72 MG/DL (ref 0–99)
LYMPHOCYTES # BLD AUTO: 1.4 X10E3/UL (ref 0.7–3.1)
LYMPHOCYTES NFR BLD AUTO: 22 %
MCH RBC QN AUTO: 25.8 PG (ref 26.6–33)
MCHC RBC AUTO-ENTMCNC: 31.7 G/DL (ref 31.5–35.7)
MCV RBC AUTO: 81 FL (ref 79–97)
MONOCYTES # BLD AUTO: 0.6 X10E3/UL (ref 0.1–0.9)
MONOCYTES NFR BLD AUTO: 10 %
NEUTROPHILS # BLD AUTO: 4.2 X10E3/UL (ref 1.4–7)
NEUTROPHILS NFR BLD AUTO: 65 %
PLATELET # BLD AUTO: 313 X10E3/UL (ref 150–450)
POTASSIUM SERPL-SCNC: NORMAL MMOL/L
PROT SERPL-MCNC: 7 G/DL (ref 6–8.5)
RBC # BLD AUTO: 5.31 X10E6/UL (ref 3.77–5.28)
SODIUM SERPL-SCNC: 142 MMOL/L (ref 134–144)
TRIGL SERPL-MCNC: 78 MG/DL (ref 0–149)
TSH SERPL DL<=0.005 MIU/L-ACNC: 1.04 UIU/ML (ref 0.45–4.5)
VLDLC SERPL CALC-MCNC: 15 MG/DL (ref 5–40)
WBC # BLD AUTO: 6.3 X10E3/UL (ref 3.4–10.8)

## 2020-09-12 NOTE — PROGRESS NOTES
Subjective   Carli Garcia is a 55 y.o. female who presents for f/u regarding HTN, depression and hypothyroidism.    She tried to decrease her sertraline and bupropion after her last visit but return to the higher dose due to worsening symptoms.  Unfortunately she has gained weight since her last visit which she attributes to inactivity and not watching diet as closely.  She is working from home and reports little activity.  She continues to remain without oxygen and is overall doing well.    Hypertension  This is a chronic problem. The current episode started more than 1 year ago. The problem is unchanged. The problem is controlled. Associated symptoms include shortness of breath. Pertinent negatives include no chest pain, headaches, palpitations or peripheral edema. Current antihypertension treatment includes calcium channel blockers and diuretics. The current treatment provides significant improvement. There are no compliance problems.    Hypothyroidism  This is a chronic problem. The current episode started more than 1 year ago. The problem occurs daily. The problem has been unchanged. Associated symptoms include arthralgias, congestion and fatigue. Pertinent negatives include no abdominal pain, chest pain, chills, coughing, fever, headaches, joint swelling, nausea, sore throat, vomiting or weakness.   DepressionPatient presents with the following symptoms: decreased concentration, nervousness/anxiety and shortness of breath.  Patient is not experiencing: palpitations.         The following portions of the patient's history were reviewed and updated as appropriate: allergies, current medications, past social history and problem list.    Past Medical History:   Diagnosis Date   • Anemia    • Angioedema     Lower lip   • Arthritis    • Depression    • Diastolic dysfunction    • DVT (deep venous thrombosis) (CMS/HCC)    • GERD (gastroesophageal reflux disease)    • H/O antiphospholipid syndrome    • Hiatal hernia     • Hypertension    • Hypothyroidism    • Lupus anticoagulant disorder (CMS/HCC)    • Lupus anticoagulant disorder (CMS/HCC)    • Morbid obesity (CMS/HCC)    • CRISTI (obstructive sleep apnea)    • PE (pulmonary embolism)     Bilateral   • Right ventricular dilation    • Thrombocytopenia (CMS/HCC)          Current Outpatient Medications:   •  amLODIPine (NORVASC) 5 MG tablet, Take 1 tablet by mouth Daily., Disp: 90 tablet, Rfl: 1  •  atorvastatin (LIPITOR) 10 MG tablet, Take 1 tablet by mouth Daily., Disp: 90 tablet, Rfl: 1  •  buPROPion XL (WELLBUTRIN XL) 300 MG 24 hr tablet, TAKE ONE TABLET BY MOUTH EVERY MORNING, Disp: 90 tablet, Rfl: 0  •  cetirizine (zyrTEC) 10 MG tablet, Take 10 mg by mouth Daily., Disp: , Rfl:   •  hydroCHLOROthiazide (HYDRODIURIL) 25 MG tablet, TAKE ONE TABLET BY MOUTH DAILY, Disp: 90 tablet, Rfl: 0  •  levothyroxine (SYNTHROID, LEVOTHROID) 175 MCG tablet, TAKE ONE TABLET BY MOUTH DAILY, Disp: 90 tablet, Rfl: 0  •  omeprazole (priLOSEC) 20 MG capsule, Take 1 capsule by mouth Daily., Disp: 90 capsule, Rfl: 1  •  sertraline (ZOLOFT) 100 MG tablet, TAKE ONE TABLET BY MOUTH DAILY, Disp: 90 tablet, Rfl: 1  •  warfarin (Jantoven) 7.5 MG tablet, FOR DIRECTIONS ON HOW TO   TAKE THIS MEDICINE, READ   THE ENCLOSED MEDICATION    INFORMATION FORM, Disp: 90 tablet, Rfl: 1    Allergies   Allergen Reactions   • Toprol Xl [Metoprolol Tartrate] Shortness Of Breath   • Dust Mite Extract Unknown - Low Severity   • Eggs Or Egg-Derived Products Nausea And Vomiting   • Losartan Angioedema   • Sulfa Antibiotics Unknown - Low Severity       Review of Systems   Constitutional: Positive for fatigue. Negative for chills, fever and unexpected weight change.   HENT: Positive for congestion and postnasal drip. Negative for ear pain, sinus pressure, sore throat and trouble swallowing.    Eyes: Negative for visual disturbance.   Respiratory: Positive for shortness of breath. Negative for cough, chest tightness and wheezing.   "  Cardiovascular: Negative for chest pain, palpitations and leg swelling.   Gastrointestinal: Negative for abdominal pain, blood in stool, nausea and vomiting.        Dyspepsia improved with Omeprazole   Genitourinary: Negative for dysuria, frequency and urgency.   Musculoskeletal: Positive for arthralgias. Negative for joint swelling.   Skin: Negative for color change.   Neurological: Negative for syncope, weakness and headaches.   Hematological: Does not bruise/bleed easily.   Psychiatric/Behavioral: Positive for decreased concentration and dysphoric mood. The patient is nervous/anxious.         Lack of motivation       Objective   Vitals:    09/11/20 1034   BP: 140/84   BP Location: Left arm   Patient Position: Sitting   Cuff Size: Adult   Pulse: 71   Temp: 98.1 °F (36.7 °C)   TempSrc: Oral   SpO2: 94%   Weight: (!) 170 kg (374 lb)   Height: 170.2 cm (67\")     Body mass index is 58.58 kg/m².  Physical Exam  Constitutional:       General: She is not in acute distress.     Appearance: Normal appearance. She is obese. She is not diaphoretic.   HENT:      Head: Normocephalic and atraumatic.      Right Ear: Tympanic membrane, ear canal and external ear normal.      Left Ear: Tympanic membrane, ear canal and external ear normal.      Nose: Nose normal. No rhinorrhea.      Mouth/Throat:      Mouth: Mucous membranes are moist.      Pharynx: Oropharynx is clear.   Eyes:      General:         Right eye: No discharge.         Left eye: No discharge.      Conjunctiva/sclera: Conjunctivae normal.   Neck:      Musculoskeletal: Normal range of motion.   Cardiovascular:      Rate and Rhythm: Normal rate and regular rhythm.      Pulses: Normal pulses.      Heart sounds: Normal heart sounds.   Pulmonary:      Effort: Pulmonary effort is normal.      Breath sounds: Normal breath sounds.   Abdominal:      General: Bowel sounds are normal.      Tenderness: There is no abdominal tenderness.   Musculoskeletal:         General: No " swelling or tenderness.   Skin:     General: Skin is warm and dry.   Neurological:      General: No focal deficit present.      Mental Status: She is alert and oriented to person, place, and time.   Psychiatric:         Mood and Affect: Mood normal.         Behavior: Behavior normal.         Judgment: Judgment normal.         Assessment/Plan   Carli was seen today for hypertension, hypothyroidism and depression.    Diagnoses and all orders for this visit:    Essential hypertension  -     CBC & Differential  -     Comprehensive Metabolic Panel    Acquired hypothyroidism  -     TSH    Gastroesophageal reflux disease, esophagitis presence not specified    Hypercholesteremia  -     Lipid Panel    Need for influenza vaccination  -     FluLaval Quad >6 Months ([id number])    1.  Her blood pressure is mildly elevated in the office today (140/84).  She will monitor at home and call the office if readings are consistently >140/90.  2.  She is currently managed on Synthroid, recheck TSH today.  3.  Her symptoms are improved with omeprazole.  4.  She denies myalgias with atorvastatin, recheck lipid panel.  5.  We discussed gradually increasing her activity level both for weight loss and improved conditioning.

## 2020-10-02 ENCOUNTER — LAB (OUTPATIENT)
Dept: LAB | Facility: HOSPITAL | Age: 55
End: 2020-10-02

## 2020-10-02 ENCOUNTER — CLINICAL SUPPORT (OUTPATIENT)
Dept: ONCOLOGY | Facility: HOSPITAL | Age: 55
End: 2020-10-02

## 2020-10-02 DIAGNOSIS — I10 ESSENTIAL HYPERTENSION: ICD-10-CM

## 2020-10-02 DIAGNOSIS — Z79.01 ANTICOAGULANT LONG-TERM USE: ICD-10-CM

## 2020-10-02 DIAGNOSIS — I82.401 DEEP VEIN THROMBOSIS (DVT) OF RIGHT LOWER EXTREMITY, UNSPECIFIED CHRONICITY, UNSPECIFIED VEIN (HCC): ICD-10-CM

## 2020-10-02 DIAGNOSIS — D68.61 ANTIPHOSPHOLIPID SYNDROME (HCC): Chronic | ICD-10-CM

## 2020-10-02 LAB
INR PPP: 3.9 (ref 0.9–1.1)
PROTHROMBIN TIME: 46.3 SECONDS (ref 11–13.5)

## 2020-10-02 PROCEDURE — 85610 PROTHROMBIN TIME: CPT

## 2020-10-02 NOTE — NURSING NOTE
Pt left before RN review. INR 3.9. Attempted to call pt x2, no answer, left to  for her to return our call.

## 2020-10-05 ENCOUNTER — TELEPHONE (OUTPATIENT)
Dept: ONCOLOGY | Facility: CLINIC | Age: 55
End: 2020-10-05

## 2020-10-05 ENCOUNTER — TELEPHONE (OUTPATIENT)
Dept: ONCOLOGY | Facility: HOSPITAL | Age: 55
End: 2020-10-05

## 2020-10-05 DIAGNOSIS — I10 ESSENTIAL HYPERTENSION: ICD-10-CM

## 2020-10-05 RX ORDER — AMLODIPINE BESYLATE 5 MG/1
TABLET ORAL
Qty: 90 TABLET | Refills: 1 | Status: SHIPPED | OUTPATIENT
Start: 2020-10-05 | End: 2021-03-18 | Stop reason: SDUPTHER

## 2020-10-05 NOTE — TELEPHONE ENCOUNTER
----- Message from Aracelis Ovalle RN sent at 10/5/2020  8:57 AM EDT -----  Please schedule for PT/INR with RN review on 10/16. Thanks.

## 2020-10-05 NOTE — TELEPHONE ENCOUNTER
Called patient concerning PT/INR results from Friday. PT 46.3  INR 3.9  Patient did not stay for RN review on Friday, and did not  answer phone calls. She states that she took  Warfarin 7.5 mg on Friday and Saturday, and then resumed taking 11.25 mg daily. She denies new meds, but states that she isn't eating very well. Reviewed with Dr. Guerrier. Per Dr. Guerrier, she is to take 7.5 mg once a week and 11.25 mg the remaining days, and is to retest in 2 weeks. She was instructed to take the 7.5 mg on Fridays. Dosing was repeated and confirmed by patient and she understands to return in 2 weeks. Message sent to scheduling.

## 2020-10-12 DIAGNOSIS — K21.9 GASTROESOPHAGEAL REFLUX DISEASE: ICD-10-CM

## 2020-10-13 RX ORDER — OMEPRAZOLE 20 MG/1
CAPSULE, DELAYED RELEASE ORAL
Qty: 90 CAPSULE | Refills: 1 | Status: SHIPPED | OUTPATIENT
Start: 2020-10-13 | End: 2021-01-11

## 2020-10-13 RX ORDER — WARFARIN SODIUM 7.5 MG/1
TABLET ORAL
Qty: 141 TABLET | Refills: 1 | Status: SHIPPED | OUTPATIENT
Start: 2020-10-13 | End: 2021-01-14

## 2020-10-16 ENCOUNTER — LAB (OUTPATIENT)
Dept: LAB | Facility: HOSPITAL | Age: 55
End: 2020-10-16

## 2020-10-16 ENCOUNTER — CLINICAL SUPPORT (OUTPATIENT)
Dept: ONCOLOGY | Facility: HOSPITAL | Age: 55
End: 2020-10-16

## 2020-10-16 DIAGNOSIS — D68.61 ANTIPHOSPHOLIPID SYNDROME (HCC): Chronic | ICD-10-CM

## 2020-10-16 DIAGNOSIS — Z79.01 ANTICOAGULANT LONG-TERM USE: ICD-10-CM

## 2020-10-16 DIAGNOSIS — I82.401 DEEP VEIN THROMBOSIS (DVT) OF RIGHT LOWER EXTREMITY, UNSPECIFIED CHRONICITY, UNSPECIFIED VEIN (HCC): ICD-10-CM

## 2020-10-16 LAB
INR PPP: 3.4 (ref 0.9–1.1)
PROTHROMBIN TIME: 40.2 SECONDS (ref 11–13.5)

## 2020-10-16 PROCEDURE — 85610 PROTHROMBIN TIME: CPT

## 2020-10-16 NOTE — NURSING NOTE
Called patient tor review INR of 3.4. Patient confirms previous dosing of Coumadin 7.5 mg on Friday and 11.25 mg AOD. Denies new meds,missed doses, or diet changes. Per [facility], dose will be 7.5 mg on Mon/Fri and 11.25 mg AOD. Patient repeated and confirmed dose. Next appointment confirmed with patient .

## 2020-10-20 RX ORDER — AMLODIPINE BESYLATE 5 MG/1
5 TABLET ORAL DAILY
Qty: 90 TABLET | Refills: 1 | Status: SHIPPED | OUTPATIENT
Start: 2020-10-20 | End: 2020-11-06

## 2020-11-06 ENCOUNTER — LAB (OUTPATIENT)
Dept: LAB | Facility: HOSPITAL | Age: 55
End: 2020-11-06

## 2020-11-06 ENCOUNTER — OFFICE VISIT (OUTPATIENT)
Dept: ONCOLOGY | Facility: CLINIC | Age: 55
End: 2020-11-06

## 2020-11-06 VITALS
RESPIRATION RATE: 20 BRPM | TEMPERATURE: 98 F | HEART RATE: 78 BPM | BODY MASS INDEX: 45.99 KG/M2 | SYSTOLIC BLOOD PRESSURE: 153 MMHG | HEIGHT: 67 IN | WEIGHT: 293 LBS | DIASTOLIC BLOOD PRESSURE: 93 MMHG | OXYGEN SATURATION: 91 %

## 2020-11-06 DIAGNOSIS — D68.61 ANTIPHOSPHOLIPID SYNDROME (HCC): ICD-10-CM

## 2020-11-06 DIAGNOSIS — I82.401 DEEP VEIN THROMBOSIS (DVT) OF RIGHT LOWER EXTREMITY, UNSPECIFIED CHRONICITY, UNSPECIFIED VEIN (HCC): ICD-10-CM

## 2020-11-06 DIAGNOSIS — Z79.01 ANTICOAGULANT LONG-TERM USE: ICD-10-CM

## 2020-11-06 DIAGNOSIS — D68.61 ANTIPHOSPHOLIPID SYNDROME (HCC): Primary | Chronic | ICD-10-CM

## 2020-11-06 DIAGNOSIS — I26.99 OTHER ACUTE PULMONARY EMBOLISM WITHOUT ACUTE COR PULMONALE (HCC): ICD-10-CM

## 2020-11-06 DIAGNOSIS — E66.01 MORBID OBESITY (HCC): Chronic | ICD-10-CM

## 2020-11-06 LAB
BASOPHILS # BLD AUTO: 0.06 10*3/MM3 (ref 0–0.2)
BASOPHILS NFR BLD AUTO: 0.8 % (ref 0–1.5)
DEPRECATED RDW RBC AUTO: 41.1 FL (ref 37–54)
EOSINOPHIL # BLD AUTO: 0.12 10*3/MM3 (ref 0–0.4)
EOSINOPHIL NFR BLD AUTO: 1.6 % (ref 0.3–6.2)
ERYTHROCYTE [DISTWIDTH] IN BLOOD BY AUTOMATED COUNT: 13.9 % (ref 12.3–15.4)
HCT VFR BLD AUTO: 43 % (ref 34–46.6)
HGB BLD-MCNC: 13.5 G/DL (ref 12–15.9)
IMM GRANULOCYTES # BLD AUTO: 0.06 10*3/MM3 (ref 0–0.05)
IMM GRANULOCYTES NFR BLD AUTO: 0.8 % (ref 0–0.5)
INR PPP: 3.2 (ref 0.9–1.1)
LYMPHOCYTES # BLD AUTO: 1.68 10*3/MM3 (ref 0.7–3.1)
LYMPHOCYTES NFR BLD AUTO: 22.5 % (ref 19.6–45.3)
MCH RBC QN AUTO: 25.4 PG (ref 26.6–33)
MCHC RBC AUTO-ENTMCNC: 31.4 G/DL (ref 31.5–35.7)
MCV RBC AUTO: 81 FL (ref 79–97)
MONOCYTES # BLD AUTO: 0.64 10*3/MM3 (ref 0.1–0.9)
MONOCYTES NFR BLD AUTO: 8.6 % (ref 5–12)
NEUTROPHILS NFR BLD AUTO: 4.92 10*3/MM3 (ref 1.7–7)
NEUTROPHILS NFR BLD AUTO: 65.7 % (ref 42.7–76)
NRBC BLD AUTO-RTO: 0 /100 WBC (ref 0–0.2)
PLATELET # BLD AUTO: 196 10*3/MM3 (ref 140–450)
PMV BLD AUTO: 10.3 FL (ref 6–12)
PROTHROMBIN TIME: 38.8 SECONDS (ref 11–13.5)
RBC # BLD AUTO: 5.31 10*6/MM3 (ref 3.77–5.28)
WBC # BLD AUTO: 7.48 10*3/MM3 (ref 3.4–10.8)

## 2020-11-06 PROCEDURE — 36415 COLL VENOUS BLD VENIPUNCTURE: CPT

## 2020-11-06 PROCEDURE — 85610 PROTHROMBIN TIME: CPT

## 2020-11-06 PROCEDURE — 85025 COMPLETE CBC W/AUTO DIFF WBC: CPT

## 2020-11-06 PROCEDURE — 99214 OFFICE O/P EST MOD 30 MIN: CPT | Performed by: INTERNAL MEDICINE

## 2020-11-06 NOTE — PROGRESS NOTES
REASONS FOR FOLLOWUP:  Antiphospholipid antibody syndrome with history of pulmonary embolism March 2016    History of Present Illness    Mrs. Garcia is a 55-year-old woman with history of pulmonary embolism March 2016.  She was found to have positive lupus anticoagulant and cardiolipin antibodies at the time of her pulmonary embolism which have been persistent and has been on anticoagulation with Coumadin since diagnosis.  She has had no recurrent thromboembolic events while anticoagulated.  She has no bleeding problems.    She returned today for follow-up visit.  She has chronic dyspnea on exertion and leg swelling both the same.  She continues Coumadin with no significant bleeding or bruising.  She continues to have great difficulty with her weight management.    Past Medical History:   Diagnosis Date   • Anemia    • Angioedema     Lower lip   • Arthritis    • Depression    • Diastolic dysfunction    • DVT (deep venous thrombosis) (CMS/HCC)    • GERD (gastroesophageal reflux disease)    • H/O antiphospholipid syndrome    • Hiatal hernia    • Hypertension    • Hypothyroidism    • Lupus anticoagulant disorder (CMS/HCC)    • Lupus anticoagulant disorder (CMS/HCC)    • Morbid obesity (CMS/HCC)    • CRISTI (obstructive sleep apnea)    • PE (pulmonary embolism)     Bilateral   • Right ventricular dilation    • Thrombocytopenia (CMS/HCC)        ONCOLOGIC HISTORY:  (History from previous dates can be found in the separate document.)    Current Outpatient Medications on File Prior to Visit   Medication Sig Dispense Refill   • amLODIPine (NORVASC) 5 MG tablet TAKE ONE TABLET BY MOUTH DAILY 90 tablet 1   • atorvastatin (LIPITOR) 10 MG tablet Take 1 tablet by mouth Daily. 90 tablet 1   • buPROPion XL (WELLBUTRIN XL) 300 MG 24 hr tablet TAKE ONE TABLET BY MOUTH EVERY MORNING 90 tablet 0   • cetirizine (zyrTEC) 10 MG tablet Take 10 mg by mouth Daily.     • hydroCHLOROthiazide (HYDRODIURIL) 25 MG tablet TAKE ONE TABLET BY MOUTH  DAILY 90 tablet 0   • levothyroxine (SYNTHROID, LEVOTHROID) 175 MCG tablet TAKE ONE TABLET BY MOUTH DAILY 90 tablet 0   • omeprazole (priLOSEC) 20 MG capsule TAKE ONE CAPSULE BY MOUTH DAILY 90 capsule 1   • sertraline (ZOLOFT) 100 MG tablet TAKE ONE TABLET BY MOUTH DAILY 90 tablet 1   • warfarin (COUMADIN) 7.5 MG tablet TAKE TWO TABLETS BY MOUTH ON SUNDAYS AND TAKE 1.5 TABLETS ON ALL OTHER DAYS 141 tablet 1   • [DISCONTINUED] amLODIPine (NORVASC) 5 MG tablet Take 1 tablet by mouth Daily. 90 tablet 1     No current facility-administered medications on file prior to visit.        ALLERGIES:     Allergies   Allergen Reactions   • Losartan Angioedema   • Toprol Xl [Metoprolol Tartrate] Shortness Of Breath   • Eggs Or Egg-Derived Products Nausea And Vomiting   • Dust Mite Extract Unknown - Low Severity   • Sulfa Antibiotics Unknown - Low Severity       Social History     Socioeconomic History   • Marital status: Single     Spouse name: Not on file   • Number of children: 0   • Years of education: Not on file   • Highest education level: Not on file   Occupational History   • Occupation: [employer]     Employer: [employer]   Tobacco Use   • Smoking status: Never Smoker   • Smokeless tobacco: Never Used   Substance and Sexual Activity   • Alcohol use: No     Frequency: Never   • Drug use: No   • Sexual activity: Defer         Cancer-related family history includes Uterine cancer in her mother.     Review of Systems   Constitutional: Positive for unexpected weight change (gain). Negative for activity change and appetite change.   HENT: Negative.    Respiratory: Positive for shortness of breath (Exertion, chronic).    Cardiovascular: Positive for leg swelling. Negative for palpitations.   Genitourinary: Negative.    Musculoskeletal: Negative.    Neurological: Negative for weakness.   Hematological: Does not bruise/bleed easily.   Psychiatric/Behavioral: Positive for dysphoric mood.   ROS  "unchanged-11/6/2020      Objective      Vitals:    11/06/20 1552   BP: 153/93   Pulse: 78   Resp: 20   Temp: 98 °F (36.7 °C)   TempSrc: Temporal   SpO2: 91%   Weight: (!) 170 kg (374 lb 6.4 oz)   Height: 170.2 cm (67.01\")   PainSc: 0-No pain     Current Status 11/6/2020   ECOG score 0       Physical Exam   GENERAL: Well-developed, morbid obese woman  SKIN: Warm, dry without rashes, purpura or petechiae.  NECK: Supple with good range of motion; no thyromegaly or masses, no JVD.  LYMPHATICS: No cervical, supraclavicular, axillary or inguinal adenopathy.  CHEST: Lungs clear to percussion and auscultation. Good airflow.    CARDIAC: Regular rate and rhythm without murmurs, rubs or gallops. Normal S1,S2.  ABDOMEN: Soft, nontender with no organomegaly or masses.  EXTREMITIES: No clubbing, cyanosis.  Trace to 1+ ankle edema bilaterally   PSYCHIATRIC: Normal affect and mood.    Exam unchanged as of 11/6/2020    RECENT LABS:  Hematology WBC   Date Value Ref Range Status   11/06/2020 7.48 3.40 - 10.80 10*3/mm3 Final   09/11/2020 6.3 3.4 - 10.8 x10E3/uL Final     RBC   Date Value Ref Range Status   11/06/2020 5.31 (H) 3.77 - 5.28 10*6/mm3 Final   09/11/2020 5.31 (H) 3.77 - 5.28 x10E6/uL Final     Hemoglobin   Date Value Ref Range Status   11/06/2020 13.5 12.0 - 15.9 g/dL Final     Hematocrit   Date Value Ref Range Status   11/06/2020 43.0 34.0 - 46.6 % Final     Platelets   Date Value Ref Range Status   11/06/2020 196 140 - 450 10*3/mm3 Final      INR 3.2    Assessment/Plan    1.   history of pulmonary embolism in March 2016 secondary to antiphospholipid antibody syndrome.   Imaging in February 2019 showed age indeterminate pulmonary emboli and she complained of increased shortness of breath so her INR goal was changed to 2.5-3.5.  Her INR is therapeutic today 3.2.  She will continue monthly INR checks for Coumadin adjustment.    Patient is on medication requiring intensive monitoring for toxicity.    2.  Antiphospholipid " antibody syndrome    3.  Obesity: I recommended the patient to consider an evaluation for weight loss surgery.  I am not sure if she is a candidate.

## 2020-11-23 DIAGNOSIS — E03.9 ACQUIRED HYPOTHYROIDISM: ICD-10-CM

## 2020-11-23 DIAGNOSIS — I10 ESSENTIAL HYPERTENSION: ICD-10-CM

## 2020-11-23 DIAGNOSIS — E78.00 HYPERCHOLESTEREMIA: ICD-10-CM

## 2020-11-24 RX ORDER — HYDROCHLOROTHIAZIDE 25 MG/1
TABLET ORAL
Qty: 90 TABLET | Refills: 1 | Status: SHIPPED | OUTPATIENT
Start: 2020-11-24 | End: 2021-03-18 | Stop reason: SDUPTHER

## 2020-11-24 RX ORDER — LEVOTHYROXINE SODIUM 175 UG/1
TABLET ORAL
Qty: 90 TABLET | Refills: 1 | Status: SHIPPED | OUTPATIENT
Start: 2020-11-24 | End: 2021-03-18 | Stop reason: SDUPTHER

## 2020-11-24 RX ORDER — BUPROPION HYDROCHLORIDE 300 MG/1
TABLET ORAL
Qty: 90 TABLET | Refills: 1 | Status: SHIPPED | OUTPATIENT
Start: 2020-11-24 | End: 2021-03-18 | Stop reason: SDUPTHER

## 2020-11-24 RX ORDER — ATORVASTATIN CALCIUM 10 MG/1
TABLET, FILM COATED ORAL
Qty: 90 TABLET | Refills: 1 | Status: SHIPPED | OUTPATIENT
Start: 2020-11-24 | End: 2021-03-18 | Stop reason: SDUPTHER

## 2020-12-04 ENCOUNTER — LAB (OUTPATIENT)
Dept: LAB | Facility: HOSPITAL | Age: 55
End: 2020-12-04

## 2020-12-04 ENCOUNTER — TELEPHONE (OUTPATIENT)
Dept: ONCOLOGY | Facility: CLINIC | Age: 55
End: 2020-12-04

## 2020-12-04 ENCOUNTER — CLINICAL SUPPORT (OUTPATIENT)
Dept: ONCOLOGY | Facility: HOSPITAL | Age: 55
End: 2020-12-04

## 2020-12-04 DIAGNOSIS — D68.61 ANTIPHOSPHOLIPID SYNDROME (HCC): Chronic | ICD-10-CM

## 2020-12-04 DIAGNOSIS — Z79.01 ANTICOAGULANT LONG-TERM USE: ICD-10-CM

## 2020-12-04 LAB
INR PPP: 2.9 (ref 0.9–1.1)
PROTHROMBIN TIME: 35 SECONDS (ref 11–13.5)

## 2020-12-04 PROCEDURE — 85610 PROTHROMBIN TIME: CPT

## 2020-12-04 NOTE — NURSING NOTE
PT 35.0  INR 2.9  Patient did not stay to review INR with RN. Called her to review.  Patient states that she has been taking Coumadin 15 mg on Sun/Thurs and 11.25 mg AOD. She states that she missed dose last night. When reviewed previous dose that was listed in [facility] and in notes from this office, she states that she has not been taking Coumadin as documented. She denies new meds or diet changes. Her range is 2.5-3.5 with goal of 3. Per [facility], dose will be 7.5 mg on Mon/Fri and 11.25 mg AOD. Patient repeated and confirmed dose. Will retest in 2 weeks. Patient v/u.  Message sent to scheduling.

## 2020-12-18 ENCOUNTER — CLINICAL SUPPORT (OUTPATIENT)
Dept: ONCOLOGY | Facility: HOSPITAL | Age: 55
End: 2020-12-18

## 2020-12-18 ENCOUNTER — LAB (OUTPATIENT)
Dept: LAB | Facility: HOSPITAL | Age: 55
End: 2020-12-18

## 2020-12-18 DIAGNOSIS — Z79.01 ANTICOAGULANT LONG-TERM USE: Primary | ICD-10-CM

## 2020-12-18 LAB
INR PPP: 2.2 (ref 0.9–1.1)
PROTHROMBIN TIME: 26.9 SECONDS (ref 11–13.5)

## 2020-12-18 PROCEDURE — 85610 PROTHROMBIN TIME: CPT

## 2020-12-18 NOTE — NURSING NOTE
Pt here for INR and RN review. INR 2.2, goal 3. Pt confirms previous dosing, denies missed doses, or new medications. INR placed in [facility]. INR and dosing reviewed with Priya ETIENNE, ok to dose per [facility]. Per [facility] pt will now take 11.25 mg daily. Pt v/u. Denied copy of dosing instructions.

## 2020-12-22 ENCOUNTER — TELEPHONE (OUTPATIENT)
Dept: ONCOLOGY | Facility: CLINIC | Age: 55
End: 2020-12-22

## 2020-12-22 NOTE — TELEPHONE ENCOUNTER
Caller: Carli    Relationship to patient: Pt    Best call back number: [phone]    Pt's asking to have all of her Friday appts changed to Wednesdays. She's asking to keep the time around 3pm

## 2021-01-06 ENCOUNTER — LAB (OUTPATIENT)
Dept: LAB | Facility: HOSPITAL | Age: 56
End: 2021-01-06

## 2021-01-06 ENCOUNTER — CLINICAL SUPPORT (OUTPATIENT)
Dept: ONCOLOGY | Facility: HOSPITAL | Age: 56
End: 2021-01-06

## 2021-01-06 DIAGNOSIS — Z79.01 ANTICOAGULANT LONG-TERM USE: ICD-10-CM

## 2021-01-06 DIAGNOSIS — D68.61 ANTIPHOSPHOLIPID SYNDROME (HCC): Chronic | ICD-10-CM

## 2021-01-06 LAB
INR PPP: 2.4 (ref 0.9–1.1)
PROTHROMBIN TIME: 28.7 SECONDS (ref 11–13.5)

## 2021-01-06 PROCEDURE — 85610 PROTHROMBIN TIME: CPT

## 2021-01-06 PROCEDURE — G0463 HOSPITAL OUTPT CLINIC VISIT: HCPCS

## 2021-01-06 NOTE — NURSING NOTE
PT 28.7  INR 2.4  Goal is 3 with range of 2.5-3.5. Patient reports missing dose on Monday. She denies new meds or diet changes. Per [facility], dose would  be decreased to 7.5 mg twice a week and 11.25 mg  5 times weekly. Patient is concerned that this is too much of a decrease and wants to take 7.5 mg once a week. Will change dose 7.5 mg on Sundays and 11.25 mg AOD. Printed and verbal instructions provided. Verbalized understanding. Will retest in 1 month and will call for any problems or concerns.

## 2021-01-08 ENCOUNTER — APPOINTMENT (OUTPATIENT)
Dept: LAB | Facility: HOSPITAL | Age: 56
End: 2021-01-08

## 2021-01-08 ENCOUNTER — APPOINTMENT (OUTPATIENT)
Dept: ONCOLOGY | Facility: HOSPITAL | Age: 56
End: 2021-01-08

## 2021-01-11 DIAGNOSIS — K21.9 GASTROESOPHAGEAL REFLUX DISEASE: ICD-10-CM

## 2021-01-11 RX ORDER — OMEPRAZOLE 20 MG/1
CAPSULE, DELAYED RELEASE ORAL
Qty: 90 CAPSULE | Refills: 1 | Status: SHIPPED | OUTPATIENT
Start: 2021-01-11 | End: 2021-03-18 | Stop reason: SDUPTHER

## 2021-01-14 RX ORDER — WARFARIN SODIUM 7.5 MG/1
TABLET ORAL
Qty: 141 TABLET | Refills: 0 | Status: SHIPPED | OUTPATIENT
Start: 2021-01-14 | End: 2021-03-18 | Stop reason: SDUPTHER

## 2021-02-03 ENCOUNTER — CLINICAL SUPPORT (OUTPATIENT)
Dept: ONCOLOGY | Facility: HOSPITAL | Age: 56
End: 2021-02-03

## 2021-02-03 ENCOUNTER — LAB (OUTPATIENT)
Dept: LAB | Facility: HOSPITAL | Age: 56
End: 2021-02-03

## 2021-02-03 DIAGNOSIS — Z79.01 ANTICOAGULANT LONG-TERM USE: ICD-10-CM

## 2021-02-03 DIAGNOSIS — D68.61 ANTIPHOSPHOLIPID SYNDROME (HCC): Chronic | ICD-10-CM

## 2021-02-03 LAB
INR PPP: 3.7 (ref 0.9–1.1)
PROTHROMBIN TIME: 44.3 SECONDS (ref 11–13.5)

## 2021-02-03 PROCEDURE — 85610 PROTHROMBIN TIME: CPT

## 2021-02-03 PROCEDURE — G0463 HOSPITAL OUTPT CLINIC VISIT: HCPCS

## 2021-02-03 NOTE — PROGRESS NOTES
Pt here for INR review. INR is 3.7 and goal is 2.5-3.5. Pt reports taking 7.5 mg Sunday and 11.25 mg aod with no issues or changes. Standing stone recommends pt take 7.5 mg M/F and 11.25 mg aod. Pt v/u and denied copy of report.

## 2021-02-05 NOTE — TELEPHONE ENCOUNTER
CALLED AND RESCHEDULED APPT FOR PT - SHE HAS CONFIRMED HER APPT   
PT called to reschedule todays appt. Please call ren adam [phone]    
You can access the FollowMyHealth Patient Portal offered by [facility] by registering at the following website: [email or web address] By joining [facility]’s FollowMyHealth portal, you will also be able to view your health information using other applications (apps) compatible with our system.

## 2021-03-01 ENCOUNTER — LAB (OUTPATIENT)
Dept: LAB | Facility: HOSPITAL | Age: 56
End: 2021-03-01

## 2021-03-01 ENCOUNTER — CLINICAL SUPPORT (OUTPATIENT)
Dept: ONCOLOGY | Facility: HOSPITAL | Age: 56
End: 2021-03-01

## 2021-03-01 DIAGNOSIS — Z79.01 ANTICOAGULANT LONG-TERM USE: ICD-10-CM

## 2021-03-01 DIAGNOSIS — D68.61 ANTIPHOSPHOLIPID SYNDROME (HCC): Chronic | ICD-10-CM

## 2021-03-01 LAB
INR PPP: 2.2 (ref 0.9–1.1)
PROTHROMBIN TIME: 26 SECONDS (ref 11–13.5)

## 2021-03-01 PROCEDURE — 85610 PROTHROMBIN TIME: CPT

## 2021-03-01 PROCEDURE — G0463 HOSPITAL OUTPT CLINIC VISIT: HCPCS

## 2021-03-03 ENCOUNTER — APPOINTMENT (OUTPATIENT)
Dept: LAB | Facility: HOSPITAL | Age: 56
End: 2021-03-03

## 2021-03-03 ENCOUNTER — APPOINTMENT (OUTPATIENT)
Dept: ONCOLOGY | Facility: HOSPITAL | Age: 56
End: 2021-03-03

## 2021-03-03 ENCOUNTER — IMMUNIZATION (OUTPATIENT)
Dept: VACCINE CLINIC | Facility: HOSPITAL | Age: 56
End: 2021-03-03

## 2021-03-03 PROCEDURE — 91300 HC SARSCOV02 VAC 30MCG/0.3ML IM: CPT | Performed by: INTERNAL MEDICINE

## 2021-03-03 PROCEDURE — 0001A: CPT | Performed by: INTERNAL MEDICINE

## 2021-03-18 ENCOUNTER — OFFICE VISIT (OUTPATIENT)
Dept: INTERNAL MEDICINE | Facility: CLINIC | Age: 56
End: 2021-03-18

## 2021-03-18 VITALS
HEART RATE: 84 BPM | SYSTOLIC BLOOD PRESSURE: 140 MMHG | DIASTOLIC BLOOD PRESSURE: 82 MMHG | OXYGEN SATURATION: 97 % | BODY MASS INDEX: 45.99 KG/M2 | TEMPERATURE: 97.7 F | WEIGHT: 293 LBS | HEIGHT: 67 IN

## 2021-03-18 DIAGNOSIS — Z00.00 PE (PHYSICAL EXAM), ANNUAL: Primary | ICD-10-CM

## 2021-03-18 DIAGNOSIS — Z20.822 SUSPECTED COVID-19 VIRUS INFECTION: ICD-10-CM

## 2021-03-18 DIAGNOSIS — F41.9 ANXIETY: ICD-10-CM

## 2021-03-18 DIAGNOSIS — E03.9 ACQUIRED HYPOTHYROIDISM: ICD-10-CM

## 2021-03-18 DIAGNOSIS — E78.00 HYPERCHOLESTEREMIA: ICD-10-CM

## 2021-03-18 DIAGNOSIS — K21.9 GASTROESOPHAGEAL REFLUX DISEASE WITHOUT ESOPHAGITIS: ICD-10-CM

## 2021-03-18 DIAGNOSIS — I10 ESSENTIAL HYPERTENSION: ICD-10-CM

## 2021-03-18 LAB
DEPRECATED RDW RBC AUTO: 42.6 FL (ref 37–54)
ERYTHROCYTE [DISTWIDTH] IN BLOOD BY AUTOMATED COUNT: 14.5 % (ref 12.3–15.4)
HCT VFR BLD AUTO: 43.5 % (ref 34–46.6)
HGB BLD-MCNC: 14 G/DL (ref 12–15.9)
MCH RBC QN AUTO: 26.2 PG (ref 26.6–33)
MCHC RBC AUTO-ENTMCNC: 32.2 G/DL (ref 31.5–35.7)
MCV RBC AUTO: 81.3 FL (ref 79–97)
PLATELET # BLD AUTO: 331 10*3/MM3 (ref 140–450)
PMV BLD AUTO: 10 FL (ref 6–12)
RBC # BLD AUTO: 5.35 10*6/MM3 (ref 3.77–5.28)
WBC # BLD AUTO: 7.49 10*3/MM3 (ref 3.4–10.8)

## 2021-03-18 PROCEDURE — 85027 COMPLETE CBC AUTOMATED: CPT | Performed by: NURSE PRACTITIONER

## 2021-03-18 PROCEDURE — 36415 COLL VENOUS BLD VENIPUNCTURE: CPT | Performed by: NURSE PRACTITIONER

## 2021-03-18 PROCEDURE — 99396 PREV VISIT EST AGE 40-64: CPT | Performed by: NURSE PRACTITIONER

## 2021-03-18 RX ORDER — AMLODIPINE BESYLATE 5 MG/1
5 TABLET ORAL DAILY
Qty: 90 TABLET | Refills: 2 | Status: SHIPPED | OUTPATIENT
Start: 2021-03-18 | End: 2022-05-19

## 2021-03-18 RX ORDER — HYDROCHLOROTHIAZIDE 25 MG/1
25 TABLET ORAL DAILY
Qty: 90 TABLET | Refills: 2 | Status: SHIPPED | OUTPATIENT
Start: 2021-03-18 | End: 2022-04-01

## 2021-03-18 RX ORDER — WARFARIN SODIUM 7.5 MG/1
TABLET ORAL
Qty: 141 TABLET | Refills: 2 | Status: ON HOLD | OUTPATIENT
Start: 2021-03-18 | End: 2021-07-23

## 2021-03-18 RX ORDER — ATORVASTATIN CALCIUM 10 MG/1
10 TABLET, FILM COATED ORAL DAILY
Qty: 90 TABLET | Refills: 2 | Status: SHIPPED | OUTPATIENT
Start: 2021-03-18 | End: 2022-02-25

## 2021-03-18 RX ORDER — OMEPRAZOLE 20 MG/1
20 CAPSULE, DELAYED RELEASE ORAL DAILY
Qty: 90 CAPSULE | Refills: 2 | Status: SHIPPED | OUTPATIENT
Start: 2021-03-18 | End: 2022-02-25

## 2021-03-18 RX ORDER — SERTRALINE HYDROCHLORIDE 100 MG/1
100 TABLET, FILM COATED ORAL DAILY
Qty: 90 TABLET | Refills: 2 | Status: SHIPPED | OUTPATIENT
Start: 2021-03-18 | End: 2021-12-30

## 2021-03-18 RX ORDER — BUPROPION HYDROCHLORIDE 300 MG/1
300 TABLET ORAL EVERY MORNING
Qty: 90 TABLET | Refills: 2 | Status: SHIPPED | OUTPATIENT
Start: 2021-03-18 | End: 2021-09-15

## 2021-03-18 RX ORDER — LEVOTHYROXINE SODIUM 175 UG/1
175 TABLET ORAL DAILY
Qty: 90 TABLET | Refills: 2 | Status: SHIPPED | OUTPATIENT
Start: 2021-03-18 | End: 2022-01-01

## 2021-03-18 NOTE — PROGRESS NOTES
Subjective   Carli Garcia is a 56 y.o. female who presents for a physical exam.     History of Present Illness     The following portions of the patient's history were reviewed and updated as appropriate: allergies, current medications, past social history and problem list.    Past Medical History:   Diagnosis Date   • Anemia    • Angioedema     Lower lip   • Arthritis    • Depression    • Diastolic dysfunction    • DVT (deep venous thrombosis) (CMS/HCC)    • GERD (gastroesophageal reflux disease)    • H/O antiphospholipid syndrome    • Hiatal hernia    • Hypertension    • Hypothyroidism    • Lupus anticoagulant disorder (CMS/HCC)    • Lupus anticoagulant disorder (CMS/HCC)    • Morbid obesity (CMS/HCC)    • CRISTI (obstructive sleep apnea)    • PE (pulmonary embolism)     Bilateral   • Right ventricular dilation    • Thrombocytopenia (CMS/HCC)          Current Outpatient Medications:   •  amLODIPine (NORVASC) 5 MG tablet, Take 1 tablet by mouth Daily., Disp: 90 tablet, Rfl: 2  •  atorvastatin (LIPITOR) 10 MG tablet, Take 1 tablet by mouth Daily., Disp: 90 tablet, Rfl: 2  •  buPROPion XL (WELLBUTRIN XL) 300 MG 24 hr tablet, Take 1 tablet by mouth Every Morning., Disp: 90 tablet, Rfl: 2  •  cetirizine (zyrTEC) 10 MG tablet, Take 10 mg by mouth Daily., Disp: , Rfl:   •  hydroCHLOROthiazide (HYDRODIURIL) 25 MG tablet, Take 1 tablet by mouth Daily., Disp: 90 tablet, Rfl: 2  •  levothyroxine (SYNTHROID, LEVOTHROID) 175 MCG tablet, Take 1 tablet by mouth Daily., Disp: 90 tablet, Rfl: 2  •  omeprazole (priLOSEC) 20 MG capsule, Take 1 capsule by mouth Daily., Disp: 90 capsule, Rfl: 2  •  sertraline (ZOLOFT) 100 MG tablet, Take 1 tablet by mouth Daily., Disp: 90 tablet, Rfl: 2  •  warfarin (COUMADIN) 7.5 MG tablet, TAKE 2 TABLETS BY MOUTH ON SUNDAYS AND TAKE ONE AND ONE-HALF TABLET BY MOUTH ON ALL OTHER DAYS, Disp: 141 tablet, Rfl: 2    Allergies   Allergen Reactions   • Losartan Angioedema   • Toprol Xl [Metoprolol Tartrate]  Shortness Of Breath   • Eggs Or Egg-Derived Products Nausea And Vomiting   • Dust Mite Extract Unknown - Low Severity   • Sulfa Antibiotics Unknown - Low Severity       Review of Systems   Constitutional: Positive for fatigue. Negative for activity change, appetite change, chills, diaphoresis, fever and unexpected weight change.   HENT: Positive for congestion, postnasal drip and rhinorrhea. Negative for dental problem, drooling, ear discharge, ear pain, facial swelling, hearing loss, mouth sores, nosebleeds, sinus pressure, sore throat, tinnitus and trouble swallowing.    Eyes: Negative for photophobia, pain, discharge, redness, itching and visual disturbance.   Respiratory: Positive for shortness of breath (c/o increased shortness of breath over the past few days). Negative for apnea, cough, choking, chest tightness and wheezing.    Cardiovascular: Negative for chest pain, palpitations and leg swelling.        No orthopnea, PND, VALERIO   Gastrointestinal: Negative for abdominal pain, blood in stool, constipation, diarrhea, nausea and vomiting.   Endocrine: Negative for cold intolerance, heat intolerance, polydipsia and polyuria.   Genitourinary: Negative for decreased urine volume, dysuria, enuresis, flank pain, frequency, hematuria and urgency.   Musculoskeletal: Negative for arthralgias, back pain, gait problem, joint swelling, myalgias, neck pain and neck stiffness.   Skin: Negative for color change and rash.        No hair changes, no nail changes   Allergic/Immunologic: Negative for environmental allergies, food allergies and immunocompromised state.   Neurological: Negative for dizziness, tremors, seizures, syncope, speech difficulty, weakness, light-headedness, numbness and headaches.   Hematological: Negative for adenopathy. Does not bruise/bleed easily.   Psychiatric/Behavioral: Negative for agitation, confusion, decreased concentration, dysphoric mood, sleep disturbance and suicidal ideas. The patient is  "not nervous/anxious.        Objective   Vitals:    03/18/21 1101   BP: 140/82   BP Location: Left arm   Patient Position: Sitting   Cuff Size: Adult   Pulse: 84   Temp: 97.7 °F (36.5 °C)   TempSrc: Temporal   SpO2: 97%   Weight: (!) 164 kg (362 lb)   Height: 170.2 cm (67\")     Body mass index is 56.7 kg/m².  Physical Exam  Constitutional:       General: She is not in acute distress.     Appearance: Normal appearance. She is not diaphoretic.   HENT:      Head: Normocephalic and atraumatic.      Right Ear: Tympanic membrane, ear canal and external ear normal.      Left Ear: Tympanic membrane, ear canal and external ear normal.      Nose: Nose normal. No rhinorrhea.      Mouth/Throat:      Mouth: Mucous membranes are moist.      Pharynx: Oropharynx is clear.   Eyes:      General:         Right eye: No discharge.         Left eye: No discharge.      Conjunctiva/sclera: Conjunctivae normal.   Cardiovascular:      Rate and Rhythm: Normal rate and regular rhythm.      Pulses: Normal pulses.      Heart sounds: Normal heart sounds.   Pulmonary:      Effort: Pulmonary effort is normal.      Breath sounds: Normal breath sounds.   Chest:      Breasts:         Right: Normal. No mass, nipple discharge, skin change or tenderness.         Left: Normal. No mass, nipple discharge, skin change or tenderness.   Abdominal:      General: Bowel sounds are normal.      Tenderness: There is no abdominal tenderness.   Musculoskeletal:         General: No swelling or tenderness.      Cervical back: Normal range of motion.   Skin:     General: Skin is warm and dry.   Neurological:      General: No focal deficit present.      Mental Status: She is alert and oriented to person, place, and time.   Psychiatric:         Mood and Affect: Mood normal.         Behavior: Behavior normal.         Judgment: Judgment normal.         Assessment/Plan   Diagnoses and all orders for this visit:    1. PE (physical exam), annual (Primary)  -     CBC (No " Diff)  -     Comprehensive Metabolic Panel  -     Lipid Panel  -     TSH  -     Cancel: Urinalysis With Microscopic If Indicated (No Culture) - Urine, Clean Catch    2. Essential hypertension  -     amLODIPine (NORVASC) 5 MG tablet; Take 1 tablet by mouth Daily.  Dispense: 90 tablet; Refill: 2  -     hydroCHLOROthiazide (HYDRODIURIL) 25 MG tablet; Take 1 tablet by mouth Daily.  Dispense: 90 tablet; Refill: 2    3. Hypercholesteremia  -     atorvastatin (LIPITOR) 10 MG tablet; Take 1 tablet by mouth Daily.  Dispense: 90 tablet; Refill: 2    4. Acquired hypothyroidism  -     levothyroxine (SYNTHROID, LEVOTHROID) 175 MCG tablet; Take 1 tablet by mouth Daily.  Dispense: 90 tablet; Refill: 2    5. Gastroesophageal reflux disease without esophagitis  -     omeprazole (priLOSEC) 20 MG capsule; Take 1 capsule by mouth Daily.  Dispense: 90 capsule; Refill: 2    6. Anxiety  -     sertraline (ZOLOFT) 100 MG tablet; Take 1 tablet by mouth Daily.  Dispense: 90 tablet; Refill: 2    7. Suspected COVID-19 virus infection  -     COVID-19,[facility] ROUTINE, NP/OP SWAB IN TRANSPORT MEDIA OR ESWAB 72 HR TAT - Swab, Oropharynx; Future  -     COVID-19,[facility] ROUTINE, NP/OP SWAB IN TRANSPORT MEDIA OR ESWAB 72 HR TAT - Swab, Oropharynx    Other orders  -     buPROPion XL (WELLBUTRIN XL) 300 MG 24 hr tablet; Take 1 tablet by mouth Every Morning.  Dispense: 90 tablet; Refill: 2  -     warfarin (COUMADIN) 7.5 MG tablet; TAKE 2 TABLETS BY MOUTH ON SUNDAYS AND TAKE ONE AND ONE-HALF TABLET BY MOUTH ON ALL OTHER DAYS  Dispense: 141 tablet; Refill: 2        Risk assessment:  She c/o increased congestion and drainage with shortness of breath, O2 has remained 94% at above at home. Denies fever or chills, will order COVID-19 test and she will quarantine over the weekend. Report to ER with worsening symptoms.    Her Body mass index is 56.7 kg/m². - She has been inactive, discussed dietary changes with the goal of weight loss.    Prevention:  She has  received her annual flu vaccine. Tdap is not current.  Colonoscopy is overdue, referral placed.   Discussed Shingrix vaccine, she will check with pharmacy regarding coverage and availability.    Discussed healthy lifestyle choices such as maintaining a balanced diet low in carbohydrates and limiting caffeine and alcohol intake.  Recommended routine exercise for bone strength and cardiovascular health.       Addendum:  3/22: Discussed lab results with patient which did not show any acute abnl, COVID-19 test was negative. She reports feeling better with improvement in dyspnea. RTC if sx persist or worsen.

## 2021-03-19 LAB
ALBUMIN SERPL-MCNC: 4.1 G/DL (ref 3.5–5.2)
ALBUMIN/GLOB SERPL: 1.2 G/DL
ALP SERPL-CCNC: 102 U/L (ref 39–117)
ALT SERPL-CCNC: 9 U/L (ref 1–33)
AST SERPL-CCNC: 15 U/L (ref 1–32)
BILIRUB SERPL-MCNC: 0.5 MG/DL (ref 0–1.2)
BUN SERPL-MCNC: 13 MG/DL (ref 6–20)
BUN/CREAT SERPL: 11.5 (ref 7–25)
CALCIUM SERPL-MCNC: 9.5 MG/DL (ref 8.6–10.5)
CHLORIDE SERPL-SCNC: 98 MMOL/L (ref 98–107)
CHOLEST SERPL-MCNC: 136 MG/DL (ref 0–200)
CO2 SERPL-SCNC: 28.3 MMOL/L (ref 22–29)
CREAT SERPL-MCNC: 1.13 MG/DL (ref 0.57–1)
GLOBULIN SER CALC-MCNC: 3.3 GM/DL
GLUCOSE SERPL-MCNC: 95 MG/DL (ref 65–99)
HDLC SERPL-MCNC: 40 MG/DL (ref 40–60)
LDLC SERPL CALC-MCNC: 77 MG/DL (ref 0–100)
POTASSIUM SERPL-SCNC: 4.3 MMOL/L (ref 3.5–5.2)
PROT SERPL-MCNC: 7.4 G/DL (ref 6–8.5)
SARS-COV-2 RNA RESP QL NAA+PROBE: NOT DETECTED
SODIUM SERPL-SCNC: 138 MMOL/L (ref 136–145)
TRIGL SERPL-MCNC: 105 MG/DL (ref 0–150)
TSH SERPL DL<=0.005 MIU/L-ACNC: 2.3 UIU/ML (ref 0.27–4.2)
VLDLC SERPL CALC-MCNC: 19 MG/DL (ref 5–40)

## 2021-03-24 ENCOUNTER — IMMUNIZATION (OUTPATIENT)
Dept: VACCINE CLINIC | Facility: HOSPITAL | Age: 56
End: 2021-03-24

## 2021-03-24 PROCEDURE — 0002A: CPT | Performed by: INTERNAL MEDICINE

## 2021-03-24 PROCEDURE — 91300 HC SARSCOV02 VAC 30MCG/0.3ML IM: CPT | Performed by: INTERNAL MEDICINE

## 2021-03-28 PROBLEM — E78.00 HYPERCHOLESTEREMIA: Chronic | Status: ACTIVE | Noted: 2021-03-28

## 2021-03-28 PROBLEM — E78.00 HYPERCHOLESTEREMIA: Status: ACTIVE | Noted: 2021-03-28

## 2021-03-28 PROBLEM — F41.9 ANXIETY: Chronic | Status: ACTIVE | Noted: 2021-03-28

## 2021-03-28 PROBLEM — F41.9 ANXIETY: Status: ACTIVE | Noted: 2021-03-28

## 2021-03-28 PROBLEM — R06.09 DYSPNEA ON EXERTION: Chronic | Status: ACTIVE | Noted: 2019-02-22

## 2021-03-30 ENCOUNTER — TELEPHONE (OUTPATIENT)
Dept: ONCOLOGY | Facility: CLINIC | Age: 56
End: 2021-03-30

## 2021-03-30 NOTE — TELEPHONE ENCOUNTER
Caller: KAILEY WEAVER     Relationship to patient: SELF    Best call back number: [phone]    Chief complaint: PT NEEDS TO R/S APT ON 05/04/2021 WITH DR STOUT     Type of visit: LAB AND FU     Requested date: ANY DAY IN THE AFTERNOON     If rescheduling, when is the original appointment: 05/04/2021,

## 2021-03-31 ENCOUNTER — APPOINTMENT (OUTPATIENT)
Dept: ONCOLOGY | Facility: HOSPITAL | Age: 56
End: 2021-03-31

## 2021-03-31 ENCOUNTER — TELEPHONE (OUTPATIENT)
Dept: ONCOLOGY | Facility: CLINIC | Age: 56
End: 2021-03-31

## 2021-03-31 ENCOUNTER — APPOINTMENT (OUTPATIENT)
Dept: LAB | Facility: HOSPITAL | Age: 56
End: 2021-03-31

## 2021-03-31 NOTE — TELEPHONE ENCOUNTER
Call to Ms. Garcia to let her know that it is very important for her to keep appointments for PT/INR checks and could we make another appointment for her to come in this month for lab check.  Ms. Garcia states that she understands it is very important, but she is not willing to make an appointment at this time.  Reviewed appointment with Dr. Guerrier is not until May 10, 2021.  Patient verbalized understanding and will not reschedule PT/INR visit at this time.

## 2021-04-08 ENCOUNTER — TELEPHONE (OUTPATIENT)
Dept: INTERNAL MEDICINE | Facility: CLINIC | Age: 56
End: 2021-04-08

## 2021-04-08 NOTE — TELEPHONE ENCOUNTER
"Discussed with patient-she will copy of report from dermatologist from biopsy. However, I am concerned about her dyspnea (O2 88-90%) with her hx of PEs and have advised her to go to the ER for further evaluation.    Regarding: RE: Test Results Question  Contact: [phone]  ----- Message from Mack Sierra MA sent at 4/8/2021  8:47 AM EDT -----       ----- Message from Carli Garcia to Mack Sierra MA sent at 4/7/2021  4:37 PM -----   Continuation of note.  I seen her today and she called in a steroid, my eyes have been like the picture for 3 days, maybe I will just see if this helps, I was just so tired of dealing with this since Feb 12th and was afraid since my legs were burning and I was SOA and now my eyes are swollen it would lead to another clot.  Thank you for your message   Carli       ----- Message -----       From:ANNELISE LOZA       Sent:4/7/2021  1:40 PM EDT         To:Carli Garcia    Subject:RE: Test Results Question    Ms. Garcia,    I have a few questions for you so we may further assist you:    Did you see a dermatologist who performed your biopsy?  Is this rash on your legs as a result of the biopsy you had done that has worsened?     Have you started a new medication prior too or after the biopsy?     How long has your eye been swollen?    If you are seeing a dermatologist have you contacted their office for follow-up care?    Thank you,    Bartolo Sierra    [facility]      ----- Message -----       From:Carli Garcia       Sent:4/7/2021  1:13 PM EDT         To:Lupe Simeon, JANINE    Subject:Test Results Question    Bladimir Andrade,   I had this big long message typed out and somehow it disappeared, but I was wondering if I could get some guidance. Attached is the results of the punch biopsy and my \"rash\" and my eye swollen. My legs are just on fire. I need some relief.    "

## 2021-04-09 ENCOUNTER — HOSPITAL ENCOUNTER (OUTPATIENT)
Dept: CT IMAGING | Facility: HOSPITAL | Age: 56
Discharge: HOME | End: 2021-04-09

## 2021-04-09 ENCOUNTER — OFFICE VISIT (OUTPATIENT)
Dept: INTERNAL MEDICINE | Facility: CLINIC | Age: 56
End: 2021-04-09

## 2021-04-09 ENCOUNTER — LAB (OUTPATIENT)
Dept: LAB | Facility: HOSPITAL | Age: 56
End: 2021-04-09

## 2021-04-09 VITALS
HEART RATE: 86 BPM | WEIGHT: 293 LBS | BODY MASS INDEX: 58.11 KG/M2 | SYSTOLIC BLOOD PRESSURE: 124 MMHG | TEMPERATURE: 98.2 F | OXYGEN SATURATION: 98 % | DIASTOLIC BLOOD PRESSURE: 80 MMHG

## 2021-04-09 DIAGNOSIS — Z86.711 HISTORY OF PULMONARY EMBOLUS (PE): ICD-10-CM

## 2021-04-09 DIAGNOSIS — R09.02 HYPOXIA: ICD-10-CM

## 2021-04-09 DIAGNOSIS — R06.02 SHORTNESS OF BREATH: Primary | ICD-10-CM

## 2021-04-09 DIAGNOSIS — M31.0 HYPERSENSITIVITY ANGIITIS (HCC): ICD-10-CM

## 2021-04-09 DIAGNOSIS — Z79.01 LONG TERM CURRENT USE OF ANTICOAGULANT: ICD-10-CM

## 2021-04-09 DIAGNOSIS — R06.02 SHORTNESS OF BREATH: ICD-10-CM

## 2021-04-09 DIAGNOSIS — L24.2 IRRITANT CONTACT DERMATITIS DUE TO SOLVENT: Primary | ICD-10-CM

## 2021-04-09 DIAGNOSIS — D68.61 ANTIPHOSPHOLIPID SYNDROME (HCC): Chronic | ICD-10-CM

## 2021-04-09 LAB
ALBUMIN SERPL-MCNC: 3.4 G/DL (ref 3.5–5.2)
ALBUMIN/GLOB SERPL: 0.9 G/DL
ALP SERPL-CCNC: 76 U/L (ref 39–117)
ALT SERPL W P-5'-P-CCNC: 15 U/L (ref 1–33)
ANION GAP SERPL CALCULATED.3IONS-SCNC: 11.2 MMOL/L (ref 5–15)
AST SERPL-CCNC: 14 U/L (ref 1–32)
BACTERIA UR QL AUTO: ABNORMAL /HPF
BILIRUB SERPL-MCNC: 0.4 MG/DL (ref 0–1.2)
BILIRUB UR QL STRIP: NEGATIVE
BUN SERPL-MCNC: 14 MG/DL (ref 6–20)
BUN/CREAT SERPL: 15.2 (ref 7–25)
CALCIUM SPEC-SCNC: 8.9 MG/DL (ref 8.6–10.5)
CHLORIDE SERPL-SCNC: 101 MMOL/L (ref 98–107)
CHROMATIN AB SERPL-ACNC: <10 IU/ML (ref 0–14)
CLARITY UR: CLEAR
CO2 SERPL-SCNC: 25.8 MMOL/L (ref 22–29)
COLOR UR: YELLOW
CREAT BLDA-MCNC: 1.1 MG/DL (ref 0.6–1.3)
CREAT SERPL-MCNC: 0.92 MG/DL (ref 0.57–1)
ERYTHROCYTE [SEDIMENTATION RATE] IN BLOOD: 59 MM/HR (ref 0–30)
GFR SERPL CREATININE-BSD FRML MDRD: 63 ML/MIN/1.73
GLOBULIN UR ELPH-MCNC: 3.7 GM/DL
GLUCOSE SERPL-MCNC: 80 MG/DL (ref 65–99)
GLUCOSE UR STRIP-MCNC: NEGATIVE MG/DL
HBV SURFACE AB SER RIA-ACNC: NORMAL
HBV SURFACE AG SERPL QL IA: NORMAL
HCV AB SER DONR QL: NORMAL
HGB UR QL STRIP.AUTO: ABNORMAL
HYALINE CASTS UR QL AUTO: ABNORMAL /LPF
IGA1 MFR SER: 498 MG/DL (ref 70–400)
IGG1 SER-MCNC: 1344 MG/DL (ref 700–1600)
IGM SERPL-MCNC: 104 MG/DL (ref 40–230)
INR PPP: 2.51 (ref 0.9–1.1)
KETONES UR QL STRIP: NEGATIVE
LEUKOCYTE ESTERASE UR QL STRIP.AUTO: NEGATIVE
NITRITE UR QL STRIP: NEGATIVE
PH UR STRIP.AUTO: 7 [PH] (ref 5–8)
POTASSIUM SERPL-SCNC: 3.2 MMOL/L (ref 3.5–5.2)
PROT SERPL-MCNC: 7.1 G/DL (ref 6–8.5)
PROT UR QL STRIP: NEGATIVE
PROTHROMBIN TIME: 26.8 SECONDS (ref 11.7–14.2)
RBC # UR: ABNORMAL /HPF
REF LAB TEST METHOD: ABNORMAL
SODIUM SERPL-SCNC: 138 MMOL/L (ref 136–145)
SP GR UR STRIP: >=1.03 (ref 1–1.03)
SQUAMOUS #/AREA URNS HPF: ABNORMAL /HPF
UROBILINOGEN UR QL STRIP: ABNORMAL
WBC UR QL AUTO: ABNORMAL /HPF

## 2021-04-09 PROCEDURE — 86706 HEP B SURFACE ANTIBODY: CPT

## 2021-04-09 PROCEDURE — 82565 ASSAY OF CREATININE: CPT

## 2021-04-09 PROCEDURE — 36415 COLL VENOUS BLD VENIPUNCTURE: CPT | Performed by: NURSE PRACTITIONER

## 2021-04-09 PROCEDURE — 87340 HEPATITIS B SURFACE AG IA: CPT

## 2021-04-09 PROCEDURE — 85041 AUTOMATED RBC COUNT: CPT

## 2021-04-09 PROCEDURE — 82784 ASSAY IGA/IGD/IGG/IGM EACH: CPT

## 2021-04-09 PROCEDURE — 86803 HEPATITIS C AB TEST: CPT

## 2021-04-09 PROCEDURE — 0 IOPAMIDOL PER 1 ML: Performed by: NURSE PRACTITIONER

## 2021-04-09 PROCEDURE — 84165 PROTEIN E-PHORESIS SERUM: CPT

## 2021-04-09 PROCEDURE — 71275 CT ANGIOGRAPHY CHEST: CPT

## 2021-04-09 PROCEDURE — 86705 HEP B CORE ANTIBODY IGM: CPT

## 2021-04-09 PROCEDURE — 86431 RHEUMATOID FACTOR QUANT: CPT

## 2021-04-09 PROCEDURE — 81001 URINALYSIS AUTO W/SCOPE: CPT

## 2021-04-09 PROCEDURE — 99214 OFFICE O/P EST MOD 30 MIN: CPT | Performed by: NURSE PRACTITIONER

## 2021-04-09 PROCEDURE — 86060 ANTISTREPTOLYSIN O TITER: CPT

## 2021-04-09 PROCEDURE — 86038 ANTINUCLEAR ANTIBODIES: CPT

## 2021-04-09 PROCEDURE — 86704 HEP B CORE ANTIBODY TOTAL: CPT

## 2021-04-09 PROCEDURE — 85610 PROTHROMBIN TIME: CPT | Performed by: NURSE PRACTITIONER

## 2021-04-09 PROCEDURE — 82955 ASSAY OF G6PD ENZYME: CPT

## 2021-04-09 PROCEDURE — 85652 RBC SED RATE AUTOMATED: CPT

## 2021-04-09 PROCEDURE — 80053 COMPREHEN METABOLIC PANEL: CPT

## 2021-04-09 RX ORDER — PREDNISONE 10 MG/1
10 TABLET ORAL
COMMUNITY
End: 2021-07-24 | Stop reason: HOSPADM

## 2021-04-09 RX ADMIN — IOPAMIDOL 95 ML: 755 INJECTION, SOLUTION INTRAVENOUS at 09:28

## 2021-04-09 NOTE — NURSING NOTE
Per call from TAN Mello to let the patient leave; IV removed and pt directed to Main Lobby for exit

## 2021-04-09 NOTE — PROGRESS NOTES
Subjective   Carli Garcia is a 56 y.o. female who presents due to dyspnea.    She has a hx of PE with lupus anticoagulant disorder, currently managed on Coumadin. Her last INR was 3/1 which was 2.1. She has had a recurrence of DVT while on Coumadin in 2019, INR goal changed to 2.5-3.5.  She presents today due to worsening dyspnea over the past few weeks along with a dry cough.  She has brought a video from last night of her pulse oximetry readings--O2 dropped to 78 when up and moving (went to bathroom), improved to 89 with sitting.  She has been under derm care for recurrent rash since Feb 12, recent punch biopsy showed evolving leukocytoclastic vasculitis.       The following portions of the patient's history were reviewed and updated as appropriate: allergies, current medications, past social history and problem list.    Past Medical History:   Diagnosis Date   • Anemia    • Angioedema     Lower lip   • Arthritis    • Depression    • Diastolic dysfunction    • DVT (deep venous thrombosis) (CMS/HCC)    • GERD (gastroesophageal reflux disease)    • H/O antiphospholipid syndrome    • Hiatal hernia    • Hypertension    • Hypothyroidism    • Lupus anticoagulant disorder (CMS/HCC)    • Lupus anticoagulant disorder (CMS/HCC)    • Morbid obesity (CMS/HCC)    • CRISTI (obstructive sleep apnea)    • PE (pulmonary embolism)     Bilateral   • Right ventricular dilation    • Thrombocytopenia (CMS/HCC)          Current Outpatient Medications:   •  amLODIPine (NORVASC) 5 MG tablet, Take 1 tablet by mouth Daily., Disp: 90 tablet, Rfl: 2  •  atorvastatin (LIPITOR) 10 MG tablet, Take 1 tablet by mouth Daily., Disp: 90 tablet, Rfl: 2  •  buPROPion XL (WELLBUTRIN XL) 300 MG 24 hr tablet, Take 1 tablet by mouth Every Morning., Disp: 90 tablet, Rfl: 2  •  hydroCHLOROthiazide (HYDRODIURIL) 25 MG tablet, Take 1 tablet by mouth Daily., Disp: 90 tablet, Rfl: 2  •  Levocetirizine Dihydrochloride (XYZAL ALLERGY 24HR PO), Take 1 tablet by  mouth Daily., Disp: , Rfl:   •  levothyroxine (SYNTHROID, LEVOTHROID) 175 MCG tablet, Take 1 tablet by mouth Daily., Disp: 90 tablet, Rfl: 2  •  omeprazole (priLOSEC) 20 MG capsule, Take 1 capsule by mouth Daily., Disp: 90 capsule, Rfl: 2  •  predniSONE (DELTASONE) 10 MG tablet, Take 10 mg by mouth. TAPERING DOSE, Disp: , Rfl:   •  sertraline (ZOLOFT) 100 MG tablet, Take 1 tablet by mouth Daily., Disp: 90 tablet, Rfl: 2  •  warfarin (COUMADIN) 7.5 MG tablet, TAKE 2 TABLETS BY MOUTH ON SUNDAYS AND TAKE ONE AND ONE-HALF TABLET BY MOUTH ON ALL OTHER DAYS, Disp: 141 tablet, Rfl: 2    Allergies   Allergen Reactions   • Losartan Angioedema   • Toprol Xl [Metoprolol Tartrate] Shortness Of Breath   • Eggs Or Egg-Derived Products Nausea And Vomiting   • Dust Mite Extract Unknown - Low Severity   • Sulfa Antibiotics Unknown - Low Severity       Review of Systems   Constitutional: Positive for fatigue. Negative for chills, fever and unexpected weight change.   HENT: Negative for congestion, ear pain, postnasal drip, sinus pressure, sore throat and trouble swallowing.    Eyes: Negative for visual disturbance.   Respiratory: Positive for cough and shortness of breath. Negative for chest tightness and wheezing.    Cardiovascular: Negative for chest pain, palpitations and leg swelling.   Gastrointestinal: Negative for abdominal pain, blood in stool, nausea and vomiting.   Genitourinary: Negative for dysuria, frequency and urgency.   Musculoskeletal: Negative for arthralgias and joint swelling.   Skin: Negative for color change.   Neurological: Negative for syncope, weakness and headaches.   Hematological: Does not bruise/bleed easily.       Objective   Vitals:    04/09/21 0745   BP: 124/80   BP Location: Right arm   Patient Position: Sitting   Cuff Size: Large Adult   Pulse: 86   Temp: 98.2 °F (36.8 °C)   SpO2: 98%   Weight: (!) 168 kg (371 lb)     Body mass index is 58.11 kg/m².  Physical Exam  Constitutional:       Appearance:  She is well-developed. She is not ill-appearing.   HENT:      Head: Normocephalic.      Right Ear: Hearing, tympanic membrane and external ear normal.      Left Ear: Hearing, tympanic membrane and external ear normal.      Nose: Nose normal. No nasal deformity, mucosal edema or rhinorrhea.      Right Sinus: No maxillary sinus tenderness or frontal sinus tenderness.      Left Sinus: No maxillary sinus tenderness or frontal sinus tenderness.      Mouth/Throat:      Dentition: Normal dentition.   Eyes:      General: Lids are normal.         Right eye: No discharge.         Left eye: No discharge.      Conjunctiva/sclera: Conjunctivae normal.      Right eye: No exudate.     Left eye: No exudate.  Neck:      Thyroid: No thyroid mass or thyromegaly.      Vascular: No carotid bruit.      Trachea: Trachea normal.   Cardiovascular:      Rate and Rhythm: Regular rhythm.      Pulses: Normal pulses.      Heart sounds: Normal heart sounds. No murmur heard.     Pulmonary:      Effort: No respiratory distress.      Breath sounds: Decreased breath sounds (throughout lung fields) present. No wheezing, rhonchi or rales.   Musculoskeletal:      Cervical back: Normal range of motion. No edema.   Lymphadenopathy:      Head:      Right side of head: No submental, submandibular, tonsillar, preauricular, posterior auricular or occipital adenopathy.      Left side of head: No submental, submandibular, tonsillar, preauricular, posterior auricular or occipital adenopathy.   Skin:     General: Skin is warm and dry.      Nails: There is no clubbing.   Neurological:      Mental Status: She is alert.   Psychiatric:         Behavior: Behavior is cooperative.         Assessment/Plan   Diagnoses and all orders for this visit:    1. Shortness of breath (Primary)  -     CT angiogram chest w contrast; Future    2. History of pulmonary embolus (PE)  -     CT angiogram chest w contrast; Future  -     Protime-INR Coumadin (Warfarin) Therapy yes    3.  Antiphospholipid syndrome-IgG cardiolipin   -     CT angiogram chest w contrast; Future    4. Hypoxia  -     CT angiogram chest w contrast; Future    5. Long term current use of anticoagulant  -     Protime-INR Coumadin (Warfarin) Therapy yes    I am concerned about her worsening dyspnea with a dry cough, will send for STAT CTA chest to r/o PE. Check INR today.  She has an order for O2 through her pulmonologist which she has not yet picked up.  She will report to ER with worsening symptoms over the weekend.

## 2021-04-10 LAB
ASO AB SERPL-ACNC: 434.4 IU/ML (ref 0–200)
HBV CORE AB SERPL QL IA: NEGATIVE
HBV CORE IGM SERPL QL IA: NEGATIVE

## 2021-04-11 LAB
G6PD BLD QN: 271 U/10E12 RBC (ref 127–427)
RBC # BLD AUTO: 4.68 X10E6/UL (ref 3.77–5.28)

## 2021-04-12 ENCOUNTER — TELEPHONE (OUTPATIENT)
Dept: ONCOLOGY | Facility: CLINIC | Age: 56
End: 2021-04-12

## 2021-04-12 LAB
ALBUMIN SERPL ELPH-MCNC: 2.9 G/DL (ref 2.9–4.4)
ALBUMIN/GLOB SERPL: 0.7 {RATIO} (ref 0.7–1.7)
ALPHA1 GLOB SERPL ELPH-MCNC: 0.4 G/DL (ref 0–0.4)
ALPHA2 GLOB SERPL ELPH-MCNC: 1 G/DL (ref 0.4–1)
B-GLOBULIN SERPL ELPH-MCNC: 1 G/DL (ref 0.7–1.3)
GAMMA GLOB SERPL ELPH-MCNC: 1.4 G/DL (ref 0.4–1.8)
GLOBULIN SER CALC-MCNC: 3.9 G/DL (ref 2.2–3.9)
LABORATORY COMMENT REPORT: NORMAL
M PROTEIN SERPL ELPH-MCNC: NORMAL G/DL
PROT PATTERN SERPL ELPH-IMP: NORMAL
PROT SERPL-MCNC: 6.8 G/DL (ref 6–8.5)

## 2021-04-12 NOTE — TELEPHONE ENCOUNTER
Caller: KAILEY      Relationship to patient: SELF    Best call back number: [phone]    Chief complaint: NONE    Type of visit: LAB & RN REVIEW 4/13/2021    Requested date: NONE    If rescheduling, when is the original appointment:     Additional notes: PT HAD DONE ON Friday AT THE HOSPITAL        PLEASE CANCEL APPT FOR TOMORROW THANK YOU

## 2021-04-13 ENCOUNTER — APPOINTMENT (OUTPATIENT)
Dept: ONCOLOGY | Facility: HOSPITAL | Age: 56
End: 2021-04-13

## 2021-04-13 ENCOUNTER — APPOINTMENT (OUTPATIENT)
Dept: LAB | Facility: HOSPITAL | Age: 56
End: 2021-04-13

## 2021-04-13 LAB — ANA TITR SER IF: NEGATIVE {TITER}

## 2021-04-16 DIAGNOSIS — N39.0 ACUTE UTI: Primary | ICD-10-CM

## 2021-04-16 RX ORDER — AMOXICILLIN 875 MG/1
875 TABLET, COATED ORAL EVERY 12 HOURS SCHEDULED
Qty: 20 TABLET | Refills: 0 | Status: SHIPPED | OUTPATIENT
Start: 2021-04-16 | End: 2021-04-26

## 2021-05-03 ENCOUNTER — TRANSCRIBE ORDERS (OUTPATIENT)
Dept: ADMINISTRATIVE | Facility: HOSPITAL | Age: 56
End: 2021-05-03

## 2021-05-03 DIAGNOSIS — R06.00 DYSPNEA, UNSPECIFIED TYPE: Primary | ICD-10-CM

## 2021-05-03 DIAGNOSIS — Z01.818 OTHER SPECIFIED PRE-OPERATIVE EXAMINATION: Primary | ICD-10-CM

## 2021-05-10 ENCOUNTER — LAB (OUTPATIENT)
Dept: LAB | Facility: HOSPITAL | Age: 56
End: 2021-05-10

## 2021-05-10 ENCOUNTER — OFFICE VISIT (OUTPATIENT)
Dept: ONCOLOGY | Facility: CLINIC | Age: 56
End: 2021-05-10

## 2021-05-10 VITALS
TEMPERATURE: 97.8 F | OXYGEN SATURATION: 92 % | BODY MASS INDEX: 45.99 KG/M2 | RESPIRATION RATE: 20 BRPM | WEIGHT: 293 LBS | HEART RATE: 99 BPM | SYSTOLIC BLOOD PRESSURE: 155 MMHG | HEIGHT: 67 IN | DIASTOLIC BLOOD PRESSURE: 82 MMHG

## 2021-05-10 DIAGNOSIS — D68.61 ANTIPHOSPHOLIPID SYNDROME (HCC): ICD-10-CM

## 2021-05-10 DIAGNOSIS — I26.99 OTHER PULMONARY EMBOLISM WITHOUT ACUTE COR PULMONALE, UNSPECIFIED CHRONICITY (HCC): Chronic | ICD-10-CM

## 2021-05-10 DIAGNOSIS — Z01.818 OTHER SPECIFIED PRE-OPERATIVE EXAMINATION: ICD-10-CM

## 2021-05-10 DIAGNOSIS — Z79.01 ANTICOAGULANT LONG-TERM USE: ICD-10-CM

## 2021-05-10 DIAGNOSIS — D68.61 ANTIPHOSPHOLIPID SYNDROME (HCC): Primary | Chronic | ICD-10-CM

## 2021-05-10 LAB
BASOPHILS # BLD AUTO: 0.03 10*3/MM3 (ref 0–0.2)
BASOPHILS NFR BLD AUTO: 0.3 % (ref 0–1.5)
DEPRECATED RDW RBC AUTO: 46.5 FL (ref 37–54)
EOSINOPHIL # BLD AUTO: 0.07 10*3/MM3 (ref 0–0.4)
EOSINOPHIL NFR BLD AUTO: 0.6 % (ref 0.3–6.2)
ERYTHROCYTE [DISTWIDTH] IN BLOOD BY AUTOMATED COUNT: 15.8 % (ref 12.3–15.4)
HCT VFR BLD AUTO: 41.6 % (ref 34–46.6)
HGB BLD-MCNC: 13 G/DL (ref 12–15.9)
IMM GRANULOCYTES # BLD AUTO: 0.07 10*3/MM3 (ref 0–0.05)
IMM GRANULOCYTES NFR BLD AUTO: 0.6 % (ref 0–0.5)
INR PPP: 2.9 (ref 0.9–1.1)
LYMPHOCYTES # BLD AUTO: 3.32 10*3/MM3 (ref 0.7–3.1)
LYMPHOCYTES NFR BLD AUTO: 30 % (ref 19.6–45.3)
MCH RBC QN AUTO: 25.4 PG (ref 26.6–33)
MCHC RBC AUTO-ENTMCNC: 31.3 G/DL (ref 31.5–35.7)
MCV RBC AUTO: 81.3 FL (ref 79–97)
MONOCYTES # BLD AUTO: 0.72 10*3/MM3 (ref 0.1–0.9)
MONOCYTES NFR BLD AUTO: 6.5 % (ref 5–12)
NEUTROPHILS NFR BLD AUTO: 6.86 10*3/MM3 (ref 1.7–7)
NEUTROPHILS NFR BLD AUTO: 62 % (ref 42.7–76)
NRBC BLD AUTO-RTO: 0 /100 WBC (ref 0–0.2)
PLATELET # BLD AUTO: 324 10*3/MM3 (ref 140–450)
PMV BLD AUTO: 10 FL (ref 6–12)
PROTHROMBIN TIME: 34.4 SECONDS (ref 11–13.5)
RBC # BLD AUTO: 5.12 10*6/MM3 (ref 3.77–5.28)
WBC # BLD AUTO: 11.07 10*3/MM3 (ref 3.4–10.8)

## 2021-05-10 PROCEDURE — 36415 COLL VENOUS BLD VENIPUNCTURE: CPT

## 2021-05-10 PROCEDURE — C9803 HOPD COVID-19 SPEC COLLECT: HCPCS

## 2021-05-10 PROCEDURE — 85610 PROTHROMBIN TIME: CPT

## 2021-05-10 PROCEDURE — U0004 COV-19 TEST NON-CDC HGH THRU: HCPCS

## 2021-05-10 PROCEDURE — 99214 OFFICE O/P EST MOD 30 MIN: CPT | Performed by: INTERNAL MEDICINE

## 2021-05-10 PROCEDURE — 85025 COMPLETE CBC W/AUTO DIFF WBC: CPT

## 2021-05-10 NOTE — PROGRESS NOTES
REASONS FOR FOLLOWUP:  Antiphospholipid antibody syndrome with history of pulmonary embolism March 2016    History of Present Illness    Mrs. Garcia is a 56-year-old woman with history of pulmonary embolism March 2016.  She was found to have positive lupus anticoagulant and cardiolipin antibodies at the time of her pulmonary embolism which have been persistent and has been on anticoagulation with Coumadin since diagnosis.  She has had no recurrent thromboembolic events while anticoagulated.  She has no bleeding problems.    She returned today for follow-up visit.  The patient has chronic dyspnea which is progressively worse.  She had a recent CT angiogram of the chest 04/09/2021 which was negative for pulmonary embolism.  Bilateral axillary lymphadenopathy was noted.    The patient remains on Coumadin with no excessive bleeding or bruising.    Past Medical History:   Diagnosis Date   • Anemia    • Angioedema     Lower lip   • Arthritis    • Depression    • Diastolic dysfunction    • DVT (deep venous thrombosis) (CMS/HCC)    • GERD (gastroesophageal reflux disease)    • H/O antiphospholipid syndrome    • Hiatal hernia    • Hypertension    • Hypothyroidism    • Lupus anticoagulant disorder (CMS/HCC)    • Lupus anticoagulant disorder (CMS/HCC)    • Morbid obesity (CMS/HCC)    • CRISTI (obstructive sleep apnea)    • PE (pulmonary embolism)     Bilateral   • Right ventricular dilation    • Thrombocytopenia (CMS/HCC)    • Vasculitis (CMS/HCC) 05/2021       ONCOLOGIC HISTORY:  (History from previous dates can be found in the separate document.)    Current Outpatient Medications on File Prior to Visit   Medication Sig Dispense Refill   • amLODIPine (NORVASC) 5 MG tablet Take 1 tablet by mouth Daily. 90 tablet 2   • atorvastatin (LIPITOR) 10 MG tablet Take 1 tablet by mouth Daily. 90 tablet 2   • buPROPion XL (WELLBUTRIN XL) 300 MG 24 hr tablet Take 1 tablet by mouth Every Morning. 90 tablet 2   • hydroCHLOROthiazide  (HYDRODIURIL) 25 MG tablet Take 1 tablet by mouth Daily. 90 tablet 2   • Levocetirizine Dihydrochloride (XYZAL ALLERGY 24HR PO) Take 1 tablet by mouth Daily.     • levothyroxine (SYNTHROID, LEVOTHROID) 175 MCG tablet Take 1 tablet by mouth Daily. 90 tablet 2   • omeprazole (priLOSEC) 20 MG capsule Take 1 capsule by mouth Daily. 90 capsule 2   • predniSONE (DELTASONE) 10 MG tablet Take 10 mg by mouth. TAPERING DOSE     • sertraline (ZOLOFT) 100 MG tablet Take 1 tablet by mouth Daily. 90 tablet 2   • warfarin (COUMADIN) 7.5 MG tablet TAKE 2 TABLETS BY MOUTH ON SUNDAYS AND TAKE ONE AND ONE-HALF TABLET BY MOUTH ON ALL OTHER DAYS 141 tablet 2     No current facility-administered medications on file prior to visit.       ALLERGIES:     Allergies   Allergen Reactions   • Losartan Angioedema   • Toprol Xl [Metoprolol Tartrate] Shortness Of Breath   • Eggs Or Egg-Derived Products Nausea And Vomiting   • Dust Mite Extract Unknown - Low Severity   • Sulfa Antibiotics Unknown - Low Severity       Social History     Socioeconomic History   • Marital status: Single     Spouse name: Not on file   • Number of children: 0   • Years of education: Not on file   • Highest education level: Not on file   Tobacco Use   • Smoking status: Never Smoker   • Smokeless tobacco: Never Used   Substance and Sexual Activity   • Alcohol use: No   • Drug use: No   • Sexual activity: Defer         Cancer-related family history includes Uterine cancer in her mother.     Review of Systems   Constitutional: Positive for unexpected weight change (gain). Negative for activity change and appetite change.   HENT: Negative.    Respiratory: Positive for shortness of breath (Exertion, chronic).    Cardiovascular: Positive for leg swelling. Negative for palpitations.   Genitourinary: Negative.    Musculoskeletal: Negative.    Neurological: Negative for weakness.   Hematological: Does not bruise/bleed easily.   Psychiatric/Behavioral: Positive for dysphoric mood.  "  ROS unchanged-05/10/2021      Objective      Vitals:    05/10/21 1513   BP: 155/82   Pulse: 99   Resp: 20   Temp: 97.8 °F (36.6 °C)   TempSrc: Infrared   SpO2: 92%   Weight: (!) 164 kg (361 lb 12.8 oz)   Height: 170.2 cm (67.01\")   PainSc:   3   PainLoc: Back     Current Status 5/10/2021   ECOG score 0       Physical Exam   GENERAL: Well-developed, morbid obese woman  SKIN: Warm, dry without rashes, purpura or petechiae.  NECK: Supple with good range of motion; no thyromegaly or masses, no JVD.  LYMPHATICS: No cervical, supraclavicular, axillary or inguinal adenopathy.  CHEST: Lungs clear to percussion and auscultation. Good airflow.    CARDIAC: Regular rate and rhythm without murmurs, rubs or gallops. Normal S1,S2.  ABDOMEN: Soft, nontender with no organomegaly or masses.  EXTREMITIES: No clubbing, cyanosis.  Trace to 1+ ankle edema bilaterally   PSYCHIATRIC: Depressed mood and affect      RECENT LABS:  Hematology WBC   Date Value Ref Range Status   05/10/2021 11.07 (H) 3.40 - 10.80 10*3/mm3 Final   09/11/2020 6.3 3.4 - 10.8 x10E3/uL Final     RBC   Date Value Ref Range Status   05/10/2021 5.12 3.77 - 5.28 10*6/mm3 Final   04/09/2021 4.68 3.77 - 5.28 x10E6/uL Final     Hemoglobin   Date Value Ref Range Status   05/10/2021 13.0 12.0 - 15.9 g/dL Final     Hematocrit   Date Value Ref Range Status   05/10/2021 41.6 34.0 - 46.6 % Final     Platelets   Date Value Ref Range Status   05/10/2021 324 140 - 450 10*3/mm3 Final      INR 2.9    Assessment/Plan    1.   history of pulmonary embolism in March 2016 secondary to antiphospholipid antibody syndrome.   Imaging in February 2019 showed age indeterminate pulmonary emboli and she complained of increased shortness of breath so her INR goal was changed to 2.5-3.5.  Her INR is therapeutic today 2.9.  She will continue monthly INR checks for Coumadin adjustment.  The patient is requesting a home monitoring system for her INR which we will investigate.    Patient is on " medication requiring intensive monitoring for toxicity.    2.  Antiphospholipid antibody syndrome    3.  Obesity: I recommended the patient to consider an evaluation for weight loss surgery.  I am not sure if she is a candidate.    4.  Bilateral axillary lymphadenopathy noted on CT chest 04/09/2021: Radiology recommended a 3 to 4-month follow-up CT.  I discussed with the patient this will likely be ordered by her PCP who ordered the initial exam, but if not she may call my office and we will arrange for her.    5.  Mild leukocytosis: She is on steroids for skin rash so the leukocytosis is likely secondary to steroids      Continue Coumadin current dose.  Monthly INR versus home monitoring system pending insurance approval.  We will see her back in 6 months with CBC and INR.

## 2021-05-11 LAB — SARS-COV-2 RNA RESP QL NAA+PROBE: NOT DETECTED

## 2021-05-12 ENCOUNTER — HOSPITAL ENCOUNTER (OUTPATIENT)
Dept: RESPIRATORY THERAPY | Facility: HOSPITAL | Age: 56
Discharge: HOME | End: 2021-05-12
Admitting: INTERNAL MEDICINE

## 2021-05-12 DIAGNOSIS — R06.00 DYSPNEA, UNSPECIFIED TYPE: ICD-10-CM

## 2021-05-12 PROCEDURE — 94729 DIFFUSING CAPACITY: CPT

## 2021-05-12 PROCEDURE — 94726 PLETHYSMOGRAPHY LUNG VOLUMES: CPT

## 2021-05-12 PROCEDURE — 94060 EVALUATION OF WHEEZING: CPT

## 2021-05-12 RX ORDER — ALBUTEROL SULFATE 2.5 MG/3ML
2.5 SOLUTION RESPIRATORY (INHALATION) ONCE AS NEEDED
Status: COMPLETED | OUTPATIENT
Start: 2021-05-12 | End: 2021-05-12

## 2021-05-12 RX ADMIN — ALBUTEROL SULFATE 2.5 MG: 2.5 SOLUTION RESPIRATORY (INHALATION) at 12:25

## 2021-06-07 ENCOUNTER — TELEPHONE (OUTPATIENT)
Dept: GENERAL RADIOLOGY | Facility: HOSPITAL | Age: 56
End: 2021-06-07

## 2021-06-07 ENCOUNTER — LAB (OUTPATIENT)
Dept: LAB | Facility: HOSPITAL | Age: 56
End: 2021-06-07

## 2021-06-07 ENCOUNTER — CLINICAL SUPPORT (OUTPATIENT)
Dept: ONCOLOGY | Facility: HOSPITAL | Age: 56
End: 2021-06-07

## 2021-06-07 DIAGNOSIS — I26.99 OTHER PULMONARY EMBOLISM WITHOUT ACUTE COR PULMONALE, UNSPECIFIED CHRONICITY (HCC): Chronic | ICD-10-CM

## 2021-06-07 DIAGNOSIS — R30.9 PAINFUL URINATION: Primary | ICD-10-CM

## 2021-06-07 DIAGNOSIS — D68.61 ANTIPHOSPHOLIPID SYNDROME (HCC): Chronic | ICD-10-CM

## 2021-06-07 LAB
BACTERIA UR QL AUTO: NEGATIVE /HPF
BILIRUB UR QL STRIP: NEGATIVE
CLARITY UR: CLEAR
COLOR UR: YELLOW
GLUCOSE UR STRIP-MCNC: NEGATIVE MG/DL
HGB UR QL STRIP.AUTO: ABNORMAL
HYALINE CASTS UR QL AUTO: NORMAL /LPF
INR PPP: 1.7 (ref 0.9–1.1)
KETONES UR QL STRIP: NEGATIVE
LEUKOCYTE ESTERASE UR QL STRIP.AUTO: NEGATIVE
NITRITE UR QL STRIP: NEGATIVE
PH UR STRIP.AUTO: 6.5 [PH] (ref 4.5–8)
PROT UR QL STRIP: NEGATIVE
PROTHROMBIN TIME: 20.1 SECONDS (ref 11–13.5)
RBC # UR: NORMAL /HPF
REF LAB TEST METHOD: NORMAL
SP GR UR STRIP: 1.02 (ref 1–1.03)
SQUAMOUS #/AREA URNS HPF: NORMAL /HPF
UROBILINOGEN UR QL STRIP: ABNORMAL
WBC UR QL AUTO: NORMAL /HPF

## 2021-06-07 PROCEDURE — 85610 PROTHROMBIN TIME: CPT

## 2021-06-07 PROCEDURE — 81001 URINALYSIS AUTO W/SCOPE: CPT

## 2021-06-07 PROCEDURE — 36416 COLLJ CAPILLARY BLOOD SPEC: CPT

## 2021-06-07 NOTE — TELEPHONE ENCOUNTER
----- Message from Annetta Guerrero RN sent at 6/7/2021  4:20 PM EDT -----  Regarding: RN review  Can you please schedule this patient for PT/INR labs with RN review in 2 weeks? Please call the patient with the available times.    Thanks!

## 2021-06-07 NOTE — NURSING NOTE
Pt here for INR review. INR 1.7 today. Pt has been taking 7.5mg M/F and 11.25mg AOD. Pt denies missed doses. She is taking prednisone 10mg and is currently on her third day. S/w JANINE Ceja, will dose per [facility] recommendations. Pt will now take 15mg sun/Thurs and 11.25 AOD. Pt also has c/o painful urination. U/A was ordered and obtained. Priya would like her back in 2 weeks to recheck INR. Sent message to scheduling. Pt v/u.

## 2021-06-08 ENCOUNTER — TRANSCRIBE ORDERS (OUTPATIENT)
Dept: ADMINISTRATIVE | Facility: HOSPITAL | Age: 56
End: 2021-06-08

## 2021-06-08 DIAGNOSIS — R94.2 RESTRICTIVE VENTILATORY DEFECT: Primary | ICD-10-CM

## 2021-06-21 ENCOUNTER — LAB (OUTPATIENT)
Dept: LAB | Facility: HOSPITAL | Age: 56
End: 2021-06-21

## 2021-06-21 ENCOUNTER — CLINICAL SUPPORT (OUTPATIENT)
Dept: ONCOLOGY | Facility: HOSPITAL | Age: 56
End: 2021-06-21

## 2021-06-21 DIAGNOSIS — D68.61 ANTIPHOSPHOLIPID SYNDROME (HCC): Chronic | ICD-10-CM

## 2021-06-21 DIAGNOSIS — Z79.01 ANTICOAGULANT LONG-TERM USE: ICD-10-CM

## 2021-06-21 LAB
INR PPP: 3.6 (ref 0.9–1.1)
PROTHROMBIN TIME: 43.7 SECONDS (ref 11–13.5)

## 2021-06-21 PROCEDURE — G0463 HOSPITAL OUTPT CLINIC VISIT: HCPCS

## 2021-06-21 PROCEDURE — 85610 PROTHROMBIN TIME: CPT

## 2021-06-21 NOTE — NURSING NOTE
INR 3.6 today. Pt confirms dosing and denies missed doses, medication or dietary changes. Per [facility], pt will take 15 mg Sun and 11.25 mg all other days. Pt v/u and will return in 2 weeks as scheduled.    Pt also voiced concern over getting home INR testing set up. She states she is having issues because the order is to test twice a month when it should be for only once a month. Message sent to Shirley MCCRAY to review w/ Dr. Guerrier. Pt v/u.

## 2021-06-25 ENCOUNTER — TELEPHONE (OUTPATIENT)
Dept: ONCOLOGY | Facility: CLINIC | Age: 56
End: 2021-06-25

## 2021-06-25 NOTE — TELEPHONE ENCOUNTER
Caller: PT    Relationship: SELF    Best call back number: [phone] OK TO LEAVE [facility]     What orders are you requesting (i.e. lab or imaging): HOME TESTING INR     In what timeframe would the patient need to come in: ASAP    Where will you receive your lab/imaging services: HOME     Additional notes: PT STATES WHEN SHE WAS THERE ON Monday THAT DR MCMILLAN NURSE SAID SHE WOULD PUT IN A REQUEST TO CHANGE HER ORDERS. HE WROTE THE ORDER FOR 2X A MONTH SO SHE NEEDS IT CHANGED TO 1X A MONTH. SHE HAS ALREADY MET HER DEDUCTIBLE SO SHE NEEDS THIS DONE THIS MONTH.

## 2021-07-06 ENCOUNTER — TELEPHONE (OUTPATIENT)
Dept: ONCOLOGY | Facility: CLINIC | Age: 56
End: 2021-07-06

## 2021-07-06 ENCOUNTER — LAB (OUTPATIENT)
Dept: LAB | Facility: HOSPITAL | Age: 56
End: 2021-07-06

## 2021-07-06 ENCOUNTER — TELEPHONE (OUTPATIENT)
Dept: ONCOLOGY | Facility: HOSPITAL | Age: 56
End: 2021-07-06

## 2021-07-06 ENCOUNTER — TELEPHONE (OUTPATIENT)
Dept: GENERAL RADIOLOGY | Facility: HOSPITAL | Age: 56
End: 2021-07-06

## 2021-07-06 ENCOUNTER — CLINICAL SUPPORT (OUTPATIENT)
Dept: ONCOLOGY | Facility: HOSPITAL | Age: 56
End: 2021-07-06

## 2021-07-06 DIAGNOSIS — D68.61 ANTIPHOSPHOLIPID SYNDROME (HCC): Chronic | ICD-10-CM

## 2021-07-06 DIAGNOSIS — I26.99 OTHER PULMONARY EMBOLISM WITHOUT ACUTE COR PULMONALE, UNSPECIFIED CHRONICITY (HCC): Chronic | ICD-10-CM

## 2021-07-06 LAB
INR PPP: 4.18 (ref 0.9–1.1)
PROTHROMBIN TIME: 40.1 SECONDS (ref 11.7–14.2)

## 2021-07-06 PROCEDURE — 36415 COLL VENOUS BLD VENIPUNCTURE: CPT

## 2021-07-06 PROCEDURE — 85610 PROTHROMBIN TIME: CPT | Performed by: INTERNAL MEDICINE

## 2021-07-06 NOTE — TELEPHONE ENCOUNTER
INR resulted 4.18, reviewed with Carline ETIENNE as goal is 2.5-3.5  Pt will take 11.25 mg daily ( different from recommended dosing on [facility])  Called and updated pt, left dosing on voicemail per pt request

## 2021-07-06 NOTE — NURSING NOTE
INR sent to the hospital as fingerstick came back 5.4 today. Pt did not wish to wait for results and requested she be called to review once available. She asked that a message be left if she doesn't answer. Pt confirms dosing and denies missed doses, medication or dietary changes. Result not available, report given to a late nurse to watch for and contact patient.

## 2021-07-06 NOTE — TELEPHONE ENCOUNTER
----- Message from Karey Anderson RN sent at 7/6/2021  5:13 PM EDT -----  Per Carline ETIENNE, pt needs to return in one week for INR and nurse review

## 2021-07-06 NOTE — TELEPHONE ENCOUNTER
Caller: TYRA FROM [facility]    Relationship to patient:      Best call back number: [phone]    CRITICAL INR     4.18

## 2021-07-07 ENCOUNTER — HOSPITAL ENCOUNTER (OUTPATIENT)
Dept: CT IMAGING | Facility: HOSPITAL | Age: 56
Discharge: HOME | End: 2021-07-07
Admitting: INTERNAL MEDICINE

## 2021-07-07 DIAGNOSIS — R94.2 RESTRICTIVE VENTILATORY DEFECT: ICD-10-CM

## 2021-07-07 PROCEDURE — 71250 CT THORAX DX C-: CPT

## 2021-07-13 ENCOUNTER — APPOINTMENT (OUTPATIENT)
Dept: LAB | Facility: HOSPITAL | Age: 56
End: 2021-07-13

## 2021-07-13 ENCOUNTER — APPOINTMENT (OUTPATIENT)
Dept: ONCOLOGY | Facility: HOSPITAL | Age: 56
End: 2021-07-13

## 2021-07-16 ENCOUNTER — CLINICAL SUPPORT (OUTPATIENT)
Dept: ONCOLOGY | Facility: HOSPITAL | Age: 56
End: 2021-07-16

## 2021-07-16 ENCOUNTER — LAB (OUTPATIENT)
Dept: LAB | Facility: HOSPITAL | Age: 56
End: 2021-07-16

## 2021-07-16 DIAGNOSIS — I26.99 OTHER PULMONARY EMBOLISM WITHOUT ACUTE COR PULMONALE, UNSPECIFIED CHRONICITY (HCC): Chronic | ICD-10-CM

## 2021-07-16 DIAGNOSIS — D68.61 ANTIPHOSPHOLIPID SYNDROME (HCC): Chronic | ICD-10-CM

## 2021-07-16 LAB
INR PPP: 3.2 (ref 0.9–1.1)
PROTHROMBIN TIME: 38.7 SECONDS (ref 11–13.5)

## 2021-07-16 PROCEDURE — 85610 PROTHROMBIN TIME: CPT

## 2021-07-16 PROCEDURE — G0463 HOSPITAL OUTPT CLINIC VISIT: HCPCS

## 2021-07-16 NOTE — PROGRESS NOTES
Pt here as an RN review. INR is 3.2 and goal is 2.5-3.5. Pt confirms [facility] previous dosing. Denies changes or issues with this dose. [employer] recommends pt continue taking 11.25 mg all days. Pt v/u and denied copy of report.

## 2021-07-21 ENCOUNTER — TELEPHONE (OUTPATIENT)
Dept: ONCOLOGY | Facility: CLINIC | Age: 56
End: 2021-07-21

## 2021-07-21 NOTE — TELEPHONE ENCOUNTER
Caller: KAILEY     Relationship to patient: SELF     Best call back number: [phone]    PATIENT HAS AN ORDER FOR 2 BLOOD TESTS AND A THROAT SWAB THAT HER DERMATOLOGIST DEXTER SEGUNDO RX. SHE IS WANTING TO KNOW IF THEY CAN DO IT AT THE LAB THERE.   THE ICD CODE IS M31.0     PLEASE CALL AND ADVISE   THANK YOU

## 2021-07-21 NOTE — TELEPHONE ENCOUNTER
INFORMED PT. IF SHE BRINGS A ORDER FOR THE LABS THE DERMATOLOGIST WANTS WE CAN DRAW THOSE AND THE RESULTS WILL BE SENT TO THE DERMATOLOGIST, BUT SHE MUST BRING A ORDER.  CHECK WITH CHARGE NURSE ON THROAT SWAB FOR STREP.  INFORMED PT. WE WILL NOT DO THE THROAT SWAB.  SHE WILL NEED TO GET THAT DONE BEFORE SHE COMES TO THE OFFICE AS WE HAVE IMMUNOCOMPROMISED PTS AND WE DO NOT SEE PT. FOR CANCER DIAGNOSIS.  V/U.

## 2021-07-22 ENCOUNTER — HOSPITAL ENCOUNTER (INPATIENT)
Facility: HOSPITAL | Age: 56
LOS: 2 days | Discharge: HOME | End: 2021-07-24
Attending: EMERGENCY MEDICINE | Admitting: INTERNAL MEDICINE

## 2021-07-22 ENCOUNTER — APPOINTMENT (OUTPATIENT)
Dept: GENERAL RADIOLOGY | Facility: HOSPITAL | Age: 56
End: 2021-07-22

## 2021-07-22 DIAGNOSIS — T45.515A WARFARIN-INDUCED COAGULOPATHY (HCC): ICD-10-CM

## 2021-07-22 DIAGNOSIS — T78.40XA ALLERGIC REACTION, INITIAL ENCOUNTER: ICD-10-CM

## 2021-07-22 DIAGNOSIS — T78.3XXA ANGIOEDEMA, INITIAL ENCOUNTER: Primary | ICD-10-CM

## 2021-07-22 DIAGNOSIS — D68.32 WARFARIN-INDUCED COAGULOPATHY (HCC): ICD-10-CM

## 2021-07-22 LAB
ALBUMIN SERPL-MCNC: 3.5 G/DL (ref 3.5–5.2)
ALBUMIN/GLOB SERPL: 1.1 G/DL
ALP SERPL-CCNC: 68 U/L (ref 39–117)
ALT SERPL W P-5'-P-CCNC: 15 U/L (ref 1–33)
ANION GAP SERPL CALCULATED.3IONS-SCNC: 8 MMOL/L (ref 5–15)
AST SERPL-CCNC: 13 U/L (ref 1–32)
BASOPHILS # BLD AUTO: 0.04 10*3/MM3 (ref 0–0.2)
BASOPHILS NFR BLD AUTO: 0.4 % (ref 0–1.5)
BILIRUB SERPL-MCNC: 0.6 MG/DL (ref 0–1.2)
BUN SERPL-MCNC: 13 MG/DL (ref 6–20)
BUN/CREAT SERPL: 18.6 (ref 7–25)
CALCIUM SPEC-SCNC: 9.2 MG/DL (ref 8.6–10.5)
CHLORIDE SERPL-SCNC: 103 MMOL/L (ref 98–107)
CO2 SERPL-SCNC: 28 MMOL/L (ref 22–29)
CREAT SERPL-MCNC: 0.7 MG/DL (ref 0.57–1)
DEPRECATED RDW RBC AUTO: 47.2 FL (ref 37–54)
EOSINOPHIL # BLD AUTO: 0.05 10*3/MM3 (ref 0–0.4)
EOSINOPHIL NFR BLD AUTO: 0.5 % (ref 0.3–6.2)
ERYTHROCYTE [DISTWIDTH] IN BLOOD BY AUTOMATED COUNT: 15.9 % (ref 12.3–15.4)
GFR SERPL CREATININE-BSD FRML MDRD: 86 ML/MIN/1.73
GLOBULIN UR ELPH-MCNC: 3.1 GM/DL
GLUCOSE SERPL-MCNC: 87 MG/DL (ref 65–99)
HCT VFR BLD AUTO: 41 % (ref 34–46.6)
HGB BLD-MCNC: 13.1 G/DL (ref 12–15.9)
IMM GRANULOCYTES # BLD AUTO: 0.06 10*3/MM3 (ref 0–0.05)
IMM GRANULOCYTES NFR BLD AUTO: 0.6 % (ref 0–0.5)
INR PPP: 2.65 (ref 0.9–1.1)
LYMPHOCYTES # BLD AUTO: 2.39 10*3/MM3 (ref 0.7–3.1)
LYMPHOCYTES NFR BLD AUTO: 22.8 % (ref 19.6–45.3)
MCH RBC QN AUTO: 25.9 PG (ref 26.6–33)
MCHC RBC AUTO-ENTMCNC: 32 G/DL (ref 31.5–35.7)
MCV RBC AUTO: 81 FL (ref 79–97)
MONOCYTES # BLD AUTO: 0.66 10*3/MM3 (ref 0.1–0.9)
MONOCYTES NFR BLD AUTO: 6.3 % (ref 5–12)
NEUTROPHILS NFR BLD AUTO: 69.4 % (ref 42.7–76)
NEUTROPHILS NFR BLD AUTO: 7.28 10*3/MM3 (ref 1.7–7)
NRBC BLD AUTO-RTO: 0 /100 WBC (ref 0–0.2)
PLATELET # BLD AUTO: 314 10*3/MM3 (ref 140–450)
PMV BLD AUTO: 9.9 FL (ref 6–12)
POTASSIUM SERPL-SCNC: 3.7 MMOL/L (ref 3.5–5.2)
PROT SERPL-MCNC: 6.6 G/DL (ref 6–8.5)
PROTHROMBIN TIME: 28 SECONDS (ref 11.7–14.2)
RBC # BLD AUTO: 5.06 10*6/MM3 (ref 3.77–5.28)
S PYO AG THROAT QL: NEGATIVE
SARS-COV-2 ORF1AB RESP QL NAA+PROBE: NOT DETECTED
SODIUM SERPL-SCNC: 139 MMOL/L (ref 136–145)
WBC # BLD AUTO: 10.48 10*3/MM3 (ref 3.4–10.8)

## 2021-07-22 PROCEDURE — 80053 COMPREHEN METABOLIC PANEL: CPT | Performed by: EMERGENCY MEDICINE

## 2021-07-22 PROCEDURE — 71045 X-RAY EXAM CHEST 1 VIEW: CPT

## 2021-07-22 PROCEDURE — 94640 AIRWAY INHALATION TREATMENT: CPT

## 2021-07-22 PROCEDURE — U0004 COV-19 TEST NON-CDC HGH THRU: HCPCS | Performed by: EMERGENCY MEDICINE

## 2021-07-22 PROCEDURE — 96375 TX/PRO/DX INJ NEW DRUG ADDON: CPT

## 2021-07-22 PROCEDURE — 25010000002 METHYLPREDNISOLONE PER 125 MG: Performed by: EMERGENCY MEDICINE

## 2021-07-22 PROCEDURE — 96374 THER/PROPH/DIAG INJ IV PUSH: CPT

## 2021-07-22 PROCEDURE — 85025 COMPLETE CBC W/AUTO DIFF WBC: CPT | Performed by: EMERGENCY MEDICINE

## 2021-07-22 PROCEDURE — 85610 PROTHROMBIN TIME: CPT | Performed by: EMERGENCY MEDICINE

## 2021-07-22 PROCEDURE — 87880 STREP A ASSAY W/OPTIC: CPT | Performed by: EMERGENCY MEDICINE

## 2021-07-22 PROCEDURE — 99284 EMERGENCY DEPT VISIT MOD MDM: CPT

## 2021-07-22 PROCEDURE — 87081 CULTURE SCREEN ONLY: CPT | Performed by: EMERGENCY MEDICINE

## 2021-07-22 PROCEDURE — 25010000002 DIPHENHYDRAMINE PER 50 MG: Performed by: EMERGENCY MEDICINE

## 2021-07-22 PROCEDURE — 94799 UNLISTED PULMONARY SVC/PX: CPT

## 2021-07-22 PROCEDURE — 25010000002 METHYLPREDNISOLONE PER 40 MG: Performed by: INTERNAL MEDICINE

## 2021-07-22 PROCEDURE — 25010000002 DIPHENHYDRAMINE PER 50 MG: Performed by: INTERNAL MEDICINE

## 2021-07-22 PROCEDURE — 96376 TX/PRO/DX INJ SAME DRUG ADON: CPT

## 2021-07-22 PROCEDURE — G0378 HOSPITAL OBSERVATION PER HR: HCPCS

## 2021-07-22 RX ORDER — METHYLPREDNISOLONE SODIUM SUCCINATE 125 MG/2ML
125 INJECTION, POWDER, LYOPHILIZED, FOR SOLUTION INTRAMUSCULAR; INTRAVENOUS ONCE
Status: COMPLETED | OUTPATIENT
Start: 2021-07-22 | End: 2021-07-22

## 2021-07-22 RX ORDER — ACETAMINOPHEN 325 MG/1
650 TABLET ORAL EVERY 4 HOURS PRN
Status: DISCONTINUED | OUTPATIENT
Start: 2021-07-22 | End: 2021-07-24 | Stop reason: HOSPADM

## 2021-07-22 RX ORDER — PANTOPRAZOLE SODIUM 40 MG/1
40 TABLET, DELAYED RELEASE ORAL EVERY MORNING
Refills: 2 | Status: CANCELLED | OUTPATIENT
Start: 2021-07-23

## 2021-07-22 RX ORDER — DIPHENHYDRAMINE HYDROCHLORIDE 50 MG/ML
50 INJECTION INTRAMUSCULAR; INTRAVENOUS ONCE
Status: COMPLETED | OUTPATIENT
Start: 2021-07-22 | End: 2021-07-22

## 2021-07-22 RX ORDER — HYDROCHLOROTHIAZIDE 25 MG/1
25 TABLET ORAL DAILY
Status: DISCONTINUED | OUTPATIENT
Start: 2021-07-23 | End: 2021-07-24 | Stop reason: HOSPADM

## 2021-07-22 RX ORDER — DIPHENHYDRAMINE HYDROCHLORIDE 50 MG/ML
25 INJECTION INTRAMUSCULAR; INTRAVENOUS ONCE
Status: COMPLETED | OUTPATIENT
Start: 2021-07-22 | End: 2021-07-22

## 2021-07-22 RX ORDER — SODIUM CHLORIDE 0.9 % (FLUSH) 0.9 %
10 SYRINGE (ML) INJECTION AS NEEDED
Status: DISCONTINUED | OUTPATIENT
Start: 2021-07-22 | End: 2021-07-24 | Stop reason: HOSPADM

## 2021-07-22 RX ORDER — WARFARIN SODIUM 7.5 MG/1
15 TABLET ORAL
Status: DISCONTINUED | OUTPATIENT
Start: 2021-07-25 | End: 2021-07-23 | Stop reason: DRUGHIGH

## 2021-07-22 RX ORDER — WARFARIN SODIUM 7.5 MG/1
11.25 TABLET ORAL
Status: DISCONTINUED | OUTPATIENT
Start: 2021-07-23 | End: 2021-07-23

## 2021-07-22 RX ORDER — ONDANSETRON 2 MG/ML
4 INJECTION INTRAMUSCULAR; INTRAVENOUS EVERY 6 HOURS PRN
Status: DISCONTINUED | OUTPATIENT
Start: 2021-07-22 | End: 2021-07-24 | Stop reason: HOSPADM

## 2021-07-22 RX ORDER — LEVOTHYROXINE SODIUM 175 UG/1
175 TABLET ORAL
Status: DISCONTINUED | OUTPATIENT
Start: 2021-07-23 | End: 2021-07-24 | Stop reason: HOSPADM

## 2021-07-22 RX ORDER — DIPHENHYDRAMINE HYDROCHLORIDE 50 MG/ML
25 INJECTION INTRAMUSCULAR; INTRAVENOUS EVERY 6 HOURS
Status: DISCONTINUED | OUTPATIENT
Start: 2021-07-22 | End: 2021-07-24 | Stop reason: HOSPADM

## 2021-07-22 RX ORDER — NITROGLYCERIN 0.4 MG/1
0.4 TABLET SUBLINGUAL
Status: DISCONTINUED | OUTPATIENT
Start: 2021-07-22 | End: 2021-07-24 | Stop reason: HOSPADM

## 2021-07-22 RX ORDER — ALBUTEROL SULFATE 2.5 MG/3ML
2.5 SOLUTION RESPIRATORY (INHALATION) ONCE
Status: COMPLETED | OUTPATIENT
Start: 2021-07-22 | End: 2021-07-22

## 2021-07-22 RX ORDER — BUPROPION HYDROCHLORIDE 300 MG/1
300 TABLET ORAL EVERY MORNING
Status: DISCONTINUED | OUTPATIENT
Start: 2021-07-23 | End: 2021-07-24 | Stop reason: HOSPADM

## 2021-07-22 RX ORDER — UREA 10 %
3 LOTION (ML) TOPICAL NIGHTLY PRN
Status: DISCONTINUED | OUTPATIENT
Start: 2021-07-22 | End: 2021-07-24 | Stop reason: HOSPADM

## 2021-07-22 RX ORDER — AMLODIPINE BESYLATE 5 MG/1
5 TABLET ORAL DAILY
Status: DISCONTINUED | OUTPATIENT
Start: 2021-07-23 | End: 2021-07-24 | Stop reason: HOSPADM

## 2021-07-22 RX ORDER — FAMOTIDINE 10 MG/ML
20 INJECTION, SOLUTION INTRAVENOUS ONCE
Status: COMPLETED | OUTPATIENT
Start: 2021-07-22 | End: 2021-07-22

## 2021-07-22 RX ORDER — WARFARIN SODIUM 10 MG/1
10 TABLET ORAL
Status: DISCONTINUED | OUTPATIENT
Start: 2021-07-23 | End: 2021-07-22

## 2021-07-22 RX ORDER — SERTRALINE HYDROCHLORIDE 100 MG/1
100 TABLET, FILM COATED ORAL DAILY
Status: DISCONTINUED | OUTPATIENT
Start: 2021-07-23 | End: 2021-07-24 | Stop reason: HOSPADM

## 2021-07-22 RX ORDER — ATORVASTATIN CALCIUM 20 MG/1
10 TABLET, FILM COATED ORAL DAILY
Status: DISCONTINUED | OUTPATIENT
Start: 2021-07-23 | End: 2021-07-24 | Stop reason: HOSPADM

## 2021-07-22 RX ORDER — ONDANSETRON 4 MG/1
4 TABLET, FILM COATED ORAL EVERY 6 HOURS PRN
Status: DISCONTINUED | OUTPATIENT
Start: 2021-07-22 | End: 2021-07-24 | Stop reason: HOSPADM

## 2021-07-22 RX ORDER — METHYLPREDNISOLONE SODIUM SUCCINATE 40 MG/ML
40 INJECTION, POWDER, LYOPHILIZED, FOR SOLUTION INTRAMUSCULAR; INTRAVENOUS EVERY 6 HOURS
Status: DISCONTINUED | OUTPATIENT
Start: 2021-07-22 | End: 2021-07-24 | Stop reason: HOSPADM

## 2021-07-22 RX ORDER — FAMOTIDINE 10 MG/ML
20 INJECTION, SOLUTION INTRAVENOUS EVERY 12 HOURS SCHEDULED
Status: DISCONTINUED | OUTPATIENT
Start: 2021-07-22 | End: 2021-07-24 | Stop reason: HOSPADM

## 2021-07-22 RX ADMIN — METHYLPREDNISOLONE SODIUM SUCCINATE 125 MG: 125 INJECTION, POWDER, FOR SOLUTION INTRAMUSCULAR; INTRAVENOUS at 11:56

## 2021-07-22 RX ADMIN — DIPHENHYDRAMINE HYDROCHLORIDE 25 MG: 50 INJECTION, SOLUTION INTRAMUSCULAR; INTRAVENOUS at 22:55

## 2021-07-22 RX ADMIN — ALBUTEROL SULFATE 2.5 MG: 2.5 SOLUTION RESPIRATORY (INHALATION) at 12:22

## 2021-07-22 RX ADMIN — FAMOTIDINE 20 MG: 10 INJECTION INTRAVENOUS at 11:56

## 2021-07-22 RX ADMIN — DIPHENHYDRAMINE HYDROCHLORIDE 25 MG: 50 INJECTION, SOLUTION INTRAMUSCULAR; INTRAVENOUS at 15:06

## 2021-07-22 RX ADMIN — FAMOTIDINE 20 MG: 10 INJECTION INTRAVENOUS at 22:54

## 2021-07-22 RX ADMIN — METHYLPREDNISOLONE SODIUM SUCCINATE 40 MG: 40 INJECTION, POWDER, FOR SOLUTION INTRAMUSCULAR; INTRAVENOUS at 22:55

## 2021-07-22 RX ADMIN — DIPHENHYDRAMINE HYDROCHLORIDE 50 MG: 50 INJECTION, SOLUTION INTRAMUSCULAR; INTRAVENOUS at 11:57

## 2021-07-23 LAB
ANION GAP SERPL CALCULATED.3IONS-SCNC: 10.5 MMOL/L (ref 5–15)
BUN SERPL-MCNC: 13 MG/DL (ref 6–20)
BUN/CREAT SERPL: 17.8 (ref 7–25)
CALCIUM SPEC-SCNC: 9.1 MG/DL (ref 8.6–10.5)
CHLORIDE SERPL-SCNC: 103 MMOL/L (ref 98–107)
CO2 SERPL-SCNC: 25.5 MMOL/L (ref 22–29)
CREAT SERPL-MCNC: 0.73 MG/DL (ref 0.57–1)
DEPRECATED RDW RBC AUTO: 44.5 FL (ref 37–54)
ERYTHROCYTE [DISTWIDTH] IN BLOOD BY AUTOMATED COUNT: 15.1 % (ref 12.3–15.4)
GFR SERPL CREATININE-BSD FRML MDRD: 82 ML/MIN/1.73
GLUCOSE SERPL-MCNC: 131 MG/DL (ref 65–99)
HCT VFR BLD AUTO: 45.2 % (ref 34–46.6)
HGB BLD-MCNC: 14.5 G/DL (ref 12–15.9)
INR PPP: 2.18 (ref 0.9–1.1)
INR PPP: 2.35 (ref 0.9–1.1)
MCH RBC QN AUTO: 26.1 PG (ref 26.6–33)
MCHC RBC AUTO-ENTMCNC: 32.1 G/DL (ref 31.5–35.7)
MCV RBC AUTO: 81.3 FL (ref 79–97)
PLATELET # BLD AUTO: 368 10*3/MM3 (ref 140–450)
PMV BLD AUTO: 9.7 FL (ref 6–12)
POTASSIUM SERPL-SCNC: 4.1 MMOL/L (ref 3.5–5.2)
PROTHROMBIN TIME: 24 SECONDS (ref 11.7–14.2)
PROTHROMBIN TIME: 25.5 SECONDS (ref 11.7–14.2)
RBC # BLD AUTO: 5.56 10*6/MM3 (ref 3.77–5.28)
SODIUM SERPL-SCNC: 139 MMOL/L (ref 136–145)
WBC # BLD AUTO: 13.7 10*3/MM3 (ref 3.4–10.8)

## 2021-07-23 PROCEDURE — 85610 PROTHROMBIN TIME: CPT | Performed by: INTERNAL MEDICINE

## 2021-07-23 PROCEDURE — G0378 HOSPITAL OBSERVATION PER HR: HCPCS

## 2021-07-23 PROCEDURE — 25010000002 METHYLPREDNISOLONE PER 40 MG: Performed by: INTERNAL MEDICINE

## 2021-07-23 PROCEDURE — 80048 BASIC METABOLIC PNL TOTAL CA: CPT | Performed by: INTERNAL MEDICINE

## 2021-07-23 PROCEDURE — 85027 COMPLETE CBC AUTOMATED: CPT | Performed by: INTERNAL MEDICINE

## 2021-07-23 PROCEDURE — 36415 COLL VENOUS BLD VENIPUNCTURE: CPT | Performed by: INTERNAL MEDICINE

## 2021-07-23 PROCEDURE — 25010000002 DIPHENHYDRAMINE PER 50 MG: Performed by: INTERNAL MEDICINE

## 2021-07-23 RX ORDER — WARFARIN SODIUM 7.5 MG/1
11.25 TABLET ORAL
Status: DISCONTINUED | OUTPATIENT
Start: 2021-07-24 | End: 2021-07-24 | Stop reason: HOSPADM

## 2021-07-23 RX ORDER — WARFARIN SODIUM 7.5 MG/1
11.25 TABLET ORAL NIGHTLY
COMMUNITY
End: 2022-02-07

## 2021-07-23 RX ORDER — WARFARIN SODIUM 7.5 MG/1
11.25 TABLET ORAL
Status: DISCONTINUED | OUTPATIENT
Start: 2021-07-23 | End: 2021-07-23

## 2021-07-23 RX ADMIN — BUPROPION HYDROCHLORIDE 300 MG: 300 TABLET, EXTENDED RELEASE ORAL at 05:38

## 2021-07-23 RX ADMIN — DIPHENHYDRAMINE HYDROCHLORIDE 25 MG: 50 INJECTION, SOLUTION INTRAMUSCULAR; INTRAVENOUS at 22:03

## 2021-07-23 RX ADMIN — FAMOTIDINE 20 MG: 10 INJECTION INTRAVENOUS at 08:28

## 2021-07-23 RX ADMIN — FAMOTIDINE 20 MG: 10 INJECTION INTRAVENOUS at 22:03

## 2021-07-23 RX ADMIN — DIPHENHYDRAMINE HYDROCHLORIDE 25 MG: 50 INJECTION, SOLUTION INTRAMUSCULAR; INTRAVENOUS at 10:07

## 2021-07-23 RX ADMIN — DIPHENHYDRAMINE HYDROCHLORIDE 25 MG: 50 INJECTION, SOLUTION INTRAMUSCULAR; INTRAVENOUS at 15:39

## 2021-07-23 RX ADMIN — METHYLPREDNISOLONE SODIUM SUCCINATE 40 MG: 40 INJECTION, POWDER, FOR SOLUTION INTRAMUSCULAR; INTRAVENOUS at 05:38

## 2021-07-23 RX ADMIN — METHYLPREDNISOLONE SODIUM SUCCINATE 40 MG: 40 INJECTION, POWDER, FOR SOLUTION INTRAMUSCULAR; INTRAVENOUS at 22:03

## 2021-07-23 RX ADMIN — SERTRALINE 100 MG: 100 TABLET, FILM COATED ORAL at 08:28

## 2021-07-23 RX ADMIN — ATORVASTATIN CALCIUM 10 MG: 20 TABLET, FILM COATED ORAL at 08:28

## 2021-07-23 RX ADMIN — AMLODIPINE BESYLATE 5 MG: 5 TABLET ORAL at 08:28

## 2021-07-23 RX ADMIN — WARFARIN 13.5 MG: 7.5 TABLET ORAL at 17:36

## 2021-07-23 RX ADMIN — METHYLPREDNISOLONE SODIUM SUCCINATE 40 MG: 40 INJECTION, POWDER, FOR SOLUTION INTRAMUSCULAR; INTRAVENOUS at 15:39

## 2021-07-23 RX ADMIN — DIPHENHYDRAMINE HYDROCHLORIDE 25 MG: 50 INJECTION, SOLUTION INTRAMUSCULAR; INTRAVENOUS at 05:38

## 2021-07-23 RX ADMIN — HYDROCHLOROTHIAZIDE 25 MG: 25 TABLET ORAL at 08:28

## 2021-07-23 RX ADMIN — LEVOTHYROXINE SODIUM 175 MCG: 0.17 TABLET ORAL at 05:38

## 2021-07-23 RX ADMIN — METHYLPREDNISOLONE SODIUM SUCCINATE 40 MG: 40 INJECTION, POWDER, FOR SOLUTION INTRAMUSCULAR; INTRAVENOUS at 10:06

## 2021-07-23 RX ADMIN — WARFARIN 11.25 MG: 7.5 TABLET ORAL at 00:27

## 2021-07-24 ENCOUNTER — READMISSION MANAGEMENT (OUTPATIENT)
Dept: CALL CENTER | Facility: HOSPITAL | Age: 56
End: 2021-07-24

## 2021-07-24 VITALS
BODY MASS INDEX: 47.09 KG/M2 | HEIGHT: 66 IN | TEMPERATURE: 98.7 F | OXYGEN SATURATION: 97 % | RESPIRATION RATE: 18 BRPM | WEIGHT: 293 LBS | SYSTOLIC BLOOD PRESSURE: 139 MMHG | HEART RATE: 69 BPM | DIASTOLIC BLOOD PRESSURE: 94 MMHG

## 2021-07-24 LAB
ALBUMIN SERPL-MCNC: 3.9 G/DL (ref 3.5–5.2)
ANION GAP SERPL CALCULATED.3IONS-SCNC: 9.6 MMOL/L (ref 5–15)
BACTERIA SPEC AEROBE CULT: NORMAL
BUN SERPL-MCNC: 16 MG/DL (ref 6–20)
BUN/CREAT SERPL: 22.2 (ref 7–25)
CALCIUM SPEC-SCNC: 9.2 MG/DL (ref 8.6–10.5)
CHLORIDE SERPL-SCNC: 100 MMOL/L (ref 98–107)
CO2 SERPL-SCNC: 27.4 MMOL/L (ref 22–29)
CREAT SERPL-MCNC: 0.72 MG/DL (ref 0.57–1)
DEPRECATED RDW RBC AUTO: 44.5 FL (ref 37–54)
ERYTHROCYTE [DISTWIDTH] IN BLOOD BY AUTOMATED COUNT: 15.1 % (ref 12.3–15.4)
GFR SERPL CREATININE-BSD FRML MDRD: 83 ML/MIN/1.73
GLUCOSE SERPL-MCNC: 129 MG/DL (ref 65–99)
HCT VFR BLD AUTO: 43.7 % (ref 34–46.6)
HGB BLD-MCNC: 14.1 G/DL (ref 12–15.9)
INR PPP: 2.78 (ref 0.9–1.1)
MAGNESIUM SERPL-MCNC: 2.1 MG/DL (ref 1.6–2.6)
MCH RBC QN AUTO: 26.2 PG (ref 26.6–33)
MCHC RBC AUTO-ENTMCNC: 32.3 G/DL (ref 31.5–35.7)
MCV RBC AUTO: 81.2 FL (ref 79–97)
PHOSPHATE SERPL-MCNC: 3.1 MG/DL (ref 2.5–4.5)
PLATELET # BLD AUTO: 371 10*3/MM3 (ref 140–450)
PMV BLD AUTO: 10 FL (ref 6–12)
POTASSIUM SERPL-SCNC: 4 MMOL/L (ref 3.5–5.2)
PROTHROMBIN TIME: 29 SECONDS (ref 11.7–14.2)
RBC # BLD AUTO: 5.38 10*6/MM3 (ref 3.77–5.28)
SODIUM SERPL-SCNC: 137 MMOL/L (ref 136–145)
WBC # BLD AUTO: 19.18 10*3/MM3 (ref 3.4–10.8)

## 2021-07-24 PROCEDURE — G0378 HOSPITAL OBSERVATION PER HR: HCPCS

## 2021-07-24 PROCEDURE — 25010000002 METHYLPREDNISOLONE PER 40 MG: Performed by: INTERNAL MEDICINE

## 2021-07-24 PROCEDURE — 80069 RENAL FUNCTION PANEL: CPT | Performed by: INTERNAL MEDICINE

## 2021-07-24 PROCEDURE — 85027 COMPLETE CBC AUTOMATED: CPT | Performed by: INTERNAL MEDICINE

## 2021-07-24 PROCEDURE — 25010000002 DIPHENHYDRAMINE PER 50 MG: Performed by: INTERNAL MEDICINE

## 2021-07-24 PROCEDURE — 85610 PROTHROMBIN TIME: CPT | Performed by: INTERNAL MEDICINE

## 2021-07-24 PROCEDURE — 83735 ASSAY OF MAGNESIUM: CPT | Performed by: INTERNAL MEDICINE

## 2021-07-24 RX ORDER — PREDNISONE 20 MG/1
20 TABLET ORAL DAILY
Qty: 3 TABLET | Refills: 0 | Status: SHIPPED | OUTPATIENT
Start: 2021-07-24 | End: 2021-09-15

## 2021-07-24 RX ADMIN — METHYLPREDNISOLONE SODIUM SUCCINATE 40 MG: 40 INJECTION, POWDER, FOR SOLUTION INTRAMUSCULAR; INTRAVENOUS at 08:38

## 2021-07-24 RX ADMIN — AMLODIPINE BESYLATE 5 MG: 5 TABLET ORAL at 08:39

## 2021-07-24 RX ADMIN — HYDROCHLOROTHIAZIDE 25 MG: 25 TABLET ORAL at 08:39

## 2021-07-24 RX ADMIN — LEVOTHYROXINE SODIUM 175 MCG: 0.17 TABLET ORAL at 07:05

## 2021-07-24 RX ADMIN — SERTRALINE 100 MG: 100 TABLET, FILM COATED ORAL at 08:39

## 2021-07-24 RX ADMIN — ATORVASTATIN CALCIUM 10 MG: 20 TABLET, FILM COATED ORAL at 08:39

## 2021-07-24 RX ADMIN — BUPROPION HYDROCHLORIDE 300 MG: 300 TABLET, EXTENDED RELEASE ORAL at 07:05

## 2021-07-24 RX ADMIN — METHYLPREDNISOLONE SODIUM SUCCINATE 40 MG: 40 INJECTION, POWDER, FOR SOLUTION INTRAMUSCULAR; INTRAVENOUS at 04:21

## 2021-07-24 RX ADMIN — DIPHENHYDRAMINE HYDROCHLORIDE 25 MG: 50 INJECTION, SOLUTION INTRAMUSCULAR; INTRAVENOUS at 04:21

## 2021-07-24 RX ADMIN — DIPHENHYDRAMINE HYDROCHLORIDE 25 MG: 50 INJECTION, SOLUTION INTRAMUSCULAR; INTRAVENOUS at 08:38

## 2021-07-24 NOTE — OUTREACH NOTE
Prep Survey      Responses   [facility] facility patient discharged from?  [place]   Is LACE score < 7 ?  Yes   Emergency Room discharge w/ pulse ox?  No   Eligibility  [facility]    Date of Admission  07/22/21   Date of Discharge  07/24/21   Discharge Disposition  Home or Self Care   Discharge diagnosis  Angioedema with swelling the face and neck with urticarial rash noted   Does the patient have one of the following disease processes/diagnoses(primary or secondary)?  Other   Does the patient have Home health ordered?  No   Is there a DME ordered?  No   Prep survey completed?  Yes          Monisha Arreguin RN

## 2021-07-26 ENCOUNTER — TRANSITIONAL CARE MANAGEMENT TELEPHONE ENCOUNTER (OUTPATIENT)
Dept: CALL CENTER | Facility: HOSPITAL | Age: 56
End: 2021-07-26

## 2021-07-26 NOTE — OUTREACH NOTE
Call Center TCM Note      Responses   [facility] patient discharged from?  [place]   Does the patient have one of the following disease processes/diagnoses(primary or secondary)?  Other   TCM attempt successful?  Yes   Call start time  1136   Call end time  1138   Discharge diagnosis  Angioedema with swelling the face and neck with urticarial rash noted   Meds reviewed with patient/caregiver?  Yes   Is the patient having any side effects they believe may be caused by any medication additions or changes?  No   Does the patient have all medications ordered at discharge?  Yes   Is the patient taking all medications as directed (includes completed medication regime)?  Yes   Does the patient have a primary care provider?   Yes   Does the patient have an appointment with their PCP within 7 days of discharge?  No   Comments regarding PCP  Patient declines PCP followup she reports she is seeing allergist this week.    What is preventing the patient from scheduling follow up appointments within 7 days of discharge?  Haven't had time   Nursing Interventions  Educated patient on importance of making appointment, Advised patient to make appointment   Has the patient kept scheduled appointments due by today?  N/A   Has home health visited the patient within 72 hours of discharge?  N/A   Psychosocial issues?  No   Did the patient receive a copy of their discharge instructions?  Yes   Nursing interventions  Reviewed instructions with patient   What is the patient's perception of their health status since discharge?  Improving   Is the patient/caregiver able to teach back signs and symptoms related to disease process for when to call PCP?  Yes   Is the patient/caregiver able to teach back signs and symptoms related to disease process for when to call 911?  Yes   Is the patient/caregiver able to teach back the hierarchy of who to call/visit for symptoms/problems? PCP, Specialist, Home health nurse, Urgent Care, ED, 911  Yes    If the patient is a current smoker, are they able to teach back resources for cessation?  Not a smoker   TCM call completed?  Yes          Dorian Humphries RN    7/26/2021, 11:38 EDT

## 2021-08-02 ENCOUNTER — CLINICAL SUPPORT (OUTPATIENT)
Dept: ONCOLOGY | Facility: HOSPITAL | Age: 56
End: 2021-08-02

## 2021-08-02 ENCOUNTER — LAB (OUTPATIENT)
Dept: LAB | Facility: HOSPITAL | Age: 56
End: 2021-08-02

## 2021-08-02 DIAGNOSIS — M31.0 GOODPASTURE'S SYNDROME (HCC): Primary | ICD-10-CM

## 2021-08-02 DIAGNOSIS — I82.401 DEEP VEIN THROMBOSIS (DVT) OF RIGHT LOWER EXTREMITY, UNSPECIFIED CHRONICITY, UNSPECIFIED VEIN (HCC): ICD-10-CM

## 2021-08-02 LAB
ERYTHROCYTE [SEDIMENTATION RATE] IN BLOOD: 24 MM/HR (ref 0–30)
INR PPP: 2.9 (ref 0.9–1.1)
PROTHROMBIN TIME: 34.4 SECONDS (ref 11–13.5)

## 2021-08-02 PROCEDURE — 36415 COLL VENOUS BLD VENIPUNCTURE: CPT

## 2021-08-02 PROCEDURE — 85610 PROTHROMBIN TIME: CPT

## 2021-08-02 PROCEDURE — 85652 RBC SED RATE AUTOMATED: CPT | Performed by: DERMATOLOGY

## 2021-08-02 PROCEDURE — G0463 HOSPITAL OUTPT CLINIC VISIT: HCPCS

## 2021-08-03 LAB — ASO AB SERPL-ACNC: 245.3 IU/ML (ref 0–200)

## 2021-08-26 ENCOUNTER — TELEPHONE (OUTPATIENT)
Dept: ONCOLOGY | Facility: CLINIC | Age: 56
End: 2021-08-26

## 2021-08-26 NOTE — TELEPHONE ENCOUNTER
Caller: Carli Garcia    Relationship to patient: Self    Best call back number: [phone]    Chief complaint: PATIENT CALLED TO RESCHEDULE HER LAB/NURSE REVIEW APPTS. ON 8/30/21 AND 9/27/21.  SHE STATES THAT HER WORK SCHEDULE HAS CHANGED AND SHE WOULD PREFER APPTS AROUND 11, 12 OR 1:00    IF THIS CANNOT BE DONE, LEAVE APPTS AS IS.      Type of visit: LAB/NURSE REVIEW     PLEASE CONTACT PATIENT TO ADVISE OF CHANGE OR INABILITY TO CHANGE.

## 2021-08-26 NOTE — TELEPHONE ENCOUNTER
PT COMING IN FOR LABS AT 1:00, PT EXPECTS A CALL BACK ON HER RN REVIEW HER WORK SCHEDULE CHANGED AND NEEDED THIS TO BE THIS WAY

## 2021-08-30 ENCOUNTER — CLINICAL SUPPORT (OUTPATIENT)
Dept: ONCOLOGY | Facility: HOSPITAL | Age: 56
End: 2021-08-30

## 2021-08-30 ENCOUNTER — LAB (OUTPATIENT)
Dept: LAB | Facility: HOSPITAL | Age: 56
End: 2021-08-30

## 2021-08-30 ENCOUNTER — TELEPHONE (OUTPATIENT)
Dept: ONCOLOGY | Facility: CLINIC | Age: 56
End: 2021-08-30

## 2021-08-30 DIAGNOSIS — J98.9 DISEASE OF RESPIRATORY SYSTEM: Primary | ICD-10-CM

## 2021-08-30 DIAGNOSIS — I26.99 OTHER PULMONARY EMBOLISM WITHOUT ACUTE COR PULMONALE, UNSPECIFIED CHRONICITY (HCC): Chronic | ICD-10-CM

## 2021-08-30 DIAGNOSIS — D68.61 ANTIPHOSPHOLIPID SYNDROME (HCC): Chronic | ICD-10-CM

## 2021-08-30 LAB
INR PPP: 4.3 (ref 0.9–1.1)
PROTHROMBIN TIME: 51.3 SECONDS (ref 11–13.5)

## 2021-08-30 PROCEDURE — 36415 COLL VENOUS BLD VENIPUNCTURE: CPT

## 2021-08-30 PROCEDURE — G0463 HOSPITAL OUTPT CLINIC VISIT: HCPCS

## 2021-08-30 PROCEDURE — 85610 PROTHROMBIN TIME: CPT

## 2021-08-30 NOTE — NURSING NOTE
Pt here today for lab with RN review. INR 4.3 today. She confirms her previous dose of 11.25mg daily. Pt denies missed doses or dietary changes. She started taking 10mg of Prednisone last Thursday and will be tapering for 2 weeks. S/w LOWELL Watson, OK to dose according to [facility]. Pt will now take 7.5mg M/F and 11.25mg AOD. Pt will recheck in one week. Bleeding precautions reviewed. Pt instructed to call with any new or worsening symptoms before next visit. Pt v/u to all.

## 2021-08-30 NOTE — TELEPHONE ENCOUNTER
----- Message from Annetta Guerrero RN sent at 8/30/2021  2:07 PM EDT -----  Regarding: Rn review next week  Can you call this pt with the available times for next Monday for an RN review with PT/INR labs?     Thanks,   Annetta

## 2021-09-01 ENCOUNTER — TELEPHONE (OUTPATIENT)
Dept: ONCOLOGY | Facility: CLINIC | Age: 56
End: 2021-09-01

## 2021-09-01 LAB
CD19 CELLS NFR SPEC: 24 % (ref 6–23)
CD3 CELLS # BLD: 1561 CELLS/UL (ref 570–2400)
CD3 CELLS NFR SPEC: 71 % (ref 62–87)
CD3+CD4+ CELLS # BLD: 1135 CELLS/UL (ref 430–1800)
CD3+CD4+ CELLS NFR BLD: 52 % (ref 32–64)
CD3+CD4+ CELLS/CD3+CD8+ CLL BLD: 2.74 RATIO (ref 0.8–3.9)
CD3+CD8+ CELLS # BLD: 409 CELLS/UL (ref 210–1200)
CD3+CD8+ CELLS NFR SPEC: 19 % (ref 15–46)
CD3-CD16+CD56+ CELLS # SPEC: 80 CELLS/UL (ref 78–470)
CD3-CD16+CD56+ CELLS NFR SPEC: 4 % (ref 4–26)
CD4+CD45+ CELLS NFR BLD: 58 % (ref 28–72)
CD4+CD45RA+ CELLS # BLD: 469 CELLS/UL (ref 150–870)
CD4+CD45RO+ CELLS # BLD: 648 CELLS/UL (ref 190–1050)
CD45RA CELLS NFR BLD: 42 % (ref 28–71)
CELLS.CD3-CD19+ [#/VOLUME] IN BLOOD: 538 CELLS/UL (ref 91–610)
CONV COMMENT: ABNORMAL
Lab: NORMAL
VIABLE CELLS NFR SPEC: NORMAL %

## 2021-09-01 NOTE — TELEPHONE ENCOUNTER
Caller: CARRIE    Relationship: GONZALO    Best call back number:  [phone] OPT [phone]    What is the best time to reach you: ASAP    What was the call regarding: SHE SAYS THE LYMPHOCYTE STIMULATION PANEL WILL NEED TO BE DIRECTLY SHIPPED TO [facility] IN [facility] BECAUSE IT NEEDS TO GET THERE WITHIN 30 HOURS. TO GET SHIPPING LABEL, ETC. CALL:  [phone] OPT [phone] [phone]    Do you require a callback: YES

## 2021-09-10 ENCOUNTER — APPOINTMENT (OUTPATIENT)
Dept: ONCOLOGY | Facility: HOSPITAL | Age: 56
End: 2021-09-10

## 2021-09-10 ENCOUNTER — APPOINTMENT (OUTPATIENT)
Dept: LAB | Facility: HOSPITAL | Age: 56
End: 2021-09-10

## 2021-09-14 ENCOUNTER — TRANSCRIBE ORDERS (OUTPATIENT)
Dept: ADMINISTRATIVE | Facility: HOSPITAL | Age: 56
End: 2021-09-14

## 2021-09-14 ENCOUNTER — TELEPHONE (OUTPATIENT)
Dept: ONCOLOGY | Facility: CLINIC | Age: 56
End: 2021-09-14

## 2021-09-14 DIAGNOSIS — R59.0 MEDIASTINAL LYMPHADENOPATHY: Primary | ICD-10-CM

## 2021-09-14 NOTE — TELEPHONE ENCOUNTER
Caller: KAILEY     Relationship to patient: SELF   Best call back number: [phone]    PATIENT IS HOPING TO CHANGE HER 9-16 LAB AND RN REVIEW TO TOMORROW 09-15. PLEASE CALL AND ADVISE   THANK YOU

## 2021-09-15 ENCOUNTER — LAB (OUTPATIENT)
Dept: LAB | Facility: HOSPITAL | Age: 56
End: 2021-09-15

## 2021-09-15 ENCOUNTER — OFFICE VISIT (OUTPATIENT)
Dept: INTERNAL MEDICINE | Facility: CLINIC | Age: 56
End: 2021-09-15

## 2021-09-15 ENCOUNTER — INFUSION (OUTPATIENT)
Dept: ONCOLOGY | Facility: HOSPITAL | Age: 56
End: 2021-09-15

## 2021-09-15 VITALS
OXYGEN SATURATION: 98 % | DIASTOLIC BLOOD PRESSURE: 90 MMHG | WEIGHT: 293 LBS | BODY MASS INDEX: 47.09 KG/M2 | TEMPERATURE: 98.7 F | HEART RATE: 103 BPM | SYSTOLIC BLOOD PRESSURE: 140 MMHG | HEIGHT: 66 IN

## 2021-09-15 DIAGNOSIS — J30.9 ALLERGIC RHINITIS, UNSPECIFIED SEASONALITY, UNSPECIFIED TRIGGER: ICD-10-CM

## 2021-09-15 DIAGNOSIS — I77.6 VASCULITIS (HCC): Primary | ICD-10-CM

## 2021-09-15 DIAGNOSIS — E78.00 HYPERCHOLESTEREMIA: Chronic | ICD-10-CM

## 2021-09-15 DIAGNOSIS — D68.61 ANTIPHOSPHOLIPID SYNDROME (HCC): Chronic | ICD-10-CM

## 2021-09-15 DIAGNOSIS — E03.9 ACQUIRED HYPOTHYROIDISM: Chronic | ICD-10-CM

## 2021-09-15 DIAGNOSIS — D68.61 ANTIPHOSPHOLIPID SYNDROME (HCC): Primary | ICD-10-CM

## 2021-09-15 DIAGNOSIS — Z79.01 ANTICOAGULANT LONG-TERM USE: ICD-10-CM

## 2021-09-15 LAB
BASOPHILS # BLD AUTO: 0.02 10*3/MM3 (ref 0–0.2)
BASOPHILS NFR BLD AUTO: 0.3 % (ref 0–1.5)
DEPRECATED RDW RBC AUTO: 42.4 FL (ref 37–54)
EOSINOPHIL # BLD AUTO: 0.07 10*3/MM3 (ref 0–0.4)
EOSINOPHIL NFR BLD AUTO: 1.1 % (ref 0.3–6.2)
ERYTHROCYTE [DISTWIDTH] IN BLOOD BY AUTOMATED COUNT: 14.6 % (ref 12.3–15.4)
HCT VFR BLD AUTO: 40.1 % (ref 34–46.6)
HGB BLD-MCNC: 12.8 G/DL (ref 12–15.9)
IMM GRANULOCYTES # BLD AUTO: 0.05 10*3/MM3 (ref 0–0.05)
IMM GRANULOCYTES NFR BLD AUTO: 0.8 % (ref 0–0.5)
INR PPP: 3.9 (ref 0.9–1.1)
LYMPHOCYTES # BLD AUTO: 1.36 10*3/MM3 (ref 0.7–3.1)
LYMPHOCYTES NFR BLD AUTO: 20.9 % (ref 19.6–45.3)
MCH RBC QN AUTO: 25.5 PG (ref 26.6–33)
MCHC RBC AUTO-ENTMCNC: 31.9 G/DL (ref 31.5–35.7)
MCV RBC AUTO: 80 FL (ref 79–97)
MONOCYTES # BLD AUTO: 0.43 10*3/MM3 (ref 0.1–0.9)
MONOCYTES NFR BLD AUTO: 6.6 % (ref 5–12)
NEUTROPHILS NFR BLD AUTO: 4.58 10*3/MM3 (ref 1.7–7)
NEUTROPHILS NFR BLD AUTO: 70.3 % (ref 42.7–76)
NRBC BLD AUTO-RTO: 0 /100 WBC (ref 0–0.2)
PLATELET # BLD AUTO: 310 10*3/MM3 (ref 140–450)
PMV BLD AUTO: 9.7 FL (ref 6–12)
PROTHROMBIN TIME: 47.3 SECONDS (ref 11–13.5)
RBC # BLD AUTO: 5.01 10*6/MM3 (ref 3.77–5.28)
WBC # BLD AUTO: 6.51 10*3/MM3 (ref 3.4–10.8)

## 2021-09-15 PROCEDURE — 99214 OFFICE O/P EST MOD 30 MIN: CPT | Performed by: NURSE PRACTITIONER

## 2021-09-15 PROCEDURE — 85610 PROTHROMBIN TIME: CPT

## 2021-09-15 PROCEDURE — 36415 COLL VENOUS BLD VENIPUNCTURE: CPT

## 2021-09-15 PROCEDURE — 85025 COMPLETE CBC W/AUTO DIFF WBC: CPT

## 2021-09-15 RX ORDER — COLCHICINE 0.6 MG/1
0.6 TABLET ORAL 2 TIMES DAILY
COMMUNITY
Start: 2021-08-25 | End: 2022-04-01

## 2021-09-15 RX ORDER — LEVOCETIRIZINE DIHYDROCHLORIDE 5 MG/1
5 TABLET, FILM COATED ORAL EVERY EVENING
Qty: 90 TABLET | Refills: 1 | Status: SHIPPED | OUTPATIENT
Start: 2021-09-15 | End: 2022-04-01

## 2021-09-15 NOTE — NURSING NOTE
Talked with pt on phone regarding INR of 3.9 today.  Pt was unable to stay due to another appt.  Pt denies any changes in diet or medications.  Pt reports that she was sick last week so the week of 8/30 she took 7.5mg coumadin on m/f and 11.25 all other days.  She then was unable to come in for recheck last week and took 11.25mg coumadin each day last week.    Per [facility] pt will reduce weekly coumadin dose to 63.75mg.  Pt will take 11.25mg coumadin Sun/Tues/ Thurs and 7.5mg all other days. Pt had no other questions or concerns.  Pt also is agreeable to this dosage plan.

## 2021-09-15 NOTE — PROGRESS NOTES
"Chief Complaint  Follow-up, Generalized Body Aches, Anxiety, and Hypertension    Subjective          Carli Garcia presents to [facility] for f/u regarding HTN, anxiety and diffuse joint pain.    She was hospitalized 7/22 due to angioedema, c/o swelling of the face and neck with an urticarial rash. She is followed by dermatology for leukocytoclastic vasculitis, has recently started colchicine. She c/o diffuse joint pain and stiffness. She has tapered off steroids for vasculitis.  She is now wearing O2 at 2LPM continuously, c/o increased dyspnea since recent hospitalization.    Sore Throat   This is a recurrent problem. The current episode started 1 to 4 weeks ago. The problem has been unchanged. Neither side of throat is experiencing more pain than the other. There has been no fever. The pain is at a severity of 8/10. Associated symptoms include abdominal pain, headaches, a hoarse voice and swollen glands. She has had no exposure to strep or mono.       Objective   Vital Signs:   /90 (BP Location: Left arm, Patient Position: Sitting, Cuff Size: Adult)   Pulse 103   Temp 98.7 °F (37.1 °C)   Ht 167.6 cm (65.98\")   Wt (!) 169 kg (372 lb 12.8 oz)   SpO2 98%   BMI 60.20 kg/m²     Physical Exam  Constitutional:       Appearance: She is well-developed. She is not ill-appearing.   HENT:      Head: Normocephalic.      Right Ear: Hearing, tympanic membrane and external ear normal.      Left Ear: Hearing, tympanic membrane and external ear normal.      Nose: Nose normal. No nasal deformity, mucosal edema or rhinorrhea.      Right Sinus: No maxillary sinus tenderness or frontal sinus tenderness.      Left Sinus: No maxillary sinus tenderness or frontal sinus tenderness.      Mouth/Throat:      Dentition: Normal dentition.   Eyes:      General: Lids are normal.         Right eye: No discharge.         Left eye: No discharge.      Conjunctiva/sclera: Conjunctivae normal.      Right eye: " No exudate.     Left eye: No exudate.  Neck:      Thyroid: No thyroid mass or thyromegaly.      Vascular: No carotid bruit.      Trachea: Trachea normal.   Cardiovascular:      Rate and Rhythm: Regular rhythm.      Pulses: Normal pulses.      Heart sounds: Normal heart sounds. No murmur heard.     Pulmonary:      Effort: No respiratory distress.      Breath sounds: Normal breath sounds. No decreased breath sounds, wheezing, rhonchi or rales.   Abdominal:      General: Bowel sounds are normal.      Palpations: Abdomen is soft.      Tenderness: There is no abdominal tenderness.   Musculoskeletal:      Cervical back: Normal range of motion. No edema.   Lymphadenopathy:      Head:      Right side of head: No submental, submandibular, tonsillar, preauricular, posterior auricular or occipital adenopathy.      Left side of head: No submental, submandibular, tonsillar, preauricular, posterior auricular or occipital adenopathy.   Skin:     General: Skin is warm and dry.      Nails: There is no clubbing.      Comments: Scattered urticaria on bilateral upper and lower extremities   Neurological:      Mental Status: She is alert.   Psychiatric:         Behavior: Behavior is cooperative.        Result Review :   The following data was reviewed by: JANINE Teague on 09/15/2021:  Common labs    Common Labsle 7/23/21 7/23/21 7/24/21 7/24/21 9/15/21    0552 [id number] 0702 0702    Glucose  131 (A)  129 (A)    BUN  13  16    Creatinine  0.73  0.72    eGFR Non African Am  82  83    Sodium  139  137    Potassium  4.1  4.0    Chloride  103  100    Calcium  9.1  9.2    Albumin    3.90    WBC 13.70 (A)  19.18 (A)  6.51   Hemoglobin 14.5  14.1  12.8   Hematocrit 45.2  43.7  40.1   Platelets 368  371  310   (A) Abnormal value            Data reviewed: Recent hospitalization notes 7/2021          Assessment and Plan    Diagnoses and all orders for this visit:    1. Vasculitis (CMS/HCC) (Primary)  Assessment & Plan:  She is followed by  dermatology and has been on tapering prednisone for several months, has recently tapered off and started colchicine      2. Antiphospholipid syndrome-IgG cardiolipin   Assessment & Plan:  +hx DVT/PE, now managed on Coumadin with regular protimes. She is unsure if she has had a rheumatology evaluation, will review and order autoimmune panel with next labs.       3. Allergic rhinitis, unspecified seasonality, unspecified trigger  Assessment & Plan:  She c/o increased congestion with drainage, will restart antihistamine (Xyzal) and continue to monitor.    Orders:  -     levocetirizine (XYZAL) 5 MG tablet; Take 1 tablet by mouth Every Evening.  Dispense: 90 tablet; Refill: 1    4. Acquired hypothyroidism  Assessment & Plan:  She is currently managed on Synthroid, recheck TSH today.    Orders:  -     T4, Free  -     TSH    5. Hypercholesteremia  Assessment & Plan:  She denies myalgias with atorvastatin, recheck lipid panel today.    Orders:  -     Lipid Panel    Current outpatient and discharge medications have been reconciled for the patient.  Reviewed by: JANINE Tobar      Follow Up   Return in about 3 months (around 12/15/2021) for Annual physical.    Patient was given instructions and counseling regarding her condition or for health maintenance advice. Please see specific information pulled into the AVS if appropriate.       Answers for HPI/ROS submitted by the patient on 9/14/2021  What is the primary reason for your visit?: Sore Throat

## 2021-09-16 ENCOUNTER — APPOINTMENT (OUTPATIENT)
Dept: ONCOLOGY | Facility: HOSPITAL | Age: 56
End: 2021-09-16

## 2021-09-16 ENCOUNTER — APPOINTMENT (OUTPATIENT)
Dept: LAB | Facility: HOSPITAL | Age: 56
End: 2021-09-16

## 2021-09-19 PROBLEM — J30.9 ALLERGIC RHINITIS: Chronic | Status: ACTIVE | Noted: 2021-09-19

## 2021-09-19 PROBLEM — J30.9 ALLERGIC RHINITIS: Status: ACTIVE | Noted: 2021-09-19

## 2021-09-19 PROBLEM — I77.6 VASCULITIS (HCC): Status: ACTIVE | Noted: 2021-09-19

## 2021-09-19 PROBLEM — Z86.711 HISTORY OF PULMONARY EMBOLUS (PE): Status: ACTIVE | Noted: 2021-09-19

## 2021-09-19 PROBLEM — I77.6 VASCULITIS: Chronic | Status: ACTIVE | Noted: 2021-09-19

## 2021-09-19 NOTE — ASSESSMENT & PLAN NOTE
+hx DVT/PE, now managed on Coumadin with regular protimes. She is unsure if she has had a rheumatology evaluation, will review and order autoimmune panel with next labs.

## 2021-09-19 NOTE — ASSESSMENT & PLAN NOTE
She is followed by dermatology and has been on tapering prednisone for several months, has recently tapered off and started colchicine

## 2021-09-19 NOTE — ASSESSMENT & PLAN NOTE
She c/o increased congestion with drainage, will restart antihistamine (Xyzal) and continue to monitor.

## 2021-10-25 ENCOUNTER — LAB (OUTPATIENT)
Dept: LAB | Facility: HOSPITAL | Age: 56
End: 2021-10-25

## 2021-10-25 ENCOUNTER — TELEPHONE (OUTPATIENT)
Dept: ONCOLOGY | Facility: HOSPITAL | Age: 56
End: 2021-10-25

## 2021-10-25 ENCOUNTER — OFFICE VISIT (OUTPATIENT)
Dept: ONCOLOGY | Facility: CLINIC | Age: 56
End: 2021-10-25

## 2021-10-25 VITALS
WEIGHT: 293 LBS | HEART RATE: 113 BPM | DIASTOLIC BLOOD PRESSURE: 85 MMHG | HEIGHT: 66 IN | OXYGEN SATURATION: 92 % | TEMPERATURE: 97.3 F | SYSTOLIC BLOOD PRESSURE: 155 MMHG | BODY MASS INDEX: 47.09 KG/M2 | RESPIRATION RATE: 20 BRPM

## 2021-10-25 DIAGNOSIS — I26.99 OTHER PULMONARY EMBOLISM WITHOUT ACUTE COR PULMONALE, UNSPECIFIED CHRONICITY (HCC): ICD-10-CM

## 2021-10-25 DIAGNOSIS — Z86.711 HISTORY OF PULMONARY EMBOLUS (PE): Primary | ICD-10-CM

## 2021-10-25 DIAGNOSIS — D68.61 ANTIPHOSPHOLIPID SYNDROME (HCC): ICD-10-CM

## 2021-10-25 DIAGNOSIS — I82.401 DEEP VEIN THROMBOSIS (DVT) OF RIGHT LOWER EXTREMITY, UNSPECIFIED CHRONICITY, UNSPECIFIED VEIN (HCC): ICD-10-CM

## 2021-10-25 DIAGNOSIS — D68.61 ANTIPHOSPHOLIPID SYNDROME (HCC): Chronic | ICD-10-CM

## 2021-10-25 LAB
BASOPHILS # BLD AUTO: 0.03 10*3/MM3 (ref 0–0.2)
BASOPHILS NFR BLD AUTO: 0.5 % (ref 0–1.5)
DEPRECATED RDW RBC AUTO: 41.2 FL (ref 37–54)
EOSINOPHIL # BLD AUTO: 0.06 10*3/MM3 (ref 0–0.4)
EOSINOPHIL NFR BLD AUTO: 1 % (ref 0.3–6.2)
ERYTHROCYTE [DISTWIDTH] IN BLOOD BY AUTOMATED COUNT: 14.3 % (ref 12.3–15.4)
HCT VFR BLD AUTO: 40.2 % (ref 34–46.6)
HGB BLD-MCNC: 12.6 G/DL (ref 12–15.9)
IMM GRANULOCYTES # BLD AUTO: 0.05 10*3/MM3 (ref 0–0.05)
IMM GRANULOCYTES NFR BLD AUTO: 0.8 % (ref 0–0.5)
INR PPP: 3.95 (ref 0.9–1.1)
LYMPHOCYTES # BLD AUTO: 1.21 10*3/MM3 (ref 0.7–3.1)
LYMPHOCYTES NFR BLD AUTO: 19.9 % (ref 19.6–45.3)
MCH RBC QN AUTO: 24.8 PG (ref 26.6–33)
MCHC RBC AUTO-ENTMCNC: 31.3 G/DL (ref 31.5–35.7)
MCV RBC AUTO: 79.1 FL (ref 79–97)
MONOCYTES # BLD AUTO: 0.4 10*3/MM3 (ref 0.1–0.9)
MONOCYTES NFR BLD AUTO: 6.6 % (ref 5–12)
NEUTROPHILS NFR BLD AUTO: 4.34 10*3/MM3 (ref 1.7–7)
NEUTROPHILS NFR BLD AUTO: 71.2 % (ref 42.7–76)
NRBC BLD AUTO-RTO: 0 /100 WBC (ref 0–0.2)
PLATELET # BLD AUTO: 404 10*3/MM3 (ref 140–450)
PMV BLD AUTO: 9.5 FL (ref 6–12)
PROTHROMBIN TIME: 38.3 SECONDS (ref 11.7–14.2)
RBC # BLD AUTO: 5.08 10*6/MM3 (ref 3.77–5.28)
WBC # BLD AUTO: 6.09 10*3/MM3 (ref 3.4–10.8)

## 2021-10-25 PROCEDURE — 99214 OFFICE O/P EST MOD 30 MIN: CPT | Performed by: INTERNAL MEDICINE

## 2021-10-25 PROCEDURE — 85610 PROTHROMBIN TIME: CPT | Performed by: INTERNAL MEDICINE

## 2021-10-25 PROCEDURE — 85025 COMPLETE CBC W/AUTO DIFF WBC: CPT

## 2021-10-25 PROCEDURE — 36415 COLL VENOUS BLD VENIPUNCTURE: CPT

## 2021-10-25 NOTE — PROGRESS NOTES
REASONS FOR FOLLOWUP:  Antiphospholipid antibody syndrome with history of pulmonary embolism March 2016    History of Present Illness    Mrs. Garcia is a 56-year-old woman with history of pulmonary embolism March 2016.  She was found to have positive lupus anticoagulant and cardiolipin antibodies at the time of her pulmonary embolism which have been persistent and has been on anticoagulation with Coumadin since diagnosis.  She has had no recurrent thromboembolic events while anticoagulated.  She has no bleeding problems.    She returned today for follow-up visit.  The patient has chronic dyspnea which is progressively worse.  She had a recent CT angiogram of the chest 04/09/2021 which was negative for pulmonary embolism.  Bilateral axillary lymphadenopathy was noted.  A follow-up CT chest by pulmonary medicine 7/7/2021 showed patchy areas of air trapping bilateral upper lobes, lower lobes, right middle lobe and lingula, prominent mediastinal and axillary lymph nodes stable.  She complains of shortness of breath with minimal exertion and is on 2 L O2 continuously.  She also has recent vasculitic skin rash being treated by dermatology.    The patient remains on Coumadin with no excessive bleeding or bruising.    Past Medical History:   Diagnosis Date   • Anemia    • Angioedema     Lower lip   • Depression    • Diastolic dysfunction    • DVT (deep venous thrombosis) (HCC)    • GERD (gastroesophageal reflux disease)    • H/O antiphospholipid syndrome    • Hiatal hernia    • Hypertension    • Hypothyroidism    • Lupus anticoagulant disorder (HCC)    • Lupus anticoagulant disorder (HCC)    • Morbid obesity (HCC)    • CRISTI (obstructive sleep apnea)    • PE (pulmonary embolism)     Bilateral   • Right ventricular dilation    • Thrombocytopenia (HCC)    • Vasculitis ([facility]) 05/2021       ONCOLOGIC HISTORY:  (History from previous dates can be found in the separate document.)    Current Outpatient Medications on File Prior to  Visit   Medication Sig Dispense Refill   • amLODIPine (NORVASC) 5 MG tablet Take 1 tablet by mouth Daily. 90 tablet 2   • atorvastatin (LIPITOR) 10 MG tablet Take 1 tablet by mouth Daily. 90 tablet 2   • colchicine 0.6 MG tablet Take 0.6 mg by mouth 2 (Two) Times a Day.     • hydroCHLOROthiazide (HYDRODIURIL) 25 MG tablet Take 1 tablet by mouth Daily. 90 tablet 2   • levocetirizine (XYZAL) 5 MG tablet Take 1 tablet by mouth Every Evening. 90 tablet 1   • levothyroxine (SYNTHROID, LEVOTHROID) 175 MCG tablet Take 1 tablet by mouth Daily. 90 tablet 2   • omeprazole (priLOSEC) 20 MG capsule Take 1 capsule by mouth Daily. 90 capsule 2   • sertraline (ZOLOFT) 100 MG tablet Take 1 tablet by mouth Daily. 90 tablet 2   • warfarin (COUMADIN) 7.5 MG tablet Take 11.25 mg by mouth Every Night.       No current facility-administered medications on file prior to visit.       ALLERGIES:     Allergies   Allergen Reactions   • Losartan Angioedema   • Toprol Xl [Metoprolol Tartrate] Shortness Of Breath   • Eggs Or Egg-Derived Products Nausea And Vomiting   • Dust Mite Extract Unknown - Low Severity   • Sulfa Antibiotics Unknown - Low Severity       Social History     Socioeconomic History   • Marital status: Single   • Number of children: 0   Tobacco Use   • Smoking status: Never Smoker   • Smokeless tobacco: Never Used   Vaping Use   • Vaping Use: Never used   Substance and Sexual Activity   • Alcohol use: No   • Drug use: No   • Sexual activity: Not Currently     Partners: Male     Birth control/protection: Abstinence         Cancer-related family history includes Cancer in her mother; Uterine cancer in her mother.     Review of Systems   Constitutional: Positive for unexpected weight change (gain). Negative for activity change and appetite change.   HENT: Negative.    Respiratory: Positive for shortness of breath (Exertion, chronic).    Cardiovascular: Positive for leg swelling. Negative for palpitations.   Genitourinary: Negative.  "   Musculoskeletal: Negative.    Skin: Positive for rash.   Neurological: Negative for weakness.   Hematological: Does not bruise/bleed easily.   Psychiatric/Behavioral: Positive for dysphoric mood.   ROS unchanged-05/10/2021      Objective      Vitals:    10/25/21 1552   BP: 155/85   Pulse: 113   Resp: 20   Temp: 97.3 °F (36.3 °C)   TempSrc: Infrared   SpO2: 92%   Weight: (!) 173 kg (381 lb 3.2 oz)   Height: 168 cm (66.14\")  Comment: new ht   PainSc: 0-No pain     Current Status 10/25/2021   ECOG score 1       Physical Exam   GENERAL: Well-developed, morbid obese woman  SKIN: Scattered erythematous hive-like areas  NECK: Supple with good range of motion; no thyromegaly or masses, no JVD.  LYMPHATICS: No cervical, supraclavicular, axillary or inguinal adenopathy.  CHEST: Lungs clear to percussion and auscultation. Good airflow.    CARDIAC: Regular rate and rhythm without murmurs, rubs or gallops. Normal S1,S2.  ABDOMEN: Soft, nontender with no organomegaly or masses.  EXTREMITIES: No clubbing, cyanosis.  Trace to 1+ ankle edema bilaterally   PSYCHIATRIC: Depressed mood and affect         RECENT LABS:  Hematology WBC   Date Value Ref Range Status   10/25/2021 6.09 3.40 - 10.80 10*3/mm3 Final   09/11/2020 6.3 3.4 - 10.8 x10E3/uL Final     RBC   Date Value Ref Range Status   10/25/2021 5.08 3.77 - 5.28 10*6/mm3 Final   04/09/2021 4.68 3.77 - 5.28 x10E6/uL Final     Hemoglobin   Date Value Ref Range Status   10/25/2021 12.6 12.0 - 15.9 g/dL Final     Hematocrit   Date Value Ref Range Status   10/25/2021 40.2 34.0 - 46.6 % Final     Platelets   Date Value Ref Range Status   10/25/2021 404 140 - 450 10*3/mm3 Final      INR 5.1    Assessment/Plan    1.   history of pulmonary embolism in March 2016 secondary to antiphospholipid antibody syndrome.   Imaging in February 2019 showed age indeterminate pulmonary emboli and she complained of increased shortness of breath so her INR goal was changed to 2.5-3.5.  Her INR is " elevated today 5.1 being rechecked on the hospital machine.  We will have the Coumadin nurse call the patient with instructions once available and recheck the INR 2 weeks.    Patient is on medication requiring intensive monitoring for toxicity.    2.  Antiphospholipid antibody syndrome    3.  Obesity BMI 61: I recommended the patient to consider an evaluation for weight loss surgery.  I am not sure if she is a candidate.    4.  Bilateral axillary lymphadenopathy noted on CT chest 04/09/2021 stable on follow-up CT chest 7/7/2021    5.  Chronic hypoxic respiratory failure followed by pulmonary medicine    Plan:  1.  Continue Coumadin dose to be adjusted once repeat INR available from the hospital lab  2.  I recommended she follow-up with her PCP and cardiology for her dyspnea.  Does she need cardiac testing such as an echocardiogram?  3.  Consider rheumatology for evaluation of vasculitis if second opinion dermatology is not helpful

## 2021-10-25 NOTE — TELEPHONE ENCOUNTER
Pt's fingerstick INR today was >4.5. Lab repeated at the hospital. INR 3.95. Dose adjusted per [facility] and reviewed with Dr Guerrier. Pt notified by phone she will now need to take Coumadin 11.25 mg on Sunday and Thursday and 7.5 mg all other days of the week. Pt v/u.

## 2021-10-28 ENCOUNTER — TELEPHONE (OUTPATIENT)
Dept: ONCOLOGY | Facility: CLINIC | Age: 56
End: 2021-10-28

## 2021-10-28 NOTE — TELEPHONE ENCOUNTER
PT HAS BEEN CALLED AND UPDATED ABOUT HER REQUESTED APPTS BEING MOVED TO THE AFTERNOON ADVISED THAT THESE ARE ON THE PT MY CHART

## 2021-10-28 NOTE — TELEPHONE ENCOUNTER
Caller: Carli Garcia    Relationship to patient: Self    Best call back number: [phone]    Chief complaint: PATIENT HAS REVIEWED HER UPCOMING APPTS ON MY CHART. SHE STATES THAT SHE CANNOT DO MORNING APPTS.  AND WOULD LIKE TO RESCHEDULE UNTIL AROUND 1:00.  PATIENT ALSO STATES THAT SHE DOES NOT NEED NURSE REVIEW APPTS, AND THAT SHE CAN JUST REVIEW BY PHONE    Additional notes: PLEASE CONTACT PATIENT TO ADVISE-LEAVE VM IF NO ANSWER.

## 2021-11-08 ENCOUNTER — APPOINTMENT (OUTPATIENT)
Dept: ONCOLOGY | Facility: HOSPITAL | Age: 56
End: 2021-11-08

## 2021-11-08 ENCOUNTER — LAB (OUTPATIENT)
Dept: LAB | Facility: HOSPITAL | Age: 56
End: 2021-11-08

## 2021-11-08 ENCOUNTER — APPOINTMENT (OUTPATIENT)
Dept: LAB | Facility: HOSPITAL | Age: 56
End: 2021-11-08

## 2021-11-08 DIAGNOSIS — L50.9 URTICARIA, UNSPECIFIED: ICD-10-CM

## 2021-11-08 DIAGNOSIS — Z79.01 ANTICOAGULANT LONG-TERM USE: Primary | ICD-10-CM

## 2021-11-08 DIAGNOSIS — L95.8 NEUTROPHILIC VASCULITIS OF SKIN: ICD-10-CM

## 2021-11-08 DIAGNOSIS — Z79.899 ENCOUNTER FOR LONG-TERM (CURRENT) USE OF OTHER MEDICATIONS: ICD-10-CM

## 2021-11-08 DIAGNOSIS — R53.81 DEBILITY: ICD-10-CM

## 2021-11-08 LAB
ALBUMIN SERPL-MCNC: 3.5 G/DL (ref 3.5–5.2)
ALBUMIN/GLOB SERPL: 1.1 G/DL
ALP SERPL-CCNC: 64 U/L (ref 39–117)
ALT SERPL W P-5'-P-CCNC: 5 U/L (ref 1–33)
ANION GAP SERPL CALCULATED.3IONS-SCNC: 10.6 MMOL/L (ref 5–15)
AST SERPL-CCNC: 12 U/L (ref 1–32)
BACTERIA UR QL AUTO: ABNORMAL /HPF
BASOPHILS # BLD AUTO: 0.01 10*3/MM3 (ref 0–0.2)
BASOPHILS NFR BLD AUTO: 0.2 % (ref 0–1.5)
BILIRUB SERPL-MCNC: 0.4 MG/DL (ref 0–1.2)
BILIRUB UR QL STRIP: ABNORMAL
BUN SERPL-MCNC: 9 MG/DL (ref 6–20)
BUN/CREAT SERPL: 11.5 (ref 7–25)
C3 SERPL-MCNC: 56 MG/DL (ref 82–167)
C4 SERPL-MCNC: 8 MG/DL (ref 14–44)
CALCIUM SPEC-SCNC: 8.9 MG/DL (ref 8.6–10.5)
CHLORIDE SERPL-SCNC: 101 MMOL/L (ref 98–107)
CHROMATIN AB SERPL-ACNC: <10 IU/ML (ref 0–14)
CLARITY UR: CLEAR
CO2 SERPL-SCNC: 28.4 MMOL/L (ref 22–29)
COLOR UR: YELLOW
CREAT SERPL-MCNC: 0.78 MG/DL (ref 0.57–1)
DEPRECATED RDW RBC AUTO: 42.1 FL (ref 37–54)
EOSINOPHIL # BLD AUTO: 0.04 10*3/MM3 (ref 0–0.4)
EOSINOPHIL NFR BLD AUTO: 0.8 % (ref 0.3–6.2)
ERYTHROCYTE [DISTWIDTH] IN BLOOD BY AUTOMATED COUNT: 14.5 % (ref 12.3–15.4)
ERYTHROCYTE [SEDIMENTATION RATE] IN BLOOD: 13 MM/HR (ref 0–30)
GFR SERPL CREATININE-BSD FRML MDRD: 76 ML/MIN/1.73
GLOBULIN UR ELPH-MCNC: 3.2 GM/DL
GLUCOSE SERPL-MCNC: 94 MG/DL (ref 65–99)
GLUCOSE UR STRIP-MCNC: NEGATIVE MG/DL
HAV IGM SERPL QL IA: NORMAL
HBV CORE IGM SERPL QL IA: NORMAL
HBV SURFACE AG SERPL QL IA: NORMAL
HCT VFR BLD AUTO: 39.7 % (ref 34–46.6)
HCV AB SER DONR QL: NORMAL
HGB BLD-MCNC: 12.4 G/DL (ref 12–15.9)
HGB UR QL STRIP.AUTO: ABNORMAL
HYALINE CASTS UR QL AUTO: ABNORMAL /LPF
IMM GRANULOCYTES # BLD AUTO: 0.03 10*3/MM3 (ref 0–0.05)
IMM GRANULOCYTES NFR BLD AUTO: 0.6 % (ref 0–0.5)
INR PPP: 3.9 (ref 0.9–1.1)
KETONES UR QL STRIP: ABNORMAL
LEUKOCYTE ESTERASE UR QL STRIP.AUTO: NEGATIVE
LYMPHOCYTES # BLD AUTO: 1.07 10*3/MM3 (ref 0.7–3.1)
LYMPHOCYTES NFR BLD AUTO: 20.3 % (ref 19.6–45.3)
MCH RBC QN AUTO: 25 PG (ref 26.6–33)
MCHC RBC AUTO-ENTMCNC: 31.2 G/DL (ref 31.5–35.7)
MCV RBC AUTO: 80 FL (ref 79–97)
MONOCYTES # BLD AUTO: 0.35 10*3/MM3 (ref 0.1–0.9)
MONOCYTES NFR BLD AUTO: 6.6 % (ref 5–12)
NEUTROPHILS NFR BLD AUTO: 3.77 10*3/MM3 (ref 1.7–7)
NEUTROPHILS NFR BLD AUTO: 71.5 % (ref 42.7–76)
NITRITE UR QL STRIP: NEGATIVE
NRBC BLD AUTO-RTO: 0 /100 WBC (ref 0–0.2)
PH UR STRIP.AUTO: 7 [PH] (ref 4.5–8)
PLATELET # BLD AUTO: 399 10*3/MM3 (ref 140–450)
PMV BLD AUTO: 9.3 FL (ref 6–12)
POTASSIUM SERPL-SCNC: 3.8 MMOL/L (ref 3.5–5.2)
PROT SERPL-MCNC: 6.7 G/DL (ref 6–8.5)
PROT UR QL STRIP: ABNORMAL
PROTHROMBIN TIME: 38 SECONDS (ref 11.7–14.2)
RBC # BLD AUTO: 4.96 10*6/MM3 (ref 3.77–5.28)
RBC # UR: ABNORMAL /HPF
REF LAB TEST METHOD: ABNORMAL
SODIUM SERPL-SCNC: 140 MMOL/L (ref 136–145)
SP GR UR STRIP: 1.02 (ref 1–1.03)
SQUAMOUS #/AREA URNS HPF: ABNORMAL /HPF
UROBILINOGEN UR QL STRIP: ABNORMAL
WBC # BLD AUTO: 5.27 10*3/MM3 (ref 3.4–10.8)
WBC UR QL AUTO: ABNORMAL /HPF

## 2021-11-08 PROCEDURE — 36415 COLL VENOUS BLD VENIPUNCTURE: CPT

## 2021-11-08 PROCEDURE — 86160 COMPLEMENT ANTIGEN: CPT | Performed by: DERMATOLOGY

## 2021-11-08 PROCEDURE — 81001 URINALYSIS AUTO W/SCOPE: CPT

## 2021-11-08 PROCEDURE — 85610 PROTHROMBIN TIME: CPT | Performed by: INTERNAL MEDICINE

## 2021-11-08 PROCEDURE — 85025 COMPLETE CBC W/AUTO DIFF WBC: CPT

## 2021-11-08 PROCEDURE — 86431 RHEUMATOID FACTOR QUANT: CPT | Performed by: DERMATOLOGY

## 2021-11-08 PROCEDURE — 85652 RBC SED RATE AUTOMATED: CPT | Performed by: DERMATOLOGY

## 2021-11-08 PROCEDURE — 80053 COMPREHEN METABOLIC PANEL: CPT | Performed by: DERMATOLOGY

## 2021-11-08 PROCEDURE — 80074 ACUTE HEPATITIS PANEL: CPT | Performed by: DERMATOLOGY

## 2021-11-09 ENCOUNTER — TELEPHONE (OUTPATIENT)
Dept: ONCOLOGY | Facility: HOSPITAL | Age: 56
End: 2021-11-09

## 2021-11-09 LAB
ANA SER QL: NEGATIVE
ENA SS-A AB SER-ACNC: <0.2 AI (ref 0–0.9)
ENA SS-B AB SER-ACNC: <0.2 AI (ref 0–0.9)
IGA SERPL-MCNC: 358 MG/DL (ref 87–352)
IGG SERPL-MCNC: 1131 MG/DL (ref 586–1602)
IGM SERPL-MCNC: 89 MG/DL (ref 26–217)
PROT PATTERN SERPL IFE-IMP: ABNORMAL

## 2021-11-09 NOTE — TELEPHONE ENCOUNTER
Called pt to review INR from yesterday that was sent to the hospital. INR 3.9. Pt confirmed previous dosing, denies missed doses, or new medications. Reviewed with Shirley ETIENNE, ok to dose per [facility]. Per [facility] pt will now take 11.25 mg on Sun and 7.5 mg AOD. Pt wrote down dosing and v/u. Pt requesting home monitoring at this time if Dr. Guerrier is okay with it. Pt requesting to test once per month from home. Told pt I would review with Dr. Guerrire and send message to  who sets up home testing. Pt v/u.

## 2021-12-06 ENCOUNTER — APPOINTMENT (OUTPATIENT)
Dept: ONCOLOGY | Facility: HOSPITAL | Age: 56
End: 2021-12-06

## 2021-12-06 ENCOUNTER — TELEPHONE (OUTPATIENT)
Dept: ONCOLOGY | Facility: CLINIC | Age: 56
End: 2021-12-06

## 2021-12-06 ENCOUNTER — APPOINTMENT (OUTPATIENT)
Dept: LAB | Facility: HOSPITAL | Age: 56
End: 2021-12-06

## 2021-12-06 NOTE — TELEPHONE ENCOUNTER
Caller: SHEMAR    Relationship to patient: Self    Best call back number: [phone]    Chief complaint: CANC. TODAY'S  APPTS. DUE TO ILLNESS.    Type of visit: LAB/ RN REVIEW      Requested date: WILL CALL BACK, DID NOT WANT ME TO WT.    If rescheduling, when is the original appointment: 12/6/2021

## 2021-12-08 ENCOUNTER — APPOINTMENT (OUTPATIENT)
Dept: ONCOLOGY | Facility: HOSPITAL | Age: 56
End: 2021-12-08

## 2021-12-08 ENCOUNTER — APPOINTMENT (OUTPATIENT)
Dept: LAB | Facility: HOSPITAL | Age: 56
End: 2021-12-08

## 2021-12-09 ENCOUNTER — TELEPHONE (OUTPATIENT)
Dept: ONCOLOGY | Facility: HOSPITAL | Age: 56
End: 2021-12-09

## 2021-12-09 LAB
EXTERNAL INR: 3.1
EXTERNAL RESULTING LABORATORY: NORMAL

## 2021-12-09 NOTE — TELEPHONE ENCOUNTER
Called pt to review INR results. INR 3.1 with a goal of 3. Pt denies any missed doses but read off many medication changes. Currently tapering off prednisone, taking plaquenil 200 mg, torsemide 20 mg, Potassium 20meq, and decreased amlodipine to 2.5 mg and discontinued hydrochlorothiazide. Confirmed current dosing. Per [facility] pt will remian on same dose of 11.25 mg sundays and 7.5 mg AOD. Pt repeated dosing back to me and v/u.

## 2021-12-30 DIAGNOSIS — F41.9 ANXIETY: ICD-10-CM

## 2021-12-30 RX ORDER — SERTRALINE HYDROCHLORIDE 100 MG/1
TABLET, FILM COATED ORAL
Qty: 90 TABLET | Refills: 2 | Status: SHIPPED | OUTPATIENT
Start: 2021-12-30 | End: 2022-08-29

## 2022-01-01 DIAGNOSIS — E03.9 ACQUIRED HYPOTHYROIDISM: ICD-10-CM

## 2022-01-01 RX ORDER — LEVOTHYROXINE SODIUM 175 UG/1
TABLET ORAL
Qty: 90 TABLET | Refills: 2 | Status: SHIPPED | OUTPATIENT
Start: 2022-01-01 | End: 2022-06-06 | Stop reason: SDUPTHER

## 2022-01-03 ENCOUNTER — APPOINTMENT (OUTPATIENT)
Dept: LAB | Facility: HOSPITAL | Age: 57
End: 2022-01-03

## 2022-01-03 ENCOUNTER — APPOINTMENT (OUTPATIENT)
Dept: ONCOLOGY | Facility: HOSPITAL | Age: 57
End: 2022-01-03

## 2022-01-25 ENCOUNTER — APPOINTMENT (OUTPATIENT)
Dept: CT IMAGING | Facility: HOSPITAL | Age: 57
End: 2022-01-25

## 2022-01-31 ENCOUNTER — APPOINTMENT (OUTPATIENT)
Dept: LAB | Facility: HOSPITAL | Age: 57
End: 2022-01-31

## 2022-01-31 ENCOUNTER — APPOINTMENT (OUTPATIENT)
Dept: ONCOLOGY | Facility: HOSPITAL | Age: 57
End: 2022-01-31

## 2022-02-04 ENCOUNTER — TELEPHONE (OUTPATIENT)
Dept: ONCOLOGY | Facility: HOSPITAL | Age: 57
End: 2022-02-04

## 2022-02-04 LAB
EXTERNAL INR: 2.2
EXTERNAL RESULTING LABORATORY: NORMAL

## 2022-02-04 NOTE — TELEPHONE ENCOUNTER
Called pt to review INR results. INR 2.2 with a goal of 3. Denies any missed doses or medication changes. Per [facility] pt will now take 11.25 mg sundays/thursdays and 7.5 mg AOD. Pt repeated back new dosing and v/u.

## 2022-02-07 RX ORDER — WARFARIN SODIUM 7.5 MG/1
TABLET ORAL
Qty: 141 TABLET | Refills: 1 | Status: SHIPPED | OUTPATIENT
Start: 2022-02-07 | End: 2022-08-29

## 2022-02-25 DIAGNOSIS — K21.9 GASTROESOPHAGEAL REFLUX DISEASE WITHOUT ESOPHAGITIS: ICD-10-CM

## 2022-02-25 DIAGNOSIS — E78.00 HYPERCHOLESTEREMIA: ICD-10-CM

## 2022-02-25 RX ORDER — OMEPRAZOLE 20 MG/1
CAPSULE, DELAYED RELEASE ORAL
Qty: 90 CAPSULE | Refills: 2 | Status: SHIPPED | OUTPATIENT
Start: 2022-02-25 | End: 2022-06-06 | Stop reason: SDUPTHER

## 2022-02-25 RX ORDER — ATORVASTATIN CALCIUM 10 MG/1
TABLET, FILM COATED ORAL
Qty: 90 TABLET | Refills: 2 | Status: SHIPPED | OUTPATIENT
Start: 2022-02-25 | End: 2022-06-06 | Stop reason: SDUPTHER

## 2022-02-28 ENCOUNTER — APPOINTMENT (OUTPATIENT)
Dept: ONCOLOGY | Facility: HOSPITAL | Age: 57
End: 2022-02-28

## 2022-02-28 ENCOUNTER — APPOINTMENT (OUTPATIENT)
Dept: LAB | Facility: HOSPITAL | Age: 57
End: 2022-02-28

## 2022-03-04 ENCOUNTER — TELEPHONE (OUTPATIENT)
Dept: ONCOLOGY | Facility: HOSPITAL | Age: 57
End: 2022-03-04

## 2022-03-04 LAB
EXTERNAL INR: 2.1
EXTERNAL RESULTING LABORATORY: NORMAL

## 2022-03-15 ENCOUNTER — TELEPHONE (OUTPATIENT)
Dept: ONCOLOGY | Facility: CLINIC | Age: 57
End: 2022-03-15

## 2022-03-15 NOTE — TELEPHONE ENCOUNTER
DELETE AFTER REVIEWING: Telephone encounter to be sent to the clinical pool     Caller: Carli Garcia    Relationship to patient: Self    Best call back number: [phone] CAN CALL ANYTIME AND LEAVE VM IF NEEDED.    Type of visit: LABS/RN REVIEW    Requested date: 03/21/2022.     If rescheduling, when is the original appointment: 03/28/2022     Additional notes:PATIENT WOULD LIKE TO HAVE HER LABS AND RN REVIEW RESCHEDULED FOR 03/21/2022 AROUND 1PM IF POSSIBLE.  PATIENT WILL ALREADY BE IN THE AREA AND IT WOULD BEST FIT PATIENTS SCHEDULE.  ONCE A DETERMINATION IS MADE PLEASE CONTACT PATIENT BACK VIA PHONE.         [facility]

## 2022-03-21 ENCOUNTER — LAB (OUTPATIENT)
Dept: LAB | Facility: HOSPITAL | Age: 57
End: 2022-03-21

## 2022-03-21 ENCOUNTER — HOSPITAL ENCOUNTER (OUTPATIENT)
Dept: CT IMAGING | Facility: HOSPITAL | Age: 57
Discharge: HOME | End: 2022-03-21
Admitting: INTERNAL MEDICINE

## 2022-03-21 ENCOUNTER — CLINICAL SUPPORT (OUTPATIENT)
Dept: ONCOLOGY | Facility: HOSPITAL | Age: 57
End: 2022-03-21

## 2022-03-21 ENCOUNTER — TELEPHONE (OUTPATIENT)
Dept: ONCOLOGY | Facility: HOSPITAL | Age: 57
End: 2022-03-21

## 2022-03-21 DIAGNOSIS — R59.0 MEDIASTINAL LYMPHADENOPATHY: ICD-10-CM

## 2022-03-21 DIAGNOSIS — Z79.01 ANTICOAGULANT LONG-TERM USE: Primary | ICD-10-CM

## 2022-03-21 LAB
CREAT BLDA-MCNC: 0.8 MG/DL (ref 0.6–1.3)
INR PPP: 1.9 (ref 0.9–1.1)
PROTHROMBIN TIME: 23.1 SECONDS (ref 11–13.5)

## 2022-03-21 PROCEDURE — 71260 CT THORAX DX C+: CPT

## 2022-03-21 PROCEDURE — 25010000002 IOPAMIDOL 61 % SOLUTION: Performed by: INTERNAL MEDICINE

## 2022-03-21 PROCEDURE — 36416 COLLJ CAPILLARY BLOOD SPEC: CPT

## 2022-03-21 PROCEDURE — 85610 PROTHROMBIN TIME: CPT

## 2022-03-21 PROCEDURE — 82565 ASSAY OF CREATININE: CPT | Performed by: NURSE PRACTITIONER

## 2022-03-21 RX ADMIN — IOPAMIDOL 95 ML: 612 INJECTION, SOLUTION INTRAVENOUS at 14:09

## 2022-03-21 NOTE — PROGRESS NOTES
Pt not found in waiting room. Attempted to call pt and review INR of 1.90. Goal is 2.5-3.5. No answer lvm.

## 2022-03-21 NOTE — TELEPHONE ENCOUNTER
Called pt to review INR of 1.90. Goal is 2.5-3.5. Pt confirms taking dose in coag, but reports missing a dose the Saturday before last. Coag suggest pt take 11.25 mg all days. Reviewed with Shirley MEHTA who is ok for pt to follow coag. Pt v/u and will recheck at home in one week.

## 2022-03-31 ENCOUNTER — TELEPHONE (OUTPATIENT)
Dept: INTERNAL MEDICINE | Facility: CLINIC | Age: 57
End: 2022-03-31

## 2022-03-31 NOTE — TELEPHONE ENCOUNTER
Caller: Carli Garcia    Relationship: Self    Best call back number: [phone]    What was the call regarding: PATIENT REPORTING THE FOLLOWING SYMPTOMS:  RUNNY NOSE, GREEN MUCUS, COUGHING, FATIGUE,  SHE IS ON OXYGEN.  SHE HAS THESE SYMPTOMS SINCE Tuesday.    PHARMACY: [address] - [address] [address] - [phone]  - [phone] FX    ALSO PATIENT REQUESTING A MAMMOGRAM ON  5/11/22 THE SAME DAY SHE SEES [facility] FOR A PHYSICAL    Do you require a callback: YES

## 2022-04-01 ENCOUNTER — TELEMEDICINE (OUTPATIENT)
Dept: INTERNAL MEDICINE | Facility: CLINIC | Age: 57
End: 2022-04-01

## 2022-04-01 DIAGNOSIS — J21.9 ACUTE BRONCHIOLITIS DUE TO UNSPECIFIED ORGANISM: Primary | ICD-10-CM

## 2022-04-01 PROCEDURE — 99213 OFFICE O/P EST LOW 20 MIN: CPT | Performed by: NURSE PRACTITIONER

## 2022-04-01 RX ORDER — GUAIFENESIN 600 MG/1
600 TABLET, EXTENDED RELEASE ORAL EVERY 12 HOURS SCHEDULED
Qty: 30 TABLET | Refills: 0
Start: 2022-04-01 | End: 2022-04-08

## 2022-04-01 RX ORDER — FLUTICASONE PROPIONATE 50 MCG
2 SPRAY, SUSPENSION (ML) NASAL DAILY
Qty: 9.9 ML | Refills: 0 | Status: SHIPPED | OUTPATIENT
Start: 2022-04-01 | End: 2022-05-01

## 2022-04-01 NOTE — PROGRESS NOTES
Subjective   Carli Garcia is a 57 y.o. female.         History of Present Illness     The following portions of the patient's history were reviewed and updated as appropriate: allergies, current medications, past social history and problem list.    Past Medical History:   Diagnosis Date   • Anemia    • Angioedema     Lower lip   • Depression    • Diastolic dysfunction    • DVT (deep venous thrombosis) (HCC)    • GERD (gastroesophageal reflux disease)    • H/O antiphospholipid syndrome    • Hiatal hernia    • Hypertension    • Hypothyroidism    • Lupus anticoagulant disorder (HCC)    • Lupus anticoagulant disorder (HCC)    • Morbid obesity (HCC)    • CRISTI (obstructive sleep apnea)    • PE (pulmonary embolism)     Bilateral   • Right ventricular dilation    • Thrombocytopenia (HCC)    • Vasculitis (HCC) 05/2021         Current Outpatient Medications:   •  amLODIPine (NORVASC) 5 MG tablet, Take 1 tablet by mouth Daily., Disp: 90 tablet, Rfl: 2  •  atorvastatin (LIPITOR) 10 MG tablet, TAKE ONE TABLET BY MOUTH DAILY, Disp: 90 tablet, Rfl: 2  •  colchicine 0.6 MG tablet, Take 0.6 mg by mouth 2 (Two) Times a Day., Disp: , Rfl:   •  hydroCHLOROthiazide (HYDRODIURIL) 25 MG tablet, Take 1 tablet by mouth Daily., Disp: 90 tablet, Rfl: 2  •  levocetirizine (XYZAL) 5 MG tablet, Take 1 tablet by mouth Every Evening., Disp: 90 tablet, Rfl: 1  •  levothyroxine (SYNTHROID, LEVOTHROID) 175 MCG tablet, TAKE ONE TABLET BY MOUTH DAILY, Disp: 90 tablet, Rfl: 2  •  omeprazole (priLOSEC) 20 MG capsule, TAKE ONE CAPSULE BY MOUTH DAILY, Disp: 90 capsule, Rfl: 2  •  sertraline (ZOLOFT) 100 MG tablet, TAKE ONE TABLET BY MOUTH DAILY, Disp: 90 tablet, Rfl: 2  •  warfarin (COUMADIN) 7.5 MG tablet, TAKE TWO TABLETS BY MOUTH DAILY ON SUNDAYS AND TAKE 1 AND 1/2 TABLET BY MOUTH ON ALL OTHER DAYS, Disp: 141 tablet, Rfl: 1    Allergies   Allergen Reactions   • Losartan Angioedema   • Toprol Xl [Metoprolol Tartrate] Shortness Of Breath   • Eggs Or  Egg-Derived Products Nausea And Vomiting   • Dust Mite Extract Unknown - Low Severity   • Sulfa Antibiotics Unknown - Low Severity       Review of Systems    Objective   There were no vitals filed for this visit.  There is no height or weight on file to calculate BMI.  Physical Exam    Assessment/Plan   There are no diagnoses linked to this encounter.

## 2022-04-01 NOTE — PROGRESS NOTES
Chief Complaint  PAM Garcia presents to [facility] due to respiratory symptoms.      She presents due to increased congestion and drainage over the past week with discolored sputum, denies fever and/or chills. She is also coughing up sputum, denies worsening dyspnea. She denies known COVID-19 exposure.  She is being treated for vasculitis, doing well on tapering prednisone as well as dapsone and Plaquenil daily.      Objective   Vital Signs:   There were no vitals taken for this visit.           Physical Exam  Constitutional:       Appearance: She is well-developed.   HENT:      Head: Normocephalic and atraumatic.   Eyes:      General:         Right eye: No discharge.         Left eye: No discharge.      Conjunctiva/sclera: Conjunctivae normal.   Pulmonary:      Effort: Pulmonary effort is normal.   Neurological:      Mental Status: She is alert and oriented to person, place, and time.   Psychiatric:         Behavior: Behavior normal.         Thought Content: Thought content normal.         Judgment: Judgment normal.        Result Review :   The following data was reviewed by: JANINE Teague on 04/01/2022:  Common labs    Common Labsle 10/25/21 11/8/21 11/8/21 3/21/22     1549 1549    Glucose   94    BUN   9    Creatinine   0.78 0.80   eGFR Non African Am   76    Sodium   140    Potassium   3.8    Chloride   101    Calcium   8.9    Albumin   3.50    Total Bilirubin   0.4    Alkaline Phosphatase   64    AST (SGOT)   12    ALT (SGPT)   5    WBC 6.09 5.27     Hemoglobin 12.6 12.4     Hematocrit 40.2 39.7     Platelets 404 399        Comments are available for some flowsheets but are not being displayed.                     Assessment and Plan    Diagnoses and all orders for this visit:    1. Acute bronchiolitis due to unspecified organism (Primary)  -     guaiFENesin (Mucinex) 600 MG 12 hr tablet; Take 1 tablet by mouth Every 12 (Twelve) Hours for 7  days.  Dispense: 30 tablet; Refill: 0  -     fluticasone (FLONASE) 50 MCG/ACT nasal spray; 2 sprays into the nostril(s) as directed by provider Daily for 30 days.  Dispense: 9.9 mL; Refill: 0    Sx appear more viral in nature, will begin Mucinex and Flonase for increased drainage and continue to monitor. Consider further evaluation if sx persist and/or worsen.    History and medical judgement obtained from video conversation with patient. No hands-on physical examination was performed. Approximately 14 minutes spent in video conversation with patient and in chart review.      Follow Up   Return if symptoms worsen or fail to improve, for Next scheduled follow up.  Patient was given instructions and counseling regarding her condition or for health maintenance advice. Please see specific information pulled into the AVS if appropriate.

## 2022-04-03 RX ORDER — POTASSIUM CHLORIDE 1.5 G/1.77G
POWDER, FOR SOLUTION ORAL EVERY 6 HOURS
COMMUNITY
End: 2022-05-11 | Stop reason: SDUPTHER

## 2022-04-03 RX ORDER — POTASSIUM CHLORIDE 1500 MG/1
20 TABLET, FILM COATED, EXTENDED RELEASE ORAL DAILY
COMMUNITY
Start: 2022-02-25

## 2022-04-03 RX ORDER — HYDROXYCHLOROQUINE SULFATE 200 MG/1
1 TABLET, FILM COATED ORAL EVERY 12 HOURS
COMMUNITY

## 2022-04-03 RX ORDER — TORSEMIDE 20 MG/1
20 TABLET ORAL DAILY
COMMUNITY
Start: 2022-02-25

## 2022-04-03 RX ORDER — DAPSONE 25 MG/1
25 TABLET ORAL
COMMUNITY
Start: 2022-03-21 | End: 2022-08-24

## 2022-04-03 RX ORDER — PREDNISONE 1 MG/1
7 TABLET ORAL DAILY
COMMUNITY
Start: 2022-02-23 | End: 2022-10-17 | Stop reason: DRUGHIGH

## 2022-04-04 ENCOUNTER — TELEPHONE (OUTPATIENT)
Dept: ONCOLOGY | Facility: HOSPITAL | Age: 57
End: 2022-04-04

## 2022-04-04 LAB
EXTERNAL INR: 2.8
EXTERNAL RESULTING LABORATORY: NORMAL

## 2022-04-04 NOTE — TELEPHONE ENCOUNTER
Called patient to review INR of 2.8, goal of 3. Pt stated she did miss a dose but couldn't remember if it was this past Thursday or the Thursday prior. Regardless, per [facility] pt to remain on same dose of 11.25 mg daily. Pt voiced understanding.

## 2022-04-06 DIAGNOSIS — D68.61 ANTIPHOSPHOLIPID SYNDROME: Primary | ICD-10-CM

## 2022-04-11 ENCOUNTER — OFFICE VISIT (OUTPATIENT)
Dept: ONCOLOGY | Facility: CLINIC | Age: 57
End: 2022-04-11

## 2022-04-11 ENCOUNTER — LAB (OUTPATIENT)
Dept: LAB | Facility: HOSPITAL | Age: 57
End: 2022-04-11

## 2022-04-11 VITALS
WEIGHT: 293 LBS | RESPIRATION RATE: 18 BRPM | BODY MASS INDEX: 47.09 KG/M2 | DIASTOLIC BLOOD PRESSURE: 80 MMHG | OXYGEN SATURATION: 93 % | HEIGHT: 66 IN | SYSTOLIC BLOOD PRESSURE: 133 MMHG | TEMPERATURE: 97.1 F | HEART RATE: 78 BPM

## 2022-04-11 DIAGNOSIS — D68.61 ANTIPHOSPHOLIPID SYNDROME: Chronic | ICD-10-CM

## 2022-04-11 DIAGNOSIS — I82.401 DEEP VEIN THROMBOSIS (DVT) OF RIGHT LOWER EXTREMITY, UNSPECIFIED CHRONICITY, UNSPECIFIED VEIN: Primary | ICD-10-CM

## 2022-04-11 DIAGNOSIS — D68.61 ANTIPHOSPHOLIPID SYNDROME: ICD-10-CM

## 2022-04-11 DIAGNOSIS — Z79.01 ANTICOAGULANT LONG-TERM USE: Primary | ICD-10-CM

## 2022-04-11 LAB
BASOPHILS # BLD AUTO: 0.06 10*3/MM3 (ref 0–0.2)
BASOPHILS NFR BLD AUTO: 0.7 % (ref 0–1.5)
DEPRECATED RDW RBC AUTO: 43.8 FL (ref 37–54)
EOSINOPHIL # BLD AUTO: 0.02 10*3/MM3 (ref 0–0.4)
EOSINOPHIL NFR BLD AUTO: 0.2 % (ref 0.3–6.2)
ERYTHROCYTE [DISTWIDTH] IN BLOOD BY AUTOMATED COUNT: 15.1 % (ref 12.3–15.4)
HCT VFR BLD AUTO: 39.7 % (ref 34–46.6)
HGB BLD-MCNC: 12.2 G/DL (ref 12–15.9)
IMM GRANULOCYTES # BLD AUTO: 0.06 10*3/MM3 (ref 0–0.05)
IMM GRANULOCYTES NFR BLD AUTO: 0.7 % (ref 0–0.5)
INR PPP: 3.3 (ref 0.9–1.1)
LYMPHOCYTES # BLD AUTO: 1.18 10*3/MM3 (ref 0.7–3.1)
LYMPHOCYTES NFR BLD AUTO: 13.3 % (ref 19.6–45.3)
MCH RBC QN AUTO: 24.7 PG (ref 26.6–33)
MCHC RBC AUTO-ENTMCNC: 30.7 G/DL (ref 31.5–35.7)
MCV RBC AUTO: 80.4 FL (ref 79–97)
MONOCYTES # BLD AUTO: 0.31 10*3/MM3 (ref 0.1–0.9)
MONOCYTES NFR BLD AUTO: 3.5 % (ref 5–12)
NEUTROPHILS NFR BLD AUTO: 7.25 10*3/MM3 (ref 1.7–7)
NEUTROPHILS NFR BLD AUTO: 81.6 % (ref 42.7–76)
NRBC BLD AUTO-RTO: 0 /100 WBC (ref 0–0.2)
PLATELET # BLD AUTO: 305 10*3/MM3 (ref 140–450)
PMV BLD AUTO: 9.6 FL (ref 6–12)
PROTHROMBIN TIME: 39.4 SECONDS (ref 11–13.5)
RBC # BLD AUTO: 4.94 10*6/MM3 (ref 3.77–5.28)
WBC NRBC COR # BLD: 8.88 10*3/MM3 (ref 3.4–10.8)

## 2022-04-11 PROCEDURE — 85610 PROTHROMBIN TIME: CPT

## 2022-04-11 PROCEDURE — 36415 COLL VENOUS BLD VENIPUNCTURE: CPT

## 2022-04-11 PROCEDURE — 99214 OFFICE O/P EST MOD 30 MIN: CPT | Performed by: INTERNAL MEDICINE

## 2022-04-11 PROCEDURE — 85025 COMPLETE CBC W/AUTO DIFF WBC: CPT

## 2022-04-11 RX ORDER — CETIRIZINE HYDROCHLORIDE 10 MG/1
10 TABLET ORAL DAILY PRN
COMMUNITY

## 2022-04-11 NOTE — PROGRESS NOTES
REASONS FOR FOLLOWUP:  Antiphospholipid antibody syndrome with history of pulmonary embolism March 2016    History of Present Illness    Mrs. Garcia is a 57-year-old woman with history of pulmonary embolism March 2016.  She was found to have positive lupus anticoagulant and cardiolipin antibodies at the time of her pulmonary embolism which have been persistent and has been on anticoagulation with Coumadin since diagnosis.  She had concern for pulmonary embolism despite Coumadin and her INR goal was increased to 2.5-3.5.    Patient returned today for follow-up.  She is on Plaquenil, dapsone and prednisone for treatment of vasculitis.  She remains on Coumadin without excessive bleeding or bruising.  She has a home monitoring system now.  She is checking her INR every 2 to 4 weeks pending the level in need of adjustments.    Past Medical History:   Diagnosis Date   • Anemia    • Angioedema     Lower lip   • Depression    • Diastolic dysfunction    • DVT (deep venous thrombosis) (HCC)    • GERD (gastroesophageal reflux disease)    • H/O antiphospholipid syndrome    • Hiatal hernia    • Hypertension    • Hypothyroidism    • Lupus anticoagulant disorder (HCC)    • Lupus anticoagulant disorder (HCC)    • Morbid obesity (HCC)    • CRISTI (obstructive sleep apnea)    • PE (pulmonary embolism)     Bilateral   • Right ventricular dilation    • Thrombocytopenia (HCC)    • Vasculitis ([facility]) 05/2021       ONCOLOGIC HISTORY:  (History from previous dates can be found in the separate document.)    Current Outpatient Medications on File Prior to Visit   Medication Sig Dispense Refill   • amLODIPine (NORVASC) 5 MG tablet Take 1 tablet by mouth Daily. 90 tablet 2   • atorvastatin (LIPITOR) 10 MG tablet TAKE ONE TABLET BY MOUTH DAILY 90 tablet 2   • dapsone 25 MG tablet      • fluticasone (FLONASE) 50 MCG/ACT nasal spray 2 sprays into the nostril(s) as directed by provider Daily for 30 days. 9.9 mL 0   • hydroxychloroquine (Plaquenil)  200 MG tablet Take 1 tablet by mouth Every 12 (Twelve) Hours.     • levothyroxine (SYNTHROID, LEVOTHROID) 175 MCG tablet TAKE ONE TABLET BY MOUTH DAILY 90 tablet 2   • omeprazole (priLOSEC) 20 MG capsule TAKE ONE CAPSULE BY MOUTH DAILY 90 capsule 2   • potassium chloride (KLOR-CON) 20 MEQ packet Take  by mouth Every 6 (Six) Hours.     • potassium chloride ER (K-TAB) 20 MEQ tablet controlled-release ER tablet      • predniSONE (DELTASONE) 1 MG tablet Take 4 tablets by mouth.     • sertraline (ZOLOFT) 100 MG tablet TAKE ONE TABLET BY MOUTH DAILY 90 tablet 2   • torsemide (DEMADEX) 20 MG tablet      • warfarin (COUMADIN) 7.5 MG tablet TAKE TWO TABLETS BY MOUTH DAILY ON SUNDAYS AND TAKE 1 AND 1/2 TABLET BY MOUTH ON ALL OTHER DAYS 141 tablet 1     No current facility-administered medications on file prior to visit.       ALLERGIES:     Allergies   Allergen Reactions   • Losartan Angioedema   • Toprol Xl [Metoprolol Tartrate] Shortness Of Breath   • Eggs Or Egg-Derived Products Nausea And Vomiting   • Dust Mite Extract Unknown - Low Severity   • Sulfa Antibiotics Unknown - Low Severity       Social History     Socioeconomic History   • Marital status: Single   • Number of children: 0   Tobacco Use   • Smoking status: Never Smoker   • Smokeless tobacco: Never Used   Vaping Use   • Vaping Use: Never used   Substance and Sexual Activity   • Alcohol use: No   • Drug use: No   • Sexual activity: Not Currently     Partners: Male     Birth control/protection: Abstinence         Cancer-related family history includes Cancer in her mother; Uterine cancer in her mother.     Review of Systems   Constitutional: Negative for activity change, appetite change and unexpected weight change (gain).   HENT: Negative.    Respiratory: Positive for shortness of breath (Exertion, chronic).    Cardiovascular: Positive for leg swelling. Negative for palpitations.   Genitourinary: Negative.    Musculoskeletal: Negative.    Skin: Positive for rash  "(Improving on therapy for vasculitis).   Neurological: Negative for weakness.   Hematological: Does not bruise/bleed easily.   Psychiatric/Behavioral: Positive for dysphoric mood.         Objective      Vitals:    04/11/22 1459   BP: 133/80   Pulse: 78   Resp: 18   Temp: 97.1 °F (36.2 °C)   TempSrc: Infrared   SpO2: 93%  Comment: w/oxy   Weight: (!) 160 kg (352 lb)   Height: 168 cm (66.14\")   PainSc: 0-No pain     Current Status 4/11/2022   ECOG score 1       Physical Exam   GENERAL: Well-developed, morbid obese woman  SKIN: Scattered small erythematous lesions  NECK: Supple with good range of motion; no thyromegaly or masses, no JVD.  LYMPHATICS: No cervical, supraclavicular, axillary or inguinal adenopathy.  CHEST: Lungs clear to percussion and auscultation. Good airflow.    CARDIAC: Regular rate and rhythm without murmurs, rubs or gallops. Normal S1,S2.  ABDOMEN: Soft, nontender with no organomegaly or masses.  EXTREMITIES: No clubbing, cyanosis.  Trace to 1+ ankle edema bilaterally   PSYCHIATRIC: Normal mood and affect         RECENT LABS:  Hematology WBC   Date Value Ref Range Status   04/11/2022 8.88 3.40 - 10.80 10*3/mm3 Final   09/11/2020 6.3 3.4 - 10.8 x10E3/uL Final     RBC   Date Value Ref Range Status   04/11/2022 4.94 3.77 - 5.28 10*6/mm3 Final   04/09/2021 4.68 3.77 - 5.28 x10E6/uL Final     Hemoglobin   Date Value Ref Range Status   04/11/2022 12.2 12.0 - 15.9 g/dL Final     Hematocrit   Date Value Ref Range Status   04/11/2022 39.7 34.0 - 46.6 % Final     Platelets   Date Value Ref Range Status   04/11/2022 305 140 - 450 10*3/mm3 Final      INR 3.3    Assessment/Plan    1.   history of pulmonary embolism in March 2016 secondary to antiphospholipid antibody syndrome.   Imaging in February 2019 showed age indeterminate pulmonary emboli and she complained of increased shortness of breath so her INR goal was changed to 2.5-3.5.  INR therapeutic today 3.3; we will continue 11.25 daily of Coumadin.  " Patient will recheck her INR with her home monitoring system 2 weeks.    Patient is on medication requiring intensive monitoring for toxicity.    2.  Antiphospholipid antibody syndrome    3.  Obesity BMI 61:     4.  Bilateral axillary lymphadenopathy noted on CT chest 04/09/2021 stable on follow-up CT chest 7/7/2021    5.  Chronic hypoxic respiratory failure on O2 followed by pulmonary medicine    Plan:  1.  Continue Coumadin with goal INR 2.5-3.5.  2.  The patient has a home monitoring system and will repeat her INR in 2 weeks.

## 2022-04-28 ENCOUNTER — TELEPHONE (OUTPATIENT)
Dept: ONCOLOGY | Facility: HOSPITAL | Age: 57
End: 2022-04-28

## 2022-04-28 LAB
EXTERNAL INR: 4.3 (ref 2.5–3.5)
EXTERNAL RESULTING LABORATORY: ABNORMAL

## 2022-04-28 NOTE — TELEPHONE ENCOUNTER
Lab Results   Component Value Date    INR 4.3 (A) 04/27/2022    INR 3.30 (H) 04/11/2022    INR 2.8 04/03/2022    PROTIME 39.4 (H) 04/11/2022    PROTIME 23.1 (H) 03/21/2022    PROTIME 38.0 (H) 11/08/2021     Called pt to review coag.

## 2022-05-09 ENCOUNTER — TELEPHONE (OUTPATIENT)
Dept: ONCOLOGY | Facility: CLINIC | Age: 57
End: 2022-05-09

## 2022-05-09 NOTE — TELEPHONE ENCOUNTER
Caller: Carli Garcia    Relationship: Self    Best call back number: [phone]      What was the call regarding: PATIENT WANTED TO SCHEDULE AN APPOINTMENT TO GET HER INR CHECKED. SHE WANTED 5-11-22 AROUND 11:30    Do you require a callback: YES PLEASE CALL BACK TO SCHEDULE

## 2022-05-11 ENCOUNTER — OFFICE VISIT (OUTPATIENT)
Dept: INTERNAL MEDICINE | Facility: CLINIC | Age: 57
End: 2022-05-11

## 2022-05-11 ENCOUNTER — LAB (OUTPATIENT)
Dept: LAB | Facility: HOSPITAL | Age: 57
End: 2022-05-11

## 2022-05-11 ENCOUNTER — CLINICAL SUPPORT (OUTPATIENT)
Dept: ONCOLOGY | Facility: HOSPITAL | Age: 57
End: 2022-05-11

## 2022-05-11 ENCOUNTER — APPOINTMENT (OUTPATIENT)
Dept: WOMENS IMAGING | Facility: HOSPITAL | Age: 57
End: 2022-05-11

## 2022-05-11 VITALS
OXYGEN SATURATION: 94 % | SYSTOLIC BLOOD PRESSURE: 140 MMHG | WEIGHT: 293 LBS | BODY MASS INDEX: 47.09 KG/M2 | HEIGHT: 66 IN | HEART RATE: 88 BPM | DIASTOLIC BLOOD PRESSURE: 82 MMHG | TEMPERATURE: 97.5 F

## 2022-05-11 DIAGNOSIS — I82.401 DEEP VEIN THROMBOSIS (DVT) OF RIGHT LOWER EXTREMITY, UNSPECIFIED CHRONICITY, UNSPECIFIED VEIN: Primary | ICD-10-CM

## 2022-05-11 DIAGNOSIS — F41.8 DEPRESSION WITH ANXIETY: Chronic | ICD-10-CM

## 2022-05-11 DIAGNOSIS — G47.33 OSA (OBSTRUCTIVE SLEEP APNEA): Chronic | ICD-10-CM

## 2022-05-11 DIAGNOSIS — I10 ESSENTIAL HYPERTENSION: Chronic | ICD-10-CM

## 2022-05-11 DIAGNOSIS — E03.9 ACQUIRED HYPOTHYROIDISM: Chronic | ICD-10-CM

## 2022-05-11 DIAGNOSIS — Z12.4 SCREENING FOR CERVICAL CANCER: ICD-10-CM

## 2022-05-11 DIAGNOSIS — Z23 NEED FOR SHINGLES VACCINE: ICD-10-CM

## 2022-05-11 DIAGNOSIS — Z00.00 PHYSICAL EXAM: Primary | ICD-10-CM

## 2022-05-11 DIAGNOSIS — E78.00 HYPERCHOLESTEREMIA: Chronic | ICD-10-CM

## 2022-05-11 DIAGNOSIS — J30.9 ALLERGIC RHINITIS, UNSPECIFIED SEASONALITY, UNSPECIFIED TRIGGER: Chronic | ICD-10-CM

## 2022-05-11 DIAGNOSIS — K21.9 GASTROESOPHAGEAL REFLUX DISEASE WITHOUT ESOPHAGITIS: Chronic | ICD-10-CM

## 2022-05-11 DIAGNOSIS — I77.6 VASCULITIS: Chronic | ICD-10-CM

## 2022-05-11 LAB
INR PPP: 3.2 (ref 0.9–1.1)
PROTHROMBIN TIME: 38.2 SECONDS (ref 11–13.5)

## 2022-05-11 PROCEDURE — 77067 SCR MAMMO BI INCL CAD: CPT | Performed by: RADIOLOGY

## 2022-05-11 PROCEDURE — 77063 BREAST TOMOSYNTHESIS BI: CPT | Performed by: RADIOLOGY

## 2022-05-11 PROCEDURE — 99396 PREV VISIT EST AGE 40-64: CPT | Performed by: NURSE PRACTITIONER

## 2022-05-11 PROCEDURE — 90471 IMMUNIZATION ADMIN: CPT | Performed by: NURSE PRACTITIONER

## 2022-05-11 PROCEDURE — 36416 COLLJ CAPILLARY BLOOD SPEC: CPT

## 2022-05-11 PROCEDURE — 85610 PROTHROMBIN TIME: CPT

## 2022-05-11 PROCEDURE — 90750 HZV VACC RECOMBINANT IM: CPT | Performed by: NURSE PRACTITIONER

## 2022-05-11 NOTE — PROGRESS NOTES
Subjective   Carli Garcia is a 57 y.o. female who is here for a physical exam.    She reports feeling better, pruritic rash (hx vasculitis) is steadily improving. She is managed on Plaquenil and is slowly tapering off Prednisone with good management of symptoms.  She is no longer working once employer required work in office, trying to stay at home due to immunosuppression.       The following portions of the patient's history were reviewed and updated as appropriate: allergies, current medications, past social history and problem list.    Past Medical History:   Diagnosis Date   • Anemia    • Angioedema     Lower lip   • Depression    • Diastolic dysfunction    • DVT (deep venous thrombosis) (HCC)    • GERD (gastroesophageal reflux disease)    • H/O antiphospholipid syndrome    • Hiatal hernia    • Hypertension    • Hypothyroidism    • Lupus anticoagulant disorder (HCC)    • Lupus anticoagulant disorder (HCC)    • Morbid obesity (HCC)    • CRISTI (obstructive sleep apnea)    • PE (pulmonary embolism)     Bilateral   • Right ventricular dilation    • Thrombocytopenia (HCC)    • Vasculitis ([facility]) 05/2021         Current Outpatient Medications:   •  atorvastatin (LIPITOR) 10 MG tablet, TAKE ONE TABLET BY MOUTH DAILY, Disp: 90 tablet, Rfl: 2  •  cetirizine (zyrTEC) 10 MG tablet, Take 10 mg by mouth Daily As Needed for Allergies., Disp: , Rfl:   •  dapsone 25 MG tablet, 25 mg. 2- 25mg tabs daily., Disp: , Rfl:   •  hydroxychloroquine (PLAQUENIL) 200 MG tablet, Take 1 tablet by mouth Every 12 (Twelve) Hours., Disp: , Rfl:   •  levothyroxine (SYNTHROID, LEVOTHROID) 175 MCG tablet, TAKE ONE TABLET BY MOUTH DAILY, Disp: 90 tablet, Rfl: 2  •  omeprazole (priLOSEC) 20 MG capsule, TAKE ONE CAPSULE BY MOUTH DAILY, Disp: 90 capsule, Rfl: 2  •  potassium chloride ER (K-TAB) 20 MEQ tablet controlled-release ER tablet, , Disp: , Rfl:   •  predniSONE (DELTASONE) 1 MG tablet, Take 7 mg by mouth Daily., Disp: , Rfl:   •  sertraline  (ZOLOFT) 100 MG tablet, TAKE ONE TABLET BY MOUTH DAILY, Disp: 90 tablet, Rfl: 2  •  torsemide (DEMADEX) 20 MG tablet, , Disp: , Rfl:   •  warfarin (COUMADIN) 7.5 MG tablet, TAKE TWO TABLETS BY MOUTH DAILY ON SUNDAYS AND TAKE 1 AND 1/2 TABLET BY MOUTH ON ALL OTHER DAYS, Disp: 141 tablet, Rfl: 1  •  amLODIPine (NORVASC) 5 MG tablet, TAKE ONE TABLET BY MOUTH DAILY, Disp: 90 tablet, Rfl: 2  •  fluticasone (FLONASE) 50 MCG/ACT nasal spray, 2 sprays into the nostril(s) as directed by provider Daily for 30 days., Disp: 9.9 mL, Rfl: 0    Allergies   Allergen Reactions   • Losartan Angioedema   • Toprol Xl [Metoprolol Tartrate] Shortness Of Breath   • Eggs Or Egg-Derived Products Nausea And Vomiting   • Dust Mite Extract Unknown - Low Severity   • Sulfa Antibiotics Unknown - Low Severity       Review of Systems   Constitutional: Negative for activity change, appetite change, chills, diaphoresis, fatigue, fever and unexpected weight change.   HENT: Negative for congestion, dental problem, drooling, ear discharge, ear pain, facial swelling, hearing loss, mouth sores, nosebleeds, postnasal drip, rhinorrhea, sinus pressure, sore throat, tinnitus and trouble swallowing.    Eyes: Negative for photophobia, pain, discharge, redness, itching and visual disturbance.   Respiratory: Negative for apnea, cough, choking, chest tightness, shortness of breath and wheezing.    Cardiovascular: Negative for chest pain, palpitations and leg swelling.        No orthopnea, PND, VALERIO   Gastrointestinal: Negative for abdominal pain, blood in stool, constipation, diarrhea, nausea and vomiting.   Endocrine: Negative for cold intolerance, heat intolerance, polydipsia and polyuria.   Genitourinary: Negative for decreased urine volume, dysuria, enuresis, flank pain, frequency, hematuria and urgency.   Musculoskeletal: Positive for arthralgias. Negative for back pain, gait problem, joint swelling, myalgias, neck pain and neck stiffness.   Skin: Positive  "for color change. Negative for rash.        No hair changes, no nail changes   Allergic/Immunologic: Negative for environmental allergies, food allergies and immunocompromised state.   Neurological: Negative for dizziness, tremors, seizures, syncope, speech difficulty, weakness, light-headedness, numbness and headaches.   Hematological: Negative for adenopathy. Does not bruise/bleed easily.   Psychiatric/Behavioral: Positive for dysphoric mood. Negative for agitation, confusion, decreased concentration, sleep disturbance and suicidal ideas. The patient is nervous/anxious (improved on Sertraline and Wellbutrin XL).        Objective   Vitals:    05/11/22 1347   BP: 140/82   BP Location: Left arm   Patient Position: Sitting   Cuff Size: Adult   Pulse: 88   Temp: 97.5 °F (36.4 °C)   TempSrc: Temporal   SpO2: 94%   Weight: (!) 160 kg (352 lb)   Height: 167.6 cm (66\")     Physical Exam  Vitals and nursing note reviewed.   Constitutional:       Appearance: She is well-developed. She is obese.   HENT:      Right Ear: Hearing, tympanic membrane, ear canal and external ear normal.      Left Ear: Hearing, tympanic membrane, ear canal and external ear normal.      Nose:      Right Sinus: No maxillary sinus tenderness or frontal sinus tenderness.      Left Sinus: No maxillary sinus tenderness or frontal sinus tenderness.   Eyes:      General: Lids are normal.      Conjunctiva/sclera: Conjunctivae normal.      Pupils: Pupils are equal, round, and reactive to light.   Neck:      Thyroid: No thyroid mass or thyromegaly.      Vascular: No carotid bruit or JVD.      Trachea: Trachea normal. No tracheal deviation.   Cardiovascular:      Rate and Rhythm: Normal rate and regular rhythm.      Pulses:           Carotid pulses are 2+ on the right side and 2+ on the left side.       Radial pulses are 2+ on the right side and 2+ on the left side.        Dorsalis pedis pulses are 2+ on the right side and 2+ on the left side.        Posterior " tibial pulses are 2+ on the right side and 2+ on the left side.      Heart sounds: S1 normal and S2 normal. No murmur heard.    No friction rub. No gallop.   Pulmonary:      Effort: Pulmonary effort is normal.      Breath sounds: Normal breath sounds.   Chest:      Chest wall: There is no dullness to percussion.   Breasts:      Right: No inverted nipple, mass, nipple discharge, skin change, tenderness or supraclavicular adenopathy.      Left: No inverted nipple, mass, nipple discharge, skin change, tenderness or supraclavicular adenopathy.       Abdominal:      General: Bowel sounds are normal. There is no abdominal bruit.      Palpations: Abdomen is soft.      Tenderness: There is no abdominal tenderness. There is no guarding or rebound.      Hernia: No hernia is present.   Genitourinary:     Labia:         Right: No rash, tenderness, lesion or injury.         Left: No rash, tenderness, lesion or injury.       Vagina: Normal.      Cervix: Normal.      Uterus: Normal.       Adnexa:         Right: No mass, tenderness or fullness.          Left: No mass, tenderness or fullness.        Rectum: Guaiac result negative. No mass or tenderness.   Musculoskeletal:         General: Normal range of motion.      Cervical back: Neck supple.   Lymphadenopathy:      Cervical: No cervical adenopathy.      Upper Body:      Right upper body: No supraclavicular adenopathy.      Left upper body: No supraclavicular adenopathy.   Skin:     General: Skin is warm and dry.   Neurological:      Mental Status: She is alert.      Cranial Nerves: No cranial nerve deficit.      Sensory: No sensory deficit.      Deep Tendon Reflexes:      Reflex Scores:       Patellar reflexes are 2+ on the right side and 2+ on the left side.        Assessment & Plan   Diagnoses and all orders for this visit:    1. Physical exam (Primary)  -     Lipid Panel  -     Urinalysis With Microscopic If Indicated (No Culture) - Urine, Clean Catch  -     TSH    2.  Screening for cervical cancer  -     IgP, Aptima HPV    3. Vasculitis (HCC)  Assessment & Plan:  Followed by derm (Dr. Kyle Guillory)-sx improving with addition of Plaquenil, tapering off Prednisone.      4. Essential hypertension  Assessment & Plan:  BP is mildly elevated in the office today, she will continue Amlodipine and monitor BP at home (call if readings are >140/90).      5. Hypercholesteremia  Assessment & Plan:  She denies myalgias with atrovastatin which she will continue along with a low-fat, low-cholesterol diet.        6. Allergic rhinitis, unspecified seasonality, unspecified trigger  Assessment & Plan:  Sx stable on Zyrtec and Flonase, continue to monitor.      7. Depression with anxiety  Assessment & Plan:  Sx recurrent in nature-managed on Sertraline and Wellbutrin XL      8. Acquired hypothyroidism  Assessment & Plan:  She is currently managed on Synthroid 175 mcg daily-recheck TSH.      9. CRISTI (obstructive sleep apnea)  Assessment & Plan:  She is currently using a CPAP nightly      10. Gastroesophageal reflux disease without esophagitis  Assessment & Plan:  Sx managed on Omeprazole, denies abdominal pain      11. Need for shingles vaccine  -     Shingrix Vaccine    Other orders  -     TSH  -     IgP, Aptima HPV      Risk assessment:  She has a family hx (mother) of uterine cancer-pelvic exam performed today. She also has a family hx (brother) of DM2-check fasting glucose.  Her Body mass index is 56.81 kg/m². - She would benefit from weight loss-discussed lifestyle changes to help with weight loss.    Prevention:  Health Maintenance   Topic Date Due   • COLORECTAL CANCER SCREENING  Never done   • TDAP/TD VACCINES (2 - Td or Tdap) 09/01/2020   • ANNUAL PHYSICAL  03/19/2022   • COVID-19 Vaccine (4 - Booster for Pfizer series) 03/23/2022   • ZOSTER VACCINE (2 of 2) 07/06/2022   • INFLUENZA VACCINE  08/01/2022   • LIPID PANEL  05/11/2023   • MAMMOGRAM  05/11/2024   • PAP SMEAR  05/11/2025   •  HEPATITIS C SCREENING  Completed   • Pneumococcal Vaccine 0-64  Aged Out     Discussed healthy lifestyle choices such as maintaining a balanced diet low in carbohydrates and limiting caffeine and alcohol intake.  Recommended routine exercise for bone strength and cardiovascular health.

## 2022-05-12 LAB
APPEARANCE UR: CLEAR
BILIRUB UR QL STRIP: NEGATIVE
CHOLEST SERPL-MCNC: 172 MG/DL (ref 100–199)
COLOR UR: YELLOW
GLUCOSE UR QL STRIP: NEGATIVE
HDLC SERPL-MCNC: 58 MG/DL
HGB UR QL STRIP: NEGATIVE
KETONES UR QL STRIP: NEGATIVE
LDLC SERPL CALC-MCNC: 89 MG/DL (ref 0–99)
LEUKOCYTE ESTERASE UR QL STRIP: NEGATIVE
MICRO URNS: NORMAL
NITRITE UR QL STRIP: NEGATIVE
PH UR STRIP: 6 [PH] (ref 5–7.5)
PROT UR QL STRIP: NEGATIVE
SP GR UR STRIP: 1.01 (ref 1–1.03)
TRIGL SERPL-MCNC: 142 MG/DL (ref 0–149)
TSH SERPL DL<=0.005 MIU/L-ACNC: 0.89 UIU/ML (ref 0.45–4.5)
UROBILINOGEN UR STRIP-MCNC: 0.2 MG/DL (ref 0.2–1)
VLDLC SERPL CALC-MCNC: 25 MG/DL (ref 5–40)

## 2022-05-13 NOTE — PROGRESS NOTES
Denise, your recent labs show good cholesterol-continue atorvastatin along with a low-fat, low-cholesterol diet.  TSH is within therapeutic range-continue current Synthroid dose.

## 2022-05-18 DIAGNOSIS — I10 ESSENTIAL HYPERTENSION: ICD-10-CM

## 2022-05-18 LAB
CYTOLOGIST CVX/VAG CYTO: NORMAL
CYTOLOGY CVX/VAG DOC CYTO: NORMAL
CYTOLOGY CVX/VAG DOC THIN PREP: NORMAL
DX ICD CODE: NORMAL
HIV 1 & 2 AB SER-IMP: NORMAL
HPV I/H RISK 4 DNA CVX QL PROBE+SIG AMP: NEGATIVE
Lab: NORMAL
OTHER STN SPEC: NORMAL
REF LAB TEST METHOD: NORMAL
STAT OF ADQ CVX/VAG CYTO-IMP: NORMAL

## 2022-05-19 RX ORDER — AMLODIPINE BESYLATE 5 MG/1
TABLET ORAL
Qty: 90 TABLET | Refills: 2 | Status: SHIPPED | OUTPATIENT
Start: 2022-05-19

## 2022-05-19 NOTE — PROGRESS NOTES
Denise, your recent Pap smear was negative for cervical cancer and for HPV, the virus that causes cervical cancer.

## 2022-05-30 NOTE — ASSESSMENT & PLAN NOTE
She denies myalgias with atrovastatin which she will continue along with a low-fat, low-cholesterol diet.

## 2022-05-30 NOTE — ASSESSMENT & PLAN NOTE
BP is mildly elevated in the office today, she will continue Amlodipine and monitor BP at home (call if readings are >140/90).

## 2022-06-06 DIAGNOSIS — E78.00 HYPERCHOLESTEREMIA: ICD-10-CM

## 2022-06-06 DIAGNOSIS — E03.9 ACQUIRED HYPOTHYROIDISM: ICD-10-CM

## 2022-06-06 DIAGNOSIS — K21.9 GASTROESOPHAGEAL REFLUX DISEASE WITHOUT ESOPHAGITIS: ICD-10-CM

## 2022-06-06 RX ORDER — OMEPRAZOLE 20 MG/1
20 CAPSULE, DELAYED RELEASE ORAL DAILY
Qty: 90 CAPSULE | Refills: 2 | Status: SHIPPED | OUTPATIENT
Start: 2022-06-06

## 2022-06-06 RX ORDER — LEVOTHYROXINE SODIUM 175 UG/1
175 TABLET ORAL DAILY
Qty: 90 TABLET | Refills: 2 | Status: SHIPPED | OUTPATIENT
Start: 2022-06-06 | End: 2023-04-01 | Stop reason: SDUPTHER

## 2022-06-06 RX ORDER — ATORVASTATIN CALCIUM 10 MG/1
10 TABLET, FILM COATED ORAL DAILY
Qty: 90 TABLET | Refills: 2 | Status: SHIPPED | OUTPATIENT
Start: 2022-06-06 | End: 2022-12-19 | Stop reason: SDUPTHER

## 2022-06-07 ENCOUNTER — TELEPHONE (OUTPATIENT)
Dept: INTERNAL MEDICINE | Facility: CLINIC | Age: 57
End: 2022-06-07

## 2022-06-07 DIAGNOSIS — R92.1 CALCIFICATION OF RIGHT BREAST: Primary | ICD-10-CM

## 2022-06-07 NOTE — TELEPHONE ENCOUNTER
Caller: LIVIER    Relationship: REFERRAL COORDINATOR AT [facility]    Best call back number: [phone]    What orders are you requesting (i.e. lab or imaging): ORDER FOR RIGHT DIAGNOSTIC MAMMOGRAM    In what timeframe would the patient need to come in: APPOINTMENT IS ON 6-8-22    Where will you receive your lab/imaging services: [facility]    Additional notes: FAX NUMBER: [phone]

## 2022-06-08 ENCOUNTER — APPOINTMENT (OUTPATIENT)
Dept: WOMENS IMAGING | Facility: HOSPITAL | Age: 57
End: 2022-06-08

## 2022-06-08 PROCEDURE — G0279 TOMOSYNTHESIS, MAMMO: HCPCS | Performed by: RADIOLOGY

## 2022-06-08 PROCEDURE — 77065 DX MAMMO INCL CAD UNI: CPT | Performed by: RADIOLOGY

## 2022-06-08 PROCEDURE — 77061 BREAST TOMOSYNTHESIS UNI: CPT | Performed by: RADIOLOGY

## 2022-06-10 ENCOUNTER — TELEPHONE (OUTPATIENT)
Dept: INTERNAL MEDICINE | Facility: CLINIC | Age: 57
End: 2022-06-10

## 2022-06-10 NOTE — TELEPHONE ENCOUNTER
[facility] is recommending Rt breast stereotactic biopsy for calcifications.     Sending results via fax.

## 2022-06-11 NOTE — TELEPHONE ENCOUNTER
Notified patient, will await fax for additional details.   Elida, will you see if the results of this are available?

## 2022-06-13 DIAGNOSIS — R92.8 ABNORMAL MAMMOGRAM: Primary | ICD-10-CM

## 2022-06-15 ENCOUNTER — TELEPHONE (OUTPATIENT)
Dept: ONCOLOGY | Facility: HOSPITAL | Age: 57
End: 2022-06-15

## 2022-06-15 LAB
EXTERNAL INR: 2.3
EXTERNAL RESULTING LABORATORY: NORMAL

## 2022-06-15 NOTE — TELEPHONE ENCOUNTER
Received fax today from 6/10 with pts INR of 2.3. Goal is 2.5-3.5. Encouraged pt to recheck INR that way she can be dosed appropriately. The pt reports the test strips cost her money and she would like to wait until next Thursday to recheck. The pt usually checks INR every 2 weeks. The pt confirms taking 7.5 M/F and 11.25 aod, but once she checked her INR she started taking 11.25 all days except Friday 7.5. Coag suggest pt take 11.25 all days. Pt v/u.    Lab Results   Component Value Date    INR 2.3 06/10/2022    INR 3.20 (H) 05/11/2022    INR 4.3 (A) 04/27/2022    PROTIME 38.2 (H) 05/11/2022    PROTIME 39.4 (H) 04/11/2022    PROTIME 23.1 (H) 03/21/2022

## 2022-06-23 ENCOUNTER — TELEPHONE (OUTPATIENT)
Dept: ONCOLOGY | Facility: HOSPITAL | Age: 57
End: 2022-06-23

## 2022-06-23 LAB
EXTERNAL INR: 2.2
EXTERNAL RESULTING LABORATORY: NORMAL

## 2022-06-23 NOTE — TELEPHONE ENCOUNTER
Attempted to call pt and review coag. No answer lvm.    Lab Results   Component Value Date    INR 2.2 06/23/2022    INR 2.3 06/10/2022    INR 3.20 (H) 05/11/2022    PROTIME 38.2 (H) 05/11/2022    PROTIME 39.4 (H) 04/11/2022    PROTIME 23.1 (H) 03/21/2022

## 2022-06-23 NOTE — TELEPHONE ENCOUNTER
Received call from pt to review coag.    Lab Results   Component Value Date    INR 2.2 06/23/2022    INR 2.3 06/10/2022    INR 3.20 (H) 05/11/2022    PROTIME 38.2 (H) 05/11/2022    PROTIME 39.4 (H) 04/11/2022    PROTIME 23.1 (H) 03/21/2022

## 2022-06-29 ENCOUNTER — APPOINTMENT (OUTPATIENT)
Dept: WOMENS IMAGING | Facility: HOSPITAL | Age: 57
End: 2022-06-29

## 2022-06-29 PROCEDURE — 19081 BX BREAST 1ST LESION STRTCTC: CPT | Performed by: RADIOLOGY

## 2022-06-29 PROCEDURE — A4648 IMPLANTABLE TISSUE MARKER: HCPCS | Performed by: RADIOLOGY

## 2022-07-06 ENCOUNTER — TELEPHONE (OUTPATIENT)
Dept: ONCOLOGY | Facility: HOSPITAL | Age: 57
End: 2022-07-06

## 2022-07-06 LAB
EXTERNAL INR: 3.3 (ref 2.5–3.5)
EXTERNAL RESULTING LABORATORY: NORMAL

## 2022-07-06 NOTE — TELEPHONE ENCOUNTER
Attempted to call pt about coag. No answer left detailed message.    Lab Results   Component Value Date    INR 3.3 07/06/2022    INR 2.2 06/23/2022    INR 2.3 06/10/2022    PROTIME 38.2 (H) 05/11/2022    PROTIME 39.4 (H) 04/11/2022    PROTIME 23.1 (H) 03/21/2022

## 2022-07-21 ENCOUNTER — TELEPHONE (OUTPATIENT)
Dept: SURGERY | Facility: CLINIC | Age: 57
End: 2022-07-21

## 2022-07-21 NOTE — TELEPHONE ENCOUNTER
LM for pt to call me back to go over Breast Intake questions prior to her appt on 08/10/22 with Dr. Simeon.    *HUB: if pt calls back, pls transfer her to Amy at office - thanks

## 2022-07-27 ENCOUNTER — TELEPHONE (OUTPATIENT)
Dept: INTERNAL MEDICINE | Facility: CLINIC | Age: 57
End: 2022-07-27

## 2022-07-27 NOTE — TELEPHONE ENCOUNTER
Patient states she had biopsy early July at [facility].     Has appt with surgeon in August to discuss results and treatment.

## 2022-08-04 ENCOUNTER — TELEPHONE (OUTPATIENT)
Dept: ONCOLOGY | Facility: HOSPITAL | Age: 57
End: 2022-08-04

## 2022-08-04 LAB
EXTERNAL INR: 2.6
EXTERNAL RESULTING LABORATORY: NORMAL

## 2022-08-04 NOTE — TELEPHONE ENCOUNTER
Called pt to review coag.    Lab Results   Component Value Date    INR 2.6 08/04/2022    INR 3.3 07/06/2022    INR 2.2 06/23/2022    PROTIME 38.2 (H) 05/11/2022    PROTIME 39.4 (H) 04/11/2022    PROTIME 23.1 (H) 03/21/2022

## 2022-08-19 ENCOUNTER — TELEPHONE (OUTPATIENT)
Dept: ONCOLOGY | Facility: CLINIC | Age: 57
End: 2022-08-19

## 2022-08-19 NOTE — TELEPHONE ENCOUNTER
Caller: Carli Garcia    Relationship to patient: Self    Best call back number: [phone]    Chief complaint: WOULD LIKE TO REQUEST TO GET INR CHECKED     Type of visit: INR CHECK    Requested date: 08/24 AROUND 12:30 IF CAN         Additional notes:

## 2022-08-24 ENCOUNTER — OFFICE VISIT (OUTPATIENT)
Dept: SURGERY | Facility: CLINIC | Age: 57
End: 2022-08-24

## 2022-08-24 VITALS — WEIGHT: 293 LBS | BODY MASS INDEX: 47.09 KG/M2 | HEIGHT: 66 IN

## 2022-08-24 DIAGNOSIS — N60.91 ATYPICAL LOBULAR HYPERPLASIA (ALH) OF RIGHT BREAST: Primary | ICD-10-CM

## 2022-08-24 PROCEDURE — 99203 OFFICE O/P NEW LOW 30 MIN: CPT | Performed by: STUDENT IN AN ORGANIZED HEALTH CARE EDUCATION/TRAINING PROGRAM

## 2022-08-24 NOTE — PROGRESS NOTES
GENERAL SURGERY BENIGN BREAST HISTORY AND PHYSICAL     SUMMARY:  Carli Garcia is a 57 y.o. lady with:  A new diagnosis of right breast atypical lobular hyperplasia.  -Surgical plan: Discussed observation versus right breast wire localized excisional biopsy. Will decide pending MRI results. Risks (including bleeding, infection, damage to surrounding structures, need for additional surgery), benefits and alternatives were discussed with the patient who agreed and wished to proceed. Risk of pathologic upgrade was also discussed with possible need for additional treatment.     High risk screening:   -Claudia Eckert lifetime breast cancer risk: 29%. This patient does qualify for  high risk screening. MRI ordered.  -This patient does qualify be referred to medical oncology postoperatively pending final pathology for evaluation for prophylactic endocrine therapy due to high risk for breast cancer.    Referring Provider: No ref. provider found    Chief complaint: abnormal breast imaging    HPI: Ms. Carli Garcia is seen at the request of No ref. provider found. The patient is a 57 year old woman being seen for a new diagnosis of right breast atypical lobular hyperplasia.      This was initially detected as an imaging abnormality on routine screening. Her work-up is detailed in the breast history section below. She has had regular annual mammogram. She denies any prior history of abnormal mammograms or breast biopsies. She denies any breast lumps, pain, skin changes, or nipple discharge.    She denies any family history of breast or ovarian cancer.     TIMELINE OF WORKUP:  5/11/2022 bilateral screening mammogram:  There are new calcifications in the middle one third upper outer region of the right breast.  BI-RADS Category 0    6/8/2022 right breast diagnostic mammogram:  On present exam there are coarse heterogeneous calcifications with grouped distribution in the middle one third upper outer region of the right breast that  are new compared to 2020 mammogram.  Suspicious.  Stereotactic biopsy is recommended.  BI-RADS Category 4B    2022 right breast stereotactic biopsy:   The right breast at the upper outer quadrant was isolated and the calcifications were localized.  4 core specimens were obtained.  A Top-Hat clip was placed.  The clip is in the expected position.    2022 pathology:  Breast, right, upper outer quadrant:  ALH.  Background mammary tissue with hyalinized fibroadenomatoid change.    MEDICAL HISTORY:   Gynecologic History:   . P:0 AB:0  Age at first childbirth: N/A  Lactation/How long: N/A  Age at menarche: 11  Age at menopause: 55  Total years of oral contraceptive use: 30 years, previously  Total years of hormone replacement therapy: None    Past Medical History:   • HTN   • DVT and PE, on coumadin   • GRD  • COPD on 3L home O2  • No prior colonoscopy     Past Surgical History:    • Tonsillectomy   • [place] teeth     Family History:    • As above    Social History:   Denies tobacco use  • Occasional alcohol use      Allergies:   Allergies   Allergen Reactions   • Losartan Angioedema   • Toprol Xl [Metoprolol Tartrate] Shortness Of Breath   • Eggs Or Egg-Derived Products Nausea And Vomiting   • Dust Mite Extract Unknown - Low Severity   • Sulfa Antibiotics Unknown - Low Severity       Medications:     Current Outpatient Medications:   •  amLODIPine (NORVASC) 5 MG tablet, TAKE ONE TABLET BY MOUTH DAILY, Disp: 90 tablet, Rfl: 2  •  atorvastatin (LIPITOR) 10 MG tablet, Take 1 tablet by mouth Daily., Disp: 90 tablet, Rfl: 2  •  cetirizine (zyrTEC) 10 MG tablet, Take 10 mg by mouth Daily As Needed for Allergies., Disp: , Rfl:   •  dapsone 25 MG tablet, 25 mg. 2- 25mg tabs daily., Disp: , Rfl:   •  fluticasone (FLONASE) 50 MCG/ACT nasal spray, 2 sprays into the nostril(s) as directed by provider Daily for 30 days., Disp: 9.9 mL, Rfl: 0  •  hydroxychloroquine (PLAQUENIL) 200 MG tablet, Take 1 tablet by mouth Every  12 (Twelve) Hours., Disp: , Rfl:   •  levothyroxine (SYNTHROID, LEVOTHROID) 175 MCG tablet, Take 1 tablet by mouth Daily., Disp: 90 tablet, Rfl: 2  •  omeprazole (priLOSEC) 20 MG capsule, Take 1 capsule by mouth Daily., Disp: 90 capsule, Rfl: 2  •  potassium chloride ER (K-TAB) 20 MEQ tablet controlled-release ER tablet, , Disp: , Rfl:   •  predniSONE (DELTASONE) 1 MG tablet, Take 7 mg by mouth Daily., Disp: , Rfl:   •  sertraline (ZOLOFT) 100 MG tablet, TAKE ONE TABLET BY MOUTH DAILY, Disp: 90 tablet, Rfl: 2  •  torsemide (DEMADEX) 20 MG tablet, , Disp: , Rfl:   •  warfarin (COUMADIN) 7.5 MG tablet, TAKE TWO TABLETS BY MOUTH DAILY ON SUNDAYS AND TAKE 1 AND 1/2 TABLET BY MOUTH ON ALL OTHER DAYS, Disp: 141 tablet, Rfl: 1    Labs:    • Labs from 7/6/2022 reviewed    ROS:   Influenza-like illness: no fever, no  cough, no  sore throat, no  body aches, no loss of sense of taste or smell, no known exposure to person with Covid-19.  Constitutional: Negative for fevers or chills  HENT: Negative for hearing loss or runny nose  Eyes: Negative for vision changes or scleral icterus  Respiratory: Negative for cough or shortness of breath  Cardiovascular: Negative for chest pain or heart palpitations  Gastrointestinal: Negative for abdominal pain, nausea, vomiting, constipation, melena, or hematochezia  Genitourinary: Negative for hematuria or dysuria  Musculoskeletal: Negative for joint swelling or gait instability  Neurologic: Negative for tremors or seizures  Psychiatric: Negative for suicidal ideations or depression  All other systems reviewed and negative    PHYSICAL EXAM:   • Constitutional: Well-developed well-nourished, no acute distress  • Eyes: Conjunctiva normal, sclera nonicteric  • ENMT: Hearing grossly normal, oral mucosa moist  • Neck: Supple, no palpable mass, trachea midline  • Respiratory: Clear to auscultation, normal inspiratory effort  • Cardiovascular: Regular rate, no murmur, no peripheral edema, no jugular  venous distention  • Breast: symmetric  o Right: No visible abnormalities on inspection while seated, with arms raised or hands on hips. No masses, skin changes, or nipple abnormalities.  o Left: No visible abnormalities on inspection while seated, with arms raised or hands on hips. No masses, skin changes, or nipple abnormalities.  o Biopsy site appreciated in right breast, otherwise no skin changes.   o No clinical chest wall involvement.  • Gastrointestinal: Soft, nontender  • Lymphatics (palpable nodes): No cervical, supraclavicular or axillary lymphadenopathy  • Skin:  Warm, dry, no rash on visualized skin surfaces  • Musculoskeletal: Symmetric strength, normal gait  • Psychiatric: Alert and oriented ×3, normal affect     RALF ENGLAND M.D.  General and Endoscopic Surgery  [facility]    [address], [address]  [place], [place], [address]  P: [phone]  F: [phone]

## 2022-08-28 DIAGNOSIS — F41.9 ANXIETY: ICD-10-CM

## 2022-08-29 RX ORDER — SERTRALINE HYDROCHLORIDE 100 MG/1
TABLET, FILM COATED ORAL
Qty: 90 TABLET | Refills: 2 | Status: SHIPPED | OUTPATIENT
Start: 2022-08-29

## 2022-08-29 RX ORDER — WARFARIN SODIUM 7.5 MG/1
TABLET ORAL
Qty: 141 TABLET | Refills: 1 | Status: SHIPPED | OUTPATIENT
Start: 2022-08-29 | End: 2023-01-03 | Stop reason: SDUPTHER

## 2022-09-01 ENCOUNTER — TELEPHONE (OUTPATIENT)
Dept: SURGERY | Facility: CLINIC | Age: 57
End: 2022-09-01

## 2022-09-01 NOTE — TELEPHONE ENCOUNTER
LEFT A DETAILED MESSAGE REGARDING MRI BREAST SCHEDULED ON 10/7 @ 8:45 AM, ARRIVAL 8:15 AM FOR [facility]. INSTRUCTED PT TO WEAR NO METALS TO THE APPT.

## 2022-09-06 ENCOUNTER — TELEPHONE (OUTPATIENT)
Dept: ONCOLOGY | Facility: HOSPITAL | Age: 57
End: 2022-09-06

## 2022-09-06 LAB
EXTERNAL INR: 2.6
EXTERNAL RESULTING LABORATORY: NORMAL

## 2022-09-06 NOTE — TELEPHONE ENCOUNTER
Received fax with pts INR from 9/2. INR was 2.6. Goal is 2.5-3.5. called pt and she reports taking 15 Sun/Th and 11.25 aod with no issues. Asked pt to recheck INR since we are just now reviewing from the holiday. Pt reports she is ok remaining on the same dose and doesn't wish to recheck until next month.     Lab Results   Component Value Date    INR 2.6 09/02/2022    INR 2.6 08/04/2022    INR 3.3 07/06/2022    PROTIME 38.2 (H) 05/11/2022    PROTIME 39.4 (H) 04/11/2022    PROTIME 23.1 (H) 03/21/2022

## 2022-09-30 ENCOUNTER — TELEPHONE (OUTPATIENT)
Dept: ONCOLOGY | Facility: HOSPITAL | Age: 57
End: 2022-09-30

## 2022-09-30 LAB
EXTERNAL INR: 3.2
EXTERNAL RESULTING LABORATORY: NORMAL

## 2022-09-30 NOTE — TELEPHONE ENCOUNTER
Called pt to review coag.    Lab Results   Component Value Date    INR 3.2 09/29/2022    INR 2.6 09/02/2022    INR 2.6 08/04/2022    PROTIME 38.2 (H) 05/11/2022    PROTIME 39.4 (H) 04/11/2022    PROTIME 23.1 (H) 03/21/2022

## 2022-10-03 ENCOUNTER — APPOINTMENT (OUTPATIENT)
Dept: LAB | Facility: HOSPITAL | Age: 57
End: 2022-10-03

## 2022-10-03 ENCOUNTER — TELEPHONE (OUTPATIENT)
Dept: SURGERY | Facility: CLINIC | Age: 57
End: 2022-10-03

## 2022-10-03 NOTE — TELEPHONE ENCOUNTER
LM ASKING PT TO CALL ME BACK TO VERIFY THAT SHE'D REC'D MY PREV MESSAGE ABOUT THE MRI BREAST SCHEDULED ON 10/7 @ 8:45 AM, ARRIVAL 8:15 AM FOR [facility].

## 2022-10-05 ENCOUNTER — TELEPHONE (OUTPATIENT)
Dept: SURGERY | Facility: CLINIC | Age: 57
End: 2022-10-05

## 2022-10-07 ENCOUNTER — APPOINTMENT (OUTPATIENT)
Dept: MRI IMAGING | Facility: HOSPITAL | Age: 57
End: 2022-10-07

## 2022-10-13 ENCOUNTER — APPOINTMENT (OUTPATIENT)
Dept: WOMENS IMAGING | Facility: HOSPITAL | Age: 57
End: 2022-10-13

## 2022-10-17 ENCOUNTER — OFFICE VISIT (OUTPATIENT)
Dept: ONCOLOGY | Facility: CLINIC | Age: 57
End: 2022-10-17

## 2022-10-17 ENCOUNTER — LAB (OUTPATIENT)
Dept: LAB | Facility: HOSPITAL | Age: 57
End: 2022-10-17

## 2022-10-17 VITALS
OXYGEN SATURATION: 92 % | HEART RATE: 102 BPM | BODY MASS INDEX: 47.09 KG/M2 | TEMPERATURE: 96.6 F | DIASTOLIC BLOOD PRESSURE: 89 MMHG | WEIGHT: 293 LBS | HEIGHT: 66 IN | RESPIRATION RATE: 20 BRPM | SYSTOLIC BLOOD PRESSURE: 145 MMHG

## 2022-10-17 DIAGNOSIS — N60.99 ATYPICAL HYPERPLASIA OF BREAST: ICD-10-CM

## 2022-10-17 DIAGNOSIS — D68.61 ANTIPHOSPHOLIPID SYNDROME: ICD-10-CM

## 2022-10-17 DIAGNOSIS — I82.401 DEEP VEIN THROMBOSIS (DVT) OF RIGHT LOWER EXTREMITY, UNSPECIFIED CHRONICITY, UNSPECIFIED VEIN: ICD-10-CM

## 2022-10-17 DIAGNOSIS — D68.61 ANTIPHOSPHOLIPID SYNDROME: Primary | Chronic | ICD-10-CM

## 2022-10-17 LAB
BASOPHILS # BLD AUTO: 0.05 10*3/MM3 (ref 0–0.2)
BASOPHILS NFR BLD AUTO: 0.4 % (ref 0–1.5)
DEPRECATED RDW RBC AUTO: 39.3 FL (ref 37–54)
EOSINOPHIL # BLD AUTO: 0.02 10*3/MM3 (ref 0–0.4)
EOSINOPHIL NFR BLD AUTO: 0.2 % (ref 0.3–6.2)
ERYTHROCYTE [DISTWIDTH] IN BLOOD BY AUTOMATED COUNT: 13.9 % (ref 12.3–15.4)
HCT VFR BLD AUTO: 46.1 % (ref 34–46.6)
HGB BLD-MCNC: 15.2 G/DL (ref 12–15.9)
IMM GRANULOCYTES # BLD AUTO: 0.04 10*3/MM3 (ref 0–0.05)
IMM GRANULOCYTES NFR BLD AUTO: 0.4 % (ref 0–0.5)
INR PPP: 2.6 (ref 0.9–1.1)
LYMPHOCYTES # BLD AUTO: 1.17 10*3/MM3 (ref 0.7–3.1)
LYMPHOCYTES NFR BLD AUTO: 10.4 % (ref 19.6–45.3)
MCH RBC QN AUTO: 25.9 PG (ref 26.6–33)
MCHC RBC AUTO-ENTMCNC: 33 G/DL (ref 31.5–35.7)
MCV RBC AUTO: 78.4 FL (ref 79–97)
MONOCYTES # BLD AUTO: 0.61 10*3/MM3 (ref 0.1–0.9)
MONOCYTES NFR BLD AUTO: 5.4 % (ref 5–12)
NEUTROPHILS NFR BLD AUTO: 83.2 % (ref 42.7–76)
NEUTROPHILS NFR BLD AUTO: 9.35 10*3/MM3 (ref 1.7–7)
NRBC BLD AUTO-RTO: 0 /100 WBC (ref 0–0.2)
PLATELET # BLD AUTO: 350 10*3/MM3 (ref 140–450)
PMV BLD AUTO: 10.2 FL (ref 6–12)
PROTHROMBIN TIME: 31.3 SECONDS (ref 11–13.5)
RBC # BLD AUTO: 5.88 10*6/MM3 (ref 3.77–5.28)
WBC NRBC COR # BLD: 11.24 10*3/MM3 (ref 3.4–10.8)

## 2022-10-17 PROCEDURE — 99214 OFFICE O/P EST MOD 30 MIN: CPT | Performed by: INTERNAL MEDICINE

## 2022-10-17 PROCEDURE — 36415 COLL VENOUS BLD VENIPUNCTURE: CPT

## 2022-10-17 PROCEDURE — 85610 PROTHROMBIN TIME: CPT

## 2022-10-17 PROCEDURE — 85025 COMPLETE CBC W/AUTO DIFF WBC: CPT

## 2022-10-17 RX ORDER — PREDNISONE 10 MG/1
10 TABLET ORAL DAILY
COMMUNITY
Start: 2022-09-22

## 2022-10-17 NOTE — PROGRESS NOTES
REASONS FOR FOLLOWUP:  Antiphospholipid antibody syndrome with history of pulmonary embolism March 2016    History of Present Illness    Mrs. Garcia is a 57-year-old woman with history of pulmonary embolism March 2016.  She was found to have positive lupus anticoagulant and cardiolipin antibodies at the time of her pulmonary embolism which have been persistent and has been on anticoagulation with Coumadin since diagnosis.  She had concern for pulmonary embolism despite Coumadin and her INR goal was increased to 2.5-3.5.    Patient returned today for follow-up.  She is on Plaquenil and prednisone for treatment of vasculitis per rheumatology Dr. Guillory.      She remains on Coumadin without excessive bleeding or bruising.  She has a home monitoring system now.  She is checking her INR every 2 to 4 weeks pending the level in need of adjustments.    The patient has atypical lobular hyperplasia of the breast undergoing evaluation by Dr. Simeon.    Past Medical History:   Diagnosis Date   • Anemia    • Angioedema     Lower lip   • Depression    • Diastolic dysfunction    • DVT (deep venous thrombosis) (HCC)    • GERD (gastroesophageal reflux disease)    • H/O antiphospholipid syndrome    • Hiatal hernia    • Hypertension    • Hypothyroidism    • Lupus anticoagulant disorder (HCC)    • Lupus anticoagulant disorder (HCC)    • Morbid obesity (HCC)    • CRISTI (obstructive sleep apnea)    • PE (pulmonary embolism)     Bilateral   • Right ventricular dilation    • Thrombocytopenia (HCC)    • Vasculitis (HCC) 05/2021       ONCOLOGIC HISTORY:  (History from previous dates can be found in the separate document.)    Current Outpatient Medications on File Prior to Visit   Medication Sig Dispense Refill   • amLODIPine (NORVASC) 5 MG tablet TAKE ONE TABLET BY MOUTH DAILY 90 tablet 2   • atorvastatin (LIPITOR) 10 MG tablet Take 1 tablet by mouth Daily. 90 tablet 2   • cetirizine (zyrTEC) 10 MG tablet Take 10 mg by mouth Daily As Needed  for Allergies.     • hydroxychloroquine (PLAQUENIL) 200 MG tablet Take 1 tablet by mouth Every 12 (Twelve) Hours.     • levothyroxine (SYNTHROID, LEVOTHROID) 175 MCG tablet Take 1 tablet by mouth Daily. 90 tablet 2   • omeprazole (priLOSEC) 20 MG capsule Take 1 capsule by mouth Daily. 90 capsule 2   • potassium chloride ER (K-TAB) 20 MEQ tablet controlled-release ER tablet Take 20 mEq by mouth Daily.     • predniSONE (DELTASONE) 10 MG tablet Take 1 tablet by mouth Daily.     • sertraline (ZOLOFT) 100 MG tablet TAKE ONE TABLET BY MOUTH DAILY 90 tablet 2   • torsemide (DEMADEX) 20 MG tablet Take 20 mg by mouth Daily.     • warfarin (COUMADIN) 7.5 MG tablet TAKE TWO TABLETS BY MOUTH DAILY ON SUNDAYS AND TAKE 1 AND 1/2 TABLET BY MOUTH DAILY ON ALL OTHER DAYS 141 tablet 1   • fluticasone (FLONASE) 50 MCG/ACT nasal spray 2 sprays into the nostril(s) as directed by provider Daily for 30 days. 9.9 mL 0   • [DISCONTINUED] predniSONE (DELTASONE) 1 MG tablet Take 7 mg by mouth Daily.       No current facility-administered medications on file prior to visit.       ALLERGIES:     Allergies   Allergen Reactions   • Losartan Angioedema   • Toprol Xl [Metoprolol Tartrate] Shortness Of Breath   • Eggs Or Egg-Derived Products Nausea And Vomiting   • Dust Mite Extract Unknown - Low Severity   • Sulfa Antibiotics Unknown - Low Severity       Social History     Socioeconomic History   • Marital status: Single   • Number of children: 0   Tobacco Use   • Smoking status: Never   • Smokeless tobacco: Never   Vaping Use   • Vaping Use: Never used   Substance and Sexual Activity   • Alcohol use: No   • Drug use: No   • Sexual activity: Not Currently     Partners: Male     Birth control/protection: Abstinence         Cancer-related family history includes Cancer in her mother; Uterine cancer in her mother.     Review of Systems   Constitutional: Negative for activity change and appetite change.   HENT: Negative.    Respiratory: Positive for  "shortness of breath (Exertion, chronic).    Cardiovascular: Positive for leg swelling. Negative for palpitations.   Genitourinary: Negative.    Musculoskeletal: Negative.    Skin: Positive for rash (Improving on therapy for vasculitis).   Neurological: Negative for weakness.   Hematological: Does not bruise/bleed easily.   Psychiatric/Behavioral: Positive for dysphoric mood.         Objective      Vitals:    10/17/22 1522   BP: 145/89   Pulse: 102   Resp: 20   Temp: 96.6 °F (35.9 °C)   TempSrc: Infrared   SpO2: 92%  Comment: with oxygen   Weight: (!) 165 kg (364 lb 1.6 oz)   Height: 167.8 cm (66.06\")  Comment: new ht   PainSc:   3   PainLoc: Generalized  Comment: Pain all over body     Current Status 10/17/2022   ECOG score 1       Physical Exam   GENERAL: Well-developed, morbid obese woman  CHEST: Lungs clear to percussion and auscultation. Good airflow.    CARDIAC: Regular rate and rhythm without murmurs, rubs or gallops. Normal S1,S2.  ABDOMEN: Soft, nontender with no organomegaly or masses.  EXTREMITIES: No clubbing, cyanosis.  Trace to 1+ ankle edema bilaterally   PSYCHIATRIC: Normal mood and affect         RECENT LABS:  Hematology WBC   Date Value Ref Range Status   10/17/2022 11.24 (H) 3.40 - 10.80 10*3/mm3 Final   09/11/2020 6.3 3.4 - 10.8 x10E3/uL Final     RBC   Date Value Ref Range Status   10/17/2022 5.88 (H) 3.77 - 5.28 10*6/mm3 Final   04/09/2021 4.68 3.77 - 5.28 x10E6/uL Final     Hemoglobin   Date Value Ref Range Status   10/17/2022 15.2 12.0 - 15.9 g/dL Final     Hematocrit   Date Value Ref Range Status   10/17/2022 46.1 34.0 - 46.6 % Final     Platelets   Date Value Ref Range Status   10/17/2022 350 140 - 450 10*3/mm3 Final      INR 2.6    Assessment & Plan    *Antiphospholipid antibody syndrome   · history of pulmonary embolism in March 2016 secondary to antiphospholipid antibody syndrome.    · Imaging in February 2019 showed age indeterminate pulmonary emboli and she complained of increased " shortness of breath so her INR goal was changed to 2.5-3.5.   · INR 2.6 today    *Atypical hyperplasia of the breast undergoing evaluation by Dr. Simeon    Plan:  1.  Continue Coumadin with goal INR 2.5-3.5.  The patient has a home monitoring system and checks her INR every 2 weeks  2.  The patient is not a candidate for chemoprevention with tamoxifen secondary to her clotting history; she is perimenopausal (menstrual cycle 2-3 times per year) so not yet a candidate for an aromatase inhibitor  3.  Follow-up 6 months CBC INR

## 2022-11-03 ENCOUNTER — HOSPITAL ENCOUNTER (EMERGENCY)
Facility: HOSPITAL | Age: 57
Discharge: HOME | End: 2022-11-03
Attending: EMERGENCY MEDICINE | Admitting: EMERGENCY MEDICINE

## 2022-11-03 ENCOUNTER — APPOINTMENT (OUTPATIENT)
Dept: CT IMAGING | Facility: HOSPITAL | Age: 57
End: 2022-11-03

## 2022-11-03 ENCOUNTER — APPOINTMENT (OUTPATIENT)
Dept: GENERAL RADIOLOGY | Facility: HOSPITAL | Age: 57
End: 2022-11-03

## 2022-11-03 VITALS
TEMPERATURE: 98.6 F | HEART RATE: 77 BPM | HEIGHT: 66 IN | BODY MASS INDEX: 47.09 KG/M2 | WEIGHT: 293 LBS | SYSTOLIC BLOOD PRESSURE: 145 MMHG | DIASTOLIC BLOOD PRESSURE: 87 MMHG | OXYGEN SATURATION: 97 % | RESPIRATION RATE: 18 BRPM

## 2022-11-03 DIAGNOSIS — R93.5 ABNORMAL CT OF THE ABDOMEN: ICD-10-CM

## 2022-11-03 DIAGNOSIS — R10.9 LEFT FLANK PAIN: Primary | ICD-10-CM

## 2022-11-03 LAB
ALBUMIN SERPL-MCNC: 3.7 G/DL (ref 3.5–5.2)
ALBUMIN/GLOB SERPL: 1.2 G/DL
ALP SERPL-CCNC: 84 U/L (ref 39–117)
ALT SERPL W P-5'-P-CCNC: 10 U/L (ref 1–33)
ANION GAP SERPL CALCULATED.3IONS-SCNC: 8 MMOL/L (ref 5–15)
AST SERPL-CCNC: 13 U/L (ref 1–32)
BACTERIA UR QL AUTO: ABNORMAL /HPF
BASOPHILS # BLD AUTO: 0.07 10*3/MM3 (ref 0–0.2)
BASOPHILS NFR BLD AUTO: 1 % (ref 0–1.5)
BILIRUB SERPL-MCNC: 0.5 MG/DL (ref 0–1.2)
BILIRUB UR QL STRIP: NEGATIVE
BUN SERPL-MCNC: 11 MG/DL (ref 6–20)
BUN/CREAT SERPL: 13.6 (ref 7–25)
CALCIUM SPEC-SCNC: 8.9 MG/DL (ref 8.6–10.5)
CHLORIDE SERPL-SCNC: 102 MMOL/L (ref 98–107)
CLARITY UR: ABNORMAL
CO2 SERPL-SCNC: 29 MMOL/L (ref 22–29)
COD CRY URNS QL: ABNORMAL /HPF
COLOR UR: YELLOW
CREAT SERPL-MCNC: 0.81 MG/DL (ref 0.57–1)
DEPRECATED RDW RBC AUTO: 40.3 FL (ref 37–54)
EGFRCR SERPLBLD CKD-EPI 2021: 84.3 ML/MIN/1.73
EOSINOPHIL # BLD AUTO: 0.12 10*3/MM3 (ref 0–0.4)
EOSINOPHIL NFR BLD AUTO: 1.6 % (ref 0.3–6.2)
ERYTHROCYTE [DISTWIDTH] IN BLOOD BY AUTOMATED COUNT: 14.2 % (ref 12.3–15.4)
GLOBULIN UR ELPH-MCNC: 3.2 GM/DL
GLUCOSE SERPL-MCNC: 99 MG/DL (ref 65–99)
GLUCOSE UR STRIP-MCNC: NEGATIVE MG/DL
HCT VFR BLD AUTO: 39.5 % (ref 34–46.6)
HGB BLD-MCNC: 12.9 G/DL (ref 12–15.9)
HGB UR QL STRIP.AUTO: ABNORMAL
HYALINE CASTS UR QL AUTO: ABNORMAL /LPF
IMM GRANULOCYTES # BLD AUTO: 0.02 10*3/MM3 (ref 0–0.05)
IMM GRANULOCYTES NFR BLD AUTO: 0.3 % (ref 0–0.5)
INR PPP: 2.88 (ref 0.9–1.1)
KETONES UR QL STRIP: ABNORMAL
LEUKOCYTE ESTERASE UR QL STRIP.AUTO: ABNORMAL
LIPASE SERPL-CCNC: 15 U/L (ref 13–60)
LYMPHOCYTES # BLD AUTO: 1.28 10*3/MM3 (ref 0.7–3.1)
LYMPHOCYTES NFR BLD AUTO: 17.5 % (ref 19.6–45.3)
MCH RBC QN AUTO: 25.7 PG (ref 26.6–33)
MCHC RBC AUTO-ENTMCNC: 32.7 G/DL (ref 31.5–35.7)
MCV RBC AUTO: 78.7 FL (ref 79–97)
MONOCYTES # BLD AUTO: 0.91 10*3/MM3 (ref 0.1–0.9)
MONOCYTES NFR BLD AUTO: 12.4 % (ref 5–12)
NEUTROPHILS NFR BLD AUTO: 4.91 10*3/MM3 (ref 1.7–7)
NEUTROPHILS NFR BLD AUTO: 67.2 % (ref 42.7–76)
NITRITE UR QL STRIP: NEGATIVE
NRBC BLD AUTO-RTO: 0 /100 WBC (ref 0–0.2)
PH UR STRIP.AUTO: 5.5 [PH] (ref 5–8)
PLATELET # BLD AUTO: 267 10*3/MM3 (ref 140–450)
PMV BLD AUTO: 10.2 FL (ref 6–12)
POTASSIUM SERPL-SCNC: 4 MMOL/L (ref 3.5–5.2)
PROT SERPL-MCNC: 6.9 G/DL (ref 6–8.5)
PROT UR QL STRIP: ABNORMAL
PROTHROMBIN TIME: 30.6 SECONDS (ref 11.7–14.2)
QT INTERVAL: 413 MS
RBC # BLD AUTO: 5.02 10*6/MM3 (ref 3.77–5.28)
RBC # UR STRIP: ABNORMAL /HPF
REF LAB TEST METHOD: ABNORMAL
SODIUM SERPL-SCNC: 139 MMOL/L (ref 136–145)
SP GR UR STRIP: 1.02 (ref 1–1.03)
SQUAMOUS #/AREA URNS HPF: ABNORMAL /HPF
UROBILINOGEN UR QL STRIP: ABNORMAL
WBC # UR STRIP: ABNORMAL /HPF
WBC NRBC COR # BLD: 7.31 10*3/MM3 (ref 3.4–10.8)

## 2022-11-03 PROCEDURE — 74176 CT ABD & PELVIS W/O CONTRAST: CPT

## 2022-11-03 PROCEDURE — 80053 COMPREHEN METABOLIC PANEL: CPT | Performed by: PHYSICIAN ASSISTANT

## 2022-11-03 PROCEDURE — 93010 ELECTROCARDIOGRAM REPORT: CPT | Performed by: INTERNAL MEDICINE

## 2022-11-03 PROCEDURE — 85610 PROTHROMBIN TIME: CPT | Performed by: PHYSICIAN ASSISTANT

## 2022-11-03 PROCEDURE — 85025 COMPLETE CBC W/AUTO DIFF WBC: CPT | Performed by: PHYSICIAN ASSISTANT

## 2022-11-03 PROCEDURE — 83690 ASSAY OF LIPASE: CPT | Performed by: PHYSICIAN ASSISTANT

## 2022-11-03 PROCEDURE — 81001 URINALYSIS AUTO W/SCOPE: CPT | Performed by: PHYSICIAN ASSISTANT

## 2022-11-03 PROCEDURE — 99284 EMERGENCY DEPT VISIT MOD MDM: CPT

## 2022-11-03 PROCEDURE — 93005 ELECTROCARDIOGRAM TRACING: CPT | Performed by: PHYSICIAN ASSISTANT

## 2022-11-03 PROCEDURE — 71045 X-RAY EXAM CHEST 1 VIEW: CPT

## 2022-11-03 NOTE — ED PROVIDER NOTES
EMERGENCY DEPARTMENT ENCOUNTER    Room Number: 01/01  Date Seen: 11/3/2022  Time Seen: 06:22 EDT  PCP: Lupe Simeon APRN    Historian: Patient      HISTORY OF PRESENT ILLNESS    Chief Complaint: Left flank pain    Context: Carli Garcia is a 57 y.o. female with PMHx of HTN, antiphospholipid syndrome with history of DVT and PE on Coumadin, and Goodpasture's syndrome who presents to the ED with c/o left flank pain that began 2 days ago.  She states it has been persistent and worsens with deep inspiration.  She states it woke her from her sleep last night.  Patient wears 3 L supplemental O2 at all times and has not increased from baseline but does report some mildly increased shortness of breath.  She takes Coumadin for history of DVT and PE, has not missed any doses and reports last INR was 2.6.  She denies any increased cough, fevers, chills, abdominal pain, nausea, vomiting, or diarrhea.  She denies previous surgical history to abdomen.  She denies history of kidney stones.      MEDICAL RECORD REVIEW:    Reviewed in epic    PAST MEDICAL HISTORY    Active Ambulatory Problems     Diagnosis Date Noted   • Antiphospholipid syndrome-IgG cardiolipin  03/04/2016   • Essential hypertension 03/04/2016   • GERD (gastroesophageal reflux disease) 03/04/2016   • Depression with anxiety 03/04/2016   • Morbid obesity (HCC) 03/04/2016   • Right ventricular dilation 03/31/2016   • Diastolic dysfunction 03/31/2016   • CRISTI (obstructive sleep apnea)    • Hypothyroidism    • Hiatal hernia    • DVT (deep venous thrombosis) ([facility])    • Dyspnea on exertion 02/22/2019   • Hypercholesteremia 03/28/2021   • Angio-edema 07/22/2021   • Vasculitis (HCC) 09/19/2021   • Allergic rhinitis 09/19/2021   • History of pulmonary embolus (PE) 09/19/2021   • Atypical hyperplasia of breast 10/17/2022     Resolved Ambulatory Problems     Diagnosis Date Noted   • Acute respiratory failure with hypoxia (HCC) 03/04/2016   • Microcytic anemia 03/04/2016    • Pulmonary embolism without acute cor pulmonale (HCC) 03/31/2016   • Anemia    • Unstable angina (HCC) 04/19/2019     Past Medical History:   Diagnosis Date   • Angioedema    • Depression    • H/O antiphospholipid syndrome    • Hypertension    • Lupus anticoagulant disorder (HCC)    • Lupus anticoagulant disorder (HCC)    • PE (pulmonary embolism)    • Thrombocytopenia (HCC)          PAST SURGICAL HISTORY    Past Surgical History:   Procedure Laterality Date   • BREAST BIOPSY Right 06/29/2022   • CARDIAC CATHETERIZATION N/A 04/23/2019    Procedure: Right and Left Heart Cath;  Surgeon: Sonido Arroyo MD;  Location:  [facility];  Service: Cardiology   • CARDIAC CATHETERIZATION N/A 04/23/2019    Procedure: Coronary angiography;  Surgeon: Sonido Arroyo MD;  Location:  [facility];  Service: Cardiology   • CARDIAC CATHETERIZATION N/A 04/23/2019    Procedure: Left ventriculography;  Surgeon: Sonido Arroyo MD;  Location:  [facility];  Service: Cardiology   • NOSE SURGERY     • TONSILLECTOMY           FAMILY HISTORY    Family History   Problem Relation Age of Onset   • Other Mother         varicose veins   • Uterine cancer Mother    • COPD Mother    • Depression Mother    • Cancer Mother    • Alcohol abuse Father    • Cirrhosis Father    • Diabetes Brother    • Depression Brother          SOCIAL HISTORY    Social History     Socioeconomic History   • Marital status: Single   • Number of children: 0   Tobacco Use   • Smoking status: Never   • Smokeless tobacco: Never   Vaping Use   • Vaping Use: Never used   Substance and Sexual Activity   • Alcohol use: No   • Drug use: No   • Sexual activity: Not Currently     Partners: Male     Birth control/protection: Abstinence         ALLERGIES    Losartan, Toprol xl [metoprolol tartrate], Eggs or egg-derived products, Dust mite extract, and Sulfa antibiotics      REVIEW OF SYSTEMS    Review of Systems    Constitutional: Negative for chills and fever.   HENT: Negative for congestion.    Respiratory: Positive for shortness of breath (Mildly increased).    Cardiovascular: Negative for leg swelling.   Gastrointestinal: Negative for blood in stool, constipation, diarrhea, nausea and vomiting.   Genitourinary: Positive for flank pain. Negative for dysuria, frequency and hematuria.   Skin: Negative.    Neurological: Negative for dizziness and headaches.       All systems reviewed and negative except those discussed in HPI.      PHYSICAL EXAM    ED Triage Vitals [11/03/22 0526]   Temp Heart Rate Resp BP SpO2   99.4 °F (37.4 °C) 97 22 156/83 93 %      Temp src Heart Rate Source Patient Position BP Location FiO2 (%)   Tympanic Monitor Sitting Right arm --       I have reviewed the triage vital signs and nursing notes.    Constitutional: Well appearing, nontoxic, obese body habitus  Head: Atraumatic, normocephalic  Neck: No midline tenderness, Full painless ROM  Eyes: No scleral icterus, no scleral injection  ENT: Nares patent  CV: Regular rate, regular rhythm, distal pulses symmetric  Respiratory/Chest: No distress, CTAB, no chest wall tenderness  Abdomen: Abdomen soft, mildly reproducible left-sided flank and abdominal tenderness, no guarding, no rigidity  Back: No midline tenderness, Full ROM, No CVA tenderness  Extremities: No deformity, soft compartments, no edema  Skin: Warm, dry, no rash  Neuro: A&Ox4, moves all extremities, follows commands, no focal deficits  Psych: Normal mood        LAB RESULTS    Recent Results (from the past 24 hour(s))   Urinalysis With Microscopic If Indicated (No Culture) - Urine, Clean Catch    Collection Time: 11/03/22  6:12 AM    Specimen: Urine, Clean Catch   Result Value Ref Range    Color, UA Yellow Yellow, Straw    Appearance, UA Cloudy (A) Clear    pH, UA 5.5 5.0 - 8.0    Specific Gravity, UA 1.025 1.005 - 1.030    Glucose, UA Negative Negative    Ketones, UA Trace (A) Negative     Bilirubin, UA Negative Negative    Blood, UA Moderate (2+) (A) Negative    Protein, UA Trace (A) Negative    Leuk Esterase, UA Trace (A) Negative    Nitrite, UA Negative Negative    Urobilinogen, UA 1.0 E.U./dL 0.2 - 1.0 E.U./dL   Urinalysis, Microscopic Only - Urine, Clean Catch    Collection Time: 11/03/22  6:12 AM    Specimen: Urine, Clean Catch   Result Value Ref Range    RBC, UA 0-2 None Seen, 0-2 /HPF    WBC, UA 3-5 (A) None Seen, 0-2 /HPF    Bacteria, UA None Seen None Seen /HPF    Squamous Epithelial Cells, UA 3-6 (A) None Seen, 0-2 /HPF    Hyaline Casts, UA None Seen None Seen /LPF    Calcium Oxalate Crystals, UA Moderate/2+ None Seen /HPF    Methodology Automated Microscopy    Comprehensive Metabolic Panel    Collection Time: 11/03/22  6:13 AM    Specimen: Arm, Right; Blood   Result Value Ref Range    Glucose 99 65 - 99 mg/dL    BUN 11 6 - 20 mg/dL    Creatinine 0.81 0.57 - 1.00 mg/dL    Sodium 139 136 - 145 mmol/L    Potassium 4.0 3.5 - 5.2 mmol/L    Chloride 102 98 - 107 mmol/L    CO2 29.0 22.0 - 29.0 mmol/L    Calcium 8.9 8.6 - 10.5 mg/dL    Total Protein 6.9 6.0 - 8.5 g/dL    Albumin 3.70 3.50 - 5.20 g/dL    ALT (SGPT) 10 1 - 33 U/L    AST (SGOT) 13 1 - 32 U/L    Alkaline Phosphatase 84 39 - 117 U/L    Total Bilirubin 0.5 0.0 - 1.2 mg/dL    Globulin 3.2 gm/dL    A/G Ratio 1.2 g/dL    BUN/Creatinine Ratio 13.6 7.0 - 25.0    Anion Gap 8.0 5.0 - 15.0 mmol/L    eGFR 84.3 >60.0 mL/min/1.73   Lipase    Collection Time: 11/03/22  6:13 AM    Specimen: Arm, Right; Blood   Result Value Ref Range    Lipase 15 13 - 60 U/L   Protime-INR    Collection Time: 11/03/22  6:13 AM    Specimen: Arm, Right; Blood   Result Value Ref Range    Protime 30.6 (H) 11.7 - 14.2 Seconds    INR 2.88 (H) 0.90 - 1.10   CBC Auto Differential    Collection Time: 11/03/22  6:13 AM    Specimen: Arm, Right; Blood   Result Value Ref Range    WBC 7.31 3.40 - 10.80 10*3/mm3    RBC 5.02 3.77 - 5.28 10*6/mm3    Hemoglobin 12.9 12.0 - 15.9 g/dL     Hematocrit 39.5 34.0 - 46.6 %    MCV 78.7 (L) 79.0 - 97.0 fL    MCH 25.7 (L) 26.6 - 33.0 pg    MCHC 32.7 31.5 - 35.7 g/dL    RDW 14.2 12.3 - 15.4 %    RDW-SD 40.3 37.0 - 54.0 fl    MPV 10.2 6.0 - 12.0 fL    Platelets 267 140 - 450 10*3/mm3    Neutrophil % 67.2 42.7 - 76.0 %    Lymphocyte % 17.5 (L) 19.6 - 45.3 %    Monocyte % 12.4 (H) 5.0 - 12.0 %    Eosinophil % 1.6 0.3 - 6.2 %    Basophil % 1.0 0.0 - 1.5 %    Immature Grans % 0.3 0.0 - 0.5 %    Neutrophils, Absolute 4.91 1.70 - 7.00 10*3/mm3    Lymphocytes, Absolute 1.28 0.70 - 3.10 10*3/mm3    Monocytes, Absolute 0.91 (H) 0.10 - 0.90 10*3/mm3    Eosinophils, Absolute 0.12 0.00 - 0.40 10*3/mm3    Basophils, Absolute 0.07 0.00 - 0.20 10*3/mm3    Immature Grans, Absolute 0.02 0.00 - 0.05 10*3/mm3    nRBC 0.0 0.0 - 0.2 /100 WBC   ECG 12 Lead Chest Pain    Collection Time: 11/03/22  6:20 AM   Result Value Ref Range    QT Interval 413 ms       I ordered the above labs and independently reviewed the results.    RADIOLOGY RESULTS    CT Abdomen Pelvis Without Contrast    Result Date: 11/3/2022  CT ABDOMEN AND PELVIS WITHOUT CONTRAST  HISTORY: Left flank pain.  TECHNIQUE: Axial CT images of the abdomen and pelvis were obtained without administration of intravenous contrast. The patient was not given oral contrast. Coronal and sagittal reformats were obtained.  COMPARISON: None available.  FINDINGS: There is focal stranding seen along the anterior margin of the spleen, image 45. This is lateral to the left colon however, the colon wall is unremarkable. The spleen itself appears unremarkable. No fluid collection is seen. The noncontrast attenuation of the liver is normal. The gallbladder is unremarkable. The pancreas is normal without ductal dilatation. Bilateral adrenal glands are normal. Both kidneys are normal in size and attenuation. No renal calculi or hydronephrosis. The urinary bladder is partially distended and normal. The uterus is anteverted and normal. No abnormal  adnexal mass.  The small and large bowel loops demonstrate normal caliber. The appendix is normal. No pathological retroperitoneal lymphadenopathy. No pleural or pericardial effusion. Mosaic perfusion is demonstrated within the lower lung fields.      There is a focal area of fat stranding seen within the left subdiaphragmatic space adjacent to the anterior edge of the spleen. There is no fluid collection in this region and the spleen is unremarkable on noncontrast imaging. Possibilities could include an underlying acute splenic infarct. At this time I recommend repeat imaging with contrast-enhanced imaging if the patient's clinical condition worsens. These findings were discussed with Dr. Reyes by telephone at the time of dictation.  Radiation dose reduction techniques were utilized, including automated exposure control and exposure modulation based on body size.       XR Chest 1 View    Result Date: 11/3/2022  XR CHEST 1 VW-clinical: Left-sided chest pain  COMPARISON CT scan of the chest dated 3/21/2022 and chest radiograph 7/22/2021  FINDINGS:The cardiomediastinal silhouette is stable. No effusion, edema or acute airspace disease has developed.  CONCLUSION: Stable chest  This report was finalized on 11/3/2022 7:20 AM by Dr. Uday Mcmillan M.D.        I ordered the above noted radiological studies and reviewed the images on the PACS system.     PROCEDURES    None    EKG    Interpreted by ED Physician. Please see their note for documentation.    MEDICATIONS GIVEN IN ER    Medications - No data to display      PROGRESS, CONSULTS, and MEDICAL DECISION MAKING    DIFFERENTIAL DIAGNOSIS    My differential diagnosis for abdominal pain includes but is not limited to:  Gastritis, gastroenteritis, peptic ulcer disease, GERD, esophageal perforation, acute appendicitis, mesenteric adenitis, Meckel’s diverticulum, epiploic appendagitis, diverticulitis, colon cancer, ulcerative colitis, Crohn’s disease, intussusception, small  bowel obstruction, adhesions, ischemic bowel, perforated viscus, ileus, obstipation, biliary colic, cholecystitis, cholelithiasis, Randy-Nam Lobo, hepatitis, pancreatitis, common bile duct obstruction, cholangitis, bile leak, splenic trauma, splenic rupture, splenic infarction, splenic abscess, abdominal abscess, ascites, spontaneous bacterial peritonitis, hernia, UTI, cystitis, ureterolithiasis, urinary obstruction, ovarian cyst, torsion, pregnancy, ectopic pregnancy, PID, pelvic abscess, mittelschmerz, endometriosis, AAA, myocardial infarction, pneumonia, cancer, porphyria, DKA, medications, sickle cell, viral syndrome, zoster        ED Course as of 11/03/22 1541   Thu Nov 03, 2022   0628 Patient has left-sided flank discomfort, worse with deep inspiration.  Patient localizes pain to left abdomen, lower chest wall.  On Coumadin and has been therapeutic, not missing any doses so PE extremely unlikely.  Not requiring any increased supplemental O2 from baseline.  Will obtain chest x-ray to evaluate for cardiopulmonary abnormality.  Denies urinary symptoms but given left flank pain will obtain urine to evaluate for infection versus hematuria.  Will obtain labs for further evaluation of electrolyte abnormality, infectious process, anemia, pancreatitis. [DC]   0728 Blood, UA(!): Moderate (2+)  Will obtain CT for further evaluation of possible ureterolithiasis or other intra-abdominal abnormality. [DC]   0729 WBC: 7.31 [DC]   0729 Hemoglobin: 12.9 [DC]   0729 Creatinine: 0.81 [DC]   0729 Sodium: 139 [DC]   0729 Potassium: 4.0 [DC]   0729 Lipase: 15 [DC]   0749 INR(!): 2.88  Therapeutic [DC]   0816 Phone call with radiologist.  I had previously reviewed the images. I discussed the patient, imaging, and their interpretation.  CT abdomen pelvis shows fat stranding anterior to the spleen but is otherwise unremarkable.  No signs of injury to the spleen, no splenic hemorrhage, no splenic infarct.  No rib fractures.  See  dictated report for final interpretation.     [JG]   0830 Discussed lab and imaging results with patient.  Patient states she would prefer to hold off on any additional CT imaging at this time.  Recommended close follow-up with primary care provider and discussed ER return precautions with patient who is agreeable with plan. [DC]      ED Course User Index  [DC] Massiel Ramirez PA  [JG] Arik Reyes MD           DIAGNOSIS  Final diagnoses:   Left flank pain   Abnormal CT of the abdomen       DISPOSITION  ED Disposition     ED Disposition   Discharge    Condition   Stable    Comment   --             FOLLOW UP  Lupe Simeon, APRN  [address]  [facility] [id number] [phone]    Schedule an appointment as soon as possible for a visit       [facility] Emergency Department  [address] [address] [phone]    As needed, If symptoms worsen      DISCHARGE RX     Medication List      No changes were made to your prescriptions during this visit.             Patient was placed in face mask in first look. Patient was wearing facemask when I entered the room and throughout our encounter. I wore full protective equipment throughout this patient encounter including a face mask, and gloves. Hand hygiene was performed before donning protective equipment and after removal when leaving the room.    Dictated utilizing Dragon dictation.      Note Disclaimer: At [facility], we believe that sharing information builds trust and better relationships. You are receiving this note because you recently visited [facility]. It is possible you will see health information before a provider has talked with you about it. This kind of information can be easy to misunderstand. To help you fully understand what it means for your health, we urge you to discuss this note with your provider.           Massiel Ramirez PA  11/03/22 4430

## 2022-11-03 NOTE — ED PROVIDER NOTES
MD ATTESTATION NOTE  I wore full protective equipment throughout this patient encounter including an N95 face mask, googles, gown and gloves. Hand hygiene was performed before donning protective equipment and after removal when leaving the room.    The BRET and I have discussed this patient's history, physical exam, and treatment plan. I have reviewed the documentation and personally had a face to face interaction with the patient. I affirm the BRET documentation and agree with their diagnostics, findings, treatment, plan, and disposition.    I provided a substantive portion of the care of this patient.  I personally performed the physical exam, in its entirety.  The attached note describes my personal findings.    Carli Garcia is a 57 y.o. female who presents to the ED c/o abdominal pain.  Patient complains of left-sided abdominal pain for last 2 days.  Patient reports that she woke up in the morning with the pain.  Patient reports pain is minimal at rest, dull, worse when she stretches out her abdomen, worse when taking a deep breath.  Patient denies any difficulty breathing although she is chronically on oxygen, denies any chest pain.  Patient denies any rib tenderness, states feels like the pain is an her anterior abdomen.  Patient denies any other abdominal pain, no back pain.  Patient denies any nausea vomiting, normal bowel movements, no hematuria dysuria, no increased urinary frequency or urgency.  Patient reports that she has been eating and drinking normally.  Patient denies any fall or trauma, no strain inside event.  Patient reports that she has had similar symptoms in the past but usually resolve.    On exam:  General: NAD.  Head: NCAT.  ENT: nares patent, no scleral icterus  Neck: Supple, trachea midline.  Cardiac: regular rate and rhythm.  Lungs: normal effort.  Abdomen: Soft, nondistended, mild tenderness in left side of the abdomen, no point tenderness, no splenomegaly, no flank or CVA tenderness.   No rebound tenderness, no guarding or rigidity..   Extremities: Moves all extremities well, no peripheral edema  Neuro: alert, MAEW, follows commands  Psych: calm, cooperative  Skin: Warm, dry.    Medical Decision Making:  After the initial H&P, I discussed pertinent information from history and physical exam with patient/family.  Discussed differential diagnosis.  Discussed plan for ED evaluation/work-up/treatment.  All questions answered.  Patient/family is agreeable with plan.    ED Course as of 11/03/22 0844   Thu Nov 03, 2022   0628 Patient has left-sided flank discomfort, worse with deep inspiration.  Patient localizes pain to left abdomen, lower chest wall.  On Coumadin and has been therapeutic, not missing any doses so PE extremely unlikely.  Not requiring any increased supplemental O2 from baseline.  Will obtain chest x-ray to evaluate for cardiopulmonary abnormality.  Denies urinary symptoms but given left flank pain will obtain urine to evaluate for infection versus hematuria.  Will obtain labs for further evaluation of electrolyte abnormality, infectious process, anemia, pancreatitis. [DC]   0728 Blood, UA(!): Moderate (2+)  Will obtain CT for further evaluation of possible ureterolithiasis or other intra-abdominal abnormality. [DC]   0729 WBC: 7.31 [DC]   0729 Hemoglobin: 12.9 [DC]   0729 Creatinine: 0.81 [DC]   0729 Sodium: 139 [DC]   0729 Potassium: 4.0 [DC]   0729 Lipase: 15 [DC]   0749 INR(!): 2.88  Therapeutic [DC]   0816 Phone call with radiologist.  I had previously reviewed the images. I discussed the patient, imaging, and their interpretation.  CT abdomen pelvis shows fat stranding anterior to the spleen but is otherwise unremarkable.  No signs of injury to the spleen, no splenic hemorrhage, no splenic infarct.  No rib fractures.  See dictated report for final interpretation.     [JG]   0830 Discussed lab and imaging results with patient.  Patient states she would prefer to hold off on any  additional CT imaging at this time.  Recommended close follow-up with primary care provider and discussed ER return precautions with patient who is agreeable with plan. [DC]      ED Course User Index  [DC] Massiel Ramirez PA  [JG] Arik Reyes MD       Diagnosis  Final diagnoses:   Left flank pain   Abnormal CT of the abdomen        Arik Reyes MD  11/03/22 0845

## 2022-11-03 NOTE — ED NOTES
"Pt arrives ambulatory to the triage desk at this time, chronically wears O2 at 3L.    Pt states \"I am having pain in my left side from under my ribs all the way down. When I take a deep breath it is a sharp pain.\"      Patient was placed in face mask during first look triage.  Patient was wearing a face mask throughout encounter.  I wore personal protective equipment throughout the encounter.  Hand hygiene was performed before and after patient encounter.    "

## 2022-11-28 ENCOUNTER — TELEPHONE (OUTPATIENT)
Dept: ONCOLOGY | Facility: HOSPITAL | Age: 57
End: 2022-11-28

## 2022-11-28 LAB
EXTERNAL INR: 2.8
EXTERNAL RESULTING LABORATORY: NORMAL

## 2022-11-28 NOTE — TELEPHONE ENCOUNTER
Called pt to review INR. INR 2.8 in [facility]. Pt stated that number is old and that she is not scheduled to recheck until this up coming Thursday. Pt stated [facility] required her to report her number and she was just checked in the hospital a couple of weeks ago. Told pt that is fine we will disregard this reading and redose if necessary on Thursday when pt rechecks her INR as scheduled. Pt v/u.

## 2022-12-01 LAB
EXTERNAL INR: 4.8 (ref 2.5–3.5)
EXTERNAL RESULTING LABORATORY: ABNORMAL

## 2022-12-02 ENCOUNTER — TELEPHONE (OUTPATIENT)
Dept: ONCOLOGY | Facility: HOSPITAL | Age: 57
End: 2022-12-02

## 2022-12-02 NOTE — TELEPHONE ENCOUNTER
Pt returned phone call to discuss INR 4.8  She states she was taking 15 mg Sun/Tues/thurs and 11.25 mg AOD, however yesterday (thurs 12/1) she only took 7.5 mg( weekly total 82.5mg)  She denies missed doses or new meds  Per [facility] dosing to change to M/F 7.5mg and AOD 11.25 mg however pt felt this was too low. Pt states her best INR's have been when she take 15 mg two days and 11.25 mg AOD.  Reviewed with Brenda ETIENNE that agreed recommended [facility] dosing too low, she would rather pt take 7.5mg again today, 11.25 mg AOD except Tuesday which will remain at 15 mg, pt will need to re-check in one week    Called pt back to update on dosing plan, she understands she is to take 7.5 mg again, 11.25 mg AOD except tues which she will take 15 mg.  I asked her to re-check on Friday which she was hesitant but I explained why she needed to re-test sooner and she v/u

## 2022-12-02 NOTE — TELEPHONE ENCOUNTER
Attempted to call to review INR from 12/1 of 4.8  No answer, message left on voicemail to call our office to discuss dosing

## 2022-12-19 ENCOUNTER — TELEPHONE (OUTPATIENT)
Dept: ONCOLOGY | Facility: HOSPITAL | Age: 57
End: 2022-12-19

## 2022-12-19 ENCOUNTER — TELEPHONE (OUTPATIENT)
Dept: ONCOLOGY | Facility: CLINIC | Age: 57
End: 2022-12-19

## 2022-12-19 DIAGNOSIS — E78.00 HYPERCHOLESTEREMIA: ICD-10-CM

## 2022-12-19 LAB
EXTERNAL INR: 3.1
EXTERNAL RESULTING LABORATORY: NORMAL

## 2022-12-19 RX ORDER — WARFARIN SODIUM 7.5 MG/1
TABLET ORAL
Qty: 141 TABLET | Refills: 1 | Status: CANCELLED | OUTPATIENT
Start: 2022-12-19

## 2022-12-19 NOTE — TELEPHONE ENCOUNTER
Home INR received, 3.1  Called to review with pt, she states she has been taking 11.25 all days except Sunday which was 15 mg.  She denies missed doses or new meds, per [facility] dose to remain the same, she v/u

## 2022-12-20 RX ORDER — ATORVASTATIN CALCIUM 10 MG/1
10 TABLET, FILM COATED ORAL DAILY
Qty: 90 TABLET | Refills: 0 | Status: SHIPPED | OUTPATIENT
Start: 2022-12-20 | End: 2023-04-01 | Stop reason: SDUPTHER

## 2023-01-03 NOTE — TELEPHONE ENCOUNTER
Rx Refill Note  Requested Prescriptions     Pending Prescriptions Disp Refills   • warfarin (COUMADIN) 7.5 MG tablet 141 tablet 1      Last office visit with prescribing clinician: 5/11/2022   Last telemedicine visit with prescribing clinician: Visit date not found   Next office visit with prescribing clinician: Visit date not found                         Would you like a call back once the refill request has been completed: [] Yes [] No    If the office needs to give you a call back, can they leave a voicemail: [] Yes [] No    Nafisa Curtis MA  01/03/23, 13:23 EST

## 2023-01-06 RX ORDER — WARFARIN SODIUM 7.5 MG/1
TABLET ORAL
Qty: 141 TABLET | Refills: 1 | OUTPATIENT
Start: 2023-01-06

## 2023-01-06 NOTE — TELEPHONE ENCOUNTER
Caller: Carli Garcia    Relationship: Self    Best call back number: [phone]    Requested Prescriptions:   Requested Prescriptions     Pending Prescriptions Disp Refills   • warfarin (COUMADIN) 7.5 MG tablet 141 tablet 1        Pharmacy where request should be sent: [id number] [phone] [facility] [phone] FX     Additional details provided by patient:     Does the patient have less than a 3 day supply:  [] Yes  [x] No    Would you like a call back once the refill request has been completed: [] Yes [x] No    If the office needs to give you a call back, can they leave a voicemail: [] Yes [x] No

## 2023-01-06 NOTE — TELEPHONE ENCOUNTER
Attempted to call patient, LVM. Advised patient to call back and make appointment for further refills

## 2023-01-08 RX ORDER — WARFARIN SODIUM 7.5 MG/1
TABLET ORAL
Qty: 141 TABLET | Refills: 0 | Status: SHIPPED | OUTPATIENT
Start: 2023-01-08 | End: 2023-01-11 | Stop reason: SDUPTHER

## 2023-01-11 RX ORDER — WARFARIN SODIUM 7.5 MG/1
TABLET ORAL
Qty: 180 TABLET | Refills: 1 | Status: SHIPPED | OUTPATIENT
Start: 2023-01-11

## 2023-01-11 NOTE — TELEPHONE ENCOUNTER
Patient receives her Coumadin dosing by our office, requested 180 tablets with one refill due to insurance issues.

## 2023-01-11 NOTE — TELEPHONE ENCOUNTER
PT CALLED WANTED TO KNOW WHY DR STOUT DIDN'T FILL HER SCRIPT AND IT WENT TO HER PCP. PT NEEDS A TOTAL  PILLS WITH A REFILL. PLEASE CALL PATIENT [phone]

## 2023-01-17 ENCOUNTER — TELEPHONE (OUTPATIENT)
Dept: ONCOLOGY | Facility: HOSPITAL | Age: 58
End: 2023-01-17
Payer: COMMERCIAL

## 2023-01-17 LAB
EXTERNAL INR: 1.7
EXTERNAL RESULTING LABORATORY: NORMAL

## 2023-01-31 ENCOUNTER — TELEPHONE (OUTPATIENT)
Dept: ONCOLOGY | Facility: HOSPITAL | Age: 58
End: 2023-01-31
Payer: COMMERCIAL

## 2023-01-31 LAB
EXTERNAL INR: 2.9
EXTERNAL RESULTING LABORATORY: NORMAL

## 2023-01-31 NOTE — TELEPHONE ENCOUNTER
Home INR received, 2.9  Called to review with pt, she denies missed doses or new meds.  She was taking 15 mg Mon/Thurs and 11.25 mg AOD. Per [facility], dose to remain the same, she v/u

## 2023-02-06 NOTE — TELEPHONE ENCOUNTER
Patient wants to cancel 8/11 lab and nurse review.  She is still not feeling well.  She would like an appointment on Friday (8/14) afternoon, if possible.    [phone]   This is a chronic condition. Patients most recent lipid panel shows increasing LDL and triglycerides. Patients ASCVD score is 15.7%.     Will start atorvastatin 10mg.

## 2023-02-23 NOTE — ASSESSMENT & PLAN NOTE
Followed by derm (Dr. Kyle Guillory)-sx improving with addition of Plaquenil, tapering off Prednisone.   5

## 2023-02-27 DIAGNOSIS — D68.61 ANTIPHOSPHOLIPID SYNDROME: Primary | Chronic | ICD-10-CM

## 2023-02-27 DIAGNOSIS — Z86.711 HISTORY OF PULMONARY EMBOLUS (PE): ICD-10-CM

## 2023-02-27 DIAGNOSIS — I82.401 DEEP VEIN THROMBOSIS (DVT) OF RIGHT LOWER EXTREMITY, UNSPECIFIED CHRONICITY, UNSPECIFIED VEIN: ICD-10-CM

## 2023-03-03 LAB — INR PPP: 3.3

## 2023-03-24 ENCOUNTER — ANTICOAGULATION VISIT (OUTPATIENT)
Dept: PHARMACY | Facility: HOSPITAL | Age: 58
End: 2023-03-24
Payer: COMMERCIAL

## 2023-03-24 DIAGNOSIS — I82.401 DEEP VEIN THROMBOSIS (DVT) OF RIGHT LOWER EXTREMITY, UNSPECIFIED CHRONICITY, UNSPECIFIED VEIN: ICD-10-CM

## 2023-03-24 DIAGNOSIS — Z86.711 HISTORY OF PULMONARY EMBOLUS (PE): ICD-10-CM

## 2023-03-24 DIAGNOSIS — D68.61 ANTIPHOSPHOLIPID SYNDROME: Primary | Chronic | ICD-10-CM

## 2023-03-24 NOTE — PROGRESS NOTES
Anticoagulation Clinic Progress Note    Patient's visit was held via telephone today.    Anticoagulation Summary  As of 3/24/2023    INR goal:  2.5-3.5   TTR:  --   INR used for dosing:  3.30 (3/3/2023)   Warfarin maintenance plan:  15 mg (7.5 mg x 2) every Mon, Fri; 11.25 mg (7.5 mg x 1.5) all other days; Starting 3/24/2023   Weekly warfarin total:  86.25 mg   Plan last modified:  Kriss Melendez RPH (3/24/2023)   Next INR check:  4/7/2023   Target end date:  Indefinite    Indications    Antiphospholipid syndrome-IgG cardiolipin  [D68.61]  History of pulmonary embolus (PE) [Z86.711]  Deep vein thrombosis (DVT) of right lower extremity  unspecified chronicity  unspecified vein (HCC) [I82.401]             Anticoagulation Episode Summary     INR check location:      Preferred lab:      Send INR reminders to:  [facility] CBC ANTICOAG POOL    Comments:  [employer] home ally, every 2-4 weeks.      Anticoagulation Care Providers     Provider Role Specialty Phone number    Juanito Guerrier MD Referring Hematology and Oncology [phone]          Drug interactions: has remained unchanged.  Diet: has remained unchanged.    Clinic Interview:  No pertinent clinical findings have been reported.    INR History:  Anticoagulation Monitoring 3/24/2023   INR 3.30   INR Date 3/3/2023   INR Goal 2.5-3.5   Last Week Total 0 mg   Next Week Total 86.25 mg   Sun 11.25 mg   Mon 15 mg   Tue 11.25 mg   Wed 11.25 mg   Thu 11.25 mg   Fri 15 mg   Sat 11.25 mg   Visit Report -       Plan:  1. INR was Therapeutic 3/3/23- see above in Anticoagulation Summary.    Carli Garcia  Confirmed she will Continue her warfarin regimen- see above in Anticoagulation Summary.  She confirms her warfarin dose of 15mg on M/F and 11.25mg all other days.  2. Follow up in 2 weeks--she tests monthly at home   3.They have been instructed to call if any changes in medications, doses, concerns, etc. Patient expresses understanding and has no further questions at this  time.    Kriss Melendez [facility]

## 2023-04-01 DIAGNOSIS — E78.00 HYPERCHOLESTEREMIA: ICD-10-CM

## 2023-04-01 DIAGNOSIS — E03.9 ACQUIRED HYPOTHYROIDISM: ICD-10-CM

## 2023-04-03 ENCOUNTER — ANTICOAGULATION VISIT (OUTPATIENT)
Dept: PHARMACY | Facility: HOSPITAL | Age: 58
End: 2023-04-03
Payer: COMMERCIAL

## 2023-04-03 DIAGNOSIS — D68.61 ANTIPHOSPHOLIPID SYNDROME: Primary | Chronic | ICD-10-CM

## 2023-04-03 DIAGNOSIS — I82.401 DEEP VEIN THROMBOSIS (DVT) OF RIGHT LOWER EXTREMITY, UNSPECIFIED CHRONICITY, UNSPECIFIED VEIN: ICD-10-CM

## 2023-04-03 DIAGNOSIS — Z86.711 HISTORY OF PULMONARY EMBOLUS (PE): ICD-10-CM

## 2023-04-03 LAB — INR PPP: 3.7

## 2023-04-03 RX ORDER — LEVOTHYROXINE SODIUM 175 UG/1
175 TABLET ORAL DAILY
Qty: 90 TABLET | Refills: 1 | Status: SHIPPED | OUTPATIENT
Start: 2023-04-03

## 2023-04-03 RX ORDER — ATORVASTATIN CALCIUM 10 MG/1
10 TABLET, FILM COATED ORAL DAILY
Qty: 90 TABLET | Refills: 1 | Status: SHIPPED | OUTPATIENT
Start: 2023-04-03

## 2023-04-03 NOTE — PROGRESS NOTES
Anticoagulation Clinic Progress Note    Patient's visit was held via telephone today.    Anticoagulation Summary  As of 4/3/2023    INR goal:  2.5-3.5   TTR:  0.0 % (1.4 wk)   INR used for dosing:  3.70 (4/3/2023)   Warfarin maintenance plan:  15 mg (7.5 mg x 2) every Fri; 11.25 mg (7.5 mg x 1.5) all other days; Starting 4/3/2023   Weekly warfarin total:  82.5 mg   Plan last modified:  Kriss Melendez RPH (4/3/2023)   Next INR check:  4/24/2023   Target end date:  Indefinite    Indications    Antiphospholipid syndrome-IgG cardiolipin  [D68.61]  History of pulmonary embolus (PE) [Z86.711]  Deep vein thrombosis (DVT) of right lower extremity  unspecified chronicity  unspecified vein [I82.401]             Anticoagulation Episode Summary     INR check location:      Preferred lab:      Send INR reminders to:  [facility] ANTICOAG POOL    Comments:  [employer] home ally, every 2-4 weeks.      Anticoagulation Care Providers     Provider Role Specialty Phone number    Juanito Guerrier MD Referring Hematology and Oncology [phone]          Drug interactions: has remained unchanged.  Diet: has remained unchanged.    Clinic Interview:  No pertinent clinical findings have been reported.    INR History:  Anticoagulation Monitoring 3/24/2023 4/3/2023   INR 3.30 3.70   INR Date 3/3/2023 4/3/2023   INR Goal 2.5-3.5 2.5-3.5   Trend - Down   Last Week Total 0 mg 86.25 mg   Next Week Total 86.25 mg 82.5 mg   Sun 11.25 mg 11.25 mg   Mon 15 mg 11.25 mg   Tue 11.25 mg 11.25 mg   Wed 11.25 mg 11.25 mg   Thu 11.25 mg 11.25 mg   Fri 15 mg 15 mg   Sat 11.25 mg 11.25 mg   Visit Report - -       Plan:  1. INR is Supratherapeutic today- see above in Anticoagulation Summary.   Will instruct Carli Garcia to Change their warfarin regimen- see above in Anticoagulation Summary.  2. Follow up in 3 weeks with MD appt  3.They have been instructed to call if any changes in medications, doses, concerns, etc. Patient expresses understanding and has  no further questions at this time.    Kriss Melendez RPH

## 2023-04-18 NOTE — PROGRESS NOTES
REASONS FOR FOLLOWUP:  Antiphospholipid antibody syndrome with history of pulmonary embolism March 2016    History of Present Illness    Mrs. Garcia is a 57-year-old woman with history of pulmonary embolism March 2016.  She was found to have positive lupus anticoagulant and cardiolipin antibodies at the time of her pulmonary embolism which have been persistent and has been on anticoagulation with Coumadin since diagnosis.  She had concern for pulmonary embolism despite Coumadin and her INR goal was increased to 2.5-3.5.    Patient returned today for follow-up.  She is on Plaquenil and prednisone for treatment of vasculitis per rheumatology Dr. Guillory.      She remains on Coumadin without excessive bleeding or bruising.  She has a home monitoring system now.  She has not had recent thromboses.    The patient has atypical lobular hyperplasia of the breast but did not follow-up with MRI of the breast or surgery (Dr. Simeon).    Past Medical History:   Diagnosis Date   • Anemia    • Angioedema     Lower lip   • Depression    • Diastolic dysfunction    • DVT (deep venous thrombosis)    • GERD (gastroesophageal reflux disease)    • H/O antiphospholipid syndrome    • Hiatal hernia    • Hypertension    • Hypothyroidism    • Lupus anticoagulant disorder    • Lupus anticoagulant disorder    • Morbid obesity    • CRISTI (obstructive sleep apnea)    • PE (pulmonary embolism)     Bilateral   • Right ventricular dilation    • Thrombocytopenia    • Vasculitis 05/2021       ONCOLOGIC HISTORY:  (History from previous dates can be found in the separate document.)    Current Outpatient Medications on File Prior to Visit   Medication Sig Dispense Refill   • amLODIPine (NORVASC) 5 MG tablet TAKE ONE TABLET BY MOUTH DAILY 90 tablet 2   • atorvastatin (LIPITOR) 10 MG tablet Take 1 tablet by mouth Daily. 90 tablet 1   • cetirizine (zyrTEC) 10 MG tablet Take 1 tablet by mouth Daily As Needed for Allergies.     • hydroxychloroquine  (PLAQUENIL) 200 MG tablet Take 1 tablet by mouth Every 12 (Twelve) Hours.     • levothyroxine (SYNTHROID, LEVOTHROID) 175 MCG tablet Take 1 tablet by mouth Daily. 90 tablet 1   • omeprazole (priLOSEC) 20 MG capsule Take 1 capsule by mouth Daily. 90 capsule 2   • potassium chloride ER (K-TAB) 20 MEQ tablet controlled-release ER tablet Take 1 tablet by mouth Daily.     • predniSONE (DELTASONE) 10 MG tablet Take 1 tablet by mouth Daily.     • sertraline (ZOLOFT) 100 MG tablet TAKE ONE TABLET BY MOUTH DAILY 90 tablet 2   • torsemide (DEMADEX) 20 MG tablet Take 1 tablet by mouth Daily.     • warfarin (COUMADIN) 7.5 MG tablet 1.5 tablets daily, 2 tablets on Sunday 180 tablet 1   • fluticasone (FLONASE) 50 MCG/ACT nasal spray 2 sprays into the nostril(s) as directed by provider Daily for 30 days. 9.9 mL 0     No current facility-administered medications on file prior to visit.       ALLERGIES:     Allergies   Allergen Reactions   • Losartan Angioedema   • Toprol Xl [Metoprolol Tartrate] Shortness Of Breath   • Eggs Or Egg-Derived Products Nausea And Vomiting   • Dust Mite Extract Unknown - Low Severity   • Sulfa Antibiotics Unknown - Low Severity       Social History     Socioeconomic History   • Marital status: Single   • Number of children: 0   Tobacco Use   • Smoking status: Never   • Smokeless tobacco: Never   Vaping Use   • Vaping Use: Never used   Substance and Sexual Activity   • Alcohol use: No   • Drug use: No   • Sexual activity: Not Currently     Partners: Male     Birth control/protection: Abstinence         Cancer-related family history includes Cancer in her mother; Uterine cancer in her mother.     Review of Systems   Constitutional: Negative for activity change and appetite change.   HENT: Negative.    Respiratory: Positive for shortness of breath (Exertion, chronic).    Cardiovascular: Positive for leg swelling. Negative for palpitations.   Genitourinary: Negative.    Musculoskeletal: Negative.    Skin:  "Positive for rash (Improving on therapy for vasculitis).   Neurological: Negative for weakness.   Hematological: Does not bruise/bleed easily.   Psychiatric/Behavioral: Positive for dysphoric mood.   ROS unchanged-4/24/2023      Objective      Vitals:    04/24/23 1558   BP: 139/78   Pulse: 102   Resp: 20   Temp: 96.9 °F (36.1 °C)   TempSrc: Infrared   SpO2: 90%   Weight: (!) 169 kg (373 lb 1.6 oz)   Height: 167.8 cm (66.06\")   PainSc:   5   PainLoc: Generalized  Comment: pain all over body         4/24/2023     4:05 PM   Current Status   ECOG score 1       Physical Exam   GENERAL: Well-developed, morbid obese woman  CHEST: Lungs clear to percussion and auscultation. Good airflow.    CARDIAC: Regular rate and rhythm without murmurs, rubs or gallops. Normal S1,S2.  ABDOMEN: Soft, nontender with no organomegaly or masses.  EXTREMITIES: No clubbing, cyanosis.  Trace to 1+ ankle edema bilaterally   PSYCHIATRIC: Normal mood and affect   Exam unchanged-4/24/2023      RECENT LABS:  Hematology WBC   Date Value Ref Range Status   04/24/2023 8.34 3.40 - 10.80 10*3/mm3 Final   09/11/2020 6.3 3.4 - 10.8 x10E3/uL Final     RBC   Date Value Ref Range Status   04/24/2023 5.41 (H) 3.77 - 5.28 10*6/mm3 Final   04/09/2021 4.68 3.77 - 5.28 x10E6/uL Final     Hemoglobin   Date Value Ref Range Status   04/24/2023 14.2 12.0 - 15.9 g/dL Final     Hematocrit   Date Value Ref Range Status   04/24/2023 43.8 34.0 - 46.6 % Final     Platelets   Date Value Ref Range Status   04/24/2023 289 140 - 450 10*3/mm3 Final      INR 3.6    Assessment & Plan    *Antiphospholipid antibody syndrome   · history of pulmonary embolism in March 2016 secondary to antiphospholipid antibody syndrome.    · Imaging in February 2019 showed age indeterminate pulmonary emboli and she complained of increased shortness of breath so her INR goal was changed to 2.5-3.5.   · INR 3.6 today    *Atypical hyperplasia of the breast lost to surgery follow-up    Plan:  1.  Continue " Coumadin with goal INR 2.5-3.5.  The patient has a home monitoring system and checks her INR every 2 weeks  2.  Encouraged follow-up with general surgery for atypical hyperplasia of the breast; the patient is not a candidate for chemoprevention with tamoxifen secondary to her clotting history; she is perimenopausal (menstrual cycle 2-3 times per year) so not yet a candidate for an aromatase inhibitor  3.  Follow-up 6 months CBC INR

## 2023-04-20 ENCOUNTER — TELEPHONE (OUTPATIENT)
Dept: ONCOLOGY | Facility: CLINIC | Age: 58
End: 2023-04-20
Payer: COMMERCIAL

## 2023-04-20 NOTE — TELEPHONE ENCOUNTER
----- Message from Cheryl Cast sent at 4/20/2023 10:02 AM EDT -----  Regarding: FW: needs to cancel/reschedule Dr. Guerrier    ----- Message -----  From: Kriss Melendez [facility]  Sent: 4/20/2023   9:39 AM EDT  To: [facility]  Subject: needs to cancel/reschedule Dr. Guerrier            She called and cannot make the late afternoon time on Mon 4/24 for Dr. Guerrier/Lab/coag appointments.  Please call her to change these appointments.  THANK YOU!

## 2023-04-24 ENCOUNTER — ANTICOAGULATION VISIT (OUTPATIENT)
Dept: ONCOLOGY | Facility: HOSPITAL | Age: 58
End: 2023-04-24
Payer: COMMERCIAL

## 2023-04-24 ENCOUNTER — LAB (OUTPATIENT)
Dept: LAB | Facility: HOSPITAL | Age: 58
End: 2023-04-24
Payer: COMMERCIAL

## 2023-04-24 ENCOUNTER — OFFICE VISIT (OUTPATIENT)
Dept: ONCOLOGY | Facility: CLINIC | Age: 58
End: 2023-04-24
Payer: COMMERCIAL

## 2023-04-24 VITALS
DIASTOLIC BLOOD PRESSURE: 78 MMHG | HEIGHT: 66 IN | RESPIRATION RATE: 20 BRPM | SYSTOLIC BLOOD PRESSURE: 139 MMHG | HEART RATE: 102 BPM | WEIGHT: 293 LBS | OXYGEN SATURATION: 90 % | BODY MASS INDEX: 47.09 KG/M2 | TEMPERATURE: 96.9 F

## 2023-04-24 DIAGNOSIS — Z86.711 HISTORY OF PULMONARY EMBOLUS (PE): ICD-10-CM

## 2023-04-24 DIAGNOSIS — N60.99 ATYPICAL HYPERPLASIA OF BREAST: ICD-10-CM

## 2023-04-24 DIAGNOSIS — D68.61 ANTIPHOSPHOLIPID SYNDROME: Primary | Chronic | ICD-10-CM

## 2023-04-24 DIAGNOSIS — I82.401 DEEP VEIN THROMBOSIS (DVT) OF RIGHT LOWER EXTREMITY, UNSPECIFIED CHRONICITY, UNSPECIFIED VEIN: ICD-10-CM

## 2023-04-24 LAB
BASOPHILS # BLD AUTO: 0.07 10*3/MM3 (ref 0–0.2)
BASOPHILS NFR BLD AUTO: 0.8 % (ref 0–1.5)
DEPRECATED RDW RBC AUTO: 38.9 FL (ref 37–54)
EOSINOPHIL # BLD AUTO: 0.01 10*3/MM3 (ref 0–0.4)
EOSINOPHIL NFR BLD AUTO: 0.1 % (ref 0.3–6.2)
ERYTHROCYTE [DISTWIDTH] IN BLOOD BY AUTOMATED COUNT: 13.2 % (ref 12.3–15.4)
HCT VFR BLD AUTO: 43.8 % (ref 34–46.6)
HGB BLD-MCNC: 14.2 G/DL (ref 12–15.9)
IMM GRANULOCYTES # BLD AUTO: 0.05 10*3/MM3 (ref 0–0.05)
IMM GRANULOCYTES NFR BLD AUTO: 0.6 % (ref 0–0.5)
INR PPP: 3.6 (ref 0.9–1.1)
LYMPHOCYTES # BLD AUTO: 0.84 10*3/MM3 (ref 0.7–3.1)
LYMPHOCYTES NFR BLD AUTO: 10.1 % (ref 19.6–45.3)
MCH RBC QN AUTO: 26.2 PG (ref 26.6–33)
MCHC RBC AUTO-ENTMCNC: 32.4 G/DL (ref 31.5–35.7)
MCV RBC AUTO: 81 FL (ref 79–97)
MONOCYTES # BLD AUTO: 0.27 10*3/MM3 (ref 0.1–0.9)
MONOCYTES NFR BLD AUTO: 3.2 % (ref 5–12)
NEUTROPHILS NFR BLD AUTO: 7.1 10*3/MM3 (ref 1.7–7)
NEUTROPHILS NFR BLD AUTO: 85.2 % (ref 42.7–76)
NRBC BLD AUTO-RTO: 0 /100 WBC (ref 0–0.2)
PLATELET # BLD AUTO: 289 10*3/MM3 (ref 140–450)
PMV BLD AUTO: 10.7 FL (ref 6–12)
PROTHROMBIN TIME: 43.1 SECONDS (ref 11–13.5)
RBC # BLD AUTO: 5.41 10*6/MM3 (ref 3.77–5.28)
WBC NRBC COR # BLD: 8.34 10*3/MM3 (ref 3.4–10.8)

## 2023-04-24 PROCEDURE — 85025 COMPLETE CBC W/AUTO DIFF WBC: CPT

## 2023-04-24 PROCEDURE — G0463 HOSPITAL OUTPT CLINIC VISIT: HCPCS

## 2023-04-24 PROCEDURE — 85610 PROTHROMBIN TIME: CPT

## 2023-04-24 PROCEDURE — 99214 OFFICE O/P EST MOD 30 MIN: CPT | Performed by: INTERNAL MEDICINE

## 2023-04-24 PROCEDURE — 36415 COLL VENOUS BLD VENIPUNCTURE: CPT

## 2023-04-24 NOTE — PROGRESS NOTES
Anticoagulation Clinic Progress Note    Patient's visit was held in MD office today--she is usually home ally.    Anticoagulation Summary  As of 4/24/2023    INR goal:  2.5-3.5   TTR:  0.0 % (1 mo)   INR used for dosing:  3.60 (4/24/2023)   Warfarin maintenance plan:  11.25 mg (7.5 mg x 1.5) every day; Starting 4/24/2023   Weekly warfarin total:  78.75 mg   Plan last modified:  Kriss Melendez RPH (4/24/2023)   Next INR check:  5/8/2023   Target end date:  Indefinite    Indications    Antiphospholipid syndrome-IgG cardiolipin  [D68.61]  History of pulmonary embolus (PE) [Z86.711]  Deep vein thrombosis (DVT) of right lower extremity  unspecified chronicity  unspecified vein [I82.401]             Anticoagulation Episode Summary     INR check location:      Preferred lab:      Send INR reminders to:  [facility] CBC ANTICOAG POOL    Comments:  [employer] home ally, every 2-4 weeks.      Anticoagulation Care Providers     Provider Role Specialty Phone number    Juanito Guerrier MD Referring Hematology and Oncology [phone]          Clinic Interview:      INR History:      Latest Ref Rng & Units 11/3/2022     6:13 AM 3/3/2023    12:00 AM 3/24/2023     5:02 PM 4/3/2023    12:00 AM 4/3/2023     1:32 PM 4/24/2023     3:15 PM 4/24/2023     3:45 PM   Anticoagulation Monitoring   INR    3.30  3.70  3.60   INR Date    3/3/2023  4/3/2023  4/24/2023   INR Goal    2.5-3.5  2.5-3.5  2.5-3.5   Trend      Down  Down   Last Week Total    0 mg  86.25 mg  82.5 mg   Next Week Total    86.25 mg  82.5 mg  75 mg   Sun    11.25 mg  11.25 mg  11.25 mg   Mon    15 mg  11.25 mg  7.5 mg (4/24); Otherwise 11.25 mg   Tue    11.25 mg  11.25 mg  11.25 mg   Wed    11.25 mg  11.25 mg  11.25 mg   Thu    11.25 mg  11.25 mg  11.25 mg   Fri    15 mg  15 mg  11.25 mg   Sat    11.25 mg  11.25 mg  11.25 mg   Historical INR 0.90 - 1.10 2.88   3.30       3.70       3.60      Visit Report       Report Report       This result is from an external source.        Plan:  1. INR is Supratherapeutic again today with dose decrease at last home test-- see above in Anticoagulation Summary.  Will instruct Carli Garcia to Change their warfarin regimen- see above in Anticoagulation Summary.  2. Follow up in 2 weeks  3.  Verbal  information provided. Patient expresses understanding and has no further questions at this time.    Kriss Melendez RPH

## 2023-05-09 LAB — INR PPP: 4

## 2023-05-10 ENCOUNTER — ANTICOAGULATION VISIT (OUTPATIENT)
Dept: PHARMACY | Facility: HOSPITAL | Age: 58
End: 2023-05-10
Payer: COMMERCIAL

## 2023-05-10 DIAGNOSIS — I82.401 DEEP VEIN THROMBOSIS (DVT) OF RIGHT LOWER EXTREMITY, UNSPECIFIED CHRONICITY, UNSPECIFIED VEIN: ICD-10-CM

## 2023-05-10 DIAGNOSIS — Z86.711 HISTORY OF PULMONARY EMBOLUS (PE): ICD-10-CM

## 2023-05-10 DIAGNOSIS — D68.61 ANTIPHOSPHOLIPID SYNDROME: Primary | Chronic | ICD-10-CM

## 2023-05-10 RX ORDER — WARFARIN SODIUM 7.5 MG/1
TABLET ORAL
Qty: 135 TABLET | Refills: 1 | Status: SHIPPED | OUTPATIENT
Start: 2023-05-10

## 2023-05-10 NOTE — PROGRESS NOTES
Anticoagulation Clinic Progress Note    Patient's visit was held by phone today.    Anticoagulation Summary  As of 5/10/2023    INR goal:  2.5-3.5   TTR:  0.0 % (1.5 mo)   INR used for dosin.00 (2023)   Warfarin maintenance plan:  7.5 mg (7.5 mg x 1) every Fri; 11.25 mg (7.5 mg x 1.5) all other days; Starting 5/10/2023   Weekly warfarin total:  75 mg   Plan last modified:  Radha Park RPH (5/10/2023)   Next INR check:  2023   Target end date:  Indefinite    Indications    Antiphospholipid syndrome-IgG cardiolipin  [D68.61]  History of pulmonary embolus (PE) [Z86.711]  Deep vein thrombosis (DVT) of right lower extremity  unspecified chronicity  unspecified vein [I82.401]             Anticoagulation Episode Summary     INR check location:      Preferred lab:      Send INR reminders to:  [facility] CBC ANTICOAG POOL    Comments:  [place] home ally, every 2-4 weeks.      Anticoagulation Care Providers     Provider Role Specialty Phone number    Juanito Guerrier MD Referring Hematology and Oncology [phone]          Drug interactions: has remained unchanged.  Diet: has remained unchanged.    Clinic Interview:  No pertinent clinical findings have been reported.    INR History:      3/24/2023     5:02 PM 4/3/2023    12:00 AM 4/3/2023     1:32 PM 2023     3:15 PM 2023     3:45 PM 2023    12:00 AM 5/10/2023     2:42 PM   Anticoagulation Monitoring   INR 3.30  3.70  3.60  4.00   INR Date 3/3/2023  4/3/2023  2023  2023   INR Goal 2.5-3.5  2.5-3.5  2.5-3.5  2.5-3.5   Trend   Down  Down  Down   Last Week Total 0 mg  86.25 mg  82.5 mg  78.75 mg   Next Week Total 86.25 mg  82.5 mg  75 mg  67.5 mg   Sun 11.25 mg  11.25 mg  11.25 mg  11.25 mg   Mon 15 mg  11.25 mg  7.5 mg (); Otherwise 11.25 mg  11.25 mg   Tue 11.25 mg  11.25 mg  11.25 mg  11.25 mg   Wed 11.25 mg  11.25 mg  11.25 mg  3.75 mg (5/10); Otherwise 11.25 mg   Thu 11.25 mg  11.25 mg  11.25 mg  11.25 mg   Fri 15 mg  15 mg   11.25 mg  7.5 mg   Sat 11.25 mg  11.25 mg  11.25 mg  11.25 mg   Historical INR  3.70       3.60    4.00         Visit Report    Report Report         This result is from an external source.       Plan:  1. INR is Supratherapeutic today- see above in Anticoagulation Summary.   Will instruct Carli Garcia to Change their warfarin regimen- see above in Anticoagulation Summary.  2. Follow up in 2 weeks  3.They have been instructed to call if any changes in medications, doses, concerns, etc. Patient expresses understanding and has no further questions at this time.    Radha Park [facility]

## 2023-05-16 ENCOUNTER — OFFICE VISIT (OUTPATIENT)
Dept: INTERNAL MEDICINE | Facility: CLINIC | Age: 58
End: 2023-05-16

## 2023-05-16 VITALS
OXYGEN SATURATION: 94 % | TEMPERATURE: 98.1 F | BODY MASS INDEX: 47.09 KG/M2 | HEIGHT: 66 IN | HEART RATE: 93 BPM | DIASTOLIC BLOOD PRESSURE: 75 MMHG | WEIGHT: 293 LBS | SYSTOLIC BLOOD PRESSURE: 128 MMHG

## 2023-05-16 DIAGNOSIS — J30.9 ALLERGIC RHINITIS, UNSPECIFIED SEASONALITY, UNSPECIFIED TRIGGER: Chronic | ICD-10-CM

## 2023-05-16 DIAGNOSIS — D68.61 ANTIPHOSPHOLIPID SYNDROME: Chronic | ICD-10-CM

## 2023-05-16 DIAGNOSIS — E78.00 HYPERCHOLESTEREMIA: Chronic | ICD-10-CM
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
## 2023-05-16 DIAGNOSIS — Z00.00 PE (PHYSICAL EXAM), ANNUAL: Primary | ICD-10-CM

## 2023-05-16 DIAGNOSIS — Z12.11 SCREENING FOR COLON CANCER: ICD-10-CM

## 2023-05-16 DIAGNOSIS — E03.9 ACQUIRED HYPOTHYROIDISM: Chronic | ICD-10-CM

## 2023-05-16 DIAGNOSIS — N60.99 ATYPICAL HYPERPLASIA OF BREAST: ICD-10-CM

## 2023-05-16 DIAGNOSIS — I10 ESSENTIAL HYPERTENSION: Chronic | ICD-10-CM

## 2023-05-16 DIAGNOSIS — Z12.31 ENCOUNTER FOR SCREENING MAMMOGRAM FOR MALIGNANT NEOPLASM OF BREAST: ICD-10-CM

## 2023-05-16 DIAGNOSIS — K21.9 GASTROESOPHAGEAL REFLUX DISEASE WITHOUT ESOPHAGITIS: Chronic | ICD-10-CM

## 2023-05-16 DIAGNOSIS — I77.6 VASCULITIS: Chronic | ICD-10-CM

## 2023-05-16 RX ORDER — AMOXICILLIN AND CLAVULANATE POTASSIUM 875; 125 MG/1; MG/1
1 TABLET, FILM COATED ORAL EVERY 12 HOURS SCHEDULED
Qty: 14 TABLET | Refills: 0 | Status: SHIPPED | OUTPATIENT
Start: 2023-05-16 | End: 2023-05-23

## 2023-05-17 LAB
APPEARANCE UR: CLEAR
BILIRUB UR QL STRIP: NEGATIVE
COLOR UR: YELLOW
GLUCOSE UR QL STRIP: NEGATIVE
HGB UR QL STRIP: NEGATIVE
KETONES UR QL STRIP: NEGATIVE
LEUKOCYTE ESTERASE UR QL STRIP: NEGATIVE
NITRITE UR QL STRIP: NEGATIVE
PH UR STRIP: 6 [PH] (ref 5–8)
PROT UR QL STRIP: NEGATIVE
SP GR UR STRIP: 1.01 (ref 1–1.03)
UROBILINOGEN UR STRIP-MCNC: NORMAL MG/DL

## 2023-05-19 ENCOUNTER — OFFICE VISIT (OUTPATIENT)
Dept: INTERNAL MEDICINE | Facility: CLINIC | Age: 58
End: 2023-05-19
Payer: COMMERCIAL

## 2023-05-19 VITALS
HEART RATE: 69 BPM | HEIGHT: 66 IN | DIASTOLIC BLOOD PRESSURE: 70 MMHG | BODY MASS INDEX: 47.09 KG/M2 | SYSTOLIC BLOOD PRESSURE: 122 MMHG | OXYGEN SATURATION: 99 % | WEIGHT: 293 LBS
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
## 2023-05-20 LAB
ALBUMIN SERPL-MCNC: 4.4 G/DL (ref 3.5–5.2)
ALBUMIN/GLOB SERPL: 1.6 G/DL
ALP SERPL-CCNC: 98 U/L (ref 39–117)
ALT SERPL-CCNC: 17 U/L (ref 1–33)
AST SERPL-CCNC: 14 U/L (ref 1–32)
BILIRUB SERPL-MCNC: 0.4 MG/DL (ref 0–1.2)
BUN SERPL-MCNC: 15 MG/DL (ref 6–20)
BUN/CREAT SERPL: 16.7 (ref 7–25)
CALCIUM SERPL-MCNC: 9.9 MG/DL (ref 8.6–10.5)
CHLORIDE SERPL-SCNC: 104 MMOL/L (ref 98–107)
CHOLEST SERPL-MCNC: 161 MG/DL (ref 0–200)
CO2 SERPL-SCNC: 28.5 MMOL/L (ref 22–29)
CREAT SERPL-MCNC: 0.9 MG/DL (ref 0.57–1)
EGFRCR SERPLBLD CKD-EPI 2021: 73.8 ML/MIN/1.73
ERYTHROCYTE [DISTWIDTH] IN BLOOD BY AUTOMATED COUNT: 13.5 % (ref 12.3–15.4)
GLOBULIN SER CALC-MCNC: 2.7 GM/DL
GLUCOSE SERPL-MCNC: 103 MG/DL (ref 65–99)
HCT VFR BLD AUTO: 43.1 % (ref 34–46.6)
HDLC SERPL-MCNC: 54 MG/DL (ref 40–60)
HGB BLD-MCNC: 14 G/DL (ref 12–15.9)
LDLC SERPL CALC-MCNC: 84 MG/DL (ref 0–100)
MCH RBC QN AUTO: 25.5 PG (ref 26.6–33)
MCHC RBC AUTO-ENTMCNC: 32.5 G/DL (ref 31.5–35.7)
MCV RBC AUTO: 78.6 FL (ref 79–97)
PLATELET # BLD AUTO: 349 10*3/MM3 (ref 140–450)
POTASSIUM SERPL-SCNC: 4.6 MMOL/L (ref 3.5–5.2)
PROT SERPL-MCNC: 7.1 G/DL (ref 6–8.5)
RBC # BLD AUTO: 5.48 10*6/MM3 (ref 3.77–5.28)
SODIUM SERPL-SCNC: 144 MMOL/L (ref 136–145)
TRIGL SERPL-MCNC: 131 MG/DL (ref 0–150)
TSH SERPL DL<=0.005 MIU/L-ACNC: 1.14 UIU/ML (ref 0.27–4.2)
UNABLE TO VOID: NORMAL
VLDLC SERPL CALC-MCNC: 23 MG/DL (ref 5–40)
WBC # BLD AUTO: 8.8 10*3/MM3 (ref 3.4–10.8)

## 2023-05-25 ENCOUNTER — ANTICOAGULATION VISIT (OUTPATIENT)
Dept: PHARMACY | Facility: HOSPITAL | Age: 58
End: 2023-05-25
Payer: COMMERCIAL

## 2023-05-25 DIAGNOSIS — I82.401 DEEP VEIN THROMBOSIS (DVT) OF RIGHT LOWER EXTREMITY, UNSPECIFIED CHRONICITY, UNSPECIFIED VEIN: ICD-10-CM

## 2023-05-25 DIAGNOSIS — Z86.711 HISTORY OF PULMONARY EMBOLUS (PE): ICD-10-CM

## 2023-05-25 DIAGNOSIS — D68.61 ANTIPHOSPHOLIPID SYNDROME: Primary | Chronic | ICD-10-CM

## 2023-05-25 LAB — INR PPP: 3.8

## 2023-05-25 NOTE — PROGRESS NOTES
Anticoagulation Clinic Progress Note    Patient's visit was held by phone today.    Anticoagulation Summary  As of 5/25/2023    INR goal:  2.5-3.5   TTR:  0.0 % (2 mo)   INR used for dosing:  3.80 (5/25/2023)   Warfarin maintenance plan:  7.5 mg (7.5 mg x 1) every Tue, Thu, Fri; 11.25 mg (7.5 mg x 1.5) all other days; Starting 5/25/2023   Weekly warfarin total:  67.5 mg   Plan last modified:  Elvira Pearson RPH (5/25/2023)   Next INR check:  6/1/2023   Target end date:  Indefinite    Indications    Antiphospholipid syndrome-IgG cardiolipin  [D68.61]  History of pulmonary embolus (PE) [Z86.711]  Deep vein thrombosis (DVT) of right lower extremity  unspecified chronicity  unspecified vein [I82.401]             Anticoagulation Episode Summary     INR check location:      Preferred lab:      Send INR reminders to:   [facility] CBC ANTICOAG POOL    Comments:  [place] home ally, every 2-4 weeks.      Anticoagulation Care Providers     Provider Role Specialty Phone number    Juanito Guerrier MD Referring Hematology and Oncology [phone]          Drug interactions: has remained unchanged.  Diet: has remained unchanged.    Clinic Interview:  No pertinent clinical findings have been reported.    INR History:      4/3/2023     1:32 PM 4/24/2023     3:15 PM 4/24/2023     3:45 PM 5/9/2023    12:00 AM 5/10/2023     2:42 PM 5/25/2023    12:00 AM 5/25/2023     4:38 PM   Anticoagulation Monitoring   INR 3.70  3.60  4.00  3.80   INR Date 4/3/2023  4/24/2023  5/9/2023  5/25/2023   INR Goal 2.5-3.5  2.5-3.5  2.5-3.5  2.5-3.5   Trend Down  Down  Down  Down   Last Week Total 86.25 mg  82.5 mg  78.75 mg  75 mg   Next Week Total 82.5 mg  75 mg  67.5 mg  67.5 mg   Sun 11.25 mg  11.25 mg  11.25 mg  11.25 mg   Mon 11.25 mg  7.5 mg (4/24); Otherwise 11.25 mg  11.25 mg  11.25 mg   Tue 11.25 mg  11.25 mg  11.25 mg  7.5 mg   Wed 11.25 mg  11.25 mg  3.75 mg (5/10); Otherwise 11.25 mg  11.25 mg   Thu 11.25 mg  11.25 mg  11.25 mg  7.5 mg   Fri  15 mg  11.25 mg  7.5 mg  7.5 mg   Sat 11.25 mg  11.25 mg  11.25 mg  11.25 mg   Historical INR  3.60    4.00       3.80         Visit Report  Report Report           This result is from an external source.       Plan:  1. INR is Supratherapeutic today- see above in Anticoagulation Summary.   Will instruct Carli Garcia to Change their warfarin regimen- see above in Anticoagulation Summary.  2. Follow up in 1 weeks ([place] self ally)  3.They have been instructed to call if any changes in medications, doses, concerns, etc. Patient expresses understanding and has no further questions at this time.    Chintan Pearson [facility]

## 2023-05-28 NOTE — PROGRESS NOTES
"Chief Complaint  Animal Bite (Right hand)    Subjective        Carli Garcia presents to [facility] for f/u regarding dog bite.    History of Present Illness  She presents for f/u regarding a dog bite of her right arm. She was started on Augmentin 5/16 and given a Tdap injection. She reports significant improvement in erythema and tenderness since then, denies fever and/or chills.      Objective   Vital Signs:  /70   Pulse 69   Ht 167.6 cm (66\")   Wt (!) 167 kg (368 lb)   SpO2 99%   BMI 59.40 kg/m²   Estimated body mass index is 59.4 kg/m² as calculated from the following:    Height as of this encounter: 167.6 cm (66\").    Weight as of this encounter: 167 kg (368 lb).             Physical Exam  Constitutional:       Appearance: She is well-developed. She is not ill-appearing.   HENT:      Head: Normocephalic.      Right Ear: Hearing, tympanic membrane and external ear normal.      Left Ear: Hearing, tympanic membrane and external ear normal.      Nose: Nose normal. No nasal deformity, mucosal edema or rhinorrhea.      Right Sinus: No maxillary sinus tenderness or frontal sinus tenderness.      Left Sinus: No maxillary sinus tenderness or frontal sinus tenderness.      Mouth/Throat:      Dentition: Normal dentition.   Eyes:      General: Lids are normal.         Right eye: No discharge.         Left eye: No discharge.      Conjunctiva/sclera: Conjunctivae normal.      Right eye: No exudate.     Left eye: No exudate.  Neck:      Thyroid: No thyroid mass or thyromegaly.      Vascular: No carotid bruit.      Trachea: Trachea normal.   Cardiovascular:      Rate and Rhythm: Regular rhythm.      Pulses: Normal pulses.      Heart sounds: Normal heart sounds. No murmur heard.  Pulmonary:      Effort: No respiratory distress.      Breath sounds: Normal breath sounds. No decreased breath sounds, wheezing, rhonchi or rales.   Abdominal:      General: Bowel sounds are normal.      " Palpations: Abdomen is soft.      Tenderness: There is no abdominal tenderness.   Musculoskeletal:      Cervical back: Normal range of motion. No edema.      Comments: Erythema on right hand with decreased in sie, area previously marked by black marker has decreased in size   Lymphadenopathy:      Head:      Right side of head: No submental, submandibular, tonsillar, preauricular, posterior auricular or occipital adenopathy.      Left side of head: No submental, submandibular, tonsillar, preauricular, posterior auricular or occipital adenopathy.   Skin:     General: Skin is warm and dry.      Nails: There is no clubbing.   Neurological:      Mental Status: She is alert.   Psychiatric:         Behavior: Behavior is cooperative.        Result Review :  The following data was reviewed by: JANINE Teague on 05/19/2023:  Common labs        11/3/2022    06:13 4/24/2023    15:15 5/19/2023    14:31   Common Labs   Glucose 99    103     BUN 11    15     Creatinine 0.81    0.90     Sodium 139    144     Potassium 4.0    4.6     Chloride 102    104     Calcium 8.9    9.9     Total Protein   7.1     Albumin 3.70    4.4     Total Bilirubin 0.5    0.4     Alkaline Phosphatase 84    98     AST (SGOT) 13    14     ALT (SGPT) 10    17     WBC 7.31   8.34   8.80     Hemoglobin 12.9   14.2   14.0     Hematocrit 39.5   43.8   43.1     Platelets 267   289   349     Total Cholesterol   161     Triglycerides   131     HDL Cholesterol   54     LDL Cholesterol    84                    Assessment and Plan   Diagnoses and all orders for this visit:    1. Dog bite, initial encounter (Primary)    There is decreased erythema and tenderness in the right hand since visit earlier this week, she will continue course of Augmentin. She will also continue to elevate her right arm for improved healing. RTC if sx persist and/or worsen.    She may take Tylenol if needed for pain control.       Follow Up   Return in about 6 months (around  11/19/2023).  Patient was given instructions and counseling regarding her condition or for health maintenance advice. Please see specific information pulled into the AVS if appropriate.

## 2023-06-01 DIAGNOSIS — I10 ESSENTIAL HYPERTENSION: ICD-10-CM

## 2023-06-01 RX ORDER — AMLODIPINE BESYLATE 5 MG/1
TABLET ORAL
Qty: 90 TABLET | Refills: 0 | Status: SHIPPED | OUTPATIENT
Start: 2023-06-01
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
## 2023-06-04 NOTE — ASSESSMENT & PLAN NOTE
Elevated right arm, begin antibiotics and re-evaluate on Friday (area outlined with black marker so we can monitor for increased redness).

## 2023-06-04 NOTE — ASSESSMENT & PLAN NOTE
MRI breast recommended by Dr. Simeon which she has declined for now, discussed benefits of further imaging which she will consider.

## 2023-06-04 NOTE — ASSESSMENT & PLAN NOTE
She denies myalgias with atorvastatin which she will continue along with a low-fat, low-cholesterol diet.   Lab Results   Component Value Date    LDL 84 05/19/2023

## 2023-06-04 NOTE — PROGRESS NOTES
Subjective   Carli Garcia is a 58 y.o. female who is here for a physical exam.    History of Present Illness  She was bit by her dog Saturday afternoon on her right hand, has noticed increased redness and tenderness since yesterday.     The following portions of the patient's history were reviewed and updated as appropriate: allergies, current medications, past social history and problem list.    Past Medical History:   Diagnosis Date    Anemia     Angioedema     Lower lip    Depression     Diastolic dysfunction     DVT (deep venous thrombosis)     GERD (gastroesophageal reflux disease)     H/O antiphospholipid syndrome     Hiatal hernia     Hypertension     Hypothyroidism     Lupus anticoagulant disorder     Lupus anticoagulant disorder     Morbid obesity     CRISTI (obstructive sleep apnea)     PE (pulmonary embolism)     Bilateral    Right ventricular dilation     Thrombocytopenia     Vasculitis 05/2021         Current Outpatient Medications:     atorvastatin (LIPITOR) 10 MG tablet, Take 1 tablet by mouth Daily., Disp: 90 tablet, Rfl: 1    cetirizine (zyrTEC) 10 MG tablet, Take 1 tablet by mouth Daily As Needed for Allergies., Disp: , Rfl:     hydroxychloroquine (PLAQUENIL) 200 MG tablet, Take 1 tablet by mouth Every 12 (Twelve) Hours., Disp: , Rfl:     levothyroxine (SYNTHROID, LEVOTHROID) 175 MCG tablet, Take 1 tablet by mouth Daily., Disp: 90 tablet, Rfl: 1    omeprazole (priLOSEC) 20 MG capsule, Take 1 capsule by mouth Daily., Disp: 90 capsule, Rfl: 2    potassium chloride ER (K-TAB) 20 MEQ tablet controlled-release ER tablet, Take 1 tablet by mouth Daily., Disp: , Rfl:     predniSONE (DELTASONE) 10 MG tablet, Take 1 tablet by mouth Daily., Disp: , Rfl:     sertraline (ZOLOFT) 100 MG tablet, TAKE ONE TABLET BY MOUTH DAILY, Disp: 90 tablet, Rfl: 2    torsemide (DEMADEX) 20 MG tablet, Take 1 tablet by mouth Daily., Disp: , Rfl:     warfarin (COUMADIN) 7.5 MG tablet, Take 1.5 tablets (11.25mg)  by mouth daily or  as directed by clinic, Disp: 135 tablet, Rfl: 1    amLODIPine (NORVASC) 5 MG tablet, TAKE 1 TABLET BY MOUTH EVERY DAY, Disp: 90 tablet, Rfl: 0    fluticasone (FLONASE) 50 MCG/ACT nasal spray, 2 sprays into the nostril(s) as directed by provider Daily for 30 days., Disp: 9.9 mL, Rfl: 0    Allergies   Allergen Reactions    Losartan Angioedema    Toprol Xl [Metoprolol Tartrate] Shortness Of Breath    Eggs Or Egg-Derived Products Nausea And Vomiting    Dust Mite Extract Unknown - Low Severity    Sulfa Antibiotics Unknown - Low Severity       Review of Systems   Constitutional:  Negative for activity change, appetite change, chills, diaphoresis, fatigue, fever and unexpected weight change.   HENT:  Positive for congestion and postnasal drip. Negative for dental problem, drooling, ear discharge, ear pain, facial swelling, hearing loss, mouth sores, nosebleeds, rhinorrhea, sinus pressure, sore throat, tinnitus and trouble swallowing.    Eyes:  Negative for photophobia, pain, discharge, redness, itching and visual disturbance.   Respiratory:  Negative for apnea, cough, choking, chest tightness, shortness of breath and wheezing.    Cardiovascular:  Negative for chest pain, palpitations and leg swelling.        No orthopnea, PND, VALERIO   Gastrointestinal:  Negative for abdominal pain, blood in stool, constipation, diarrhea, nausea and vomiting.   Endocrine: Negative for cold intolerance, heat intolerance, polydipsia and polyuria.   Genitourinary:  Negative for decreased urine volume, dysuria, enuresis, flank pain, frequency, hematuria and urgency.   Musculoskeletal:  Positive for arthralgias and back pain. Negative for gait problem, joint swelling, myalgias, neck pain and neck stiffness.   Skin:  Positive for color change, rash and wound.        No hair changes, no nail changes   Allergic/Immunologic: Negative for environmental allergies, food allergies and immunocompromised state.   Neurological:  Negative for dizziness,  "tremors, seizures, syncope, speech difficulty, weakness, light-headedness, numbness and headaches.   Hematological:  Negative for adenopathy. Does not bruise/bleed easily.   Psychiatric/Behavioral:  Negative for agitation, confusion, decreased concentration, dysphoric mood, sleep disturbance and suicidal ideas. The patient is not nervous/anxious.      Objective   Vitals:    05/16/23 1305   BP: 128/75   BP Location: Left arm   Patient Position: Sitting   Cuff Size: Large Adult   Pulse: 93   Temp: 98.1 °F (36.7 °C)   TempSrc: Oral   SpO2: 94%   Weight: (!) 168 kg (371 lb 3.2 oz)   Height: 167.6 cm (66\")     Physical Exam  Constitutional:       General: She is not in acute distress.     Appearance: Normal appearance. She is not diaphoretic.   HENT:      Head: Normocephalic and atraumatic.      Right Ear: Tympanic membrane, ear canal and external ear normal.      Left Ear: Tympanic membrane, ear canal and external ear normal.      Nose: Nose normal. No rhinorrhea.      Mouth/Throat:      Mouth: Mucous membranes are moist.      Pharynx: Oropharynx is clear.   Eyes:      General:         Right eye: No discharge.         Left eye: No discharge.      Conjunctiva/sclera: Conjunctivae normal.   Cardiovascular:      Rate and Rhythm: Normal rate and regular rhythm.      Pulses: Normal pulses.      Heart sounds: Normal heart sounds.   Pulmonary:      Effort: Pulmonary effort is normal.      Breath sounds: Normal breath sounds.   Abdominal:      General: Bowel sounds are normal.      Tenderness: There is no abdominal tenderness.   Musculoskeletal:         General: No swelling or tenderness.      Right hand: Swelling and tenderness present.      Cervical back: Normal range of motion.      Comments: There are puncture wounds on the right hand with surrounding inflammation and erythema   Skin:     General: Skin is warm and dry.   Neurological:      General: No focal deficit present.      Mental Status: She is alert and oriented to " person, place, and time.   Psychiatric:         Mood and Affect: Mood normal.         Behavior: Behavior normal.         Judgment: Judgment normal.       Assessment & Plan   Diagnoses and all orders for this visit:    1. PE (physical exam), annual (Primary)  -     CBC (No Diff)  -     Comprehensive Metabolic Panel  -     Lipid Panel  -     TSH  -     Cancel: Urinalysis With Microscopic If Indicated (No Culture) - Urine, Clean Catch    2. Dog bite, initial encounter  Assessment & Plan:  Elevated right arm, begin antibiotics and re-evaluate on Friday (area outlined with black marker so we can monitor for increased redness).    Orders:  -     Tdap Vaccine Greater Than or Equal To 8yo IM  -     amoxicillin-clavulanate (AUGMENTIN) 875-125 MG per tablet; Take 1 tablet by mouth Every 12 (Twelve) Hours for 7 days.  Dispense: 14 tablet; Refill: 0    3. Screening for colon cancer  -     Cologuard - Stool, Per Rectum; Future    4. Encounter for screening mammogram for malignant neoplasm of breast    5. Acquired hypothyroidism  Assessment & Plan:  She is currently on Synthroid 175 mcg daily for replacement, recheck TSH.      6. Allergic rhinitis, unspecified seasonality, unspecified trigger  Assessment & Plan:  Sx managed on Zyrtec and Flonase daily, continue to monitor.      7. Hypercholesteremia  Assessment & Plan:  She denies myalgias with atorvastatin which she will continue along with a low-fat, low-cholesterol diet.   Lab Results   Component Value Date    LDL 84 05/19/2023         8. Essential hypertension  Assessment & Plan:  BP is well-controlled on amlodipine which she will continue along with a low sodium diet.      9. Gastroesophageal reflux disease without esophagitis  Assessment & Plan:  Sx managed on omeprazole, denies abdominal pain.      10. Vasculitis  Assessment & Plan:  Followed by dermatology, managed on prednisone daily.      11. Antiphospholipid syndrome-IgG cardiolipin   Assessment & Plan:  She is managed  on Coumadin daily with protime monitoring through hematology      12. Atypical hyperplasia of breast  Assessment & Plan:  MRI breast recommended by Dr. Simeon which she has declined for now, discussed benefits of further imaging which she will consider.      Other orders  -     Urinalysis With Microscopic If Indicated (No Culture) -  -     Unable To Void        Risk assessment:  She has a family hx (mother) of uterine cancer and DM2 (brother)-check fasting labs today.  Her Body mass index is 59.91 kg/m². - She would benefit from weight loss which we discussed today.    Prevention:  Health Maintenance   Topic Date Due    COLORECTAL CANCER SCREENING  Never done    COVID-19 Vaccine (4 - Pfizer series) 01/18/2022    ANNUAL PHYSICAL  05/11/2023    INFLUENZA VACCINE  08/01/2023    LIPID PANEL  05/19/2024    MAMMOGRAM  06/08/2024    PAP SMEAR  05/11/2025    TDAP/TD VACCINES (3 - Td or Tdap) 05/16/2033    HEPATITIS C SCREENING  Completed    ZOSTER VACCINE  Completed    Pneumococcal Vaccine 0-64  Aged Out       Discussed healthy lifestyle choices such as maintaining a balanced diet low in carbohydrates and limiting caffeine and alcohol intake.  Recommended routine exercise for bone strength and cardiovascular health.                FAMILY HISTORY:  No pertinent family history in first degree relatives

## 2023-06-06 ENCOUNTER — TELEPHONE (OUTPATIENT)
Dept: INTERNAL MEDICINE | Facility: CLINIC | Age: 58
End: 2023-06-06
Payer: COMMERCIAL

## 2023-06-06 NOTE — TELEPHONE ENCOUNTER
Talked to Ms. Garcia.  Advised her of the importance of scheduling the bilateral MRI of her breasts and the mammogram.  Gave her the number for scheduling and she said she would call today to schedule.

## 2023-06-14 LAB — INR PPP: 2.2

## 2023-06-15 ENCOUNTER — ANTICOAGULATION VISIT (OUTPATIENT)
Dept: PHARMACY | Facility: HOSPITAL | Age: 58
End: 2023-06-15
Payer: COMMERCIAL

## 2023-06-15 DIAGNOSIS — I82.401 DEEP VEIN THROMBOSIS (DVT) OF RIGHT LOWER EXTREMITY, UNSPECIFIED CHRONICITY, UNSPECIFIED VEIN: ICD-10-CM

## 2023-06-15 DIAGNOSIS — D68.61 ANTIPHOSPHOLIPID SYNDROME: Primary | Chronic | ICD-10-CM

## 2023-06-15 DIAGNOSIS — Z86.711 HISTORY OF PULMONARY EMBOLUS (PE): ICD-10-CM

## 2023-06-15 NOTE — PROGRESS NOTES
Anticoagulation Clinic Progress Note    Patient's visit was held by phone today.    Anticoagulation Summary  As of 6/15/2023      INR goal:  2.5-3.5   TTR:  14.6 % (2.7 mo)   INR used for dosin.20 (2023)   Warfarin maintenance plan:  7.5 mg (7.5 mg x 1) every Tue, Fri; 11.25 mg (7.5 mg x 1.5) all other days; Starting 6/15/2023   Weekly warfarin total:  71.25 mg   Plan last modified:  Kriss Melendez RPH (6/15/2023)   Next INR check:  2023   Target end date:  Indefinite    Indications    Antiphospholipid syndrome-IgG cardiolipin  [D68.61]  History of pulmonary embolus (PE) [Z86.711]  Deep vein thrombosis (DVT) of right lower extremity  unspecified chronicity  unspecified vein [I82.401]                 Anticoagulation Episode Summary       INR check location:      Preferred lab:      [facility]:  [facility]    Comments:  [facility] home ally, every 2-4 weeks.          Anticoagulation Care Providers       Provider Role Specialty Phone number    Juanito Guerrier MD Referring Hematology and Oncology [phone]            Drug interactions: has remained unchanged.  Diet: has remained unchanged.    Clinic Interview:  No pertinent clinical findings have been reported.    INR History:      2023     3:45 PM 2023    12:00 AM 5/10/2023     2:42 PM 2023    12:00 AM 2023     4:38 PM 2023    12:00 AM 6/15/2023    10:17 AM   Anticoagulation Monitoring   INR 3.60  4.00  3.80  2.20   INR Date 2023   INR Goal 2.5-3.5  2.5-3.5  2.5-3.5  2.5-3.5   Trend Down  Down  Down  Up   Last Week Total 82.5 mg  78.75 mg  75 mg  67.5 mg   Next Week Total 75 mg  67.5 mg  67.5 mg  71.25 mg   Sun 11.25 mg  11.25 mg  11.25 mg  11.25 mg   Mon 7.5 mg (); Otherwise 11.25 mg  11.25 mg  11.25 mg  11.25 mg   Tue 11.25 mg  11.25 mg  7.5 mg  7.5 mg   Wed 11.25 mg  3.75 mg (5/10); Otherwise 11.25 mg  11.25 mg  11.25 mg   Thu 11.25 mg  11.25 mg  7.5 mg   11.25 mg   Fri 11.25 mg  7.5 mg  7.5 mg  7.5 mg   Sat 11.25 mg  11.25 mg  11.25 mg  11.25 mg   Historical INR  4.00      3.80      2.20        Visit Report Report             This result is from an external source.       Plan:  1. INR is Subtherapeutic today- see above in Anticoagulation Summary.   Will instruct Carli Garcia to Increase their warfarin regimen- see above in Anticoagulation Summary.  2. Follow up in 2 weeks  3.They have been instructed to call if any changes in medications, doses, concerns, etc. Patient expresses understanding and has no further questions at this time.    Kriss Melendez [facility]

## 2023-07-30 DIAGNOSIS — E03.9 ACQUIRED HYPOTHYROIDISM: ICD-10-CM

## 2023-07-30 DIAGNOSIS — K21.9 GASTROESOPHAGEAL REFLUX DISEASE WITHOUT ESOPHAGITIS: ICD-10-CM

## 2023-07-30 DIAGNOSIS — E78.00 HYPERCHOLESTEREMIA: ICD-10-CM

## 2023-07-30 DIAGNOSIS — F41.9 ANXIETY: ICD-10-CM

## 2023-07-30 LAB — INR PPP: 2.3

## 2023-07-30 RX ORDER — TORSEMIDE 20 MG/1
20 TABLET ORAL DAILY
Status: CANCELLED | OUTPATIENT
Start: 2023-07-30

## 2023-07-30 RX ORDER — POTASSIUM CHLORIDE 1500 MG/1
20 TABLET, EXTENDED RELEASE ORAL DAILY
Qty: 60 TABLET | Status: CANCELLED | OUTPATIENT
Start: 2023-07-30

## 2023-07-30 RX ORDER — PREDNISONE 10 MG/1
10 TABLET ORAL DAILY
Status: CANCELLED | OUTPATIENT
Start: 2023-07-30

## 2023-07-30 RX ORDER — HYDROXYCHLOROQUINE SULFATE 200 MG/1
200 TABLET, FILM COATED ORAL EVERY 12 HOURS
Status: CANCELLED | OUTPATIENT
Start: 2023-07-30

## 2023-07-31 ENCOUNTER — ANTICOAGULATION VISIT (OUTPATIENT)
Dept: PHARMACY | Facility: HOSPITAL | Age: 58
End: 2023-07-31

## 2023-07-31 DIAGNOSIS — D68.61 ANTIPHOSPHOLIPID SYNDROME: Primary | Chronic | ICD-10-CM

## 2023-07-31 DIAGNOSIS — I82.401 DEEP VEIN THROMBOSIS (DVT) OF RIGHT LOWER EXTREMITY, UNSPECIFIED CHRONICITY, UNSPECIFIED VEIN: ICD-10-CM

## 2023-07-31 DIAGNOSIS — Z86.711 HISTORY OF PULMONARY EMBOLUS (PE): ICD-10-CM

## 2023-07-31 RX ORDER — WARFARIN SODIUM 7.5 MG/1
TABLET ORAL
Qty: 135 TABLET | Refills: 1 | Status: SHIPPED | OUTPATIENT
Start: 2023-07-31

## 2023-08-03 RX ORDER — SERTRALINE HYDROCHLORIDE 100 MG/1
100 TABLET, FILM COATED ORAL DAILY
Qty: 90 TABLET | Refills: 1 | Status: SHIPPED | OUTPATIENT
Start: 2023-08-03

## 2023-08-03 RX ORDER — OMEPRAZOLE 20 MG/1
20 CAPSULE, DELAYED RELEASE ORAL DAILY
Qty: 90 CAPSULE | Refills: 1 | Status: SHIPPED | OUTPATIENT
Start: 2023-08-03

## 2023-08-03 RX ORDER — LEVOTHYROXINE SODIUM 175 UG/1
175 TABLET ORAL DAILY
Qty: 90 TABLET | Refills: 1 | Status: SHIPPED | OUTPATIENT
Start: 2023-08-03

## 2023-08-03 RX ORDER — ATORVASTATIN CALCIUM 10 MG/1
10 TABLET, FILM COATED ORAL DAILY
Qty: 90 TABLET | Refills: 1 | Status: SHIPPED | OUTPATIENT
Start: 2023-08-03

## 2023-08-28 ENCOUNTER — ANTICOAGULATION VISIT (OUTPATIENT)
Dept: PHARMACY | Facility: HOSPITAL | Age: 58
End: 2023-08-28

## 2023-08-28 DIAGNOSIS — I82.401 DEEP VEIN THROMBOSIS (DVT) OF RIGHT LOWER EXTREMITY, UNSPECIFIED CHRONICITY, UNSPECIFIED VEIN: ICD-10-CM

## 2023-08-28 DIAGNOSIS — D68.61 ANTIPHOSPHOLIPID SYNDROME: Primary | Chronic | ICD-10-CM

## 2023-08-28 DIAGNOSIS — Z86.711 HISTORY OF PULMONARY EMBOLUS (PE): ICD-10-CM

## 2023-08-28 LAB — INR PPP: 2.6

## 2023-08-28 NOTE — PROGRESS NOTES
Anticoagulation Clinic Progress Note    Patient's visit was held by phone today - [facility] with instructions - patient to call back if she has any diet/med/health changes to note.     Anticoagulation Summary  As of 2023      INR goal:  2.5-3.5   TTR:  26.0 % (5.2 mo)   INR used for dosin.60 (2023)   Warfarin maintenance plan:  11.25 mg (7.5 mg x 1.5) every day   Weekly warfarin total:  78.75 mg   No change documented:  Radha Park RPH   Plan last modified:  Kriss Melendez RPH (2023)   Next INR check:  2023   Priority:  Maintenance   Target end date:  Indefinite    Indications    Antiphospholipid syndrome-IgG cardiolipin  [D68.61]  History of pulmonary embolus (PE) [Z86.711]  Deep vein thrombosis (DVT) of right lower extremity  unspecified chronicity  unspecified vein [I82.401]                 Anticoagulation Episode Summary       INR check location:      Preferred lab:      Send INR reminders to:  [facility] ANTICOAG POOL    Comments:  [place] [facility], every 2-4 weeks.          Anticoagulation Care Providers       Provider Role Specialty Phone number    Juanito Guerrier MD Referring Hematology and Oncology [phone]            Drug interactions: has remained unchanged.  Diet: has remained unchanged.    Clinic Interview:  No pertinent clinical findings have been reported.    INR History:      2023    12:00 AM 2023    11:30 AM 2023    11:36 AM 2023    12:00 AM 2023     9:22 AM 2023    12:00 AM 2023     9:08 AM   Anticoagulation Monitoring   INR  2.90 2.90  2.30  2.60   INR Date  2023   INR Goal  2.5-3.5 2.5-3.5  2.5-3.5  2.5-3.5   Trend  Same Same  Up  Same   Last Week Total  75 mg 75 mg  75 mg  78.75 mg   Next Week Total  75 mg 75 mg  78.75 mg  78.75 mg   Sun  11.25 mg 11.25 mg  11.25 mg  11.25 mg   Mon  11.25 mg 11.25 mg  11.25 mg  11.25 mg   Tue  7.5 mg 7.5 mg  11.25 mg  11.25 mg   Wed  11.25 mg 11.25 mg  11.25  mg  11.25 mg   Thu  11.25 mg 11.25 mg  11.25 mg  11.25 mg   Fri  11.25 mg 11.25 mg  11.25 mg  11.25 mg   Sat  11.25 mg 11.25 mg  11.25 mg  11.25 mg   Historical INR 2.90        2.90       2.30      2.60            This result is from an external source.    Multiple values from one day are sorted in reverse-chronological order       Plan:  1. INR is Therapeutic today- see above in Anticoagulation Summary.   Will instruct Carli Garcia to Continue their warfarin regimen- see above in Anticoagulation Summary.  2. Follow up in 4 weeks  3.They have been instructed to call if any changes in medications, doses, concerns, etc. Patient expresses understanding and has no further questions at this time.    Radha Park [facility]

## 2023-09-04 NOTE — TELEPHONE ENCOUNTER
Rx Refill Note  Requested Prescriptions     Pending Prescriptions Disp Refills   • atorvastatin (LIPITOR) 10 MG tablet 90 tablet 2     Sig: Take 1 tablet by mouth Daily.   • warfarin (COUMADIN) 7.5 MG tablet 141 tablet 1      Last office visit with prescribing clinician: 5/11/2022   Last telemedicine visit with prescribing clinician: Visit date not found   Next office visit with prescribing clinician: Visit date not found      dizziness

## 2023-09-11 LAB — INR PPP: 2.7

## 2023-09-12 ENCOUNTER — ANTICOAGULATION VISIT (OUTPATIENT)
Dept: PHARMACY | Facility: HOSPITAL | Age: 58
End: 2023-09-12
Payer: COMMERCIAL

## 2023-09-12 DIAGNOSIS — D68.61 ANTIPHOSPHOLIPID SYNDROME: Primary | Chronic | ICD-10-CM

## 2023-09-12 DIAGNOSIS — I82.401 DEEP VEIN THROMBOSIS (DVT) OF RIGHT LOWER EXTREMITY, UNSPECIFIED CHRONICITY, UNSPECIFIED VEIN: ICD-10-CM

## 2023-09-12 DIAGNOSIS — Z86.711 HISTORY OF PULMONARY EMBOLUS (PE): ICD-10-CM

## 2023-09-12 NOTE — PROGRESS NOTES
Anticoagulation Clinic Progress Note    Patient's visit was held by phone today.    Anticoagulation Summary  As of 2023      INR goal:  2.5-3.5   TTR:  32.1 % (5.7 mo)   INR used for dosin.70 (2023)   Warfarin maintenance plan:  11.25 mg (7.5 mg x 1.5) every day   Weekly warfarin total:  78.75 mg   Plan last modified:  Kriss Melendez RPH (2023)   Next INR check:  2023   Priority:  Maintenance   Target end date:  Indefinite    Indications    Antiphospholipid syndrome-IgG cardiolipin  [D68.61]  History of pulmonary embolus (PE) [Z86.711]  Deep vein thrombosis (DVT) of right lower extremity  unspecified chronicity  unspecified vein [I82.401]                 Anticoagulation Episode Summary       INR check location:      Preferred lab:      Send INR reminders to:   [facility] CBC ANTICOAG POOL    Comments:  [facility], every 2-4 weeks.          Anticoagulation Care Providers       Provider Role Specialty Phone number    Juanito Guerrier MD Referring Hematology and Oncology [phone]            Drug interactions: has remained unchanged.  Diet: has remained unchanged.    Clinic Interview:  No pertinent clinical findings have been reported.    INR History:      2023    11:36 AM 2023    12:00 AM 2023     9:22 AM 2023    12:00 AM 2023     9:08 AM 2023    12:00 AM 2023    10:54 AM   Anticoagulation Monitoring   INR 2.90  2.30  2.60  2.70   INR Date 2023   INR Goal 2.5-3.5  2.5-3.5  2.5-3.5  2.5-3.5   Trend Same  Up  Same  Same   Last Week Total 75 mg  75 mg  78.75 mg  78.75 mg   Next Week Total 75 mg  78.75 mg  78.75 mg  78.75 mg   Sun 11.25 mg  11.25 mg  11.25 mg  11.25 mg   Mon 11.25 mg  11.25 mg  11.25 mg  11.25 mg   Tue 7.5 mg  11.25 mg  11.25 mg  11.25 mg   Wed 11.25 mg  11.25 mg  11.25 mg  11.25 mg   Thu 11.25 mg  11.25 mg  11.25 mg  11.25 mg   Fri 11.25 mg  11.25 mg  11.25 mg  11.25 mg   Sat 11.25 mg  11.25 mg   11.25 mg  11.25 mg   Historical INR  2.30      2.60      2.70            This result is from an external source.       Plan:  1. INR is Therapeutic today- see above in Anticoagulation Summary.   Will instruct Carli Garcia to Continue their warfarin regimen- see above in Anticoagulation Summary.  2. Follow up in 2 weeks--has MD visit  3.They have been instructed to call if any changes in medications, doses, concerns, etc. Patient expresses understanding and has no further questions at this time.    Kriss Melendez RP

## 2023-09-25 NOTE — PROGRESS NOTES
REASONS FOR FOLLOWUP:  Antiphospholipid antibody syndrome with history of pulmonary embolism March 2016    History of Present Illness    Mrs. Garcia is a 58-year-old woman with history of pulmonary embolism March 2016.  She was found to have positive lupus anticoagulant and cardiolipin antibodies at the time of her pulmonary embolism which have been persistent and has been on anticoagulation with Coumadin since diagnosis.  She had concern for pulmonary embolism despite Coumadin and her INR goal was increased to 2.5-3.5.    Patient returned today for follow-up.  She is on Plaquenil and prednisone for treatment of vasculitis per rheumatology Dr. Guillory.      She remains on Coumadin without excessive bleeding or bruising.  She has a home monitoring system now.  She has not had recent thromboses.    The patient has atypical lobular hyperplasia of the breast but did not follow-up with MRI of the breast or surgery (Dr. Simeon).  She had follow-up mammography - 6/14/2023.    Past Medical History:   Diagnosis Date    Anemia     Angioedema     Lower lip    Depression     Diastolic dysfunction     DVT (deep venous thrombosis)     GERD (gastroesophageal reflux disease)     H/O antiphospholipid syndrome     Hiatal hernia     Hypertension     Hypothyroidism     Lupus anticoagulant disorder     Lupus anticoagulant disorder     Morbid obesity     CRISTI (obstructive sleep apnea)     PE (pulmonary embolism)     Bilateral    Right ventricular dilation     Thrombocytopenia     Vasculitis 05/2021       ONCOLOGIC HISTORY:  (History from previous dates can be found in the separate document.)    Current Outpatient Medications on File Prior to Visit   Medication Sig Dispense Refill    amLODIPine (NORVASC) 5 MG tablet TAKE 1 TABLET BY MOUTH EVERY DAY 90 tablet 0    atorvastatin (LIPITOR) 10 MG tablet Take 1 tablet by mouth Daily. 90 tablet 1    cetirizine (zyrTEC) 10 MG tablet Take 1 tablet by mouth Daily As Needed for Allergies.       hydroxychloroquine (PLAQUENIL) 200 MG tablet Take 1 tablet by mouth Every 12 (Twelve) Hours.      levothyroxine (SYNTHROID, LEVOTHROID) 175 MCG tablet Take 1 tablet by mouth Daily. 90 tablet 1    omeprazole (priLOSEC) 20 MG capsule Take 1 capsule by mouth Daily. 90 capsule 1    potassium chloride ER (K-TAB) 20 MEQ tablet controlled-release ER tablet Take 1 tablet by mouth Daily.      sertraline (ZOLOFT) 100 MG tablet Take 1 tablet by mouth Daily. 90 tablet 1    torsemide (DEMADEX) 20 MG tablet Take 1 tablet by mouth Daily.      warfarin (COUMADIN) 7.5 MG tablet Take one and one-half tablets daily (11.25mg) except Tuesday take one tablet or as directed. 135 tablet 1    [DISCONTINUED] predniSONE (DELTASONE) 10 MG tablet Take 1 tablet by mouth Daily.      fluticasone (FLONASE) 50 MCG/ACT nasal spray 2 sprays into the nostril(s) as directed by provider Daily for 30 days. 9.9 mL 0     No current facility-administered medications on file prior to visit.       ALLERGIES:     Allergies   Allergen Reactions    Losartan Angioedema    Toprol Xl [Metoprolol Tartrate] Shortness Of Breath    Eggs Or Egg-Derived Products Nausea And Vomiting    Dust Mite Extract Unknown - Low Severity    Sulfa Antibiotics Unknown - Low Severity       Social History     Socioeconomic History    Marital status: Single    Number of children: 0   Tobacco Use    Smoking status: Never    Smokeless tobacco: Never   Vaping Use    Vaping Use: Never used   Substance and Sexual Activity    Alcohol use: No    Drug use: No    Sexual activity: Not Currently     Partners: Male     Birth control/protection: Abstinence         Cancer-related family history includes Cancer in her mother; Uterine cancer in her mother.     Review of Systems   Constitutional:  Negative for activity change and appetite change.   HENT: Negative.     Respiratory:  Positive for shortness of breath (Exertion, chronic).    Cardiovascular:  Positive for leg swelling. Negative for  "palpitations.   Genitourinary: Negative.    Musculoskeletal: Negative.    Skin:  Positive for rash (Improving on therapy for vasculitis).   Neurological:  Negative for weakness.   Hematological:  Does not bruise/bleed easily.   Psychiatric/Behavioral:  Positive for dysphoric mood.     ROS unchanged 9/29/2023    Objective      Vitals:    09/29/23 1347   BP: 121/82   Pulse: 63   Resp: 18   Temp: 97.7 °F (36.5 °C)   TempSrc: Temporal   SpO2: 94%   Weight: (!) 170 kg (375 lb 1.6 oz)   Height: 167.8 cm (66.06\")   PainSc:   4   PainLoc: Leg  Comment: both         4/24/2023     4:05 PM   Current Status   ECOG score 1       Physical Exam   GENERAL: Well-developed, morbid obese woman  CHEST: Lungs clear to percussion and auscultation. Good airflow.    CARDIAC: Regular rate and rhythm without murmurs, rubs or gallops. Normal S1,S2.  ABDOMEN: Soft, nontender with no organomegaly or masses.  EXTREMITIES: No clubbing, cyanosis.  1+ ankle edema bilaterally   PSYCHIATRIC: Normal mood and affect        RECENT LABS:  Hematology WBC   Date Value Ref Range Status   09/29/2023 5.88 3.40 - 10.80 10*3/mm3 Final   05/19/2023 8.80 3.40 - 10.80 10*3/mm3 Final     RBC   Date Value Ref Range Status   09/29/2023 5.30 (H) 3.77 - 5.28 10*6/mm3 Final   05/19/2023 5.48 (H) 3.77 - 5.28 10*6/mm3 Final     Hemoglobin   Date Value Ref Range Status   09/29/2023 14.0 12.0 - 15.9 g/dL Final     Hematocrit   Date Value Ref Range Status   09/29/2023 44.6 34.0 - 46.6 % Final     Platelets   Date Value Ref Range Status   09/29/2023 261 140 - 450 10*3/mm3 Final      INR 2.9    Assessment & Plan    *Antiphospholipid antibody syndrome   history of pulmonary embolism in March 2016 secondary to antiphospholipid antibody syndrome.    Imaging in February 2019 showed age indeterminate pulmonary emboli and she complained of increased shortness of breath so her INR goal was changed to 2.5-3.5.   INR 2.9 today    *Atypical hyperplasia of the breast lost to surgery " follow-up  Bilateral screening mammography June 2023 negative    Plan:  1.  Continue Coumadin with goal INR 2.5-3.5.  The patient has a home monitoring system and checks her INR with results faxed in  2.  Follow-up 6 months CBC INR

## 2023-09-29 ENCOUNTER — LAB (OUTPATIENT)
Dept: LAB | Facility: HOSPITAL | Age: 58
End: 2023-09-29
Payer: COMMERCIAL

## 2023-09-29 ENCOUNTER — ANTICOAGULATION VISIT (OUTPATIENT)
Dept: ONCOLOGY | Facility: HOSPITAL | Age: 58
End: 2023-09-29
Payer: COMMERCIAL

## 2023-09-29 ENCOUNTER — OFFICE VISIT (OUTPATIENT)
Dept: ONCOLOGY | Facility: CLINIC | Age: 58
End: 2023-09-29
Payer: COMMERCIAL

## 2023-09-29 VITALS
DIASTOLIC BLOOD PRESSURE: 82 MMHG | OXYGEN SATURATION: 94 % | BODY MASS INDEX: 47.09 KG/M2 | RESPIRATION RATE: 18 BRPM | WEIGHT: 293 LBS | HEART RATE: 63 BPM | HEIGHT: 66 IN | TEMPERATURE: 97.7 F | SYSTOLIC BLOOD PRESSURE: 121 MMHG

## 2023-09-29 DIAGNOSIS — D68.61 ANTIPHOSPHOLIPID SYNDROME: Primary | Chronic | ICD-10-CM

## 2023-09-29 DIAGNOSIS — Z86.711 HISTORY OF PULMONARY EMBOLUS (PE): Primary | ICD-10-CM

## 2023-09-29 DIAGNOSIS — D68.61 ANTIPHOSPHOLIPID SYNDROME: Chronic | ICD-10-CM

## 2023-09-29 DIAGNOSIS — Z86.711 HISTORY OF PULMONARY EMBOLUS (PE): ICD-10-CM

## 2023-09-29 DIAGNOSIS — I82.401 DEEP VEIN THROMBOSIS (DVT) OF RIGHT LOWER EXTREMITY, UNSPECIFIED CHRONICITY, UNSPECIFIED VEIN: ICD-10-CM

## 2023-09-29 LAB
BASOPHILS # BLD AUTO: 0.05 10*3/MM3 (ref 0–0.2)
BASOPHILS NFR BLD AUTO: 0.9 % (ref 0–1.5)
DEPRECATED RDW RBC AUTO: 40.7 FL (ref 37–54)
EOSINOPHIL # BLD AUTO: 0.1 10*3/MM3 (ref 0–0.4)
EOSINOPHIL NFR BLD AUTO: 1.7 % (ref 0.3–6.2)
ERYTHROCYTE [DISTWIDTH] IN BLOOD BY AUTOMATED COUNT: 13.3 % (ref 12.3–15.4)
HCT VFR BLD AUTO: 44.6 % (ref 34–46.6)
HGB BLD-MCNC: 14 G/DL (ref 12–15.9)
IMM GRANULOCYTES # BLD AUTO: 0.02 10*3/MM3 (ref 0–0.05)
IMM GRANULOCYTES NFR BLD AUTO: 0.3 % (ref 0–0.5)
INR PPP: 2.9 (ref 0.9–1.1)
LYMPHOCYTES # BLD AUTO: 1.36 10*3/MM3 (ref 0.7–3.1)
LYMPHOCYTES NFR BLD AUTO: 23.1 % (ref 19.6–45.3)
MCH RBC QN AUTO: 26.4 PG (ref 26.6–33)
MCHC RBC AUTO-ENTMCNC: 31.4 G/DL (ref 31.5–35.7)
MCV RBC AUTO: 84.2 FL (ref 79–97)
MONOCYTES # BLD AUTO: 0.53 10*3/MM3 (ref 0.1–0.9)
MONOCYTES NFR BLD AUTO: 9 % (ref 5–12)
NEUTROPHILS NFR BLD AUTO: 3.82 10*3/MM3 (ref 1.7–7)
NEUTROPHILS NFR BLD AUTO: 65 % (ref 42.7–76)
NRBC BLD AUTO-RTO: 0 /100 WBC (ref 0–0.2)
PLATELET # BLD AUTO: 261 10*3/MM3 (ref 140–450)
PMV BLD AUTO: 10.5 FL (ref 6–12)
PROTHROMBIN TIME: 35.3 SECONDS (ref 11–13.5)
RBC # BLD AUTO: 5.3 10*6/MM3 (ref 3.77–5.28)
WBC NRBC COR # BLD: 5.88 10*3/MM3 (ref 3.4–10.8)

## 2023-09-29 PROCEDURE — 85610 PROTHROMBIN TIME: CPT

## 2023-09-29 PROCEDURE — G0463 HOSPITAL OUTPT CLINIC VISIT: HCPCS

## 2023-09-29 PROCEDURE — 85025 COMPLETE CBC W/AUTO DIFF WBC: CPT

## 2023-09-29 PROCEDURE — 36415 COLL VENOUS BLD VENIPUNCTURE: CPT

## 2023-09-29 NOTE — PROGRESS NOTES
Anticoagulation Clinic Progress Note    Patient's visit was held in office today.    Anticoagulation Summary  As of 2023      INR goal:  2.5-3.5   TTR:  38.7 % (6.3 mo)   INR used for dosin.90 (2023)   Warfarin maintenance plan:  11.25 mg (7.5 mg x 1.5) every day   Weekly warfarin total:  78.75 mg   Plan last modified:  Kriss Melendez RPH (2023)   Next INR check:  10/27/2023   Priority:  Maintenance   Target end date:  Indefinite    Indications    Antiphospholipid syndrome-IgG cardiolipin  [D68.61]  History of pulmonary embolus (PE) [Z86.711]  Deep vein thrombosis (DVT) of right lower extremity  unspecified chronicity  unspecified vein [I82.401]                 Anticoagulation Episode Summary       INR check location:      Preferred lab:      Send INR reminders to:   [facility] CBC ANTICOAG POOL    Comments:  [facility], every 2-4 weeks.          Anticoagulation Care Providers       Provider Role Specialty Phone number    Juanito Guerrier MD Referring Hematology and Oncology [phone]            Clinic Interview:  Patient Findings     Negatives:  Signs/symptoms of thrombosis, Signs/symptoms of bleeding,   Laboratory test error suspected, Change in health, Change in alcohol use,   Change in activity, Upcoming invasive procedure, Emergency department   visit, Upcoming dental procedure, Missed doses, Extra doses, Change in   medications, Change in diet/appetite, Hospital admission, Bruising, Other   complaints      Clinical Outcomes     Negatives:  Major bleeding event, Thromboembolic event,   Anticoagulation-related hospital admission, Anticoagulation-related ED   visit, Anticoagulation-related fatality        INR History:      Latest Ref Rng & Units 2023     9:22 AM 2023    12:00 AM 2023     9:08 AM 2023    12:00 AM 2023    10:54 AM 2023     1:15 PM 2023     1:24 PM   Anticoagulation Monitoring   INR  2.30  2.60  2.70 2.90    INR Date  2023   8/28/2023 9/11/2023 9/29/2023    INR Goal  2.5-3.5  2.5-3.5  2.5-3.5 2.5-3.5    Trend  Up  Same  Same Same    Last Week Total  75 mg  78.75 mg  78.75 mg 78.75 mg    Next Week Total  78.75 mg  78.75 mg  78.75 mg 78.75 mg    Sun  11.25 mg  11.25 mg  11.25 mg 11.25 mg    Mon  11.25 mg  11.25 mg  11.25 mg 11.25 mg    Tue  11.25 mg  11.25 mg  11.25 mg 11.25 mg    Wed  11.25 mg  11.25 mg  11.25 mg 11.25 mg    Thu  11.25 mg  11.25 mg  11.25 mg 11.25 mg    Fri  11.25 mg  11.25 mg  11.25 mg 11.25 mg    Sat  11.25 mg  11.25 mg  11.25 mg 11.25 mg    Historical INR 0.90 - 1.10  2.60      2.70       2.90        This result is from an external source.       Plan:  1. INR is Therapeutic today- see above in Anticoagulation Summary.  Will instruct Carli Garcia to Continue their warfarin regimen of 11.25 mg daily - see above in Anticoagulation Summary.  2. Follow up in 1 month (home-testing).  3. Patient declines warfarin refills.  4. Verbal and written information provided. Patient expresses understanding and has no further questions at this time.    Maggy Rivera [facility]

## 2023-10-26 ENCOUNTER — ANTICOAGULATION VISIT (OUTPATIENT)
Dept: PHARMACY | Facility: HOSPITAL | Age: 58
End: 2023-10-26
Payer: COMMERCIAL

## 2023-10-26 DIAGNOSIS — I82.401 DEEP VEIN THROMBOSIS (DVT) OF RIGHT LOWER EXTREMITY, UNSPECIFIED CHRONICITY, UNSPECIFIED VEIN: ICD-10-CM

## 2023-10-26 DIAGNOSIS — Z86.711 HISTORY OF PULMONARY EMBOLUS (PE): ICD-10-CM

## 2023-10-26 DIAGNOSIS — D68.61 ANTIPHOSPHOLIPID SYNDROME: Primary | Chronic | ICD-10-CM

## 2023-10-26 LAB — INR PPP: 3.4

## 2023-10-26 NOTE — PROGRESS NOTES
Anticoagulation Clinic Progress Note    Patient's visit was held via telephone today.    Anticoagulation Summary  As of 10/26/2023      INR goal:  2.5-3.5   TTR:  46.2% (7.2 mo)   INR used for dosing:  3.40 (10/26/2023)   Warfarin maintenance plan:  11.25 mg (7.5 mg x 1.5) every day   Weekly warfarin total:  78.75 mg   No change documented:  Maggy Rivera RPH   Plan last modified:  Kriss Melendez RPH (7/31/2023)   Next INR check:  11/9/2023   Priority:  Maintenance   Target end date:  Indefinite    Indications    Antiphospholipid syndrome-IgG cardiolipin  [D68.61]  History of pulmonary embolus (PE) [Z86.711]  Deep vein thrombosis (DVT) of right lower extremity  unspecified chronicity  unspecified vein [I82.401]                 Anticoagulation Episode Summary       INR check location:      Preferred lab:      Send INR reminders to:  [facility] CBC ANTICOAG POOL    Comments:  [facility], every 2-4 weeks.          Anticoagulation Care Providers       Provider Role Specialty Phone number    Juanito Guerrier MD Referring Hematology and Oncology [phone]            Clinic Interview:  Patient Findings     Negatives:  Signs/symptoms of thrombosis, Signs/symptoms of bleeding,   Laboratory test error suspected, Change in health, Change in alcohol use,   Change in activity, Upcoming invasive procedure, Emergency department   visit, Upcoming dental procedure, Missed doses, Extra doses, Change in   medications, Change in diet/appetite, Hospital admission, Bruising, Other   complaints      Clinical Outcomes     Negatives:  Major bleeding event, Thromboembolic event,   Anticoagulation-related hospital admission, Anticoagulation-related ED   visit, Anticoagulation-related fatality        INR History:      8/28/2023     9:08 AM 9/11/2023    12:00 AM 9/12/2023    10:54 AM 9/29/2023     1:15 PM 9/29/2023     1:24 PM 10/26/2023    12:00 AM 10/26/2023     9:55 AM   Anticoagulation Monitoring   INR 2.60  2.70 2.90   3.40    INR Date 8/28/2023  9/11/2023 9/29/2023   10/26/2023   INR Goal 2.5-3.5  2.5-3.5 2.5-3.5   2.5-3.5   Trend Same  Same Same   Same   Last Week Total 78.75 mg  78.75 mg 78.75 mg   78.75 mg   Next Week Total 78.75 mg  78.75 mg 78.75 mg   78.75 mg   Sun 11.25 mg  11.25 mg 11.25 mg   11.25 mg   Mon 11.25 mg  11.25 mg 11.25 mg   11.25 mg   Tue 11.25 mg  11.25 mg 11.25 mg   11.25 mg   Wed 11.25 mg  11.25 mg 11.25 mg   11.25 mg   Thu 11.25 mg  11.25 mg 11.25 mg   11.25 mg   Fri 11.25 mg  11.25 mg 11.25 mg   11.25 mg   Sat 11.25 mg  11.25 mg 11.25 mg   11.25 mg   Historical INR  2.70       2.90  3.40        Visit Report    Report Report         This result is from an external source.       Plan:  1. INR is Therapeutic today- see above in Anticoagulation Summary.  Will instruct Carli Garcia to Continue their warfarin regimen of 11.25 mg daily - see above in Anticoagulation Summary.  2. Follow up in 2 weeks (home-testing).   3. Patient declines warfarin refills.  4. Verbal and written information provided. Patient expresses understanding and has no further questions at this time.    Maggy Rivera [facility]

## 2023-11-06 LAB — INR PPP: 2.9

## 2023-11-07 ENCOUNTER — ANTICOAGULATION VISIT (OUTPATIENT)
Dept: PHARMACY | Facility: HOSPITAL | Age: 58
End: 2023-11-07
Payer: COMMERCIAL

## 2023-11-07 DIAGNOSIS — I82.401 DEEP VEIN THROMBOSIS (DVT) OF RIGHT LOWER EXTREMITY, UNSPECIFIED CHRONICITY, UNSPECIFIED VEIN: ICD-10-CM

## 2023-11-07 DIAGNOSIS — Z86.711 HISTORY OF PULMONARY EMBOLUS (PE): ICD-10-CM

## 2023-11-07 DIAGNOSIS — D68.61 ANTIPHOSPHOLIPID SYNDROME: Primary | Chronic | ICD-10-CM

## 2023-11-07 NOTE — PROGRESS NOTES
Anticoagulation Clinic Progress Note    Patient's visit was held by phone today.    Anticoagulation Summary  As of 2023      INR goal:  2.5-3.5   TTR:  48.8% (7.6 mo)   INR used for dosin.90 (2023)   Warfarin maintenance plan:  11.25 mg (7.5 mg x 1.5) every day   Weekly warfarin total:  78.75 mg   No change documented:  Brooke Ramirez RPH   Plan last modified:  Kriss Melendez RPH (2023)   Next INR check:  2023   Priority:  Maintenance   Target end date:  Indefinite    Indications    Antiphospholipid syndrome-IgG cardiolipin  [D68.61]  History of pulmonary embolus (PE) [Z86.711]  Deep vein thrombosis (DVT) of right lower extremity  unspecified chronicity  unspecified vein [I82.401]                 Anticoagulation Episode Summary       INR check location:      Preferred lab:      Send INR reminders to:  [facility] CBC ANTICOAG POOL    Comments:  [facility], every 2-4 weeks.          Anticoagulation Care Providers       Provider Role Specialty Phone number    Juanito Guerrier MD Referring Hematology and Oncology [phone]            Drug interactions: has remained unchanged.  Diet: has remained unchanged.    Clinic Interview:  Patient reports no issues or changes. No missed doses.     INR History:      2023    10:54 AM 2023     1:15 PM 2023     1:24 PM 10/26/2023    12:00 AM 10/26/2023     9:55 AM 2023    12:00 AM 2023    10:54 AM   Anticoagulation Monitoring   INR 2.70 2.90   3.40  2.90   INR Date 2023 2023   10/26/2023  2023   INR Goal 2.5-3.5 2.5-3.5   2.5-3.5  2.5-3.5   Trend Same Same   Same  Same   Last Week Total 78.75 mg 78.75 mg   78.75 mg  78.75 mg   Next Week Total 78.75 mg 78.75 mg   78.75 mg  78.75 mg   Sun 11.25 mg 11.25 mg   11.25 mg  11.25 mg   Mon 11.25 mg 11.25 mg   11.25 mg  11.25 mg   Tue 11.25 mg 11.25 mg   11.25 mg  11.25 mg   Wed 11.25 mg 11.25 mg   11.25 mg  11.25 mg   Thu 11.25 mg 11.25 mg   11.25 mg  11.25 mg    Fri 11.25 mg 11.25 mg   11.25 mg  11.25 mg   Sat 11.25 mg 11.25 mg   11.25 mg  11.25 mg   Historical INR   2.90  3.40      2.90        Visit Report  Report Report           This result is from an external source.       Plan:  1. INR is Therapeutic today- see above in Anticoagulation Summary.   Will instruct Carli Garcia to Continue their warfarin regimen- see above in Anticoagulation Summary.  2. Follow up in 2 weeks- home test.   3.They have been instructed to call if any changes in medications, doses, concerns, etc. Patient expresses understanding and has no further questions at this time.    Brooke Ramirez [facility]

## 2023-12-06 LAB — INR PPP: 2.9

## 2023-12-07 ENCOUNTER — ANTICOAGULATION VISIT (OUTPATIENT)
Dept: PHARMACY | Facility: HOSPITAL | Age: 58
End: 2023-12-07
Payer: COMMERCIAL

## 2023-12-07 DIAGNOSIS — I82.401 DEEP VEIN THROMBOSIS (DVT) OF RIGHT LOWER EXTREMITY, UNSPECIFIED CHRONICITY, UNSPECIFIED VEIN: ICD-10-CM

## 2023-12-07 DIAGNOSIS — Z86.711 HISTORY OF PULMONARY EMBOLUS (PE): ICD-10-CM

## 2023-12-07 DIAGNOSIS — D68.61 ANTIPHOSPHOLIPID SYNDROME: Primary | Chronic | ICD-10-CM

## 2023-12-07 NOTE — PROGRESS NOTES
Anticoagulation Clinic Progress Note    Patient's visit was held by phone today.    Anticoagulation Summary  As of 2023      INR goal:  2.5-3.5   TTR:  54.8% (8.6 mo)   INR used for dosin.90 (2023)   Warfarin maintenance plan:  11.25 mg (7.5 mg x 1.5) every day   Weekly warfarin total:  78.75 mg   No change documented:  Kriss Melendez RPH   Plan last modified:  Kriss Melendez RPH (2023)   Next INR check:  2023   Priority:  Maintenance   Target end date:  Indefinite    Indications    Antiphospholipid syndrome-IgG cardiolipin  [D68.61]  History of pulmonary embolus (PE) [Z86.711]  Deep vein thrombosis (DVT) of right lower extremity  unspecified chronicity  unspecified vein [I82.401]                 Anticoagulation Episode Summary       INR check location:      Preferred lab:      Send INR reminders to:  [facility] CBC ANTICOAG POOL    Comments:  [employer] [facility], every 2-4 weeks.          Anticoagulation Care Providers       Provider Role Specialty Phone number    Juanito Guerrier MD Referring Hematology and Oncology [phone]            Drug interactions: has remained unchanged.  Diet: has remained unchanged.    Clinic Interview:  No pertinent clinical findings have been reported.    INR History:      2023     1:24 PM 10/26/2023    12:00 AM 10/26/2023     9:55 AM 2023    12:00 AM 2023    10:54 AM 2023    12:00 AM 2023    12:08 PM   Anticoagulation Monitoring   INR   3.40  2.90  2.90   INR Date   10/26/2023  2023  2023   INR Goal   2.5-3.5  2.5-3.5  2.5-3.5   Trend   Same  Same  Same   Last Week Total   78.75 mg  78.75 mg  78.75 mg   Next Week Total   78.75 mg  78.75 mg  78.75 mg   Sun   11.25 mg  11.25 mg  11.25 mg   Mon   11.25 mg  11.25 mg  11.25 mg   Tue   11.25 mg  11.25 mg  11.25 mg   Wed   11.25 mg  11.25 mg  11.25 mg   Thu   11.25 mg  11.25 mg  11.25 mg   Fri   11.25 mg  11.25 mg  11.25 mg   Sat   11.25 mg  11.25 mg  11.25 mg   Historical INR  2.90  3.40      2.90      2.90        Visit Report Report             This result is from an external source.       Plan:  1. INR is Therapeutic today- see above in Anticoagulation Summary.   Will instruct Carli Garcia to Continue their warfarin regimen- see above in Anticoagulation Summary.  2. Follow up in 2 weeks--she will do home test and call to us.  We will resubmit for her home testing in Jan 2024 with her new insurance then.  3.They have been instructed to call if any changes in medications, doses, concerns, etc. Patient expresses understanding and has no further questions at this time.    Kriss Melendez, [facility]

## 2023-12-21 ENCOUNTER — TELEPHONE (OUTPATIENT)
Dept: ONCOLOGY | Facility: CLINIC | Age: 58
End: 2023-12-21

## 2023-12-21 ENCOUNTER — ANTICOAGULATION VISIT (OUTPATIENT)
Dept: PHARMACY | Facility: HOSPITAL | Age: 58
End: 2023-12-21
Payer: COMMERCIAL

## 2023-12-21 DIAGNOSIS — D68.61 ANTIPHOSPHOLIPID SYNDROME: Primary | Chronic | ICD-10-CM

## 2023-12-21 DIAGNOSIS — I82.401 DEEP VEIN THROMBOSIS (DVT) OF RIGHT LOWER EXTREMITY, UNSPECIFIED CHRONICITY, UNSPECIFIED VEIN: ICD-10-CM

## 2023-12-21 DIAGNOSIS — Z86.711 HISTORY OF PULMONARY EMBOLUS (PE): ICD-10-CM

## 2023-12-21 LAB — INR PPP: 4.3

## 2023-12-21 NOTE — PROGRESS NOTES
Anticoagulation Clinic Progress Note    Patient's visit was held via telephone today.    Anticoagulation Summary  As of 2023      INR goal:  2.5-3.5   TTR:  54.1% (9.1 mo)   INR used for dosin.30 (2023)   Warfarin maintenance plan:  11.25 mg (7.5 mg x 1.5) every day   Weekly warfarin total:  78.75 mg   Plan last modified:  Kriss Melendez RPH (2023)   Next INR check:  2023   Priority:  Maintenance   Target end date:  Indefinite    Indications    Antiphospholipid syndrome-IgG cardiolipin  [D68.61]  History of pulmonary embolus (PE) [Z86.711]  Deep vein thrombosis (DVT) of right lower extremity  unspecified chronicity  unspecified vein [I82.401]                 Anticoagulation Episode Summary       INR check location:      Preferred lab:      Send INR reminders to:   [facility] CBC ANTICOAG POOL    Comments:  [facility] home ally, every 2-4 weeks.          Anticoagulation Care Providers       Provider Role Specialty Phone number    Juanito Guerrier MD Referring Hematology and Oncology [phone]            Clinic Interview:  Patient Findings     Positives:  Change in health, Change in activity, Change in diet/appetite -- Patient reports experiencing mild head cold/respiratory symptoms, she hasn't been as active or had much of an appetite d/t acute illness.       Negatives:  Signs/symptoms of thrombosis, Signs/symptoms of bleeding,   Laboratory test error suspected, Change in alcohol use, Upcoming invasive   procedure, Emergency department visit, Upcoming dental procedure, Missed   doses, Extra doses, Change in medications, Hospital admission, Bruising,   Other complaints      Clinical Outcomes     Negatives:  Major bleeding event, Thromboembolic event,   Anticoagulation-related hospital admission, Anticoagulation-related ED   visit, Anticoagulation-related fatality        INR History:      10/26/2023     9:55 AM 2023    12:00 AM 2023    10:54 AM 2023    12:00 AM 2023     12:08 PM 12/21/2023    12:00 AM 12/21/2023    11:59 AM   Anticoagulation Monitoring   INR 3.40  2.90  2.90  4.30   INR Date 10/26/2023  11/6/2023  12/6/2023  12/21/2023   INR Goal 2.5-3.5  2.5-3.5  2.5-3.5  2.5-3.5   Trend Same  Same  Same  Same   Last Week Total 78.75 mg  78.75 mg  78.75 mg  78.75 mg   Next Week Total 78.75 mg  78.75 mg  78.75 mg  67.5 mg   Sun 11.25 mg  11.25 mg  11.25 mg  11.25 mg   Mon 11.25 mg  11.25 mg  11.25 mg  11.25 mg   Tue 11.25 mg  11.25 mg  11.25 mg  -   Wed 11.25 mg  11.25 mg  11.25 mg  -   Thu 11.25 mg  11.25 mg  11.25 mg  3.75 mg (12/21)   Fri 11.25 mg  11.25 mg  11.25 mg  7.5 mg (12/22)   Sat 11.25 mg  11.25 mg  11.25 mg  11.25 mg   Historical INR  2.90      2.90      4.30            This result is from an external source.       Plan:  1. INR is Supratherapeutic today- see above in Anticoagulation Summary.  Will instruct Carli Garcia to Change their warfarin regimen to a reduced dose of 3.75 mg today, 7.5 mg tomorrow, then resume 11.25 mg daily until re-check on Tuesday - see above in Anticoagulation Summary.  2. Follow up on Tuesday.   3. Patient declines warfarin refills.  4. Verbal and written information provided. Patient expresses understanding and has no further questions at this time.    Maggy Rivera [facility]

## 2024-01-04 ENCOUNTER — TELEPHONE (OUTPATIENT)
Dept: ONCOLOGY | Facility: CLINIC | Age: 59
End: 2024-01-04

## 2024-01-04 ENCOUNTER — TELEPHONE (OUTPATIENT)
Dept: PHARMACY | Facility: HOSPITAL | Age: 59
End: 2024-01-04

## 2024-01-04 NOTE — TELEPHONE ENCOUNTER
Caller: Carli Garcia    Relationship to patient: Self    Best call back number: [phone]     Chief complaint: R/S    Type of visit: LAB, ANTICOAG    Requested date: 1/10     If rescheduling, when is the original appointment: 1/9     Additional notes:PLEASE CALL PT TO R/S IF POSSIBLE

## 2024-01-04 NOTE — TELEPHONE ENCOUNTER
Contacted patient regarding overdue INR monitoring and warfarin regimen.     Patient was scheduled for INR check and anticoagulation visit on 1/9/24.

## 2024-01-10 ENCOUNTER — ANTICOAGULATION VISIT (OUTPATIENT)
Dept: ONCOLOGY | Facility: HOSPITAL | Age: 59
End: 2024-01-10
Payer: COMMERCIAL

## 2024-01-10 ENCOUNTER — LAB (OUTPATIENT)
Dept: LAB | Facility: HOSPITAL | Age: 59
End: 2024-01-10
Payer: COMMERCIAL

## 2024-01-10 DIAGNOSIS — D68.61 ANTIPHOSPHOLIPID SYNDROME: Primary | Chronic | ICD-10-CM

## 2024-01-10 DIAGNOSIS — I82.401 DEEP VEIN THROMBOSIS (DVT) OF RIGHT LOWER EXTREMITY, UNSPECIFIED CHRONICITY, UNSPECIFIED VEIN: ICD-10-CM

## 2024-01-10 DIAGNOSIS — I82.401 DEEP VEIN THROMBOSIS (DVT) OF RIGHT LOWER EXTREMITY, UNSPECIFIED CHRONICITY, UNSPECIFIED VEIN: Primary | ICD-10-CM

## 2024-01-10 DIAGNOSIS — Z86.711 HISTORY OF PULMONARY EMBOLUS (PE): ICD-10-CM

## 2024-01-10 LAB
INR PPP: 3.2 (ref 0.9–1.1)
PROTHROMBIN TIME: 38.8 SECONDS (ref 11–13.5)

## 2024-01-10 PROCEDURE — G0463 HOSPITAL OUTPT CLINIC VISIT: HCPCS

## 2024-01-10 PROCEDURE — 85610 PROTHROMBIN TIME: CPT

## 2024-01-10 NOTE — PROGRESS NOTES
Anticoagulation Clinic Progress Note    Patient's visit was held in office today.    Anticoagulation Summary  As of 1/10/2024      INR goal:  2.5-3.5   TTR:  52.2% (9.7 mo)   INR used for dosing:  3.20 (1/10/2024)   Warfarin maintenance plan:  11.25 mg (7.5 mg x 1.5) every day   Weekly warfarin total:  78.75 mg   No change documented:  Kriss Melendez RPH   Plan last modified:  Kriss Melendez RPH (7/31/2023)   Next INR check:  1/24/2024   Priority:  Maintenance   Target end date:  Indefinite    Indications    Antiphospholipid syndrome-IgG cardiolipin  [D68.61]  History of pulmonary embolus (PE) [Z86.711]  Deep vein thrombosis (DVT) of right lower extremity  unspecified chronicity  unspecified vein [I82.401]                 Anticoagulation Episode Summary       INR check location:      Preferred lab:      Send INR reminders to:  [facility] CBC ANTICOAG POOL    Comments:  [employer] home ally, every 2-4 weeks.          Anticoagulation Care Providers       Provider Role Specialty Phone number    Juanito Guerrier MD Referring Hematology and Oncology [phone]            Clinic Interview:  Patient Findings     Negatives:  Signs/symptoms of thrombosis, Signs/symptoms of bleeding,   Laboratory test error suspected, Change in health, Change in alcohol use,   Change in activity, Upcoming invasive procedure, Emergency department   visit, Upcoming dental procedure, Missed doses, Extra doses, Change in   medications, Change in diet/appetite, Hospital admission, Bruising, Other   complaints      Clinical Outcomes     Negatives:  Major bleeding event, Thromboembolic event,   Anticoagulation-related hospital admission, Anticoagulation-related ED   visit, Anticoagulation-related fatality        INR History:      Latest Ref Rng & Units 11/7/2023    10:54 AM 12/6/2023    12:00 AM 12/7/2023    12:08 PM 12/21/2023    12:00 AM 12/21/2023    11:59 AM 1/10/2024     2:15 PM 1/10/2024     2:27 PM   Anticoagulation Monitoring   INR  2.90   2.90  4.30 3.20    INR Date  11/6/2023  12/6/2023  12/21/2023 1/10/2024    INR Goal  2.5-3.5  2.5-3.5  2.5-3.5 2.5-3.5    Trend  Same  Same  Same Same    Last Week Total  78.75 mg  78.75 mg  78.75 mg 78.75 mg    Next Week Total  78.75 mg  78.75 mg  67.5 mg 78.75 mg    Sun  11.25 mg  11.25 mg  11.25 mg 11.25 mg    Mon  11.25 mg  11.25 mg  11.25 mg 11.25 mg    Tue  11.25 mg  11.25 mg  - 11.25 mg    Wed  11.25 mg  11.25 mg  - 11.25 mg    Thu  11.25 mg  11.25 mg  3.75 mg (12/21) 11.25 mg    Fri  11.25 mg  11.25 mg  7.5 mg (12/22) 11.25 mg    Sat  11.25 mg  11.25 mg  11.25 mg 11.25 mg    Historical INR 0.90 - 1.10  2.90      4.30       3.20        This result is from an external source.       Plan:  1. INR is Therapeutic today- see above in Anticoagulation Summary.  Will instruct Carli Garcia to Continue their warfarin regimen- see above in Anticoagulation Summary.  2. Follow up in 2 weeks  3. We will send Rx for home test machine to [facility] with updated insurance info and request to continue monthly testing per history.  4. Verbal and written information provided. Patient expresses understanding and has no further questions at this time.    Kriss Melendez [facility]

## 2024-01-22 DIAGNOSIS — I10 ESSENTIAL HYPERTENSION: ICD-10-CM

## 2024-01-22 DIAGNOSIS — E78.00 HYPERCHOLESTEREMIA: ICD-10-CM

## 2024-01-22 DIAGNOSIS — E03.9 ACQUIRED HYPOTHYROIDISM: ICD-10-CM

## 2024-01-22 DIAGNOSIS — K21.9 GASTROESOPHAGEAL REFLUX DISEASE WITHOUT ESOPHAGITIS: ICD-10-CM

## 2024-01-22 RX ORDER — WARFARIN SODIUM 7.5 MG/1
TABLET ORAL
Qty: 135 TABLET | Refills: 1 | Status: SHIPPED | OUTPATIENT
Start: 2024-01-22

## 2024-01-22 RX ORDER — HYDROXYCHLOROQUINE SULFATE 200 MG/1
200 TABLET, FILM COATED ORAL EVERY 12 HOURS
Status: CANCELLED | OUTPATIENT
Start: 2024-01-22

## 2024-01-22 NOTE — TELEPHONE ENCOUNTER
Rx Refill Note  Requested Prescriptions     Pending Prescriptions Disp Refills    hydroxychloroquine (PLAQUENIL) 200 MG tablet       Sig: Take 1 tablet by mouth Every 12 (Twelve) Hours.    potassium chloride ER (K-TAB) 20 MEQ tablet controlled-release ER tablet 60 tablet      Sig: Take 1 tablet by mouth Daily.    amLODIPine (NORVASC) 5 MG tablet 90 tablet 0     Sig: Take 1 tablet by mouth Daily.    levothyroxine (SYNTHROID, LEVOTHROID) 175 MCG tablet 90 tablet 1     Sig: Take 1 tablet by mouth Daily.    atorvastatin (LIPITOR) 10 MG tablet 90 tablet 1     Sig: Take 1 tablet by mouth Daily.    omeprazole (priLOSEC) 20 MG capsule 90 capsule 1     Sig: Take 1 capsule by mouth Daily.      Last office visit with prescribing clinician: 5/19/2023   Last telemedicine visit with prescribing clinician: Visit date not found   Next office visit with prescribing clinician: 5/21/2024

## 2024-01-25 ENCOUNTER — ANTICOAGULATION VISIT (OUTPATIENT)
Dept: PHARMACY | Facility: HOSPITAL | Age: 59
End: 2024-01-25
Payer: COMMERCIAL

## 2024-01-25 DIAGNOSIS — D68.61 ANTIPHOSPHOLIPID SYNDROME: Primary | Chronic | ICD-10-CM

## 2024-01-25 DIAGNOSIS — I82.401 DEEP VEIN THROMBOSIS (DVT) OF RIGHT LOWER EXTREMITY, UNSPECIFIED CHRONICITY, UNSPECIFIED VEIN: ICD-10-CM

## 2024-01-25 DIAGNOSIS — Z86.711 HISTORY OF PULMONARY EMBOLUS (PE): ICD-10-CM

## 2024-01-25 LAB — INR PPP: 3.1

## 2024-01-25 RX ORDER — ATORVASTATIN CALCIUM 10 MG/1
10 TABLET, FILM COATED ORAL DAILY
Qty: 90 TABLET | Refills: 0 | Status: SHIPPED | OUTPATIENT
Start: 2024-01-25

## 2024-01-25 RX ORDER — AMLODIPINE BESYLATE 5 MG/1
5 TABLET ORAL DAILY
Qty: 90 TABLET | Refills: 0 | Status: SHIPPED | OUTPATIENT
Start: 2024-01-25

## 2024-01-25 RX ORDER — POTASSIUM CHLORIDE 1500 MG/1
20 TABLET, EXTENDED RELEASE ORAL DAILY
Qty: 90 TABLET | Refills: 0 | Status: SHIPPED | OUTPATIENT
Start: 2024-01-25

## 2024-01-25 RX ORDER — OMEPRAZOLE 20 MG/1
20 CAPSULE, DELAYED RELEASE ORAL DAILY
Qty: 90 CAPSULE | Refills: 0 | Status: SHIPPED | OUTPATIENT
Start: 2024-01-25

## 2024-01-25 RX ORDER — LEVOTHYROXINE SODIUM 175 UG/1
175 TABLET ORAL DAILY
Qty: 90 TABLET | Refills: 0 | Status: SHIPPED | OUTPATIENT
Start: 2024-01-25

## 2024-01-25 NOTE — PROGRESS NOTES
Anticoagulation Clinic Progress Note    Patient's visit was held via telephone today.    Anticoagulation Summary  As of 1/25/2024      INR goal:  2.5-3.5   TTR:  54.3% (10.2 mo)   INR used for dosing:  3.10 (1/24/2024)   Warfarin maintenance plan:  11.25 mg (7.5 mg x 1.5) every day   Weekly warfarin total:  78.75 mg   No change documented:  Maggy Rivera RPH   Plan last modified:  Kriss Melendez RPH (7/31/2023)   Next INR check:  2/21/2024   Priority:  Maintenance   Target end date:  Indefinite    Indications    Antiphospholipid syndrome-IgG cardiolipin  [D68.61]  History of pulmonary embolus (PE) [Z86.711]  Deep vein thrombosis (DVT) of right lower extremity  unspecified chronicity  unspecified vein [I82.401]                 Anticoagulation Episode Summary       INR check location:      Preferred lab:      Send INR reminders to:  [facility] CBC ANTICOAG POOL    Comments:  [employer] home ally, every 2-4 weeks.          Anticoagulation Care Providers       Provider Role Specialty Phone number    Juanito Guerrier MD Referring Hematology and Oncology [phone]            Clinic Interview:  Patient Findings     Negatives:  Signs/symptoms of thrombosis, Signs/symptoms of bleeding,   Laboratory test error suspected, Change in health, Change in alcohol use,   Change in activity, Upcoming invasive procedure, Emergency department   visit, Upcoming dental procedure, Missed doses, Extra doses, Change in   medications, Change in diet/appetite, Hospital admission, Bruising, Other   complaints      Clinical Outcomes     Negatives:  Major bleeding event, Thromboembolic event,   Anticoagulation-related hospital admission, Anticoagulation-related ED   visit, Anticoagulation-related fatality        INR History:      12/7/2023    12:08 PM 12/21/2023    12:00 AM 12/21/2023    11:59 AM 1/10/2024     2:15 PM 1/10/2024     2:27 PM 1/24/2024    12:00 AM 1/25/2024    10:53 AM   Anticoagulation Monitoring   INR 2.90  4.30 3.20   3.10   INR  Date 12/6/2023  12/21/2023 1/10/2024   1/24/2024   INR Goal 2.5-3.5  2.5-3.5 2.5-3.5   2.5-3.5   Trend Same  Same Same   Same   Last Week Total 78.75 mg  78.75 mg 78.75 mg   78.75 mg   Next Week Total 78.75 mg  67.5 mg 78.75 mg   78.75 mg   Sun 11.25 mg  11.25 mg 11.25 mg   11.25 mg   Mon 11.25 mg  11.25 mg 11.25 mg   11.25 mg   Tue 11.25 mg  - 11.25 mg   11.25 mg   Wed 11.25 mg  - 11.25 mg   11.25 mg   Thu 11.25 mg  3.75 mg (12/21) 11.25 mg   11.25 mg   Fri 11.25 mg  7.5 mg (12/22) 11.25 mg   11.25 mg   Sat 11.25 mg  11.25 mg 11.25 mg   11.25 mg   Historical INR  4.30       3.20  3.10            This result is from an external source.       Plan:  1. INR is Therapeutic today- see above in Anticoagulation Summary.  Will instruct Carli Garcia to Continue their warfarin regimen of 11.25 mg daily - see above in Anticoagulation Summary.  2. Follow up in 1 month (home-testing). Still awaiting response from [employer] as to reason patient's home-testing prescription shows in-active after sending updated information.   3. Patient declines warfarin refills.  4. Verbal and written information provided. Patient expresses understanding and has no further questions at this time.    Maggy Rivera [facility]

## 2024-02-01 ENCOUNTER — TELEPHONE (OUTPATIENT)
Dept: PHARMACY | Facility: HOSPITAL | Age: 59
End: 2024-02-01
Payer: COMMERCIAL

## 2024-02-01 NOTE — TELEPHONE ENCOUNTER
Spoke with patient since [employer] is no longer working with her insurance to cover home testing of INR.  She agreed to call her insurance and get the name of any home testing company they will work with and let us know.  We will then submit application to that company so she can get back on home testing.

## 2024-02-23 ENCOUNTER — TELEPHONE (OUTPATIENT)
Dept: ONCOLOGY | Facility: CLINIC | Age: 59
End: 2024-02-23

## 2024-02-23 NOTE — TELEPHONE ENCOUNTER
Caller: Carli Garcia    Relationship: Self    Best call back number: [phone]     What is the best time to reach you: ASAP    Who are you requesting to speak with (clinical staff, provider,  specific staff member): SCHEDULING    What was the call regarding: PLEASE CALL PT TO SCHEDULE LAB/INR CHECK ASAP, HUB UNABLE TO WARM TRANSFER.

## 2024-02-26 ENCOUNTER — ANTICOAGULATION VISIT (OUTPATIENT)
Dept: ONCOLOGY | Facility: HOSPITAL | Age: 59
End: 2024-02-26
Payer: COMMERCIAL

## 2024-02-26 ENCOUNTER — LAB (OUTPATIENT)
Dept: LAB | Facility: HOSPITAL | Age: 59
End: 2024-02-26
Payer: COMMERCIAL

## 2024-02-26 DIAGNOSIS — I82.401 DEEP VEIN THROMBOSIS (DVT) OF RIGHT LOWER EXTREMITY, UNSPECIFIED CHRONICITY, UNSPECIFIED VEIN: ICD-10-CM

## 2024-02-26 DIAGNOSIS — Z86.711 HISTORY OF PULMONARY EMBOLUS (PE): ICD-10-CM

## 2024-02-26 DIAGNOSIS — D68.61 ANTIPHOSPHOLIPID SYNDROME: Chronic | ICD-10-CM

## 2024-02-26 DIAGNOSIS — D68.61 ANTIPHOSPHOLIPID SYNDROME: Primary | Chronic | ICD-10-CM

## 2024-02-26 LAB
INR PPP: 2.5 (ref 0.9–1.1)
PROTHROMBIN TIME: 29.7 SECONDS (ref 11–13.5)

## 2024-02-26 PROCEDURE — 36416 COLLJ CAPILLARY BLOOD SPEC: CPT

## 2024-02-26 PROCEDURE — G0463 HOSPITAL OUTPT CLINIC VISIT: HCPCS

## 2024-02-26 PROCEDURE — 85610 PROTHROMBIN TIME: CPT

## 2024-02-26 NOTE — PROGRESS NOTES
Anticoagulation Clinic Progress Note    Patient's visit was held in office today.    Anticoagulation Summary  As of 2024      INR goal:  2.5-3.5   TTR:  58.8% (11.3 mo)   INR used for dosin.50 (2024)   Warfarin maintenance plan:  11.25 mg (7.5 mg x 1.5) every day   Weekly warfarin total:  78.75 mg   No change documented:  Kriss Melendez RPH   Plan last modified:  Kriss Melendez RPH (2023)   Next INR check:  3/25/2024   Priority:  Maintenance   Target end date:  Indefinite    Indications    Antiphospholipid syndrome-IgG cardiolipin  [D68.61]  History of pulmonary embolus (PE) [Z86.711]  Deep vein thrombosis (DVT) of right lower extremity  unspecified chronicity  unspecified vein [I82.401]                 Anticoagulation Episode Summary       INR check location:      Preferred lab:      Send INR reminders to:  [facility] CBC ANTICOAG POOL    Comments:  [employer] home ally, every 2-4 weeks.          Anticoagulation Care Providers       Provider Role Specialty Phone number    Juanito Guerrier MD Referring Hematology and Oncology [phone]            Clinic Interview:  Patient Findings     Negatives:  Signs/symptoms of thrombosis, Signs/symptoms of bleeding,   Laboratory test error suspected, Change in health, Change in alcohol use,   Change in activity, Upcoming invasive procedure, Emergency department   visit, Upcoming dental procedure, Missed doses, Extra doses, Change in   medications, Change in diet/appetite, Hospital admission, Bruising, Other   complaints    Comments:  Previously home ally but currently insurance is not covering   this.      Clinical Outcomes     Negatives:  Major bleeding event, Thromboembolic event,   Anticoagulation-related hospital admission, Anticoagulation-related ED   visit, Anticoagulation-related fatality    Comments:  Previously home ally but currently insurance is not covering   this.        INR History:      Latest Ref Rng & Units 2023    11:59 AM  1/10/2024     2:15 PM 1/10/2024     2:27 PM 1/24/2024    12:00 AM 1/25/2024    10:53 AM 2/26/2024    11:16 AM 2/26/2024    11:30 AM   Anticoagulation Monitoring   INR  4.30 3.20   3.10  2.50   INR Date  12/21/2023 1/10/2024   1/24/2024  2/26/2024   INR Goal  2.5-3.5 2.5-3.5   2.5-3.5  2.5-3.5   Trend  Same Same   Same  Same   Last Week Total  78.75 mg 78.75 mg   78.75 mg  78.75 mg   Next Week Total  67.5 mg 78.75 mg   78.75 mg  78.75 mg   Sun  11.25 mg 11.25 mg   11.25 mg  11.25 mg   Mon  11.25 mg 11.25 mg   11.25 mg  11.25 mg   Tue  - 11.25 mg   11.25 mg  11.25 mg   Wed  - 11.25 mg   11.25 mg  11.25 mg   Thu  3.75 mg (12/21) 11.25 mg   11.25 mg  11.25 mg   Fri  7.5 mg (12/22) 11.25 mg   11.25 mg  11.25 mg   Sat  11.25 mg 11.25 mg   11.25 mg  11.25 mg   Historical INR 0.90 - 1.10   3.20  3.10      2.50         This result is from an external source.       Plan:  1. INR is Therapeutic today- see above in Anticoagulation Summary.  Will instruct Carli Garcia to Continue their warfarin regimen- see above in Anticoagulation Summary.  2. Follow up in 1 month  3. Verbal information provided. Patient expresses understanding and has no further questions at this time.    Kriss Melendez [facility]

## 2024-03-11 RX ORDER — TORSEMIDE 20 MG/1
20 TABLET ORAL DAILY
Qty: 90 TABLET | Refills: 0 | Status: SHIPPED | OUTPATIENT
Start: 2024-03-11

## 2024-03-19 NOTE — PROGRESS NOTES
REASONS FOR FOLLOWUP:  Antiphospholipid antibody syndrome with history of pulmonary embolism March 2016    History of Present Illness    Mrs. Garcia is a 59-year-old woman with history of pulmonary embolism March 2016.  She was found to have positive lupus anticoagulant and cardiolipin antibodies at the time of her pulmonary embolism which have been persistent and has been on anticoagulation with Coumadin since diagnosis.  She had concern for pulmonary embolism despite Coumadin and her INR goal was increased to 2.5-3.5.    She remains on Coumadin without excessive bleeding or bruising.   She has not had recent thromboses.    The patient has atypical lobular hyperplasia of the breast but did not follow-up with MRI of the breast or surgery (Dr. Simeon).  She had follow-up mammography - 6/14/2023-negative.    Past Medical History:   Diagnosis Date    Anemia     Angioedema     Lower lip    Depression     Diastolic dysfunction     DVT (deep venous thrombosis)     GERD (gastroesophageal reflux disease)     H/O antiphospholipid syndrome     Hiatal hernia     Hypertension     Hypothyroidism     Lupus anticoagulant disorder     Lupus anticoagulant disorder     Morbid obesity     CRISTI (obstructive sleep apnea)     PE (pulmonary embolism)     Bilateral    Right ventricular dilation     Thrombocytopenia     Vasculitis 05/2021       ONCOLOGIC HISTORY:  (History from previous dates can be found in the separate document.)    Current Outpatient Medications on File Prior to Visit   Medication Sig Dispense Refill    amLODIPine (NORVASC) 5 MG tablet Take 1 tablet by mouth Daily. 90 tablet 0    atorvastatin (LIPITOR) 10 MG tablet Take 1 tablet by mouth Daily. 90 tablet 0    cetirizine (zyrTEC) 10 MG tablet Take 1 tablet by mouth Daily As Needed for Allergies.      hydroxychloroquine (PLAQUENIL) 200 MG tablet Take 1 tablet by mouth Every 12 (Twelve) Hours.      levothyroxine (SYNTHROID, LEVOTHROID) 175 MCG tablet Take 1 tablet by mouth  Daily. 90 tablet 0    omeprazole (priLOSEC) 20 MG capsule Take 1 capsule by mouth Daily. 90 capsule 0    potassium chloride ER (K-TAB) 20 MEQ tablet controlled-release ER tablet Take 1 tablet by mouth Daily. 90 tablet 0    sertraline (ZOLOFT) 100 MG tablet Take 1 tablet by mouth Daily. 90 tablet 1    torsemide (DEMADEX) 20 MG tablet Take 1 tablet by mouth Daily. 90 tablet 0    warfarin (COUMADIN) 7.5 MG tablet Take one and one-half tablets daily (11.25mg) or as directed. 135 tablet 1    fluticasone (FLONASE) 50 MCG/ACT nasal spray 2 sprays into the nostril(s) as directed by provider Daily for 30 days. 9.9 mL 0     No current facility-administered medications on file prior to visit.       ALLERGIES:     Allergies   Allergen Reactions    Losartan Angioedema    Toprol Xl [Metoprolol Tartrate] Shortness Of Breath    Eggs Or Egg-Derived Products Nausea And Vomiting    Dust Mite Extract Unknown - Low Severity    Sulfa Antibiotics Unknown - Low Severity       Social History     Socioeconomic History    Marital status: Single    Number of children: 0   Tobacco Use    Smoking status: Never    Smokeless tobacco: Never   Vaping Use    Vaping status: Never Used   Substance and Sexual Activity    Alcohol use: No    Drug use: No    Sexual activity: Not Currently     Partners: Male     Birth control/protection: Abstinence         Cancer-related family history includes Cancer in her mother; Uterine cancer in her mother.     Review of Systems   Constitutional:  Negative for activity change and appetite change.   HENT: Negative.     Respiratory:  Positive for shortness of breath (Exertion, chronic).    Cardiovascular:  Positive for leg swelling. Negative for palpitations.   Genitourinary: Negative.    Musculoskeletal: Negative.    Skin:  Positive for rash (Improving on therapy for vasculitis).   Neurological:  Negative for weakness.   Hematological:  Does not bruise/bleed easily.   Psychiatric/Behavioral:  Positive for dysphoric  "mood.       ROS unchanged 3/25/2024    Objective      Vitals:    03/25/24 1305   BP: 153/93   Pulse: 76   Resp: 18   Temp: 97.8 °F (36.6 °C)   TempSrc: Temporal   SpO2: 97%   Weight: (!) 171 kg (376 lb 9.6 oz)   Height: 167.6 cm (66\")   PainSc: 0-No pain           3/25/2024    12:29 PM   Current Status   ECOG score 0       Physical Exam   GENERAL: Well-developed, morbid obese woman  CHEST: Lungs clear to percussion and auscultation. Good airflow.    CARDIAC: Regular rate and rhythm without murmurs, rubs or gallops. Normal S1,S2.  ABDOMEN: Soft, nontender with no organomegaly or masses.  EXTREMITIES: No clubbing, cyanosis.  1+ ankle edema bilaterally   PSYCHIATRIC: Normal mood and affect    Unchanged 3/25/2024    RECENT LABS:  Hematology WBC   Date Value Ref Range Status   03/25/2024 5.18 3.40 - 10.80 10*3/mm3 Final   05/19/2023 8.80 3.40 - 10.80 10*3/mm3 Final     RBC   Date Value Ref Range Status   03/25/2024 5.07 3.77 - 5.28 10*6/mm3 Final   05/19/2023 5.48 (H) 3.77 - 5.28 10*6/mm3 Final     Hemoglobin   Date Value Ref Range Status   03/25/2024 13.7 12.0 - 15.9 g/dL Final     Hematocrit   Date Value Ref Range Status   03/25/2024 41.5 34.0 - 46.6 % Final     Platelets   Date Value Ref Range Status   03/25/2024 267 140 - 450 10*3/mm3 Final      INR 3.6    Assessment & Plan    *Antiphospholipid antibody syndrome   history of pulmonary embolism in March 2016 secondary to antiphospholipid antibody syndrome.    Imaging in February 2019 showed age indeterminate pulmonary emboli and she complained of increased shortness of breath so her INR goal was changed to 2.5-3.5.   INR 3.6 today    *Atypical hyperplasia of the breast lost to surgery follow-up  Bilateral screening mammography June 2023 negative    Plan:  1.  Continue Coumadin with goal INR 2.5-3.5.  Monthly INR Coumadin pharmacy visit  2.  CBC see 6 months  3.  MD visit 6 months CBC INR  "

## 2024-03-25 ENCOUNTER — OFFICE VISIT (OUTPATIENT)
Dept: ONCOLOGY | Facility: CLINIC | Age: 59
End: 2024-03-25
Payer: COMMERCIAL

## 2024-03-25 ENCOUNTER — LAB (OUTPATIENT)
Dept: LAB | Facility: HOSPITAL | Age: 59
End: 2024-03-25
Payer: COMMERCIAL

## 2024-03-25 ENCOUNTER — ANTICOAGULATION VISIT (OUTPATIENT)
Dept: ONCOLOGY | Facility: HOSPITAL | Age: 59
End: 2024-03-25
Payer: COMMERCIAL

## 2024-03-25 VITALS
DIASTOLIC BLOOD PRESSURE: 93 MMHG | BODY MASS INDEX: 47.09 KG/M2 | WEIGHT: 293 LBS | OXYGEN SATURATION: 97 % | SYSTOLIC BLOOD PRESSURE: 153 MMHG | TEMPERATURE: 97.8 F | RESPIRATION RATE: 18 BRPM | HEIGHT: 66 IN | HEART RATE: 76 BPM

## 2024-03-25 DIAGNOSIS — D68.61 ANTIPHOSPHOLIPID SYNDROME: Primary | Chronic | ICD-10-CM

## 2024-03-25 DIAGNOSIS — Z86.711 HISTORY OF PULMONARY EMBOLUS (PE): ICD-10-CM

## 2024-03-25 DIAGNOSIS — D68.61 ANTIPHOSPHOLIPID SYNDROME: ICD-10-CM

## 2024-03-25 DIAGNOSIS — I82.401 DEEP VEIN THROMBOSIS (DVT) OF RIGHT LOWER EXTREMITY, UNSPECIFIED CHRONICITY, UNSPECIFIED VEIN: ICD-10-CM

## 2024-03-25 LAB
BASOPHILS # BLD AUTO: 0.05 10*3/MM3 (ref 0–0.2)
BASOPHILS NFR BLD AUTO: 1 % (ref 0–1.5)
DEPRECATED RDW RBC AUTO: 39.3 FL (ref 37–54)
EOSINOPHIL # BLD AUTO: 0.09 10*3/MM3 (ref 0–0.4)
EOSINOPHIL NFR BLD AUTO: 1.7 % (ref 0.3–6.2)
ERYTHROCYTE [DISTWIDTH] IN BLOOD BY AUTOMATED COUNT: 13.2 % (ref 12.3–15.4)
HCT VFR BLD AUTO: 41.5 % (ref 34–46.6)
HGB BLD-MCNC: 13.7 G/DL (ref 12–15.9)
IMM GRANULOCYTES # BLD AUTO: 0.02 10*3/MM3 (ref 0–0.05)
IMM GRANULOCYTES NFR BLD AUTO: 0.4 % (ref 0–0.5)
INR PPP: 3.6 (ref 0.9–1.1)
LYMPHOCYTES # BLD AUTO: 1.33 10*3/MM3 (ref 0.7–3.1)
LYMPHOCYTES NFR BLD AUTO: 25.7 % (ref 19.6–45.3)
MCH RBC QN AUTO: 27 PG (ref 26.6–33)
MCHC RBC AUTO-ENTMCNC: 33 G/DL (ref 31.5–35.7)
MCV RBC AUTO: 81.9 FL (ref 79–97)
MONOCYTES # BLD AUTO: 0.5 10*3/MM3 (ref 0.1–0.9)
MONOCYTES NFR BLD AUTO: 9.7 % (ref 5–12)
NEUTROPHILS NFR BLD AUTO: 3.19 10*3/MM3 (ref 1.7–7)
NEUTROPHILS NFR BLD AUTO: 61.5 % (ref 42.7–76)
NRBC BLD AUTO-RTO: 0 /100 WBC (ref 0–0.2)
PLATELET # BLD AUTO: 267 10*3/MM3 (ref 140–450)
PMV BLD AUTO: 10.3 FL (ref 6–12)
PROTHROMBIN TIME: 43.6 SECONDS (ref 11–13.5)
RBC # BLD AUTO: 5.07 10*6/MM3 (ref 3.77–5.28)
WBC NRBC COR # BLD AUTO: 5.18 10*3/MM3 (ref 3.4–10.8)

## 2024-03-25 PROCEDURE — G0463 HOSPITAL OUTPT CLINIC VISIT: HCPCS

## 2024-03-25 PROCEDURE — 85025 COMPLETE CBC W/AUTO DIFF WBC: CPT

## 2024-03-25 PROCEDURE — 99214 OFFICE O/P EST MOD 30 MIN: CPT | Performed by: INTERNAL MEDICINE

## 2024-03-25 PROCEDURE — 36415 COLL VENOUS BLD VENIPUNCTURE: CPT

## 2024-03-25 PROCEDURE — 85610 PROTHROMBIN TIME: CPT

## 2024-03-25 NOTE — PROGRESS NOTES
Anticoagulation Clinic Progress Note    Patient's visit was held in office today.    Anticoagulation Summary  As of 3/25/2024      INR goal:  2.5-3.5   TTR:  61.3% (1 y)   INR used for dosing:  3.60 (3/25/2024)   Warfarin maintenance plan:  11.25 mg (7.5 mg x 1.5) every day   Weekly warfarin total:  78.75 mg   No change documented:  Kriss Melendez RPH   Plan last modified:  Kriss Melendez RPH (7/31/2023)   Next INR check:  4/22/2024   Target end date:  Indefinite    Indications    Antiphospholipid syndrome-IgG cardiolipin  [D68.61]  History of pulmonary embolus (PE) [Z86.711]  Deep vein thrombosis (DVT) of right lower extremity  unspecified chronicity  unspecified vein [I82.401]                 Anticoagulation Episode Summary       INR check location:      Preferred lab:      Send INR reminders to:   [facility] CBC ANTICOAG POOL    Comments:  [facility], every 2-4 weeks.          Anticoagulation Care Providers       Provider Role Specialty Phone number    Juanito Guerrier MD Referring Hematology and Oncology [phone]            Clinic Interview:  Patient Findings     Negatives:  Signs/symptoms of thrombosis, Signs/symptoms of bleeding,   Laboratory test error suspected, Change in health, Change in alcohol use,   Change in activity, Upcoming invasive procedure, Emergency department   visit, Upcoming dental procedure, Missed doses, Extra doses, Change in   medications, Change in diet/appetite, Hospital admission, Bruising, Other   complaints    Comments:  She knows to watch for bleeding. She will try to be sure she   is keeping some greens in her diet.      Clinical Outcomes     Negatives:  Major bleeding event, Thromboembolic event,   Anticoagulation-related hospital admission, Anticoagulation-related ED   visit, Anticoagulation-related fatality    Comments:  She knows to watch for bleeding. She will try to be sure she   is keeping some greens in her diet.        INR History:      Latest Ref Rng & Units  1/10/2024     2:27 PM 1/24/2024    12:00 AM 1/25/2024    10:53 AM 2/26/2024    11:16 AM 2/26/2024    11:30 AM 3/25/2024    12:39 PM 3/25/2024     1:00 PM   Anticoagulation Monitoring   INR    3.10  2.50  3.60   INR Date    1/24/2024  2/26/2024  3/25/2024   INR Goal    2.5-3.5  2.5-3.5  2.5-3.5   Trend    Same  Same  Same   Last Week Total    78.75 mg  78.75 mg  78.75 mg   Next Week Total    78.75 mg  78.75 mg  78.75 mg   Sun    11.25 mg  11.25 mg  11.25 mg   Mon    11.25 mg  11.25 mg  11.25 mg   Tue    11.25 mg  11.25 mg  11.25 mg   Wed    11.25 mg  11.25 mg  11.25 mg   Thu    11.25 mg  11.25 mg  11.25 mg   Fri    11.25 mg  11.25 mg  11.25 mg   Sat    11.25 mg  11.25 mg  11.25 mg   Historical INR 0.90 - 1.10 3.20  3.10      2.50   3.60     Visit Report       Report Report       This result is from an external source.       Plan:  1. INR is just slightly Supratherapeutic today- see above in Anticoagulation Summary.  Will instruct Carli Garcia to Continue their warfarin regimen- see above in Anticoagulation Summary.  2. Follow up in 1 month--will test monthly in clinic for now.  3. Verbal information provided. Patient expresses understanding and has no further questions at this time.    Kriss Melendez [facility]

## 2024-04-04 ENCOUNTER — TELEPHONE (OUTPATIENT)
Dept: PHARMACY | Facility: HOSPITAL | Age: 59
End: 2024-04-04
Payer: COMMERCIAL

## 2024-04-04 NOTE — TELEPHONE ENCOUNTER
Patient called and reported that she is having bleeding from her rectum in the amount of apprx 1/2 tsp per 24 hr period.  Her INR was 3.6 at her last appt on 3/25 (INR goal range 2.5-3.5); She was instr to reduce her dose on 3/25 from 11.25mg to 7.5mg - I instructed patient to do the same reduction today (11.25mg to 7.5mg) since the amt of blood is small in a 24hr period and is not at all continuous. INR check tomorrow morning for further instruction. She was instructed to go to the ER if the blood amount increases and is unable to be stopped after 20 minutes with applied pressure. Patient expressed understanding verbally.

## 2024-04-05 ENCOUNTER — LAB (OUTPATIENT)
Dept: LAB | Facility: HOSPITAL | Age: 59
End: 2024-04-05
Payer: COMMERCIAL

## 2024-04-05 ENCOUNTER — APPOINTMENT (OUTPATIENT)
Dept: ONCOLOGY | Facility: HOSPITAL | Age: 59
End: 2024-04-05
Payer: COMMERCIAL

## 2024-04-05 ENCOUNTER — ANTICOAGULATION VISIT (OUTPATIENT)
Dept: PHARMACY | Facility: HOSPITAL | Age: 59
End: 2024-04-05
Payer: COMMERCIAL

## 2024-04-05 DIAGNOSIS — D68.61 ANTIPHOSPHOLIPID SYNDROME: Chronic | ICD-10-CM

## 2024-04-05 DIAGNOSIS — D68.61 ANTIPHOSPHOLIPID SYNDROME: Primary | Chronic | ICD-10-CM

## 2024-04-05 DIAGNOSIS — I82.401 DEEP VEIN THROMBOSIS (DVT) OF RIGHT LOWER EXTREMITY, UNSPECIFIED CHRONICITY, UNSPECIFIED VEIN: ICD-10-CM

## 2024-04-05 DIAGNOSIS — Z86.711 HISTORY OF PULMONARY EMBOLUS (PE): ICD-10-CM

## 2024-04-05 LAB
INR PPP: 3.8 (ref 0.9–1.1)
PROTHROMBIN TIME: 46 SECONDS (ref 11–13.5)

## 2024-04-05 PROCEDURE — G0463 HOSPITAL OUTPT CLINIC VISIT: HCPCS

## 2024-04-05 PROCEDURE — 36416 COLLJ CAPILLARY BLOOD SPEC: CPT

## 2024-04-05 PROCEDURE — 85610 PROTHROMBIN TIME: CPT

## 2024-04-05 NOTE — PROGRESS NOTES
Anticoagulation Clinic Progress Note    Patient's visit was held in office today.    Anticoagulation Summary  As of 4/5/2024      INR goal:  2.5-3.5   TTR:  59.5% (1 y)   INR used for dosing:  3.80 (4/5/2024)   Warfarin maintenance plan:  11.25 mg (7.5 mg x 1.5) every day   Weekly warfarin total:  78.75 mg   Plan last modified:  Kriss Melendez RPH (7/31/2023)   Next INR check:     Target end date:  Indefinite    Indications    Antiphospholipid syndrome-IgG cardiolipin  [D68.61]  History of pulmonary embolus (PE) [Z86.711]  Deep vein thrombosis (DVT) of right lower extremity  unspecified chronicity  unspecified vein [I82.401]                 Anticoagulation Episode Summary       INR check location:      Preferred lab:      Send INR reminders to:   [facility] CBC ANTICOAG POOL    Comments:  [facility], every 2-4 weeks.          Anticoagulation Care Providers       Provider Role Specialty Phone number    Juanito Guerrier MD Referring Hematology and Oncology [phone]            Clinic Interview:  Patient Findings     Positives:  Signs/symptoms of bleeding    Negatives:  Signs/symptoms of thrombosis, Laboratory test error   suspected, Change in health, Change in alcohol use, Change in activity,   Upcoming invasive procedure, Emergency department visit, Upcoming dental   procedure, Missed doses, Extra doses, Change in medications, Change in   diet/appetite, Hospital admission, Bruising, Other complaints    Comments:  Patient called on 4/4 c/o of rectal bleeding (1/2tsp in 24   hrs) where pt was instructed to only take 1 tablet (7.5mg) instead of 1.5   tablet (11.25mg) until we could obtain an INR today. INR 3.8 today - no   BM today so far so patient wasn't able to report if blood present on   tissue. Will instruct patient to take only 1/2 tablet (3.75mg) today   (11.25mg HOLD over two days); resume regular maint dose until Tuesday   where we will determine if weekly maint dose should be adj down (consider    7.5mg 2x/wk)      Clinical Outcomes     Negatives:  Major bleeding event, Thromboembolic event,   Anticoagulation-related hospital admission, Anticoagulation-related ED   visit, Anticoagulation-related fatality          INR History:      Latest Ref Rng & Units 1/25/2024    10:53 AM 2/26/2024    11:16 AM 2/26/2024    11:30 AM 3/25/2024    12:39 PM 3/25/2024     1:00 PM 4/5/2024    11:02 AM 4/5/2024    11:05 AM   Anticoagulation Monitoring   INR  3.10  2.50  3.60  3.80   INR Date  1/24/2024  2/26/2024  3/25/2024  4/5/2024   INR Goal  2.5-3.5  2.5-3.5  2.5-3.5  2.5-3.5   Trend  Same  Same  Same  Same   Last Week Total  78.75 mg  78.75 mg  78.75 mg  71.25 mg   Next Week Total  78.75 mg  78.75 mg  78.75 mg  71.25 mg   Sun  11.25 mg  11.25 mg  11.25 mg  11.25 mg   Mon  11.25 mg  11.25 mg  11.25 mg  11.25 mg   Tue  11.25 mg  11.25 mg  11.25 mg  11.25 mg   Wed  11.25 mg  11.25 mg  11.25 mg  11.25 mg   Thu  11.25 mg  11.25 mg  11.25 mg  11.25 mg   Fri  11.25 mg  11.25 mg  11.25 mg  3.75 mg (4/5)   Sat  11.25 mg  11.25 mg  11.25 mg  11.25 mg   Historical INR 0.90 - 1.10  2.50   3.60   3.80     Visit Report     Report Report         Plan:  1. INR is Subtherapeutic today- see above in Anticoagulation Summary.  Will instruct Carli Garcia to Change their warfarin regimen- see above in Anticoagulation Summary.  2. Follow up in 4 days  3. Verbal and written information provided. Patient expresses understanding and has no further questions at this time.    Radha Park [facility]

## 2024-04-07 DIAGNOSIS — E78.00 HYPERCHOLESTEREMIA: ICD-10-CM

## 2024-04-08 RX ORDER — ATORVASTATIN CALCIUM 10 MG/1
10 TABLET, FILM COATED ORAL DAILY
Qty: 90 TABLET | Refills: 0 | Status: SHIPPED | OUTPATIENT
Start: 2024-04-08

## 2024-04-09 ENCOUNTER — LAB (OUTPATIENT)
Dept: LAB | Facility: HOSPITAL | Age: 59
End: 2024-04-09
Payer: COMMERCIAL

## 2024-04-09 ENCOUNTER — ANTICOAGULATION VISIT (OUTPATIENT)
Dept: ONCOLOGY | Facility: HOSPITAL | Age: 59
End: 2024-04-09
Payer: COMMERCIAL

## 2024-04-09 DIAGNOSIS — I82.401 DEEP VEIN THROMBOSIS (DVT) OF RIGHT LOWER EXTREMITY, UNSPECIFIED CHRONICITY, UNSPECIFIED VEIN: ICD-10-CM

## 2024-04-09 DIAGNOSIS — D68.61 ANTIPHOSPHOLIPID SYNDROME: Primary | Chronic | ICD-10-CM

## 2024-04-09 DIAGNOSIS — D68.61 ANTIPHOSPHOLIPID SYNDROME: Chronic | ICD-10-CM

## 2024-04-09 DIAGNOSIS — Z86.711 HISTORY OF PULMONARY EMBOLUS (PE): ICD-10-CM

## 2024-04-09 LAB
INR PPP: 2.7 (ref 0.9–1.1)
PROTHROMBIN TIME: 32.7 SECONDS (ref 11–13.5)

## 2024-04-09 PROCEDURE — G0463 HOSPITAL OUTPT CLINIC VISIT: HCPCS

## 2024-04-09 PROCEDURE — 36416 COLLJ CAPILLARY BLOOD SPEC: CPT

## 2024-04-09 PROCEDURE — 85610 PROTHROMBIN TIME: CPT

## 2024-04-09 NOTE — PROGRESS NOTES
Anticoagulation Clinic Progress Note    Patient's visit was held in office today.    Anticoagulation Summary  As of 2024      INR goal:  2.5-3.5   TTR:  59.7% (1 y)   INR used for dosin.70 (2024)   Warfarin maintenance plan:  7.5 mg (7.5 mg x 1) every Wed; 11.25 mg (7.5 mg x 1.5) all other days   Weekly warfarin total:  75 mg   Plan last modified:  Kriss Melendez RPH (2024)   Next INR check:  2024   Target end date:  Indefinite    Indications    Antiphospholipid syndrome-IgG cardiolipin  [D68.61]  History of pulmonary embolus (PE) [Z86.711]  Deep vein thrombosis (DVT) of right lower extremity  unspecified chronicity  unspecified vein [I82.401]                 Anticoagulation Episode Summary       INR check location:      Preferred lab:      Send INR reminders to:  [facility] CBC ANTICOAG POOL    Comments:  [facility], every 2-4 weeks.          Anticoagulation Care Providers       Provider Role Specialty Phone number    Juanito Guerrier MD Referring Hematology and Oncology [phone]            Clinic Interview:  Patient Findings     Positives:  Change in medications    Negatives:  Signs/symptoms of thrombosis, Signs/symptoms of bleeding,   Laboratory test error suspected, Change in health, Change in alcohol use,   Change in activity, Upcoming invasive procedure, Emergency department   visit, Upcoming dental procedure, Missed doses, Extra doses, Change in   diet/appetite, Hospital admission, Bruising, Other complaints    Comments:  She has been skipping her diuretic and this has led to   increased swelling in legs.  She is aware she needs to keep taking this   medication and that the swelling can effect her INR also.  No bleeding   issue---last weeks bleeding has resolved.      Clinical Outcomes     Negatives:  Major bleeding event, Thromboembolic event,   Anticoagulation-related hospital admission, Anticoagulation-related ED   visit, Anticoagulation-related fatality    Comments:  She  has been skipping her diuretic and this has led to   increased swelling in legs.  She is aware she needs to keep taking this   medication and that the swelling can effect her INR also.  No bleeding   issue---last weeks bleeding has resolved.        INR History:      Latest Ref Rng & Units 2/26/2024    11:30 AM 3/25/2024    12:39 PM 3/25/2024     1:00 PM 4/5/2024    11:02 AM 4/5/2024    11:05 AM 4/9/2024     1:31 PM 4/9/2024     1:45 PM   Anticoagulation Monitoring   INR  2.50  3.60  3.80  2.70   INR Date  2/26/2024  3/25/2024  4/5/2024  4/9/2024   INR Goal  2.5-3.5  2.5-3.5  2.5-3.5  2.5-3.5   Trend  Same  Same  Same  Down   Last Week Total  78.75 mg  78.75 mg  71.25 mg  63.75 mg   Next Week Total  78.75 mg  78.75 mg  71.25 mg  75 mg   Sun  11.25 mg  11.25 mg  11.25 mg  11.25 mg   Mon  11.25 mg  11.25 mg  11.25 mg  11.25 mg   Tue  11.25 mg  11.25 mg  -  11.25 mg   Wed  11.25 mg  11.25 mg  -  7.5 mg   Thu  11.25 mg  11.25 mg  -  11.25 mg   Fri  11.25 mg  11.25 mg  3.75 mg (4/5)  11.25 mg   Sat  11.25 mg  11.25 mg  11.25 mg  11.25 mg   Historical INR 0.90 - 1.10  3.60   3.80   2.70     Visit Report   Report Report           Plan:  1. INR is Therapeutic today- see above in Anticoagulation Summary.  Will instruct Carli Garcia to Change their warfarin regimen to 7.5mg on Wed and 11.25mg on all other days-- see above in Anticoagulation Summary.  2. Follow up in 2 weeks  3. Patient will resume her usual diuretic dosing.  4. Verbal and written information provided. Patient expresses understanding and has no further questions at this time.    Kriss Melendez [facility]

## 2024-04-15 ENCOUNTER — TELEPHONE (OUTPATIENT)
Dept: ONCOLOGY | Facility: CLINIC | Age: 59
End: 2024-04-15

## 2024-04-15 NOTE — TELEPHONE ENCOUNTER
Caller: Carli Garcia    Relationship to patient: Self    Best call back number: [phone]    Type of visit: LAB 7 ANTI COAG     Requested date: 4/19/2024 CALL TO R/S    If rescheduling, when is the original appointment: 4/22/2024

## 2024-04-19 ENCOUNTER — LAB (OUTPATIENT)
Dept: LAB | Facility: HOSPITAL | Age: 59
End: 2024-04-19
Payer: COMMERCIAL

## 2024-04-19 ENCOUNTER — ANTICOAGULATION VISIT (OUTPATIENT)
Dept: ONCOLOGY | Facility: HOSPITAL | Age: 59
End: 2024-04-19
Payer: COMMERCIAL

## 2024-04-19 DIAGNOSIS — D68.61 ANTIPHOSPHOLIPID SYNDROME: Primary | Chronic | ICD-10-CM

## 2024-04-19 DIAGNOSIS — Z86.711 HISTORY OF PULMONARY EMBOLUS (PE): ICD-10-CM

## 2024-04-19 DIAGNOSIS — I82.401 DEEP VEIN THROMBOSIS (DVT) OF RIGHT LOWER EXTREMITY, UNSPECIFIED CHRONICITY, UNSPECIFIED VEIN: ICD-10-CM

## 2024-04-19 DIAGNOSIS — D68.61 ANTIPHOSPHOLIPID SYNDROME: Chronic | ICD-10-CM

## 2024-04-19 LAB
INR PPP: 2.5 (ref 0.9–1.1)
PROTHROMBIN TIME: 30.5 SECONDS (ref 11–13.5)

## 2024-04-19 PROCEDURE — 85610 PROTHROMBIN TIME: CPT

## 2024-04-19 PROCEDURE — 36416 COLLJ CAPILLARY BLOOD SPEC: CPT

## 2024-04-19 NOTE — PROGRESS NOTES
Anticoagulation Clinic Progress Note    Patient's visit was held via telephone today.    Anticoagulation Summary  As of 2024      INR goal:  2.5-3.5   TTR:  60.7% (1.1 y)   INR used for dosin.50 (2024)   Warfarin maintenance plan:  7.5 mg (7.5 mg x 1) every Wed; 11.25 mg (7.5 mg x 1.5) all other days   Weekly warfarin total:  75 mg   No change documented:  Lana Olivera RPH   Plan last modified:  Kriss Melendez RPH (2024)   Next INR check:  5/3/2024   Target end date:  Indefinite    Indications    Antiphospholipid syndrome-IgG cardiolipin  [D68.61]  History of pulmonary embolus (PE) [Z86.711]  Deep vein thrombosis (DVT) of right lower extremity  unspecified chronicity  unspecified vein [I82.401]                 Anticoagulation Episode Summary       INR check location:      Preferred lab:      Send INR reminders to:   [facility] CBC ANTICOAG POOL    Comments:  [facility], every 2-4 weeks.          Anticoagulation Care Providers       Provider Role Specialty Phone number    Juanito Guerrier MD Referring Hematology and Oncology [phone]            Clinic Interview:  Patient Findings     Positives:  Other complaints    Negatives:  Signs/symptoms of thrombosis, Signs/symptoms of bleeding,   Laboratory test error suspected, Change in health, Change in alcohol use,   Change in activity, Upcoming invasive procedure, Emergency department   visit, Upcoming dental procedure, Missed doses, Extra doses, Change in   medications, Change in diet/appetite, Hospital admission, Bruising    Comments:  Patient reports that she took 11.25mg on 4/10 instead of   7.5mg.       Clinical Outcomes     Negatives:  Major bleeding event, Thromboembolic event,   Anticoagulation-related hospital admission, Anticoagulation-related ED   visit, Anticoagulation-related fatality    Comments:  Patient reports that she took 11.25mg on 4/10 instead of   7.5mg.         INR History:      Latest Ref Rng & Units 3/25/2024      1:00 PM 4/5/2024    11:02 AM 4/5/2024    11:05 AM 4/9/2024     1:31 PM 4/9/2024     1:45 PM 4/19/2024     1:09 PM 4/19/2024     1:30 PM   Anticoagulation Monitoring   INR  3.60  3.80  2.70  2.50   INR Date  3/25/2024  4/5/2024  4/9/2024  4/19/2024   INR Goal  2.5-3.5  2.5-3.5  2.5-3.5  2.5-3.5   Trend  Same  Same  Down  Same   Last Week Total  78.75 mg  71.25 mg  63.75 mg  75 mg   Next Week Total  78.75 mg  71.25 mg  75 mg  75 mg   Sun  11.25 mg  11.25 mg  11.25 mg  11.25 mg   Mon  11.25 mg  11.25 mg  11.25 mg  11.25 mg   Tue  11.25 mg  -  11.25 mg  11.25 mg   Wed  11.25 mg  -  7.5 mg  7.5 mg   Thu  11.25 mg  -  11.25 mg  11.25 mg   Fri  11.25 mg  3.75 mg (4/5)  11.25 mg  11.25 mg   Sat  11.25 mg  11.25 mg  11.25 mg  11.25 mg   Historical INR 0.90 - 1.10  3.80   2.70   2.50     Visit Report  Report             Plan:  1. INR is Therapeutic today- see above in Anticoagulation Summary.  Will instruct Carli Garcia to Change their warfarin regimen to 11.25mg daily - see above in Anticoagulation Summary.  2. Follow up in 2 weeks  3. Patient declines warfarin refills.  4. Verbal and written information provided. Patient expresses understanding and has no further questions at this time.    Lana Olivera [facility]

## 2024-04-29 RX ORDER — POTASSIUM CHLORIDE 1500 MG/1
20 TABLET, EXTENDED RELEASE ORAL DAILY
Qty: 90 TABLET | Refills: 1 | Status: SHIPPED | OUTPATIENT
Start: 2024-04-29

## 2024-05-03 ENCOUNTER — ANTICOAGULATION VISIT (OUTPATIENT)
Dept: ONCOLOGY | Facility: HOSPITAL | Age: 59
End: 2024-05-03
Payer: COMMERCIAL

## 2024-05-03 ENCOUNTER — LAB (OUTPATIENT)
Dept: LAB | Facility: HOSPITAL | Age: 59
End: 2024-05-03
Payer: COMMERCIAL

## 2024-05-03 DIAGNOSIS — D68.61 ANTIPHOSPHOLIPID SYNDROME: Chronic | ICD-10-CM

## 2024-05-03 DIAGNOSIS — I82.401 DEEP VEIN THROMBOSIS (DVT) OF RIGHT LOWER EXTREMITY, UNSPECIFIED CHRONICITY, UNSPECIFIED VEIN: ICD-10-CM

## 2024-05-03 DIAGNOSIS — D68.61 ANTIPHOSPHOLIPID SYNDROME: Primary | Chronic | ICD-10-CM

## 2024-05-03 DIAGNOSIS — Z86.711 HISTORY OF PULMONARY EMBOLUS (PE): ICD-10-CM

## 2024-05-03 LAB
INR PPP: 2.7 (ref 0.9–1.1)
PROTHROMBIN TIME: 32.2 SECONDS (ref 11–13.5)

## 2024-05-03 PROCEDURE — 36416 COLLJ CAPILLARY BLOOD SPEC: CPT

## 2024-05-03 PROCEDURE — 85610 PROTHROMBIN TIME: CPT

## 2024-05-03 NOTE — PROGRESS NOTES
Anticoagulation Clinic Progress Note    Patient's visit was held by phone today.    Anticoagulation Summary  As of 5/3/2024      INR goal:  2.5-3.5   TTR:  62.0% (1.1 y)   INR used for dosin.70 (5/3/2024)   Warfarin maintenance plan:  11.25 mg (7.5 mg x 1.5) every day   Weekly warfarin total:  78.75 mg   Plan last modified:  Kriss Melendez RPH (5/3/2024)   Next INR check:  2024   Target end date:  Indefinite    Indications    Antiphospholipid syndrome-IgG cardiolipin  [D68.61]  History of pulmonary embolus (PE) [Z86.711]  Deep vein thrombosis (DVT) of right lower extremity  unspecified chronicity  unspecified vein [I82.401]                 Anticoagulation Episode Summary       INR check location:      Preferred lab:      Send INR reminders to:   [facility] CBC ANTICOAG POOL    Comments:  [facility], every 2-4 weeks.          Anticoagulation Care Providers       Provider Role Specialty Phone number    Juanito Guerrier MD Referring Hematology and Oncology [phone]            Drug interactions: has remained unchanged.  Diet: has continued to keep more greens in her diet than in the past    Clinic Interview:  No pertinent clinical findings have been reported.    INR History:      Latest Ref Rng & Units 2024    11:05 AM 2024     1:31 PM 2024     1:45 PM 2024     1:09 PM 2024     1:30 PM 5/3/2024     1:20 PM 5/3/2024     1:30 PM   Anticoagulation Monitoring   INR  3.80  2.70  2.50  2.70   INR Date  2024  2024  2024  5/3/2024   INR Goal  2.5-3.5  2.5-3.5  2.5-3.5  2.5-3.5   Trend  Same  Down  Same  Up   Last Week Total  71.25 mg  63.75 mg  75 mg  78.75 mg   Next Week Total  71.25 mg  75 mg  75 mg  78.75 mg   Sun  11.25 mg  11.25 mg  11.25 mg  11.25 mg   Mon  11.25 mg  11.25 mg  11.25 mg  11.25 mg   Tue  -  11.25 mg  11.25 mg  11.25 mg   Wed  -  7.5 mg  7.5 mg  11.25 mg   Thu  -  11.25 mg  11.25 mg  11.25 mg   Fri  3.75 mg (4/5)  11.25 mg  11.25 mg  11.25 mg   Sat   11.25 mg  11.25 mg  11.25 mg  11.25 mg   Historical INR 0.90 - 1.10  2.70   2.50   2.70         Plan:  1. INR is Therapeutic today- see above in Anticoagulation Summary.   Will instruct Carli Garcia to Continue their warfarin regimen at 11.25mg daily-- see above in Anticoagulation Summary.  She will work on maintaining this higher amount of greens in diet.  2. Follow up in 3 weeks  3.They have been instructed to call if any changes in medications, doses, concerns, etc. Patient expresses understanding and has no further questions at this time.    Kriss Melendez [facility]

## 2024-05-06 DIAGNOSIS — E03.9 ACQUIRED HYPOTHYROIDISM: ICD-10-CM

## 2024-05-06 RX ORDER — WARFARIN SODIUM 7.5 MG/1
TABLET ORAL
Qty: 135 TABLET | Refills: 1 | Status: SHIPPED | OUTPATIENT
Start: 2024-05-06

## 2024-05-06 RX ORDER — LEVOTHYROXINE SODIUM 175 UG/1
175 TABLET ORAL DAILY
Qty: 90 TABLET | Refills: 0 | Status: SHIPPED | OUTPATIENT
Start: 2024-05-06

## 2024-05-14 ENCOUNTER — TELEPHONE (OUTPATIENT)
Dept: ONCOLOGY | Facility: CLINIC | Age: 59
End: 2024-05-14
Payer: COMMERCIAL

## 2024-05-14 NOTE — TELEPHONE ENCOUNTER
Caller: Carli Garcia    Relationship to patient: Self    Best call back number: [phone]    Chief complaint: R/S    Type of visit: LAB AND RN    Requested date: 5-21 AFTERNOON AT 12 OR 2    If rescheduling, when is the original appointment: 5-20

## 2024-05-21 ENCOUNTER — ANTICOAGULATION VISIT (OUTPATIENT)
Dept: ONCOLOGY | Facility: HOSPITAL | Age: 59
End: 2024-05-21
Payer: COMMERCIAL

## 2024-05-21 ENCOUNTER — OFFICE VISIT (OUTPATIENT)
Dept: INTERNAL MEDICINE | Facility: CLINIC | Age: 59
End: 2024-05-21
Payer: COMMERCIAL

## 2024-05-21 ENCOUNTER — LAB (OUTPATIENT)
Dept: LAB | Facility: HOSPITAL | Age: 59
End: 2024-05-21
Payer: COMMERCIAL

## 2024-05-21 VITALS
BODY MASS INDEX: 47.09 KG/M2 | HEIGHT: 66 IN | WEIGHT: 293 LBS | HEART RATE: 70 BPM | OXYGEN SATURATION: 98 % | DIASTOLIC BLOOD PRESSURE: 82 MMHG | SYSTOLIC BLOOD PRESSURE: 122 MMHG

## 2024-05-21 DIAGNOSIS — J30.9 ALLERGIC RHINITIS, UNSPECIFIED SEASONALITY, UNSPECIFIED TRIGGER: Chronic | ICD-10-CM

## 2024-05-21 DIAGNOSIS — E03.9 ACQUIRED HYPOTHYROIDISM: Chronic | ICD-10-CM

## 2024-05-21 DIAGNOSIS — D68.61 ANTIPHOSPHOLIPID SYNDROME: Chronic | ICD-10-CM

## 2024-05-21 DIAGNOSIS — I77.6 VASCULITIS: Chronic | ICD-10-CM

## 2024-05-21 DIAGNOSIS — Z86.711 HISTORY OF PULMONARY EMBOLUS (PE): ICD-10-CM

## 2024-05-21 DIAGNOSIS — I10 ESSENTIAL HYPERTENSION: Chronic | ICD-10-CM

## 2024-05-21 DIAGNOSIS — Z00.00 PHYSICAL EXAM: Primary | ICD-10-CM

## 2024-05-21 DIAGNOSIS — N60.99 ATYPICAL HYPERPLASIA OF BREAST: Chronic | ICD-10-CM

## 2024-05-21 DIAGNOSIS — E78.00 HYPERCHOLESTEREMIA: Chronic | ICD-10-CM

## 2024-05-21 DIAGNOSIS — I82.401 DEEP VEIN THROMBOSIS (DVT) OF RIGHT LOWER EXTREMITY, UNSPECIFIED CHRONICITY, UNSPECIFIED VEIN: ICD-10-CM

## 2024-05-21 DIAGNOSIS — D68.61 ANTIPHOSPHOLIPID SYNDROME: Primary | Chronic | ICD-10-CM

## 2024-05-21 DIAGNOSIS — F41.8 DEPRESSION WITH ANXIETY: Chronic | ICD-10-CM

## 2024-05-21 LAB
INR PPP: 2.3 (ref 0.9–1.1)
PROTHROMBIN TIME: 28.2 SECONDS (ref 11–13.5)

## 2024-05-21 PROCEDURE — 36416 COLLJ CAPILLARY BLOOD SPEC: CPT

## 2024-05-21 PROCEDURE — 85610 PROTHROMBIN TIME: CPT

## 2024-05-21 PROCEDURE — 99396 PREV VISIT EST AGE 40-64: CPT | Performed by: NURSE PRACTITIONER

## 2024-05-22 LAB
ALBUMIN SERPL-MCNC: 4.4 G/DL (ref 3.5–5.2)
ALBUMIN/GLOB SERPL: 1.6 G/DL
ALP SERPL-CCNC: 85 U/L (ref 39–117)
ALT SERPL-CCNC: 18 U/L (ref 1–33)
APPEARANCE UR: CLEAR
AST SERPL-CCNC: 18 U/L (ref 1–32)
BACTERIA #/AREA URNS HPF: ABNORMAL /HPF
BILIRUB SERPL-MCNC: 0.6 MG/DL (ref 0–1.2)
BILIRUB UR QL STRIP: NEGATIVE
BUN SERPL-MCNC: 14 MG/DL (ref 8–23)
BUN/CREAT SERPL: 15.4 (ref 7–25)
CALCIUM SERPL-MCNC: 9.7 MG/DL (ref 8.6–10.5)
CASTS URNS MICRO: ABNORMAL
CHLORIDE SERPL-SCNC: 99 MMOL/L (ref 98–107)
CHOLEST SERPL-MCNC: 172 MG/DL (ref 0–200)
CO2 SERPL-SCNC: 26.7 MMOL/L (ref 22–29)
COLOR UR: YELLOW
CREAT SERPL-MCNC: 0.91 MG/DL (ref 0.57–1)
EGFRCR SERPLBLD CKD-EPI 2021: 72.4 ML/MIN/1.73
EPI CELLS #/AREA URNS HPF: ABNORMAL /HPF
ERYTHROCYTE [DISTWIDTH] IN BLOOD BY AUTOMATED COUNT: 12.9 % (ref 12.3–15.4)
GLOBULIN SER CALC-MCNC: 2.7 GM/DL
GLUCOSE SERPL-MCNC: 98 MG/DL (ref 65–99)
GLUCOSE UR QL STRIP: NEGATIVE
HCT VFR BLD AUTO: 46.6 % (ref 34–46.6)
HDLC SERPL-MCNC: 62 MG/DL (ref 40–60)
HGB BLD-MCNC: 15 G/DL (ref 12–15.9)
HGB UR QL STRIP: NEGATIVE
KETONES UR QL STRIP: NEGATIVE
LDLC SERPL CALC-MCNC: 89 MG/DL (ref 0–100)
LEUKOCYTE ESTERASE UR QL STRIP: ABNORMAL
MCH RBC QN AUTO: 25.9 PG (ref 26.6–33)
MCHC RBC AUTO-ENTMCNC: 32.2 G/DL (ref 31.5–35.7)
MCV RBC AUTO: 80.5 FL (ref 79–97)
NITRITE UR QL STRIP: NEGATIVE
PH UR STRIP: 7 [PH] (ref 5–8)
PLATELET # BLD AUTO: 332 10*3/MM3 (ref 140–450)
POTASSIUM SERPL-SCNC: 4.5 MMOL/L (ref 3.5–5.2)
PROT SERPL-MCNC: 7.1 G/DL (ref 6–8.5)
PROT UR QL STRIP: NEGATIVE
RBC # BLD AUTO: 5.79 10*6/MM3 (ref 3.77–5.28)
RBC #/AREA URNS HPF: ABNORMAL /HPF
SODIUM SERPL-SCNC: 143 MMOL/L (ref 136–145)
SP GR UR STRIP: 1.01 (ref 1–1.03)
TRIGL SERPL-MCNC: 122 MG/DL (ref 0–150)
TSH SERPL DL<=0.005 MIU/L-ACNC: 2.07 UIU/ML (ref 0.27–4.2)
UROBILINOGEN UR STRIP-MCNC: ABNORMAL MG/DL
VLDLC SERPL CALC-MCNC: 21 MG/DL (ref 5–40)
WBC # BLD AUTO: 6.65 10*3/MM3 (ref 3.4–10.8)
WBC #/AREA URNS HPF: ABNORMAL /HPF

## 2024-05-23 NOTE — PROGRESS NOTES
Anticoagulation Clinic Progress Note    Patient's visit was held via telephone today.    Anticoagulation Summary  As of 2024      INR goal:  2.5-3.5   TTR:  61.5% (1.2 y)   INR used for dosin.30 (2024)   Warfarin maintenance plan:  15 mg (7.5 mg x 2) every Tue; 11.25 mg (7.5 mg x 1.5) all other days   Weekly warfarin total:  82.5 mg   Plan last modified:  Kriss Melendez RPH (2024)   Next INR check:  2024   Target end date:  Indefinite    Indications    Antiphospholipid syndrome-IgG cardiolipin  [D68.61]  History of pulmonary embolus (PE) [Z86.711]  Deep vein thrombosis (DVT) of right lower extremity  unspecified chronicity  unspecified vein [I82.401]                 Anticoagulation Episode Summary       INR check location:      Preferred lab:      Send INR reminders to:  [facility] CBC ANTICOAG POOL    Comments:  [employer], every 2-4 weeks.          Anticoagulation Care Providers       Provider Role Specialty Phone number    Juanito Guerrier MD Referring Hematology and Oncology [phone]            Clinic Interview:  Patient Findings     Positives:  Missed doses (Missed /20 dose)    Negatives:  Signs/symptoms of thrombosis, Signs/symptoms of bleeding,   Laboratory test error suspected, Change in health, Change in alcohol use,   Change in activity, Upcoming invasive procedure, Emergency department   visit, Upcoming dental procedure, Extra doses, Change in medications,   Change in diet/appetite, Hospital admission, Bruising, Other complaints      Clinical Outcomes     Negatives:  Major bleeding event, Thromboembolic event,   Anticoagulation-related hospital admission, Anticoagulation-related ED   visit, Anticoagulation-related fatality        INR History:      Latest Ref Rng & Units 2024     1:45 PM 2024     1:09 PM 2024     1:30 PM 5/3/2024     1:20 PM 5/3/2024     1:30 PM 2024     2:14 PM 2024     2:45 PM   Anticoagulation Monitoring   INR  2.70  2.50   2.70  2.30   INR Date  4/9/2024  4/19/2024  5/3/2024  5/21/2024   INR Goal  2.5-3.5  2.5-3.5  2.5-3.5  2.5-3.5   Trend  Down  Same  Up  Up   Last Week Total  63.75 mg  75 mg  78.75 mg  67.5 mg   Next Week Total  75 mg  75 mg  78.75 mg  82.5 mg   Sun  11.25 mg  11.25 mg  11.25 mg  11.25 mg   Mon  11.25 mg  11.25 mg  11.25 mg  11.25 mg   Tue  11.25 mg  11.25 mg  11.25 mg  15 mg   Wed  7.5 mg  7.5 mg  11.25 mg  11.25 mg   Thu  11.25 mg  11.25 mg  11.25 mg  11.25 mg   Fri  11.25 mg  11.25 mg  11.25 mg  11.25 mg   Sat  11.25 mg  11.25 mg  11.25 mg  11.25 mg   Historical INR 0.90 - 1.10  2.50   2.70   2.30     Visit Report       Report Report       Plan:  1. INR is Subtherapeutic today- see above in Anticoagulation Summary.  Will instruct Carli Garcia to Change their warfarin regimen to 15mg on Tuesdays and 11.25mg AOD's - patient has been on lower side of range for last several INR's - see above in Anticoagulation Summary.  2. Follow up in 4 weeks (patient wanted to keep her 6/17 appt rather than come in sooner) - she will notify us if she has any signs or symptoms of bleeding so that we can check INR sooner.   3. Verbal and written information provided. Patient expresses understanding and has no further questions at this time.    Radha Park [facility]

## 2024-06-02 PROBLEM — N60.99 ATYPICAL HYPERPLASIA OF BREAST: Chronic | Status: ACTIVE | Noted: 2022-10-17
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
## 2024-06-02 NOTE — ASSESSMENT & PLAN NOTE
The patient's lifetime breast cancer risk was calculated to be 29 percent, which places her at an elevated risk of breast cancer. An MRI as well as a biopsy were recommended; however, the patient declined. Consequently, she was advised to undergo annual mammograms..

## 2024-06-02 NOTE — PROGRESS NOTES
Subjective   Carli Garcia is a 59 y.o. female who is here for a physical exam.    History of Present Illness   History of Present Illness    The patient has been experiencing a persistent cough for the past 1.5 weeks which she states has been dry and not productive, denies dyspnea.    The patient consulted with Dr. Simeon 8/24/22 due to right breast atypical lobular hyperplasia who recommended a MRI of the breast and a biopsy to further evaluate. However, she declined the biopsy as well as additional MRI of the breast. She does receive annual screening mammogram, last performed 6/14/23.    She describes a constant burning sensation in her knees and legs, which prevents her from finding a comfortable position which she attributes to her weight.    She is currently on a regimen of sertraline 25 mg on Mondays, Wednesdays, and Fridays, with a remaining supply of 1.5 weeks before discontinuation. She perceives her depression to be well-managed.     She reports edema in her ankles, extending from the knee down, and the soles of her feet. She is supposed to be on oxygen therapy, but she chooses not to carry it. Her blood pressure is consistently high at home.      Past Medical History:   Diagnosis Date    Anemia     Angioedema     Lower lip    Depression     Diastolic dysfunction     DVT (deep venous thrombosis)     GERD (gastroesophageal reflux disease)     H/O antiphospholipid syndrome     Hiatal hernia     Hypertension     Hypothyroidism     Lupus anticoagulant disorder     Lupus anticoagulant disorder     Morbid obesity     CRISTI (obstructive sleep apnea)     PE (pulmonary embolism)     Bilateral    Right ventricular dilation     Thrombocytopenia     Vasculitis 05/2021         Current Outpatient Medications:     amLODIPine (NORVASC) 5 MG tablet, Take 1 tablet by mouth Daily. (Patient taking differently: Take 1 tablet by mouth Daily. 0.5 mg), Disp: 90 tablet, Rfl: 0    atorvastatin (LIPITOR) 10 MG tablet, Take 1 tablet  by mouth Daily., Disp: 90 tablet, Rfl: 0    cetirizine (zyrTEC) 10 MG tablet, Take 1 tablet by mouth Daily As Needed for Allergies., Disp: , Rfl:     hydroxychloroquine (PLAQUENIL) 200 MG tablet, Take 1 tablet by mouth Every 12 (Twelve) Hours., Disp: , Rfl:     levothyroxine (SYNTHROID, LEVOTHROID) 175 MCG tablet, Take 1 tablet by mouth Daily., Disp: 90 tablet, Rfl: 0    omeprazole (priLOSEC) 20 MG capsule, Take 1 capsule by mouth Daily., Disp: 90 capsule, Rfl: 0    potassium chloride ER (K-TAB) 20 MEQ tablet controlled-release ER tablet, Take 1 tablet by mouth Daily., Disp: 90 tablet, Rfl: 1    torsemide (DEMADEX) 20 MG tablet, Take 1 tablet by mouth Daily., Disp: 90 tablet, Rfl: 0    warfarin (COUMADIN) 7.5 MG tablet, Take one and one-half tablets daily (11.25mg) or as directed., Disp: 135 tablet, Rfl: 1    fluticasone (FLONASE) 50 MCG/ACT nasal spray, 2 sprays into the nostril(s) as directed by provider Daily for 30 days., Disp: 9.9 mL, Rfl: 0    sertraline (ZOLOFT) 100 MG tablet, Take 1 tablet by mouth Daily. (Patient not taking: Reported on 5/21/2024), Disp: 90 tablet, Rfl: 1    Allergies   Allergen Reactions    Losartan Angioedema    Toprol Xl [Metoprolol Tartrate] Shortness Of Breath    Egg-Derived Products Nausea And Vomiting    Dust Mite Extract Unknown - Low Severity    Sulfa Antibiotics Unknown - Low Severity       Review of Systems   Constitutional:  Positive for fatigue. Negative for activity change, appetite change, chills, diaphoresis, fever and unexpected weight change.   HENT:  Positive for congestion, postnasal drip and rhinorrhea. Negative for dental problem, drooling, ear discharge, ear pain, facial swelling, hearing loss, mouth sores, nosebleeds, sinus pressure, sore throat, tinnitus and trouble swallowing.    Eyes:  Negative for photophobia, pain, discharge, redness, itching and visual disturbance.   Respiratory:  Negative for apnea, cough, choking, chest tightness, shortness of breath and  "wheezing.    Cardiovascular:  Negative for chest pain, palpitations and leg swelling.        No orthopnea, PND, VALERIO   Gastrointestinal:  Negative for abdominal pain, blood in stool, constipation, diarrhea, nausea and vomiting.   Endocrine: Negative for cold intolerance, heat intolerance, polydipsia and polyuria.   Genitourinary:  Negative for decreased urine volume, dysuria, enuresis, flank pain, frequency, hematuria and urgency.   Musculoskeletal:  Positive for arthralgias and back pain. Negative for gait problem, joint swelling, myalgias, neck pain and neck stiffness.   Skin:  Negative for color change and rash.        No hair changes, no nail changes   Allergic/Immunologic: Negative for environmental allergies, food allergies and immunocompromised state.   Neurological:  Negative for dizziness, tremors, seizures, syncope, speech difficulty, weakness, light-headedness, numbness and headaches.   Hematological:  Negative for adenopathy. Does not bruise/bleed easily.   Psychiatric/Behavioral:  Negative for agitation, confusion, decreased concentration, dysphoric mood, sleep disturbance and suicidal ideas. The patient is not nervous/anxious.        Objective   Vitals:    05/21/24 1238   BP: 122/82   BP Location: Left arm   Patient Position: Sitting   Cuff Size: Adult   Pulse: 70   SpO2: 98%   Weight: (!) 171 kg (378 lb)   Height: 167.6 cm (66\")     Physical Exam  Constitutional:       General: She is not in acute distress.     Appearance: Normal appearance. She is not diaphoretic.   HENT:      Head: Normocephalic and atraumatic.      Right Ear: Tympanic membrane, ear canal and external ear normal.      Left Ear: Tympanic membrane, ear canal and external ear normal.      Nose: Nose normal. No rhinorrhea.      Mouth/Throat:      Mouth: Mucous membranes are moist.      Pharynx: Oropharynx is clear.   Eyes:      General:         Right eye: No discharge.         Left eye: No discharge.      Conjunctiva/sclera: Conjunctivae " normal.   Cardiovascular:      Rate and Rhythm: Normal rate and regular rhythm.      Pulses: Normal pulses.      Heart sounds: Normal heart sounds.   Pulmonary:      Effort: Pulmonary effort is normal.      Breath sounds: Normal breath sounds.   Abdominal:      General: Bowel sounds are normal.      Tenderness: There is no abdominal tenderness.   Musculoskeletal:         General: No swelling or tenderness.      Cervical back: Normal range of motion.   Skin:     General: Skin is warm and dry.   Neurological:      General: No focal deficit present.      Mental Status: She is alert and oriented to person, place, and time.   Psychiatric:         Mood and Affect: Mood normal.         Behavior: Behavior normal.         Judgment: Judgment normal.       Physical Exam      Results  Imaging  Mammogram showed atypical lobular hyperplasia of the breast.       Assessment & Plan   Diagnoses and all orders for this visit:    1. Physical exam (Primary)  -     CBC (No Diff)  -     Comprehensive Metabolic Panel  -     Lipid Panel  -     TSH  -     Urinalysis With Microscopic If Indicated (No Culture) - Urine, Clean Catch    2. Acquired hypothyroidism  Assessment & Plan:  She is currently managed on Synthroid 175 mcg daily for replacement, recheck TSH.      3. Allergic rhinitis, unspecified seasonality, unspecified trigger  Assessment & Plan:  She is taking Zyrtec daily for mgmt of allergies, continue to monitor.      4. Essential hypertension  Assessment & Plan:  BP is well-controlled in the office today, continue amlodipine along with a low sodium diet.      5. Hypercholesteremia  Assessment & Plan:  She denies myalgias with atorvastatin which she will continue along with a low-fat, low-cholesterol diet.      6. Vasculitis  Assessment & Plan:  She is currently followed by dermatology, tapered off prednisone and is now Plaquenil daily.      7. Antiphospholipid syndrome-IgG cardiolipin   Assessment & Plan:  She is currently managed on  warfarin daily with protime through hematology.      8. Atypical hyperplasia of breast  Assessment & Plan:  The patient's lifetime breast cancer risk was calculated to be 29 percent, which places her at an elevated risk of breast cancer. An MRI as well as a biopsy were recommended; however, the patient declined. Consequently, she was advised to undergo annual mammograms..      9. Depression with anxiety  Assessment & Plan:  She has discontinued Wellbutrin XL and is slowly tapering off Zoloft, doing well without medication.      Other orders  -     Urinalysis With Microscopic If Indicated (No Culture) -  -     Microscopic Examination -      Assessment & Plan          Risk Assessment:  Family History   Problem Relation Age of Onset    Other Mother         varicose veins    Uterine cancer Mother     COPD Mother     Depression Mother     Cancer Mother     Alcohol abuse Father     Cirrhosis Father     Diabetes Brother     Depression Brother      Her Body mass index is 61.01 kg/m². Lifestyle modifications are recommended to help with weight loss.    Prevention:  Health Maintenance   Topic Date Due    COVID-19 Vaccine (4 - 2023-24 season) 09/01/2023    RSV Vaccine - Adults (1 - 1-dose 60+ series) Never done    ANNUAL PHYSICAL  05/16/2024    INFLUENZA VACCINE  08/01/2024    PAP SMEAR  05/11/2025    LIPID PANEL  05/21/2025    BMI FOLLOWUP  05/21/2025    MAMMOGRAM  06/14/2025    COLORECTAL CANCER SCREENING  06/05/2026    TDAP/TD VACCINES (3 - Td or Tdap) 05/16/2033    HEPATITIS C SCREENING  Completed    ZOSTER VACCINE  Completed    Pneumococcal Vaccine 0-64  Aged Out       Discussed healthy lifestyle choices such as maintaining a balanced diet low in carbohydrates and limiting caffeine and alcohol intake.  Recommended routine exercise for bone strength and cardiovascular health.         Patient or patient representative verbalized consent for the use of Ambient Listening during the visit with  JANINE Teague for chart  documentation. 6/2/2024  14:42 EDT

## 2024-06-02 NOTE — ASSESSMENT & PLAN NOTE
She has discontinued Wellbutrin XL and is slowly tapering off Zoloft, doing well without medication.

## 2024-06-16 DIAGNOSIS — E03.9 ACQUIRED HYPOTHYROIDISM: ICD-10-CM

## 2024-06-16 DIAGNOSIS — E78.00 HYPERCHOLESTEREMIA: ICD-10-CM

## 2024-06-16 DIAGNOSIS — K21.9 GASTROESOPHAGEAL REFLUX DISEASE WITHOUT ESOPHAGITIS: ICD-10-CM

## 2024-06-16 DIAGNOSIS — I10 ESSENTIAL HYPERTENSION: ICD-10-CM

## 2024-06-16 RX ORDER — HYDROXYCHLOROQUINE SULFATE 200 MG/1
200 TABLET, FILM COATED ORAL EVERY 12 HOURS
Status: CANCELLED | OUTPATIENT
Start: 2024-06-16

## 2024-06-17 ENCOUNTER — LAB (OUTPATIENT)
Dept: LAB | Facility: HOSPITAL | Age: 59
End: 2024-06-17
Payer: COMMERCIAL

## 2024-06-17 ENCOUNTER — ANTICOAGULATION VISIT (OUTPATIENT)
Dept: ONCOLOGY | Facility: HOSPITAL | Age: 59
End: 2024-06-17
Payer: COMMERCIAL

## 2024-06-17 DIAGNOSIS — D68.61 ANTIPHOSPHOLIPID SYNDROME: Chronic | ICD-10-CM

## 2024-06-17 DIAGNOSIS — Z86.711 HISTORY OF PULMONARY EMBOLUS (PE): ICD-10-CM

## 2024-06-17 DIAGNOSIS — I82.401 DEEP VEIN THROMBOSIS (DVT) OF RIGHT LOWER EXTREMITY, UNSPECIFIED CHRONICITY, UNSPECIFIED VEIN: ICD-10-CM

## 2024-06-17 DIAGNOSIS — D68.61 ANTIPHOSPHOLIPID SYNDROME: Primary | Chronic | ICD-10-CM

## 2024-06-17 LAB
INR PPP: 3 (ref 0.9–1.1)
PROTHROMBIN TIME: 36.1 SECONDS (ref 11–13.5)

## 2024-06-17 PROCEDURE — 85610 PROTHROMBIN TIME: CPT

## 2024-06-17 PROCEDURE — G0463 HOSPITAL OUTPT CLINIC VISIT: HCPCS

## 2024-06-17 PROCEDURE — 36416 COLLJ CAPILLARY BLOOD SPEC: CPT

## 2024-06-17 RX ORDER — ATORVASTATIN CALCIUM 10 MG/1
10 TABLET, FILM COATED ORAL DAILY
Qty: 90 TABLET | Refills: 0 | Status: SHIPPED | OUTPATIENT
Start: 2024-06-17

## 2024-06-17 RX ORDER — TORSEMIDE 20 MG/1
20 TABLET ORAL DAILY
Qty: 90 TABLET | Refills: 0 | Status: SHIPPED | OUTPATIENT
Start: 2024-06-17

## 2024-06-17 RX ORDER — HYDROXYCHLOROQUINE SULFATE 200 MG/1
TABLET, FILM COATED ORAL DAILY
OUTPATIENT
Start: 2024-06-17

## 2024-06-17 RX ORDER — AMLODIPINE BESYLATE 5 MG/1
5 TABLET ORAL DAILY
Qty: 90 TABLET | Refills: 0 | Status: SHIPPED | OUTPATIENT
Start: 2024-06-17

## 2024-06-17 RX ORDER — POTASSIUM CHLORIDE 1500 MG/1
20 TABLET, EXTENDED RELEASE ORAL DAILY
Qty: 90 TABLET | Refills: 1 | Status: SHIPPED | OUTPATIENT
Start: 2024-06-17

## 2024-06-17 RX ORDER — LEVOTHYROXINE SODIUM 175 UG/1
175 TABLET ORAL DAILY
Qty: 90 TABLET | Refills: 0 | Status: SHIPPED | OUTPATIENT
Start: 2024-06-17

## 2024-06-17 RX ORDER — OMEPRAZOLE 20 MG/1
20 CAPSULE, DELAYED RELEASE ORAL DAILY
Qty: 90 CAPSULE | Refills: 0 | Status: SHIPPED | OUTPATIENT
Start: 2024-06-17

## 2024-06-17 NOTE — PROGRESS NOTES
Anticoagulation Clinic Progress Note    Patient's visit was held in office today.    Anticoagulation Summary  As of 6/17/2024      INR goal:  2.5-3.5   TTR:  62.1% (1.2 y)   INR used for dosing:  3.00 (6/17/2024)   Warfarin maintenance plan:  15 mg (7.5 mg x 2) every Tue; 11.25 mg (7.5 mg x 1.5) all other days   Weekly warfarin total:  82.5 mg   No change documented:  Lana Olivera RPH   Plan last modified:  Kriss Melendez RPH (5/21/2024)   Next INR check:  7/15/2024   Target end date:  Indefinite    Indications    Antiphospholipid syndrome-IgG cardiolipin  [D68.61]  History of pulmonary embolus (PE) [Z86.711]  Deep vein thrombosis (DVT) of right lower extremity  unspecified chronicity  unspecified vein [I82.401]                 Anticoagulation Episode Summary       INR check location:      Preferred lab:      Send INR reminders to:  [facility] ANTICOAG POOL    Comments:  [facility], every 2-4 weeks.          Anticoagulation Care Providers       Provider Role Specialty Phone number    Juanito Guerrier MD Referring Hematology and Oncology [phone]            Clinic Interview:  Patient Findings     Negatives:  Signs/symptoms of thrombosis, Signs/symptoms of bleeding,   Laboratory test error suspected, Change in health, Change in alcohol use,   Change in activity, Upcoming invasive procedure, Emergency department   visit, Upcoming dental procedure, Missed doses, Extra doses, Change in   medications, Change in diet/appetite, Hospital admission, Bruising, Other   complaints      Clinical Outcomes     Negatives:  Major bleeding event, Thromboembolic event,   Anticoagulation-related hospital admission, Anticoagulation-related ED   visit, Anticoagulation-related fatality        INR History:      Latest Ref Rng & Units 4/19/2024     1:30 PM 5/3/2024     1:20 PM 5/3/2024     1:30 PM 5/21/2024     2:14 PM 5/21/2024     2:45 PM 6/17/2024     1:05 PM 6/17/2024     1:30 PM   Anticoagulation Monitoring   INR  2.50   2.70  2.30  3.00   INR Date  4/19/2024  5/3/2024  5/21/2024  6/17/2024   INR Goal  2.5-3.5  2.5-3.5  2.5-3.5  2.5-3.5   Trend  Same  Up  Up  Same   Last Week Total  75 mg  78.75 mg  67.5 mg  82.5 mg   Next Week Total  75 mg  78.75 mg  82.5 mg  82.5 mg   Sun  11.25 mg  11.25 mg  11.25 mg  11.25 mg   Mon  11.25 mg  11.25 mg  11.25 mg  11.25 mg   Tue  11.25 mg  11.25 mg  15 mg  15 mg   Wed  7.5 mg  11.25 mg  11.25 mg  11.25 mg   Thu  11.25 mg  11.25 mg  11.25 mg  11.25 mg   Fri  11.25 mg  11.25 mg  11.25 mg  11.25 mg   Sat  11.25 mg  11.25 mg  11.25 mg  11.25 mg   Historical INR 0.90 - 1.10  2.70   2.30   3.00     Visit Report     Report Report         Plan:  1. INR is Therapeutic today- see above in Anticoagulation Summary.  Will instruct Carli Garcia to Continue their warfarin regimen- see above in Anticoagulation Summary.  2. Follow up in 1 month  3. Patient declines warfarin refills.  4. Verbal and written information provided. Patient expresses understanding and has no further questions at this time.    Lana Olivera [facility]

## 2024-06-18 RX ORDER — WARFARIN SODIUM 7.5 MG/1
TABLET ORAL
Qty: 145 TABLET | Refills: 1 | Status: SHIPPED | OUTPATIENT
Start: 2024-06-18

## 2024-06-27 ENCOUNTER — APPOINTMENT (OUTPATIENT)
Dept: WOMENS IMAGING | Facility: HOSPITAL | Age: 59
End: 2024-06-27
Payer: COMMERCIAL

## 2024-06-27 PROCEDURE — 77067 SCR MAMMO BI INCL CAD: CPT | Performed by: RADIOLOGY

## 2024-06-27 PROCEDURE — 77063 BREAST TOMOSYNTHESIS BI: CPT | Performed by: RADIOLOGY

## 2024-07-15 ENCOUNTER — ANTICOAGULATION VISIT (OUTPATIENT)
Dept: PHARMACY | Facility: HOSPITAL | Age: 59
End: 2024-07-15
Payer: COMMERCIAL

## 2024-07-15 ENCOUNTER — LAB (OUTPATIENT)
Dept: LAB | Facility: HOSPITAL | Age: 59
End: 2024-07-15
Payer: COMMERCIAL

## 2024-07-15 ENCOUNTER — APPOINTMENT (OUTPATIENT)
Dept: ONCOLOGY | Facility: HOSPITAL | Age: 59
End: 2024-07-15
Payer: COMMERCIAL

## 2024-07-15 DIAGNOSIS — Z86.711 HISTORY OF PULMONARY EMBOLUS (PE): ICD-10-CM

## 2024-07-15 DIAGNOSIS — D68.61 ANTIPHOSPHOLIPID SYNDROME: Primary | Chronic | ICD-10-CM

## 2024-07-15 DIAGNOSIS — D68.61 ANTIPHOSPHOLIPID SYNDROME: Chronic | ICD-10-CM

## 2024-07-15 DIAGNOSIS — I82.401 DEEP VEIN THROMBOSIS (DVT) OF RIGHT LOWER EXTREMITY, UNSPECIFIED CHRONICITY, UNSPECIFIED VEIN: ICD-10-CM

## 2024-07-15 LAB
INR PPP: 3.5 (ref 0.9–1.1)
PROTHROMBIN TIME: 41.6 SECONDS (ref 11–13.5)

## 2024-07-15 PROCEDURE — G0463 HOSPITAL OUTPT CLINIC VISIT: HCPCS

## 2024-07-15 PROCEDURE — 36416 COLLJ CAPILLARY BLOOD SPEC: CPT

## 2024-07-15 PROCEDURE — 85610 PROTHROMBIN TIME: CPT

## 2024-07-15 NOTE — PROGRESS NOTES
Anticoagulation Clinic Progress Note    Patient's visit was held in office today.    Anticoagulation Summary  As of 7/15/2024      INR goal:  2.5-3.5   TTR:  64.3% (1.3 y)   INR used for dosing:  3.50 (7/15/2024)   Warfarin maintenance plan:  15 mg (7.5 mg x 2) every Tue; 11.25 mg (7.5 mg x 1.5) all other days   Weekly warfarin total:  82.5 mg   No change documented:  Radha Park RPH   Plan last modified:  Kriss Melendez RPH (5/21/2024)   Next INR check:  8/12/2024   Target end date:  Indefinite    Indications    Antiphospholipid syndrome-IgG cardiolipin  [D68.61]  History of pulmonary embolus (PE) [Z86.711]  Deep vein thrombosis (DVT) of right lower extremity  unspecified chronicity  unspecified vein [I82.401]                 Anticoagulation Episode Summary       INR check location:      Preferred lab:      Send INR reminders to:  [facility] ANTICOAG POOL    Comments:  [facility], every 2-4 weeks.          Anticoagulation Care Providers       Provider Role Specialty Phone number    Juanito Guerrier MD Referring Hematology and Oncology [phone]            Clinic Interview:  Patient Findings     Negatives:  Signs/symptoms of thrombosis, Signs/symptoms of bleeding,   Laboratory test error suspected, Change in health, Change in alcohol use,   Change in activity, Upcoming invasive procedure, Emergency department   visit, Upcoming dental procedure, Missed doses, Extra doses, Change in   medications, Change in diet/appetite, Hospital admission, Bruising, Other   complaints      Clinical Outcomes     Negatives:  Major bleeding event, Thromboembolic event,   Anticoagulation-related hospital admission, Anticoagulation-related ED   visit, Anticoagulation-related fatality        INR History:      Latest Ref Rng & Units 5/3/2024     1:30 PM 5/21/2024     2:14 PM 5/21/2024     2:45 PM 6/17/2024     1:05 PM 6/17/2024     1:30 PM 7/15/2024    12:59 PM 7/15/2024     1:06 PM   Anticoagulation Monitoring   INR   2.70  2.30  3.00  3.50   INR Date  5/3/2024  5/21/2024  6/17/2024  7/15/2024   INR Goal  2.5-3.5  2.5-3.5  2.5-3.5  2.5-3.5   Trend  Up  Up  Same  Same   Last Week Total  78.75 mg  67.5 mg  82.5 mg  82.5 mg   Next Week Total  78.75 mg  82.5 mg  82.5 mg  82.5 mg   Sun  11.25 mg  11.25 mg  11.25 mg  11.25 mg   Mon  11.25 mg  11.25 mg  11.25 mg  11.25 mg   Tue  11.25 mg  15 mg  15 mg  15 mg   Wed  11.25 mg  11.25 mg  11.25 mg  11.25 mg   Thu  11.25 mg  11.25 mg  11.25 mg  11.25 mg   Fri  11.25 mg  11.25 mg  11.25 mg  11.25 mg   Sat  11.25 mg  11.25 mg  11.25 mg  11.25 mg   Historical INR 0.90 - 1.10  2.30   3.00   3.50     Visit Report   Report Report           Plan:  1. INR is Therapeutic today- see above in Anticoagulation Summary.  Will instruct Carli Garcia to Continue their warfarin regimen- see above in Anticoagulation Summary.  2. Follow up in 4 weeks  3. Verbal and written information provided. Patient expresses understanding and has no further questions at this time.    Radha Park, [facility]

## 2024-08-03 DIAGNOSIS — F41.9 ANXIETY: ICD-10-CM

## 2024-08-05 RX ORDER — SERTRALINE HYDROCHLORIDE 100 MG/1
100 TABLET, FILM COATED ORAL DAILY
Qty: 90 TABLET | Refills: 1 | Status: SHIPPED | OUTPATIENT
Start: 2024-08-05

## 2024-08-12 ENCOUNTER — CLINICAL SUPPORT (OUTPATIENT)
Dept: ONCOLOGY | Facility: HOSPITAL | Age: 59
End: 2024-08-12
Payer: COMMERCIAL

## 2024-08-12 ENCOUNTER — ANTICOAGULATION VISIT (OUTPATIENT)
Dept: ONCOLOGY | Facility: HOSPITAL | Age: 59
End: 2024-08-12
Payer: COMMERCIAL

## 2024-08-12 ENCOUNTER — LAB (OUTPATIENT)
Dept: LAB | Facility: HOSPITAL | Age: 59
End: 2024-08-12
Payer: COMMERCIAL

## 2024-08-12 DIAGNOSIS — Z86.711 HISTORY OF PULMONARY EMBOLUS (PE): ICD-10-CM

## 2024-08-12 DIAGNOSIS — I82.401 DEEP VEIN THROMBOSIS (DVT) OF RIGHT LOWER EXTREMITY, UNSPECIFIED CHRONICITY, UNSPECIFIED VEIN: ICD-10-CM

## 2024-08-12 DIAGNOSIS — D68.61 ANTIPHOSPHOLIPID SYNDROME: Chronic | ICD-10-CM

## 2024-08-12 DIAGNOSIS — D68.61 ANTIPHOSPHOLIPID SYNDROME: Primary | Chronic | ICD-10-CM

## 2024-08-12 LAB
INR PPP: 2.4 (ref 0.9–1.1)
PROTHROMBIN TIME: 28.5 SECONDS (ref 11–13.5)

## 2024-08-12 PROCEDURE — 85610 PROTHROMBIN TIME: CPT

## 2024-08-12 PROCEDURE — 36416 COLLJ CAPILLARY BLOOD SPEC: CPT

## 2024-08-12 NOTE — PROGRESS NOTES
Anticoagulation Clinic Progress Note    Patient's visit was held via telephone today.    Anticoagulation Summary  As of 2024      INR goal:  2.5-3.5   TTR:  65.8% (1.4 y)   INR used for dosin.40 (2024)   Warfarin maintenance plan:  15 mg (7.5 mg x 2) every Tue; 11.25 mg (7.5 mg x 1.5) all other days   Weekly warfarin total:  82.5 mg   No change documented:  Lana Olivera RPH   Plan last modified:  Kriss Melendez RPH (2024)   Next INR check:  2024   Target end date:  Indefinite    Indications    Antiphospholipid syndrome-IgG cardiolipin  [D68.61]  History of pulmonary embolus (PE) [Z86.711]  Deep vein thrombosis (DVT) of right lower extremity  unspecified chronicity  unspecified vein [I82.401]                 Anticoagulation Episode Summary       INR check location:      Preferred lab:      Send INR reminders to:  [facility] ANTICOAG POOL    Comments:  [facility], every 2-4 weeks.          Anticoagulation Care Providers       Provider Role Specialty Phone number    Juanito Guerrier MD Referring Hematology and Oncology [phone]            Clinic Interview:  Patient Findings     Negatives:  Signs/symptoms of thrombosis, Signs/symptoms of bleeding,   Laboratory test error suspected, Change in health, Change in alcohol use,   Change in activity, Upcoming invasive procedure, Emergency department   visit, Upcoming dental procedure, Missed doses, Extra doses, Change in   medications, Change in diet/appetite, Hospital admission, Bruising, Other   complaints      Clinical Outcomes     Negatives:  Major bleeding event, Thromboembolic event,   Anticoagulation-related hospital admission, Anticoagulation-related ED   visit, Anticoagulation-related fatality        INR History:      Latest Ref Rng & Units 2024     2:45 PM 2024     1:05 PM 2024     1:30 PM 7/15/2024    12:59 PM 7/15/2024     1:06 PM 2024     1:07 PM 2024     2:30 PM   Anticoagulation Monitoring   INR   2.30  3.00  3.50  2.40   INR Date  5/21/2024  6/17/2024  7/15/2024  8/12/2024   INR Goal  2.5-3.5  2.5-3.5  2.5-3.5  2.5-3.5   Trend  Up  Same  Same  Same   Last Week Total  67.5 mg  82.5 mg  82.5 mg  82.5 mg   Next Week Total  82.5 mg  82.5 mg  82.5 mg  82.5 mg   Sun  11.25 mg  11.25 mg  11.25 mg  11.25 mg   Mon  11.25 mg  11.25 mg  11.25 mg  11.25 mg   Tue  15 mg  15 mg  15 mg  15 mg   Wed  11.25 mg  11.25 mg  11.25 mg  11.25 mg   Thu  11.25 mg  11.25 mg  11.25 mg  11.25 mg   Fri  11.25 mg  11.25 mg  11.25 mg  11.25 mg   Sat  11.25 mg  11.25 mg  11.25 mg  11.25 mg   Historical INR 0.90 - 1.10  3.00   3.50   2.40     Visit Report  Report             Plan:  1. INR is Subtherapeutic today- see above in Anticoagulation Summary.  Will instruct Carli Garcia to Continue their warfarin regimen- see above in Anticoagulation Summary.  2. Follow up in 1 month  3. Patient declines warfarin refills.  4. Verbal and written information provided. Patient expresses understanding and has no further questions at this time.    Lana Olivera [facility]   Infliximab Counseling:  I discussed with the patient the risks of infliximab including but not limited to myelosuppression, immunosuppression, autoimmune hepatitis, demyelinating diseases, lymphoma, and serious infections.  The patient understands that monitoring is required including a PPD at baseline and must alert us or the primary physician if symptoms of infection or other concerning signs are noted.

## 2024-08-20 ENCOUNTER — TELEPHONE (OUTPATIENT)
Dept: INTERNAL MEDICINE | Facility: CLINIC | Age: 59
End: 2024-08-20
Payer: COMMERCIAL

## 2024-08-20 NOTE — TELEPHONE ENCOUNTER
Caller: DAJA WITH [employer]    Relationship: Provider    Best call back number: [phone]     What form or medical record are you requesting: WORK CAPACITY NARRATIVE    Who is requesting this form or medical record from you: [employer]    How would you like to receive the form or medical records (pick-up, mail, fax): FAX  If fax, what is the fax number: [phone]    Timeframe paperwork needed: ASAP    Additional notes: PAPERWORK WAS SENT VIA FAX ON 08/19/24 AND NEEDS TO BE COMPLETED AND SENT BACK FAX NUMBER LISTED ABOVE.

## 2024-08-22 NOTE — TELEPHONE ENCOUNTER
Name: DAJA WITH [employer]      Relationship: Provider      Best Callback Number: [phone]      HUB PROVIDED THE RELAY MESSAGE FROM THE OFFICE    Lupe has gotten the paperwork. It will take her a few days to be filled out.       PATIENT: VOICED UNDERSTANDING AND HAS NO FURTHER QUESTIONS AT THIS TIME    ADDITIONAL INFORMATION:

## 2024-08-28 ENCOUNTER — TELEMEDICINE (OUTPATIENT)
Dept: INTERNAL MEDICINE | Facility: CLINIC | Age: 59
End: 2024-08-28
Payer: COMMERCIAL

## 2024-08-28 DIAGNOSIS — D68.61 ANTIPHOSPHOLIPID SYNDROME: Primary | Chronic | ICD-10-CM

## 2024-08-28 DIAGNOSIS — E03.9 ACQUIRED HYPOTHYROIDISM: Chronic | ICD-10-CM

## 2024-08-28 DIAGNOSIS — R06.09 CHRONIC DYSPNEA: ICD-10-CM

## 2024-08-28 PROCEDURE — 99213 OFFICE O/P EST LOW 20 MIN: CPT | Performed by: NURSE PRACTITIONER

## 2024-08-28 NOTE — PROGRESS NOTES
"Chief Complaint  Shortness of Breath    Subjective        Carli Garcia presents to [facility] to f/u on chronic medication conditions and to discuss disability.    You have chosen to receive care through a telehealth visit.  Do you consent to use a video/audio connection for your medical care today? Yes  Mode of Visit: Video  Location of patient: home  Location of provider: [facility]  You have chosen to receive care through a telehealth visit.  The patient has signed the video visit consent form.  The visit included audio and video interaction. No technical issues occurred during this visit.     History of Present Illness    She has a hx of diastolic dysfunction, antiphospholipid syndrome IgG cardiolipin with hx of DVT and PE and chronic dyspnea. She is followed by pulm and on CPAP for sleep apnea.     She is followed by dermatology for vasculitis and is managed on Plaquenil daily.     She has been on disability since 4/2022 due to generalized weakness and dyspnea on exertion. She uses O2 intermittently (uses nightly) but frequently throughout the day. She was previously working at a desk job but experienced difficulty with dyspnea which has been chronic.    Objective   Vital Signs:  There were no vitals taken for this visit.  Estimated body mass index is 61.01 kg/m² as calculated from the following:    Height as of 5/21/24: 167.6 cm (66\").    Weight as of 5/21/24: 171 kg (378 lb).       BMI is within normal parameters. No other follow-up for BMI required.      Physical Exam  Constitutional:       Appearance: She is well-developed.   HENT:      Head: Normocephalic and atraumatic.   Eyes:      General:         Right eye: No discharge.         Left eye: No discharge.      Conjunctiva/sclera: Conjunctivae normal.   Pulmonary:      Effort: Pulmonary effort is normal.   Neurological:      Mental Status: She is alert and oriented to person, place, and time.   Psychiatric:         " Behavior: Behavior normal.         Thought Content: Thought content normal.         Judgment: Judgment normal.        Result Review :  The following data was reviewed by: JANINE Teague on 08/28/2024:  Common labs          3/25/2024    12:39 5/21/2024    00:00 9/9/2024    13:17   Common Labs   Glucose  98     BUN  14     Creatinine  0.91     Sodium  143     Potassium  4.5     Chloride  99     Calcium  9.7     Total Protein  7.1     Albumin  4.4     Total Bilirubin  0.6     Alkaline Phosphatase  85     AST (SGOT)  18     ALT (SGPT)  18     WBC 5.18  6.65  6.43    Hemoglobin 13.7  15.0  14.0    Hematocrit 41.5  46.6  43.6    Platelets 267  332  241    Total Cholesterol  172     Triglycerides  122     HDL Cholesterol  62     LDL Cholesterol   89       Data reviewed : Consultant notes oncology 9/2023             Assessment and Plan   Diagnoses and all orders for this visit:    1. Antiphospholipid syndrome-IgG cardiolipin  (Primary)  Assessment & Plan:  She is managed on warfarin with home monitoring, followed by CBC.      2. Acquired hypothyroidism  Assessment & Plan:  TSH 2.07 with 5/2024 labs, continue Synthroid 175 mcg daily for replacement.      3. Chronic dyspnea  Assessment & Plan:  Sx are stable but recurrent, has O2 which she uses throughout day and consistently at night. She is followed by pulm as well. Her chronic dyspnea (also on torsemide for diastolic dysfunction) limits her ability to work full-time, paperwork completed.        Form completed for disability.    History and medical judgement obtained from video conversation with patient. No hands-on physical examination was performed. Approximately 15 minutes spent in video conversation with patient and in chart review.         Follow Up   Return if symptoms worsen or fail to improve, for Next scheduled follow up.  Patient was given instructions and counseling regarding her condition or for health maintenance advice. Please see specific information  pulled into the AVS if appropriate.

## 2024-09-04 ENCOUNTER — TELEPHONE (OUTPATIENT)
Dept: INTERNAL MEDICINE | Facility: CLINIC | Age: 59
End: 2024-09-04
Payer: COMMERCIAL

## 2024-09-04 NOTE — TELEPHONE ENCOUNTER
"Caller: [employer]     Relationship to patient: OTHER     Best call back number:      Patient is needing:   PLEASE CALL [employer] WITH [employer] TO CLARIFY THE FORM THAT THEY JUST RECEIVED ABOUT PATIENT CANNOT COMPLETE THE OCCUPATIONAL DEMANDS, DATED AUGUST 28, BUT BELOW IT, IT STATES THAT \"IF YES, WHAT DATE\" AND THE OFFICE PUT APRIL OF 2022.     PLEASE CALL [employer] TO CLARIFY IF PATIENT HAS NOT BEEN ABLE TO COMPLETE THE DEMANDS SINCE APRIL 2022.     PLEASE CLARIFY.          "

## 2024-09-06 DIAGNOSIS — Z86.711 HISTORY OF PULMONARY EMBOLUS (PE): Primary | ICD-10-CM

## 2024-09-09 ENCOUNTER — APPOINTMENT (OUTPATIENT)
Dept: ONCOLOGY | Facility: HOSPITAL | Age: 59
End: 2024-09-09
Payer: MEDICARE

## 2024-09-09 ENCOUNTER — LAB (OUTPATIENT)
Dept: LAB | Facility: HOSPITAL | Age: 59
End: 2024-09-09
Payer: MEDICARE

## 2024-09-09 ENCOUNTER — ANTICOAGULATION VISIT (OUTPATIENT)
Dept: ONCOLOGY | Facility: HOSPITAL | Age: 59
End: 2024-09-09
Payer: MEDICARE

## 2024-09-09 DIAGNOSIS — Z86.711 HISTORY OF PULMONARY EMBOLUS (PE): ICD-10-CM

## 2024-09-09 DIAGNOSIS — D68.61 ANTIPHOSPHOLIPID SYNDROME: Primary | Chronic | ICD-10-CM

## 2024-09-09 DIAGNOSIS — I82.401 DEEP VEIN THROMBOSIS (DVT) OF RIGHT LOWER EXTREMITY, UNSPECIFIED CHRONICITY, UNSPECIFIED VEIN: ICD-10-CM

## 2024-09-09 DIAGNOSIS — D68.61 ANTIPHOSPHOLIPID SYNDROME: Chronic | ICD-10-CM

## 2024-09-09 LAB
BASOPHILS # BLD AUTO: 0.06 10*3/MM3 (ref 0–0.2)
BASOPHILS NFR BLD AUTO: 0.9 % (ref 0–1.5)
DEPRECATED RDW RBC AUTO: 40 FL (ref 37–54)
EOSINOPHIL # BLD AUTO: 0.13 10*3/MM3 (ref 0–0.4)
EOSINOPHIL NFR BLD AUTO: 2 % (ref 0.3–6.2)
ERYTHROCYTE [DISTWIDTH] IN BLOOD BY AUTOMATED COUNT: 13.3 % (ref 12.3–15.4)
HCT VFR BLD AUTO: 43.6 % (ref 34–46.6)
HGB BLD-MCNC: 14 G/DL (ref 12–15.9)
IMM GRANULOCYTES # BLD AUTO: 0.05 10*3/MM3 (ref 0–0.05)
IMM GRANULOCYTES NFR BLD AUTO: 0.8 % (ref 0–0.5)
INR PPP: 3.1 (ref 0.9–1.1)
LYMPHOCYTES # BLD AUTO: 1.27 10*3/MM3 (ref 0.7–3.1)
LYMPHOCYTES NFR BLD AUTO: 19.8 % (ref 19.6–45.3)
MCH RBC QN AUTO: 26.5 PG (ref 26.6–33)
MCHC RBC AUTO-ENTMCNC: 32.1 G/DL (ref 31.5–35.7)
MCV RBC AUTO: 82.4 FL (ref 79–97)
MONOCYTES # BLD AUTO: 0.64 10*3/MM3 (ref 0.1–0.9)
MONOCYTES NFR BLD AUTO: 10 % (ref 5–12)
NEUTROPHILS NFR BLD AUTO: 4.28 10*3/MM3 (ref 1.7–7)
NEUTROPHILS NFR BLD AUTO: 66.5 % (ref 42.7–76)
NRBC BLD AUTO-RTO: 0 /100 WBC (ref 0–0.2)
PLATELET # BLD AUTO: 241 10*3/MM3 (ref 140–450)
PMV BLD AUTO: 10.7 FL (ref 6–12)
PROTHROMBIN TIME: 37.3 SECONDS (ref 11–13.5)
RBC # BLD AUTO: 5.29 10*6/MM3 (ref 3.77–5.28)
WBC NRBC COR # BLD AUTO: 6.43 10*3/MM3 (ref 3.4–10.8)

## 2024-09-09 PROCEDURE — 85025 COMPLETE CBC W/AUTO DIFF WBC: CPT

## 2024-09-09 PROCEDURE — 85610 PROTHROMBIN TIME: CPT

## 2024-09-09 PROCEDURE — G0463 HOSPITAL OUTPT CLINIC VISIT: HCPCS

## 2024-09-09 PROCEDURE — 36415 COLL VENOUS BLD VENIPUNCTURE: CPT

## 2024-09-09 NOTE — PROGRESS NOTES
Anticoagulation Clinic Progress Note    Patient's visit was held in office today.    Anticoagulation Summary  As of 9/9/2024      INR goal:  2.5-3.5   TTR:  66.8% (1.5 y)   INR used for dosing:  3.10 (9/9/2024)   Warfarin maintenance plan:  15 mg (7.5 mg x 2) every Tue; 11.25 mg (7.5 mg x 1.5) all other days   Weekly warfarin total:  82.5 mg   No change documented:  Lana Olivera RPH   Plan last modified:  Kriss Melendez RPH (5/21/2024)   Next INR check:  10/7/2024   Target end date:  Indefinite    Indications    Antiphospholipid syndrome-IgG cardiolipin  [D68.61]  History of pulmonary embolus (PE) [Z86.711]  Deep vein thrombosis (DVT) of right lower extremity  unspecified chronicity  unspecified vein [I82.401]                 Anticoagulation Episode Summary       INR check location:      Preferred lab:      Send INR reminders to:  [facility] ANTICOAG POOL    Comments:  [employer] home ally, every 2-4 weeks.          Anticoagulation Care Providers       Provider Role Specialty Phone number    Juanito Guerrier MD Referring Hematology and Oncology [phone]            Clinic Interview:  Patient Findings     Negatives:  Signs/symptoms of thrombosis, Signs/symptoms of bleeding,   Laboratory test error suspected, Change in health, Change in alcohol use,   Change in activity, Upcoming invasive procedure, Emergency department   visit, Upcoming dental procedure, Missed doses, Extra doses, Change in   medications, Change in diet/appetite, Hospital admission, Bruising, Other   complaints      Clinical Outcomes     Negatives:  Major bleeding event, Thromboembolic event,   Anticoagulation-related hospital admission, Anticoagulation-related ED   visit, Anticoagulation-related fatality        INR History:      Latest Ref Rng & Units 6/17/2024     1:30 PM 7/15/2024    12:59 PM 7/15/2024     1:06 PM 8/12/2024     1:07 PM 8/12/2024     2:30 PM 9/9/2024     1:17 PM 9/9/2024     4:15 PM   Anticoagulation Monitoring   INR  3.00   3.50  2.40  3.10   INR Date  6/17/2024  7/15/2024  8/12/2024  9/9/2024   INR Goal  2.5-3.5  2.5-3.5  2.5-3.5  2.5-3.5   Trend  Same  Same  Same  Same   Last Week Total  82.5 mg  82.5 mg  82.5 mg  82.5 mg   Next Week Total  82.5 mg  82.5 mg  82.5 mg  82.5 mg   Sun  11.25 mg  11.25 mg  11.25 mg  11.25 mg   Mon  11.25 mg  11.25 mg  11.25 mg  11.25 mg   Tue  15 mg  15 mg  15 mg  15 mg   Wed  11.25 mg  11.25 mg  11.25 mg  11.25 mg   Thu  11.25 mg  11.25 mg  11.25 mg  11.25 mg   Fri  11.25 mg  11.25 mg  11.25 mg  11.25 mg   Sat  11.25 mg  11.25 mg  11.25 mg  11.25 mg   Historical INR 0.90 - 1.10  3.50   2.40   3.10         Plan:  1. INR is Therapeutic today- see above in Anticoagulation Summary.  Will instruct Carli Garcia to Continue their warfarin regimen- see above in Anticoagulation Summary.  2. Follow up in 1 month  3. Patient declines warfarin refills.  4. Verbal and written information provided. Patient expresses understanding and has no further questions at this time.    Lana Olivera [facility]

## 2024-09-15 NOTE — ASSESSMENT & PLAN NOTE
Sx are stable but recurrent, has O2 which she uses throughout day and consistently at night. She is followed by pulm as well. Her chronic dyspnea (also on torsemide for diastolic dysfunction) limits her ability to work full-time, paperwork completed.

## 2024-09-17 RX ORDER — TORSEMIDE 20 MG/1
20 TABLET ORAL DAILY
Qty: 90 TABLET | Refills: 0 | Status: SHIPPED | OUTPATIENT
Start: 2024-09-17

## 2024-10-06 DIAGNOSIS — I10 ESSENTIAL HYPERTENSION: ICD-10-CM

## 2024-10-06 DIAGNOSIS — F41.9 ANXIETY: ICD-10-CM

## 2024-10-06 DIAGNOSIS — E78.00 HYPERCHOLESTEREMIA: ICD-10-CM

## 2024-10-06 DIAGNOSIS — K21.9 GASTROESOPHAGEAL REFLUX DISEASE WITHOUT ESOPHAGITIS: ICD-10-CM

## 2024-10-06 DIAGNOSIS — E03.9 ACQUIRED HYPOTHYROIDISM: ICD-10-CM

## 2024-10-07 RX ORDER — AMLODIPINE BESYLATE 5 MG/1
5 TABLET ORAL DAILY
Qty: 90 TABLET | Refills: 0 | Status: SHIPPED | OUTPATIENT
Start: 2024-10-07

## 2024-10-07 RX ORDER — CETIRIZINE HYDROCHLORIDE 10 MG/1
10 TABLET ORAL DAILY PRN
Qty: 90 TABLET | Refills: 1 | Status: SHIPPED | OUTPATIENT
Start: 2024-10-07

## 2024-10-07 RX ORDER — SERTRALINE HYDROCHLORIDE 100 MG/1
100 TABLET, FILM COATED ORAL DAILY
Qty: 90 TABLET | Refills: 1 | Status: SHIPPED | OUTPATIENT
Start: 2024-10-07

## 2024-10-07 RX ORDER — LEVOTHYROXINE SODIUM 175 UG/1
175 TABLET ORAL DAILY
Qty: 90 TABLET | Refills: 0 | Status: SHIPPED | OUTPATIENT
Start: 2024-10-07

## 2024-10-07 RX ORDER — ATORVASTATIN CALCIUM 10 MG/1
10 TABLET, FILM COATED ORAL DAILY
Qty: 90 TABLET | Refills: 0 | Status: SHIPPED | OUTPATIENT
Start: 2024-10-07

## 2024-10-07 RX ORDER — WARFARIN SODIUM 7.5 MG/1
TABLET ORAL
Qty: 145 TABLET | Refills: 1 | Status: SHIPPED | OUTPATIENT
Start: 2024-10-07

## 2024-10-07 RX ORDER — POTASSIUM CHLORIDE 1500 MG/1
20 TABLET, EXTENDED RELEASE ORAL DAILY
Qty: 90 TABLET | Refills: 1 | Status: SHIPPED | OUTPATIENT
Start: 2024-10-07

## 2024-10-08 ENCOUNTER — LAB (OUTPATIENT)
Dept: LAB | Facility: HOSPITAL | Age: 59
End: 2024-10-08
Payer: MEDICARE

## 2024-10-08 ENCOUNTER — ANTICOAGULATION VISIT (OUTPATIENT)
Dept: ONCOLOGY | Facility: HOSPITAL | Age: 59
End: 2024-10-08
Payer: MEDICARE

## 2024-10-08 DIAGNOSIS — I82.401 DEEP VEIN THROMBOSIS (DVT) OF RIGHT LOWER EXTREMITY, UNSPECIFIED CHRONICITY, UNSPECIFIED VEIN: ICD-10-CM

## 2024-10-08 DIAGNOSIS — D68.61 ANTIPHOSPHOLIPID SYNDROME: Chronic | ICD-10-CM

## 2024-10-08 DIAGNOSIS — D68.61 ANTIPHOSPHOLIPID SYNDROME: Primary | Chronic | ICD-10-CM

## 2024-10-08 DIAGNOSIS — Z86.711 HISTORY OF PULMONARY EMBOLUS (PE): ICD-10-CM

## 2024-10-08 LAB
INR PPP: 3.3 (ref 0.9–1.1)
PROTHROMBIN TIME: 39.7 SECONDS (ref 11–13.5)

## 2024-10-08 PROCEDURE — G0463 HOSPITAL OUTPT CLINIC VISIT: HCPCS

## 2024-10-08 PROCEDURE — 85610 PROTHROMBIN TIME: CPT

## 2024-10-08 PROCEDURE — 36416 COLLJ CAPILLARY BLOOD SPEC: CPT

## 2024-10-08 NOTE — PROGRESS NOTES
Anticoagulation Clinic Progress Note    Patient's visit was held in office today.    Anticoagulation Summary  As of 10/8/2024      INR goal:  2.5-3.5   TTR:  68.5% (1.5 y)   INR used for dosing:  3.30 (10/8/2024)   Warfarin maintenance plan:  15 mg (7.5 mg x 2) every Tue; 11.25 mg (7.5 mg x 1.5) all other days   Weekly warfarin total:  82.5 mg   No change documented:  Kriss Melendez RPH   Plan last modified:  Kriss Melendez RPH (5/21/2024)   Next INR check:  10/8/2024   Target end date:  Indefinite    Indications    Antiphospholipid syndrome-IgG cardiolipin  [D68.61]  History of pulmonary embolus (PE) [Z86.711]  Deep vein thrombosis (DVT) of right lower extremity  unspecified chronicity  unspecified vein [I82.401]                 Anticoagulation Episode Summary       INR check location:      Preferred lab:      Send INR reminders to:  [facility] ANTICOAG POOL    Comments:  [employer] home ally, every 2-4 weeks.          Anticoagulation Care Providers       Provider Role Specialty Phone number    Juanito Guerrier MD Referring Hematology and Oncology [phone]            Clinic Interview:  Patient Findings     Negatives:  Signs/symptoms of thrombosis, Signs/symptoms of bleeding,   Laboratory test error suspected, Change in health, Change in alcohol use,   Change in activity, Upcoming invasive procedure, Emergency department   visit, Upcoming dental procedure, Missed doses, Extra doses, Change in   medications, Change in diet/appetite, Hospital admission, Bruising, Other   complaints      Clinical Outcomes     Negatives:  Major bleeding event, Thromboembolic event,   Anticoagulation-related hospital admission, Anticoagulation-related ED   visit, Anticoagulation-related fatality        INR History:      Latest Ref Rng & Units 7/15/2024     1:06 PM 8/12/2024     1:07 PM 8/12/2024     2:30 PM 9/9/2024     1:17 PM 9/9/2024     4:15 PM 10/8/2024    12:49 PM 10/8/2024     1:30 PM   Anticoagulation Monitoring   INR  3.50   2.40  3.10  3.30   INR Date  7/15/2024  8/12/2024  9/9/2024  10/8/2024   INR Goal  2.5-3.5  2.5-3.5  2.5-3.5  2.5-3.5   Trend  Same  Same  Same  Same   Last Week Total  82.5 mg  82.5 mg  82.5 mg  82.5 mg   Next Week Total  82.5 mg  82.5 mg  82.5 mg  82.5 mg   Sun  11.25 mg  11.25 mg  11.25 mg  -   Mon  11.25 mg  11.25 mg  11.25 mg  -   Tue  15 mg  15 mg  15 mg  -   Wed  11.25 mg  11.25 mg  11.25 mg  -   Thu  11.25 mg  11.25 mg  11.25 mg  -   Fri  11.25 mg  11.25 mg  11.25 mg  -   Sat  11.25 mg  11.25 mg  11.25 mg  -   Historical INR 0.90 - 1.10  2.40   3.10   3.30         Plan:  1. INR is Therapeutic today- see above in Anticoagulation Summary.  Will instruct Carli Garcia to Continue their warfarin regimen- see above in Anticoagulation Summary.  2. Follow up in 1 month  3. Patient declines warfarin refills.  4. Verbal and written information provided. Patient expresses understanding and has no further questions at this time.  5. She is declining to pursue home testing at this time since weekly testing would be required. May try again with new insurance in 2025.    Kriss Melendez [facility]

## 2024-11-04 ENCOUNTER — LAB (OUTPATIENT)
Dept: LAB | Facility: HOSPITAL | Age: 59
End: 2024-11-04
Payer: MEDICARE

## 2024-11-04 ENCOUNTER — APPOINTMENT (OUTPATIENT)
Dept: ONCOLOGY | Facility: HOSPITAL | Age: 59
End: 2024-11-04
Payer: MEDICARE

## 2024-11-04 ENCOUNTER — ANTICOAGULATION VISIT (OUTPATIENT)
Dept: ONCOLOGY | Facility: HOSPITAL | Age: 59
End: 2024-11-04
Payer: MEDICARE

## 2024-11-04 DIAGNOSIS — Z86.711 HISTORY OF PULMONARY EMBOLUS (PE): ICD-10-CM

## 2024-11-04 DIAGNOSIS — D68.61 ANTIPHOSPHOLIPID SYNDROME: Primary | Chronic | ICD-10-CM

## 2024-11-04 DIAGNOSIS — D68.61 ANTIPHOSPHOLIPID SYNDROME: Chronic | ICD-10-CM

## 2024-11-04 DIAGNOSIS — I82.401 DEEP VEIN THROMBOSIS (DVT) OF RIGHT LOWER EXTREMITY, UNSPECIFIED CHRONICITY, UNSPECIFIED VEIN: ICD-10-CM

## 2024-11-04 LAB
INR PPP: 4.3 (ref 0.9–1.1)
PROTHROMBIN TIME: 51.7 SECONDS (ref 11–13.5)

## 2024-11-04 PROCEDURE — 36416 COLLJ CAPILLARY BLOOD SPEC: CPT

## 2024-11-04 PROCEDURE — 85610 PROTHROMBIN TIME: CPT

## 2024-11-04 PROCEDURE — G0463 HOSPITAL OUTPT CLINIC VISIT: HCPCS

## 2024-11-04 NOTE — PROGRESS NOTES
Anticoagulation Clinic Progress Note    Patient's visit was held in office today.    Anticoagulation Summary  As of 2024      INR goal:  2.5-3.5   TTR:  66.3% (1.6 y)   INR used for dosin.30 (2024)   Warfarin maintenance plan:  11.25 mg (7.5 mg x 1.5) every day   Weekly warfarin total:  78.75 mg   Plan last modified:  Kriss Melendez RPH (2024)   Next INR check:  2024   Target end date:  Indefinite    Indications    Antiphospholipid syndrome-IgG cardiolipin  [D68.61]  History of pulmonary embolus (PE) [Z86.711]  Deep vein thrombosis (DVT) of right lower extremity  unspecified chronicity  unspecified vein [I82.401]                 Anticoagulation Episode Summary       INR check location:  --    Preferred lab:  --    Send INR reminders to:   [facility] CBC ANTICOAG POOL    Comments:  [employer] home ally, every 2-4 weeks.          Anticoagulation Care Providers       Provider Role Specialty Phone number    Juanito Guerrier MD Referring Hematology and Oncology [phone]            Clinic Interview:  Patient Findings     Negatives:  Signs/symptoms of thrombosis, Signs/symptoms of bleeding,   Laboratory test error suspected, Change in health, Change in alcohol use,   Change in activity, Upcoming invasive procedure, Emergency department   visit, Upcoming dental procedure, Missed doses, Extra doses, Change in   medications, Change in diet/appetite, Hospital admission, Bruising, Other   complaints      Clinical Outcomes     Negatives:  Major bleeding event, Thromboembolic event,   Anticoagulation-related hospital admission, Anticoagulation-related ED   visit, Anticoagulation-related fatality        INR History:      Latest Ref Rng & Units 2024     2:30 PM 2024     1:17 PM 2024     4:15 PM 10/8/2024    12:49 PM 10/8/2024     1:30 PM 2024    12:54 PM 2024     2:15 PM   Anticoagulation Monitoring   INR  2.40  3.10  3.30  4.30   INR Date  2024  2024  10/8/2024  2024    INR Goal  2.5-3.5  2.5-3.5  2.5-3.5  2.5-3.5   Trend  Same  Same  Same  Down   Last Week Total  82.5 mg  82.5 mg  82.5 mg  82.5 mg   Next Week Total  82.5 mg  82.5 mg  82.5 mg  67.5 mg   Sun  11.25 mg  11.25 mg  -  11.25 mg   Mon  11.25 mg  11.25 mg  -  Hold (11/4); Otherwise 11.25 mg   Tue  15 mg  15 mg  -  11.25 mg   Wed  11.25 mg  11.25 mg  -  11.25 mg   Thu  11.25 mg  11.25 mg  -  11.25 mg   Fri  11.25 mg  11.25 mg  -  11.25 mg   Sat  11.25 mg  11.25 mg  -  11.25 mg   Historical INR 0.90 - 1.10  3.10   3.30   4.30         Plan:  1. INR is Supratherapeutic today- see above in Anticoagulation Summary.  Will instruct Carli Garcia to HOLD warfarin today only, then Change their warfarin regimen to 11.25mg daily- see above in Anticoagulation Summary.  2. Follow up in 1 week  3. Patient declines warfarin refills.  4. Verbal information provided. Patient expresses understanding and has no further questions at this time.    Kriss Melendez [facility]

## 2024-11-13 ENCOUNTER — LAB (OUTPATIENT)
Dept: LAB | Facility: HOSPITAL | Age: 59
End: 2024-11-13
Payer: MEDICARE

## 2024-11-13 ENCOUNTER — ANTICOAGULATION VISIT (OUTPATIENT)
Dept: ONCOLOGY | Facility: HOSPITAL | Age: 59
End: 2024-11-13
Payer: MEDICARE

## 2024-11-13 DIAGNOSIS — D68.61 ANTIPHOSPHOLIPID SYNDROME: Chronic | ICD-10-CM

## 2024-11-13 DIAGNOSIS — I82.401 DEEP VEIN THROMBOSIS (DVT) OF RIGHT LOWER EXTREMITY, UNSPECIFIED CHRONICITY, UNSPECIFIED VEIN: ICD-10-CM

## 2024-11-13 DIAGNOSIS — D68.61 ANTIPHOSPHOLIPID SYNDROME: Primary | Chronic | ICD-10-CM

## 2024-11-13 DIAGNOSIS — Z86.711 HISTORY OF PULMONARY EMBOLUS (PE): ICD-10-CM

## 2024-11-13 LAB
INR PPP: 3.2 (ref 0.9–1.1)
PROTHROMBIN TIME: 38.5 SECONDS (ref 11–13.5)

## 2024-11-13 PROCEDURE — 85610 PROTHROMBIN TIME: CPT

## 2024-11-13 PROCEDURE — 36416 COLLJ CAPILLARY BLOOD SPEC: CPT

## 2024-11-13 PROCEDURE — G0463 HOSPITAL OUTPT CLINIC VISIT: HCPCS

## 2024-11-13 NOTE — PROGRESS NOTES
Anticoagulation Clinic Progress Note    Patient's visit was held in office today.    Anticoagulation Summary  As of 11/13/2024      INR goal:  2.5-3.5   TTR:  65.7% (1.6 y)   INR used for dosing:  3.20 (11/13/2024)   Warfarin maintenance plan:  11.25 mg (7.5 mg x 1.5) every day   Weekly warfarin total:  78.75 mg   No change documented:  Lana Olivera RPH   Plan last modified:  Kriss Melendez RPH (11/4/2024)   Next INR check:  12/2/2024   Target end date:  Indefinite    Indications    Antiphospholipid syndrome-IgG cardiolipin  [D68.61]  History of pulmonary embolus (PE) [Z86.711]  Deep vein thrombosis (DVT) of right lower extremity  unspecified chronicity  unspecified vein [I82.401]                 Anticoagulation Episode Summary       INR check location:  --    Preferred lab:  --    Send INR reminders to:  [facility] CBC ANTICOAG POOL    Comments:  [employer] home ally, every 2-4 weeks.          Anticoagulation Care Providers       Provider Role Specialty Phone number    Juanito Guerrier MD Referring Hematology and Oncology [phone]            Clinic Interview:  Patient Findings     Negatives:  Signs/symptoms of thrombosis, Signs/symptoms of bleeding,   Laboratory test error suspected, Change in health, Change in alcohol use,   Change in activity, Upcoming invasive procedure, Emergency department   visit, Upcoming dental procedure, Missed doses, Extra doses, Change in   medications, Change in diet/appetite, Hospital admission, Bruising, Other   complaints      Clinical Outcomes     Negatives:  Major bleeding event, Thromboembolic event,   Anticoagulation-related hospital admission, Anticoagulation-related ED   visit, Anticoagulation-related fatality        INR History:      Latest Ref Rng & Units 9/9/2024     4:15 PM 10/8/2024    12:49 PM 10/8/2024     1:30 PM 11/4/2024    12:54 PM 11/4/2024     2:15 PM 11/13/2024     2:18 PM 11/13/2024     3:00 PM   Anticoagulation Monitoring   INR  3.10  3.30  4.30  3.20   INR  Date  9/9/2024  10/8/2024  11/4/2024  11/13/2024   INR Goal  2.5-3.5  2.5-3.5  2.5-3.5  2.5-3.5   Trend  Same  Same  Down  Same   Last Week Total  82.5 mg  82.5 mg  82.5 mg  78.75 mg   Next Week Total  82.5 mg  82.5 mg  67.5 mg  78.75 mg   Sun  11.25 mg  -  11.25 mg  11.25 mg   Mon  11.25 mg  -  Hold (11/4); Otherwise 11.25 mg  11.25 mg   Tue  15 mg  -  11.25 mg  11.25 mg   Wed  11.25 mg  -  11.25 mg  11.25 mg   Thu  11.25 mg  -  11.25 mg  11.25 mg   Fri  11.25 mg  -  11.25 mg  11.25 mg   Sat  11.25 mg  -  11.25 mg  11.25 mg   Historical INR 0.90 - 1.10  3.30   4.30   3.20         Plan:  1. INR is Therapeutic today- see above in Anticoagulation Summary.  Will instruct Carli Garcia to Continue their warfarin regimen- see above in Anticoagulation Summary.  2. Follow up in 2 weeks  3. Patient declines warfarin refills.  4. Verbal and written information provided. Patient expresses understanding and has no further questions at this time.    Lana Olivera [facility]

## 2024-11-27 ENCOUNTER — TELEPHONE (OUTPATIENT)
Dept: INTERNAL MEDICINE | Facility: CLINIC | Age: 59
End: 2024-11-27
Payer: MEDICARE

## 2024-11-27 NOTE — TELEPHONE ENCOUNTER
Caller: [employer]    Relationship: Other    Best call back number: [phone] - EVANGELINA DIRECT LINE WITH [employer]     What is the best time to reach you: ANYTIME     Who are you requesting to speak with (clinical staff, provider,  specific staff member): CLINICAL     What was the call regarding: LORRAINE STATES THAT A FAX WAS SENT TO THE OFFICE ON 11/25/24 WITH AUTHORIZATION AND REQUESTING THE PATIENTS MOST RECENT OFFICE VISIT NOTES FOR THE PATIENTS LONG TERM DISABILITY CLAIM AND SHE IS CALLING TO CHECK THE STATUS.     PLEASE ADVISE

## 2024-12-02 NOTE — TELEPHONE ENCOUNTER
Received this morning - will review paper work. Post poning for follow up - checking with [employer] to see if this should go to [facility]. They are request records 6/1/24-current

## 2024-12-04 ENCOUNTER — TELEPHONE (OUTPATIENT)
Dept: INTERNAL MEDICINE | Facility: CLINIC | Age: 59
End: 2024-12-04

## 2024-12-04 NOTE — TELEPHONE ENCOUNTER
Attempted to call Arianna Love YADI advising call back for info    HUB TO READ  The patient's last appointment was 5/21/24. Your records request has been sent to [facility]'s Release of Information department. Thanks!

## 2024-12-04 NOTE — TELEPHONE ENCOUNTER
Caller: ADRIENNE    Relationship: Other    Best call back number: [phone]     What was the call regarding: LORRAINE IS CALLING TO GET THE DATE OF THE PATIENTS LAST APPOINTMENT

## 2024-12-08 DIAGNOSIS — K21.9 GASTROESOPHAGEAL REFLUX DISEASE WITHOUT ESOPHAGITIS: ICD-10-CM

## 2024-12-08 DIAGNOSIS — I10 ESSENTIAL HYPERTENSION: ICD-10-CM

## 2024-12-08 DIAGNOSIS — E03.9 ACQUIRED HYPOTHYROIDISM: ICD-10-CM

## 2024-12-08 DIAGNOSIS — E78.00 HYPERCHOLESTEREMIA: ICD-10-CM

## 2024-12-10 ENCOUNTER — TELEPHONE (OUTPATIENT)
Dept: INTERNAL MEDICINE | Facility: CLINIC | Age: 59
End: 2024-12-10
Payer: MEDICARE

## 2024-12-10 NOTE — TELEPHONE ENCOUNTER
Caller: GABRIEL- SEBASTIAN TEJEDA MD    Relationship: Other    Best call back number: [phone]    What is the best time to reach you: ANYTIME     Who are you requesting to speak with (clinical staff, provider,  specific staff member): CLINICAL     Do you know the name of the person who called: SEBASTIAN TEJEDA MD    What was the call regarding: DR TEJEDA HAS QUESTIONS REGARDING ANY CURRENT WORK RESTRICTIONS RECOMMENDED FOR [facility] [employer].     HE WILL FAX HIS QUESTIONS WITHIN THE NEXT BUSINESS DAY.     HE REQUESTS EITHER COMPLETION OF THE LETTER, OR A CALL BACK TO DISCUSS WITHIN THE NEXT 10 BUSINESS DAYS- WHICHEVER THE CLINICAL STAFF PREFERS.

## 2024-12-11 DIAGNOSIS — K21.9 GASTROESOPHAGEAL REFLUX DISEASE WITHOUT ESOPHAGITIS: ICD-10-CM

## 2024-12-11 DIAGNOSIS — E03.9 ACQUIRED HYPOTHYROIDISM: ICD-10-CM

## 2024-12-11 DIAGNOSIS — E78.00 HYPERCHOLESTEREMIA: ICD-10-CM

## 2024-12-11 DIAGNOSIS — I10 ESSENTIAL HYPERTENSION: ICD-10-CM

## 2024-12-11 DIAGNOSIS — F41.9 ANXIETY: ICD-10-CM

## 2024-12-11 RX ORDER — HYDROXYCHLOROQUINE SULFATE 200 MG/1
200 TABLET, FILM COATED ORAL EVERY 12 HOURS
Status: CANCELLED | OUTPATIENT
Start: 2024-12-11

## 2024-12-11 RX ORDER — WARFARIN SODIUM 7.5 MG/1
TABLET ORAL
Qty: 135 TABLET | Refills: 1 | Status: SHIPPED | OUTPATIENT
Start: 2024-12-11

## 2024-12-11 NOTE — TELEPHONE ENCOUNTER
She left without being seen from her last appointment-will most likely need to be seen in order to complete more specific information. Thanks.

## 2024-12-12 RX ORDER — ATORVASTATIN CALCIUM 10 MG/1
10 TABLET, FILM COATED ORAL DAILY
Qty: 90 TABLET | Refills: 0 | Status: SHIPPED | OUTPATIENT
Start: 2024-12-12

## 2024-12-12 RX ORDER — LEVOTHYROXINE SODIUM 175 UG/1
175 TABLET ORAL DAILY
Qty: 90 TABLET | Refills: 0 | Status: SHIPPED | OUTPATIENT
Start: 2024-12-12

## 2024-12-12 RX ORDER — SERTRALINE HYDROCHLORIDE 100 MG/1
100 TABLET, FILM COATED ORAL DAILY
Qty: 90 TABLET | Refills: 0 | Status: SHIPPED | OUTPATIENT
Start: 2024-12-12

## 2024-12-12 RX ORDER — TORSEMIDE 20 MG/1
20 TABLET ORAL DAILY
Qty: 90 TABLET | Refills: 0 | Status: SHIPPED | OUTPATIENT
Start: 2024-12-12

## 2024-12-12 RX ORDER — AMLODIPINE BESYLATE 5 MG/1
5 TABLET ORAL DAILY
Qty: 90 TABLET | Refills: 0 | Status: SHIPPED | OUTPATIENT
Start: 2024-12-12

## 2024-12-12 RX ORDER — POTASSIUM CHLORIDE 1500 MG/1
20 TABLET, EXTENDED RELEASE ORAL DAILY
Qty: 90 TABLET | Refills: 0 | Status: SHIPPED | OUTPATIENT
Start: 2024-12-12

## 2024-12-15 LAB — INR PPP: 3.8

## 2024-12-20 ENCOUNTER — OFFICE VISIT (OUTPATIENT)
Dept: INTERNAL MEDICINE | Facility: CLINIC | Age: 59
End: 2024-12-20
Payer: MEDICARE

## 2024-12-20 VITALS
HEART RATE: 72 BPM | HEIGHT: 66 IN | DIASTOLIC BLOOD PRESSURE: 76 MMHG | OXYGEN SATURATION: 92 % | BODY MASS INDEX: 47.09 KG/M2 | SYSTOLIC BLOOD PRESSURE: 124 MMHG | TEMPERATURE: 97.3 F | WEIGHT: 293 LBS

## 2024-12-20 DIAGNOSIS — Z23 NEED FOR INFLUENZA VACCINATION: ICD-10-CM

## 2024-12-20 DIAGNOSIS — Z23 NEED FOR VACCINATION: ICD-10-CM

## 2024-12-20 DIAGNOSIS — E78.00 HYPERCHOLESTEREMIA: Chronic | ICD-10-CM

## 2024-12-20 DIAGNOSIS — E03.9 ACQUIRED HYPOTHYROIDISM: Chronic | ICD-10-CM

## 2024-12-20 DIAGNOSIS — I77.6 VASCULITIS: Chronic | ICD-10-CM

## 2024-12-20 DIAGNOSIS — I51.89 DIASTOLIC DYSFUNCTION: Chronic | ICD-10-CM

## 2024-12-20 DIAGNOSIS — D68.61 ANTIPHOSPHOLIPID SYNDROME: Chronic | ICD-10-CM

## 2024-12-20 DIAGNOSIS — I10 ESSENTIAL HYPERTENSION: Primary | Chronic | ICD-10-CM

## 2024-12-20 PROCEDURE — 1159F MED LIST DOCD IN RCRD: CPT | Performed by: NURSE PRACTITIONER

## 2024-12-20 PROCEDURE — 90656 IIV3 VACC NO PRSV 0.5 ML IM: CPT | Performed by: NURSE PRACTITIONER

## 2024-12-20 PROCEDURE — 99214 OFFICE O/P EST MOD 30 MIN: CPT | Performed by: NURSE PRACTITIONER

## 2024-12-20 PROCEDURE — 90480 ADMN SARSCOV2 VAC 1/ONLY CMP: CPT | Performed by: NURSE PRACTITIONER

## 2024-12-20 PROCEDURE — G0008 ADMIN INFLUENZA VIRUS VAC: HCPCS | Performed by: NURSE PRACTITIONER

## 2024-12-20 PROCEDURE — 3074F SYST BP LT 130 MM HG: CPT | Performed by: NURSE PRACTITIONER

## 2024-12-20 PROCEDURE — 1125F AMNT PAIN NOTED PAIN PRSNT: CPT | Performed by: NURSE PRACTITIONER

## 2024-12-20 PROCEDURE — 1160F RVW MEDS BY RX/DR IN RCRD: CPT | Performed by: NURSE PRACTITIONER

## 2024-12-20 PROCEDURE — 3078F DIAST BP <80 MM HG: CPT | Performed by: NURSE PRACTITIONER

## 2024-12-20 PROCEDURE — 91320 SARSCV2 VAC 30MCG TRS-SUC IM: CPT | Performed by: NURSE PRACTITIONER

## 2024-12-21 LAB
ALBUMIN SERPL-MCNC: 4.3 G/DL (ref 3.8–4.9)
ALP SERPL-CCNC: 97 IU/L (ref 44–121)
ALT SERPL-CCNC: 14 IU/L (ref 0–32)
AST SERPL-CCNC: 17 IU/L (ref 0–40)
BILIRUB SERPL-MCNC: 0.5 MG/DL (ref 0–1.2)
BUN SERPL-MCNC: 14 MG/DL (ref 8–27)
BUN/CREAT SERPL: 14 (ref 12–28)
CALCIUM SERPL-MCNC: 9.4 MG/DL (ref 8.7–10.3)
CHLORIDE SERPL-SCNC: 101 MMOL/L (ref 96–106)
CHOLEST SERPL-MCNC: 158 MG/DL (ref 100–199)
CO2 SERPL-SCNC: 26 MMOL/L (ref 20–29)
CREAT SERPL-MCNC: 1 MG/DL (ref 0.57–1)
EGFRCR SERPLBLD CKD-EPI 2021: 64 ML/MIN/1.73
ERYTHROCYTE [DISTWIDTH] IN BLOOD BY AUTOMATED COUNT: 13.6 % (ref 11.7–15.4)
GLOBULIN SER CALC-MCNC: 2.7 G/DL (ref 1.5–4.5)
GLUCOSE SERPL-MCNC: 94 MG/DL (ref 70–99)
HCT VFR BLD AUTO: 43.7 % (ref 34–46.6)
HDLC SERPL-MCNC: 54 MG/DL
HGB BLD-MCNC: 14 G/DL (ref 11.1–15.9)
LDLC SERPL CALC-MCNC: 79 MG/DL (ref 0–99)
MCH RBC QN AUTO: 25.8 PG (ref 26.6–33)
MCHC RBC AUTO-ENTMCNC: 32 G/DL (ref 31.5–35.7)
MCV RBC AUTO: 81 FL (ref 79–97)
PLATELET # BLD AUTO: 280 X10E3/UL (ref 150–450)
POTASSIUM SERPL-SCNC: 4.7 MMOL/L (ref 3.5–5.2)
PROT SERPL-MCNC: 7 G/DL (ref 6–8.5)
RBC # BLD AUTO: 5.43 X10E6/UL (ref 3.77–5.28)
SODIUM SERPL-SCNC: 143 MMOL/L (ref 134–144)
TRIGL SERPL-MCNC: 146 MG/DL (ref 0–149)
TSH SERPL DL<=0.005 MIU/L-ACNC: 1.88 UIU/ML (ref 0.45–4.5)
VLDLC SERPL CALC-MCNC: 25 MG/DL (ref 5–40)
WBC # BLD AUTO: 8.3 X10E3/UL (ref 3.4–10.8)

## 2024-12-27 NOTE — TELEPHONE ENCOUNTER
----- Message from Aylin Mittal sent at 6/28/2018  3:40 PM EDT -----  Contact: pt Kate Andrade - RE: CALL // PT TO BE SEEN  Pt calling and would like a return call regarding pt being dizzy, head pressure, ears all day. Pt would like to know if pt needs appt, go to [facility] or ER. Could you please call pt to discuss? Please advise. Thanks      Pt # [phone]  
Called pt for more info, she states she has been dizzy a lot today, BP has been elevated 145/84,pt states she will come into acute in the morning, I advised if worse report to ER. I will let let front office know, is this ok, thanks.  
Yes, this is fine. Please advise to go to ER with worsening sx.  
negative

## 2025-01-05 NOTE — ASSESSMENT & PLAN NOTE
Echo 3/11/19: EF 68%, grade 1 diastolic dysfunction  She is currently managed on torsemide daily, appears euvolemic on exam-check labs today.

## 2025-01-05 NOTE — ASSESSMENT & PLAN NOTE
The patient's vasculitis appears to be under control. She reports itching, bleeding, and scabbing of the lesions, which are present everywhere except her feet. There is no pus discharge. She is advised to apply hydrocortisone cream to the affected areas to alleviate itching and prevent scratching. She is now off prednisone, followed by dermatology.

## 2025-01-05 NOTE — PROGRESS NOTES
"Chief Complaint  Med Management and Follow-up (Discuss disability paper work received )  Subjective        Carli Garcia presents to [facility]  History of Present Illness  History of Present Illness  The patient presents for evaluation of vasculitis, INR management, hypothyroidism, and health maintenance.    She reports a persistent rash, which she believes is under control. The rash is characterized by itching, bleeding, and scabbing, with lesions appearing on various parts of her body except her feet. She does not report any purulent discharge from the lesions. She has been attempting to avoid scratching the lesions. She is uncertain if these symptoms are a side effect of her medication, hydroxychloroquine. Her next dermatology appointment is scheduled for the summer.    She has been monitoring her prothrombin time at home. Her most recent reading on 12/18/2024 was 3.8.  She did not communicate her last result to her healthcare provider as it was within the normal range.    She has not received her influenza vaccine this year. She has been receiving COVID-19 boosters annually, with the most recent one administered last year.    She has been experiencing a sore throat and headache but does not report any congestion or fever. She is currently on oxygen therapy, with a dosage of 2 liters during the day and 3 liters at night. She has not required an increase in her oxygen dosage.    Objective   Vital Signs:  /76 (BP Location: Left arm, Patient Position: Sitting, Cuff Size: Large Adult)   Pulse 72   Temp 97.3 °F (36.3 °C)   Ht 167.6 cm (66\")   Wt (!) 176 kg (387 lb 6.4 oz)   SpO2 92%   BMI 62.53 kg/m²   Estimated body mass index is 62.53 kg/m² as calculated from the following:    Height as of this encounter: 167.6 cm (66\").    Weight as of this encounter: 176 kg (387 lb 6.4 oz).            Physical Exam  Constitutional:       Appearance: She is well-developed. She is not " ill-appearing.   HENT:      Head: Normocephalic.      Right Ear: Hearing, tympanic membrane and external ear normal.      Left Ear: Hearing, tympanic membrane and external ear normal.      Nose: Nose normal. No nasal deformity, mucosal edema or rhinorrhea.      Right Sinus: No maxillary sinus tenderness or frontal sinus tenderness.      Left Sinus: No maxillary sinus tenderness or frontal sinus tenderness.      Mouth/Throat:      Dentition: Normal dentition.      Pharynx: Postnasal drip present.   Eyes:      General: Lids are normal.         Right eye: No discharge.         Left eye: No discharge.      Conjunctiva/sclera: Conjunctivae normal.      Right eye: No exudate.     Left eye: No exudate.  Neck:      Thyroid: No thyroid mass or thyromegaly.      Vascular: No carotid bruit.      Trachea: Trachea normal.   Cardiovascular:      Rate and Rhythm: Regular rhythm.      Pulses: Normal pulses.      Heart sounds: Normal heart sounds. No murmur heard.  Pulmonary:      Effort: No respiratory distress.      Breath sounds: Normal breath sounds. No decreased breath sounds, wheezing, rhonchi or rales.   Abdominal:      General: Bowel sounds are normal.      Palpations: Abdomen is soft.      Tenderness: There is no abdominal tenderness.   Musculoskeletal:      Cervical back: Normal range of motion. No edema.   Lymphadenopathy:      Head:      Right side of head: No submental, submandibular, tonsillar, preauricular, posterior auricular or occipital adenopathy.      Left side of head: No submental, submandibular, tonsillar, preauricular, posterior auricular or occipital adenopathy.   Skin:     General: Skin is warm and dry.      Findings: No rash.      Nails: There is no clubbing.      Comments: Minimal rash noted on exam   Neurological:      Mental Status: She is alert.   Psychiatric:         Behavior: Behavior is cooperative.        Physical Exam      Vital Signs  Oxygen saturation is at 92 percent.    Result Review :  The  following data was reviewed by: JANINE Teague on 12/20/2024:  Common labs          5/21/2024    00:00 9/9/2024    13:17 12/20/2024    14:42   Common Labs   Glucose 98   94    BUN 14   14    Creatinine 0.91   1.00    Sodium 143   143    Potassium 4.5   4.7    Chloride 99   101    Calcium 9.7   9.4    Total Protein 7.1   7.0    Albumin 4.4   4.3    Total Bilirubin 0.6   0.5    Alkaline Phosphatase 85   97    AST (SGOT) 18   17    ALT (SGPT) 18   14    WBC 6.65  6.43  8.3    Hemoglobin 15.0  14.0  14.0    Hematocrit 46.6  43.6  43.7    Platelets 332  241  280    Total Cholesterol 172   158    Triglycerides 122   146    HDL Cholesterol 62   54    LDL Cholesterol  89   79           Results  Laboratory Studies  Pro time (INR) was 3.8 on December 18.             Assessment and Plan   Diagnoses and all orders for this visit:    1. Essential hypertension (Primary)  Assessment & Plan:  BP is excellent in the office, continue amlodipine along with a low sodium diet.    Orders:  -     CBC (No Diff)  -     Comprehensive Metabolic Panel    2. Diastolic dysfunction  Assessment & Plan:  Echo 3/11/19: EF 68%, grade 1 diastolic dysfunction  She is currently managed on torsemide daily, appears euvolemic on exam-check labs today.      3. Hypercholesteremia  Assessment & Plan:  She denies myalgias with atorvastatin which she will continue along with a low-fat, low-cholesterol diet.   Lab Results   Component Value Date    LDL 79 12/20/2024       Orders:  -     Lipid Panel    4. Antiphospholipid syndrome-IgG cardiolipin   Assessment & Plan:  Her INR was 3.8 on December 18 (within therapeutic range). She is currently checking her INR at home  and is advised to communicate her INR results to hematology to ensure proper medication management.      5. Vasculitis  Assessment & Plan:  The patient's vasculitis appears to be under control. She reports itching, bleeding, and scabbing of the lesions, which are present everywhere except her  feet. There is no pus discharge. She is advised to apply hydrocortisone cream to the affected areas to alleviate itching and prevent scratching. She is now off prednisone, followed by dermatology.      6. Acquired hypothyroidism  Assessment & Plan:  She is currently managed on Synthroid 175 mcg daily for replacement, recheck TSH.    Orders:  -     TSH    7. Need for vaccination  -     COVID-19 (Pfizer) 12yrs+ (COMIRNATY)    8. Need for influenza vaccination  -     Fluzone >6mos      Assessment & Plan           Follow Up   Return in about 4 months (around 4/20/2025).  Patient was given instructions and counseling regarding her condition or for health maintenance advice. Please see specific information pulled into the AVS if appropriate.     Patient or patient representative verbalized consent for the use of Ambient Listening during the visit with  JANINE Teague for chart documentation. 1/5/2025  17:00 EST

## 2025-01-05 NOTE — ASSESSMENT & PLAN NOTE
Her INR was 3.8 on December 18 (within therapeutic range). She is currently checking her INR at home  and is advised to communicate her INR results to hematology to ensure proper medication management.

## 2025-01-05 NOTE — ASSESSMENT & PLAN NOTE
She denies myalgias with atorvastatin which she will continue along with a low-fat, low-cholesterol diet.   Lab Results   Component Value Date    LDL 79 12/20/2024

## 2025-01-09 ENCOUNTER — TELEPHONE (OUTPATIENT)
Dept: PHARMACY | Facility: HOSPITAL | Age: 60
End: 2025-01-09
Payer: MEDICARE

## 2025-01-09 NOTE — TELEPHONE ENCOUNTER
Attempted to contact patient regarding December INR result. Based on patient's PCP note, she checked her INR on 12/18/24, but did not relay results to anticoagulation clinic. Left voicemail with clinic phone number ([phone]).

## 2025-01-15 ENCOUNTER — ANTICOAGULATION VISIT (OUTPATIENT)
Dept: PHARMACY | Facility: HOSPITAL | Age: 60
End: 2025-01-15
Payer: MEDICARE

## 2025-01-15 DIAGNOSIS — D68.61 ANTIPHOSPHOLIPID SYNDROME: Primary | Chronic | ICD-10-CM

## 2025-01-15 DIAGNOSIS — I82.401 DEEP VEIN THROMBOSIS (DVT) OF RIGHT LOWER EXTREMITY, UNSPECIFIED CHRONICITY, UNSPECIFIED VEIN: ICD-10-CM

## 2025-01-15 DIAGNOSIS — Z86.711 HISTORY OF PULMONARY EMBOLUS (PE): ICD-10-CM

## 2025-01-15 LAB — INR PPP: 3.6

## 2025-01-15 NOTE — PROGRESS NOTES
Anticoagulation Clinic Progress Note        Anticoagulation Summary  As of 1/15/2025      INR goal:  2.5-3.5   TTR:  61.9% (1.8 y)   INR used for dosing:  3.60 (1/15/2025)   Warfarin maintenance plan:  11.25 mg (7.5 mg x 1.5) every day   Weekly warfarin total:  78.75 mg   No change documented:  Kriss Melendez RPH   Plan last modified:  Kriss Melendez RPH (11/4/2024)   Next INR check:  2/15/2025   Target end date:  Indefinite    Indications    Antiphospholipid syndrome-IgG cardiolipin  [D68.61]  History of pulmonary embolus (PE) [Z86.711]  Deep vein thrombosis (DVT) of right lower extremity  unspecified chronicity  unspecified vein [I82.401]                 Anticoagulation Episode Summary       INR check location:  --    Preferred lab:  --    Send INR reminders to:   [facility] CBC ANTICOAG POOL    Comments:  [employer] home ally, every 2-4 weeks.          Anticoagulation Care Providers       Provider Role Specialty Phone number    Juanito Guerrier MD Referring Hematology and Oncology [phone]          INR History:      11/4/2024    12:54 PM 11/4/2024     2:15 PM 11/13/2024     2:18 PM 11/13/2024     3:00 PM 12/15/2024    12:00 AM 1/15/2025    12:00 AM 1/15/2025     1:55 PM   Anticoagulation Monitoring   INR  4.30  3.20   3.60   INR Date  11/4/2024  11/13/2024   1/15/2025   INR Goal  2.5-3.5  2.5-3.5   2.5-3.5   Trend  Down  Same   Same   Last Week Total  82.5 mg  78.75 mg   78.75 mg   Next Week Total  67.5 mg  78.75 mg   78.75 mg   Sun  11.25 mg  11.25 mg   11.25 mg   Mon  Hold (11/4); Otherwise 11.25 mg  11.25 mg   11.25 mg   Tue  11.25 mg  11.25 mg   11.25 mg   Wed  11.25 mg  11.25 mg   11.25 mg   Thu  11.25 mg  11.25 mg   11.25 mg   Fri  11.25 mg  11.25 mg   11.25 mg   Sat  11.25 mg  11.25 mg   11.25 mg   Historical INR 4.30   3.20   3.80     3.60            This result is from an external source.       Plan:  1. INR is Supratherapeutic today per phone report by patient of home testing- see above in  Anticoagulation Summary.   Will instruct Carli Garcia to Continue their warfarin regimen- see above in Anticoagulation Summary.  2. Follow up in 4 weeks  3.They have been instructed to call if any changes in medications, doses, concerns, etc.     Kriss Melendez RPH

## 2025-02-12 LAB — INR PPP: 3.3

## 2025-02-18 ENCOUNTER — ANTICOAGULATION VISIT (OUTPATIENT)
Dept: PHARMACY | Facility: HOSPITAL | Age: 60
End: 2025-02-18
Payer: MEDICARE

## 2025-02-18 DIAGNOSIS — Z86.711 HISTORY OF PULMONARY EMBOLUS (PE): ICD-10-CM

## 2025-02-18 DIAGNOSIS — I82.401 DEEP VEIN THROMBOSIS (DVT) OF RIGHT LOWER EXTREMITY, UNSPECIFIED CHRONICITY, UNSPECIFIED VEIN: ICD-10-CM

## 2025-02-18 DIAGNOSIS — D68.61 ANTIPHOSPHOLIPID SYNDROME: Primary | Chronic | ICD-10-CM

## 2025-02-18 NOTE — PROGRESS NOTES
Anticoagulation Clinic Progress Note    Patient's visit was held by phone today.    Anticoagulation Summary  As of 2/18/2025      INR goal:  2.5-3.5   TTR:  62.1% (1.9 y)   INR used for dosing:  3.30 (2/12/2025)   Warfarin maintenance plan:  11.25 mg (7.5 mg x 1.5) every day   Weekly warfarin total:  78.75 mg   No change documented:  Lana Olivera RPH   Plan last modified:  Kriss Melendez RPH (11/4/2024)   Next INR check:  3/12/2025   Target end date:  Indefinite    Indications    Antiphospholipid syndrome-IgG cardiolipin  [D68.61]  History of pulmonary embolus (PE) [Z86.711]  Deep vein thrombosis (DVT) of right lower extremity  unspecified chronicity  unspecified vein [I82.401]                 Anticoagulation Episode Summary       INR check location:  --    Preferred lab:  --    Send INR reminders to:  [facility] CBC ANTICOAG POOL    Comments:  [employer] home ally, every 2-4 weeks.          Anticoagulation Care Providers       Provider Role Specialty Phone number    Juanito Guerrier MD Referring Hematology and Oncology [phone]            Drug interactions: has remained unchanged.  Diet: has remained unchanged.    Clinic Interview:  No pertinent clinical findings have been reported.    INR History:      11/13/2024     2:18 PM 11/13/2024     3:00 PM 12/15/2024    12:00 AM 1/15/2025    12:00 AM 1/15/2025     1:55 PM 2/12/2025    12:00 AM 2/18/2025    10:40 AM   Anticoagulation Monitoring   INR  3.20   3.60  3.30   INR Date  11/13/2024   1/15/2025  2/12/2025   INR Goal  2.5-3.5   2.5-3.5  2.5-3.5   Trend  Same   Same  Same   Last Week Total  78.75 mg   78.75 mg  78.75 mg   Next Week Total  78.75 mg   78.75 mg  78.75 mg   Sun  11.25 mg   11.25 mg  11.25 mg   Mon  11.25 mg   11.25 mg  11.25 mg   Tue  11.25 mg   11.25 mg  11.25 mg   Wed  11.25 mg   11.25 mg  11.25 mg   Thu  11.25 mg   11.25 mg  11.25 mg   Fri  11.25 mg   11.25 mg  11.25 mg   Sat  11.25 mg   11.25 mg  11.25 mg   Historical INR 3.20   3.80     3.60       3.30            This result is from an external source.       Plan:  1. INR is Therapeutic today- see above in Anticoagulation Summary.   Will instruct Carli Garcia to Continue their warfarin regimen- see above in Anticoagulation Summary.  2. Follow up in 4 weeks  3.They have been instructed to call if any changes in medications, doses, concerns, etc. Patient expresses understanding and has no further questions at this time.    Lana Olivera [facility]

## 2025-02-23 DIAGNOSIS — K21.9 GASTROESOPHAGEAL REFLUX DISEASE WITHOUT ESOPHAGITIS: ICD-10-CM

## 2025-02-23 DIAGNOSIS — F41.9 ANXIETY: ICD-10-CM

## 2025-02-23 DIAGNOSIS — E03.9 ACQUIRED HYPOTHYROIDISM: ICD-10-CM

## 2025-02-23 DIAGNOSIS — E78.00 HYPERCHOLESTEREMIA: ICD-10-CM

## 2025-02-24 RX ORDER — TORSEMIDE 20 MG/1
20 TABLET ORAL DAILY
Qty: 90 TABLET | Refills: 0 | Status: SHIPPED | OUTPATIENT
Start: 2025-02-24

## 2025-02-24 RX ORDER — SERTRALINE HYDROCHLORIDE 100 MG/1
100 TABLET, FILM COATED ORAL DAILY
Qty: 90 TABLET | Refills: 0 | Status: SHIPPED | OUTPATIENT
Start: 2025-02-24

## 2025-02-24 RX ORDER — WARFARIN SODIUM 7.5 MG/1
TABLET ORAL
Qty: 135 TABLET | Refills: 0 | Status: SHIPPED | OUTPATIENT
Start: 2025-02-24

## 2025-02-24 RX ORDER — LEVOTHYROXINE SODIUM 175 UG/1
175 TABLET ORAL DAILY
Qty: 90 TABLET | Refills: 0 | Status: SHIPPED | OUTPATIENT
Start: 2025-02-24

## 2025-02-24 RX ORDER — OMEPRAZOLE 20 MG/1
20 CAPSULE, DELAYED RELEASE ORAL DAILY
Qty: 90 CAPSULE | Refills: 0 | Status: SHIPPED | OUTPATIENT
Start: 2025-02-24

## 2025-02-24 RX ORDER — POTASSIUM CHLORIDE 1500 MG/1
20 TABLET, EXTENDED RELEASE ORAL DAILY
Qty: 90 TABLET | Refills: 0 | Status: SHIPPED | OUTPATIENT
Start: 2025-02-24

## 2025-02-24 RX ORDER — ATORVASTATIN CALCIUM 10 MG/1
10 TABLET, FILM COATED ORAL DAILY
Qty: 90 TABLET | Refills: 0 | Status: SHIPPED | OUTPATIENT
Start: 2025-02-24

## 2025-03-13 LAB — INR PPP: 3.6

## 2025-03-17 DIAGNOSIS — E03.9 ACQUIRED HYPOTHYROIDISM: ICD-10-CM

## 2025-03-17 DIAGNOSIS — I10 ESSENTIAL HYPERTENSION: ICD-10-CM

## 2025-03-17 DIAGNOSIS — K21.9 GASTROESOPHAGEAL REFLUX DISEASE WITHOUT ESOPHAGITIS: ICD-10-CM

## 2025-03-17 DIAGNOSIS — F41.9 ANXIETY: ICD-10-CM

## 2025-03-17 DIAGNOSIS — E78.00 HYPERCHOLESTEREMIA: ICD-10-CM

## 2025-03-17 RX ORDER — ATORVASTATIN CALCIUM 10 MG/1
10 TABLET, FILM COATED ORAL DAILY
Qty: 90 TABLET | Refills: 0 | OUTPATIENT
Start: 2025-03-17

## 2025-03-17 RX ORDER — TORSEMIDE 20 MG/1
20 TABLET ORAL DAILY
Qty: 90 TABLET | Refills: 0 | OUTPATIENT
Start: 2025-03-17

## 2025-03-17 RX ORDER — AMLODIPINE BESYLATE 5 MG/1
5 TABLET ORAL DAILY
Qty: 90 TABLET | Refills: 0 | OUTPATIENT
Start: 2025-03-17

## 2025-03-17 RX ORDER — LEVOTHYROXINE SODIUM 175 UG/1
175 TABLET ORAL DAILY
Qty: 90 TABLET | Refills: 0 | OUTPATIENT
Start: 2025-03-17

## 2025-03-17 RX ORDER — SERTRALINE HYDROCHLORIDE 100 MG/1
100 TABLET, FILM COATED ORAL DAILY
Qty: 90 TABLET | Refills: 0 | OUTPATIENT
Start: 2025-03-17

## 2025-03-17 RX ORDER — POTASSIUM CHLORIDE 1500 MG/1
20 TABLET, EXTENDED RELEASE ORAL DAILY
Qty: 90 TABLET | Refills: 0 | OUTPATIENT
Start: 2025-03-17

## 2025-03-17 RX ORDER — OMEPRAZOLE 20 MG/1
20 CAPSULE, DELAYED RELEASE ORAL DAILY
Qty: 90 CAPSULE | Refills: 0 | OUTPATIENT
Start: 2025-03-17

## 2025-03-18 NOTE — PROGRESS NOTES
REASONS FOR FOLLOWUP:  Antiphospholipid antibody syndrome with history of pulmonary embolism March 2016    History of Present Illness    Mrs. Garcia is a 60-year-old woman with history of pulmonary embolism March 2016.  She was found to have positive lupus anticoagulant and cardiolipin antibodies at the time of her pulmonary embolism which have been persistent and has been on anticoagulation with Coumadin since diagnosis.  She had concern for pulmonary embolism despite Coumadin and her INR goal was increased to 2.5-3.5.    She remains on Coumadin without excessive bleeding or bruising.   She has not had recent thromboses.    The patient has chronic dyspnea on exertion secondary to chronic hypoxic respiratory failure for which she utilizes O2.  She denies leg swelling or chest pains.      Past Medical History:   Diagnosis Date    Anemia     Angioedema     Lower lip    Depression     Diastolic dysfunction     DVT (deep venous thrombosis)     GERD (gastroesophageal reflux disease)     H/O antiphospholipid syndrome     Hiatal hernia     Hypertension     Hypothyroidism     Lupus anticoagulant disorder     Lupus anticoagulant disorder     Morbid obesity     CRISTI (obstructive sleep apnea)     PE (pulmonary embolism)     Bilateral    Right ventricular dilation     Thrombocytopenia     Vasculitis 05/2021       ONCOLOGIC HISTORY:  (History from previous dates can be found in the separate document.)    Current Outpatient Medications on File Prior to Visit   Medication Sig Dispense Refill    amLODIPine (NORVASC) 5 MG tablet TAKE 1 TABLET DAILY 90 tablet 0    atorvastatin (LIPITOR) 10 MG tablet Take 1 tablet by mouth Daily. 90 tablet 0    cetirizine (zyrTEC) 10 MG tablet Take 1 tablet by mouth Daily As Needed for Allergies. 90 tablet 1    hydroxychloroquine (PLAQUENIL) 200 MG tablet Take 1 tablet by mouth Every 12 (Twelve) Hours.      levothyroxine (SYNTHROID, LEVOTHROID) 175 MCG tablet Take 1 tablet by mouth Daily. 90 tablet 0     omeprazole (priLOSEC) 20 MG capsule Take 1 capsule by mouth Daily. 90 capsule 0    potassium chloride ER (K-TAB) 20 MEQ tablet controlled-release ER tablet Take 1 tablet by mouth Daily. 90 tablet 0    sertraline (ZOLOFT) 100 MG tablet Take 1 tablet by mouth Daily. 90 tablet 0    torsemide (DEMADEX) 20 MG tablet Take 1 tablet by mouth Daily. 90 tablet 0    warfarin (COUMADIN) 7.5 MG tablet Take one and one-half tablets (11.25 mg) by mouth daily or as directed by clinic 135 tablet 1    fluticasone (FLONASE) 50 MCG/ACT nasal spray 2 sprays into the nostril(s) as directed by provider Daily for 30 days. (Patient taking differently: Administer 2 sprays into the nostril(s) as directed by provider As Needed.) 9.9 mL 0     No current facility-administered medications on file prior to visit.       ALLERGIES:     Allergies   Allergen Reactions    Losartan Angioedema    Toprol Xl [Metoprolol Tartrate] Shortness Of Breath    Egg-Derived Products Nausea And Vomiting    Dust Mite Extract Unknown - Low Severity    Sulfa Antibiotics Unknown - Low Severity       Social History     Socioeconomic History    Marital status: Single    Number of children: 0   Tobacco Use    Smoking status: Never    Smokeless tobacco: Never   Vaping Use    Vaping status: Never Used   Substance and Sexual Activity    Alcohol use: No    Drug use: No    Sexual activity: Not Currently     Partners: Male     Birth control/protection: Abstinence         Cancer-related family history includes Cancer in her mother; Uterine cancer in her mother.     Review of Systems   Constitutional:  Negative for activity change and appetite change.   HENT: Negative.     Respiratory:  Positive for shortness of breath (Exertion, chronic).    Cardiovascular:  Positive for leg swelling (Improved). Negative for palpitations.   Genitourinary: Negative.    Musculoskeletal: Negative.    Skin:  Positive for rash (Improving on therapy for vasculitis).   Neurological:  Negative for  "weakness.   Hematological:  Does not bruise/bleed easily.   Psychiatric/Behavioral:  Positive for dysphoric mood.           Objective      Vitals:    03/24/25 1252   BP: 140/84   Pulse: 85   Resp: 16   Temp: 97.6 °F (36.4 °C)   TempSrc: Oral   SpO2: 90%   Weight: (!) 170 kg (374 lb 11.2 oz)   Height: 167.6 cm (65.98\")   PainSc: 0-No pain             3/24/2025    12:55 PM   Current Status   ECOG score 0       Physical Exam   GENERAL: Well-developed, morbid obese woman  CHEST: Lungs clear to percussion and auscultation. Good airflow.    CARDIAC: Regular rate and rhythm without murmurs, rubs or gallops. Normal S1,S2.  ABDOMEN: Soft, nontender with no organomegaly or masses.  EXTREMITIES: No clubbing, cyanosis.  Trace ankle edema bilaterally   PSYCHIATRIC: Normal mood and affect    Unchanged 3/25/2024    RECENT LABS:  Hematology WBC   Date Value Ref Range Status   03/24/2025 7.69 3.40 - 10.80 10*3/mm3 Final   12/20/2024 8.3 3.4 - 10.8 x10E3/uL Final     RBC   Date Value Ref Range Status   03/24/2025 5.48 (H) 3.77 - 5.28 10*6/mm3 Final   12/20/2024 5.43 (H) 3.77 - 5.28 x10E6/uL Final     Hemoglobin   Date Value Ref Range Status   03/24/2025 14.4 12.0 - 15.9 g/dL Final     Hematocrit   Date Value Ref Range Status   03/24/2025 44.2 34.0 - 46.6 % Final     Platelets   Date Value Ref Range Status   03/24/2025 267 140 - 450 10*3/mm3 Final      INR 4.0    Assessment & Plan    *Antiphospholipid antibody syndrome   history of pulmonary embolism in March 2016 secondary to antiphospholipid antibody syndrome.    Imaging in February 2019 showed age indeterminate pulmonary emboli and she complained of increased shortness of breath so her INR goal was changed to 2.5-3.5.   INR 4.0 today; Coumadin adjusted per pharmacy staff    *Atypical hyperplasia of the breast   Followed by breast surgery team      Plan:  1.  Continue Coumadin with goal INR 2.5-3.5.  The patient now has a home monitoring system  2.  12-month follow-up CBC INR MD " visit

## 2025-03-19 DIAGNOSIS — F41.9 ANXIETY: ICD-10-CM

## 2025-03-19 DIAGNOSIS — E78.00 HYPERCHOLESTEREMIA: ICD-10-CM

## 2025-03-19 DIAGNOSIS — K21.9 GASTROESOPHAGEAL REFLUX DISEASE WITHOUT ESOPHAGITIS: ICD-10-CM

## 2025-03-19 DIAGNOSIS — E03.9 ACQUIRED HYPOTHYROIDISM: ICD-10-CM

## 2025-03-20 RX ORDER — WARFARIN SODIUM 7.5 MG/1
TABLET ORAL
Qty: 135 TABLET | Refills: 1 | Status: SHIPPED | OUTPATIENT
Start: 2025-03-20

## 2025-03-21 RX ORDER — POTASSIUM CHLORIDE 1500 MG/1
20 TABLET, EXTENDED RELEASE ORAL DAILY
Qty: 90 TABLET | Refills: 0 | Status: SHIPPED | OUTPATIENT
Start: 2025-03-21

## 2025-03-21 RX ORDER — LEVOTHYROXINE SODIUM 175 UG/1
175 TABLET ORAL DAILY
Qty: 90 TABLET | Refills: 0 | Status: SHIPPED | OUTPATIENT
Start: 2025-03-21

## 2025-03-21 RX ORDER — OMEPRAZOLE 20 MG/1
20 CAPSULE, DELAYED RELEASE ORAL DAILY
Qty: 90 CAPSULE | Refills: 0 | Status: SHIPPED | OUTPATIENT
Start: 2025-03-21

## 2025-03-21 RX ORDER — SERTRALINE HYDROCHLORIDE 100 MG/1
100 TABLET, FILM COATED ORAL DAILY
Qty: 90 TABLET | Refills: 0 | Status: SHIPPED | OUTPATIENT
Start: 2025-03-21

## 2025-03-21 RX ORDER — ATORVASTATIN CALCIUM 10 MG/1
10 TABLET, FILM COATED ORAL DAILY
Qty: 90 TABLET | Refills: 0 | Status: SHIPPED | OUTPATIENT
Start: 2025-03-21

## 2025-03-21 RX ORDER — TORSEMIDE 20 MG/1
20 TABLET ORAL DAILY
Qty: 90 TABLET | Refills: 0 | Status: SHIPPED | OUTPATIENT
Start: 2025-03-21

## 2025-03-24 ENCOUNTER — ANTICOAGULATION VISIT (OUTPATIENT)
Dept: ONCOLOGY | Facility: HOSPITAL | Age: 60
End: 2025-03-24
Payer: MEDICARE

## 2025-03-24 ENCOUNTER — APPOINTMENT (OUTPATIENT)
Dept: ONCOLOGY | Facility: HOSPITAL | Age: 60
End: 2025-03-24
Payer: MEDICARE

## 2025-03-24 ENCOUNTER — OFFICE VISIT (OUTPATIENT)
Dept: ONCOLOGY | Facility: CLINIC | Age: 60
End: 2025-03-24
Payer: MEDICARE

## 2025-03-24 ENCOUNTER — LAB (OUTPATIENT)
Dept: LAB | Facility: HOSPITAL | Age: 60
End: 2025-03-24
Payer: MEDICARE

## 2025-03-24 VITALS
HEIGHT: 66 IN | RESPIRATION RATE: 16 BRPM | BODY MASS INDEX: 47.09 KG/M2 | HEART RATE: 85 BPM | DIASTOLIC BLOOD PRESSURE: 84 MMHG | SYSTOLIC BLOOD PRESSURE: 140 MMHG | TEMPERATURE: 97.6 F | WEIGHT: 293 LBS | OXYGEN SATURATION: 90 %

## 2025-03-24 DIAGNOSIS — E78.00 HYPERCHOLESTEREMIA: ICD-10-CM

## 2025-03-24 DIAGNOSIS — Z86.711 HISTORY OF PULMONARY EMBOLUS (PE): ICD-10-CM

## 2025-03-24 DIAGNOSIS — I82.401 DEEP VEIN THROMBOSIS (DVT) OF RIGHT LOWER EXTREMITY, UNSPECIFIED CHRONICITY, UNSPECIFIED VEIN: ICD-10-CM

## 2025-03-24 DIAGNOSIS — Z86.711 HISTORY OF PULMONARY EMBOLUS (PE): Primary | ICD-10-CM

## 2025-03-24 DIAGNOSIS — D68.61 ANTIPHOSPHOLIPID SYNDROME: Chronic | ICD-10-CM

## 2025-03-24 DIAGNOSIS — F41.9 ANXIETY: ICD-10-CM

## 2025-03-24 DIAGNOSIS — E03.9 ACQUIRED HYPOTHYROIDISM: ICD-10-CM

## 2025-03-24 DIAGNOSIS — D68.61 ANTIPHOSPHOLIPID SYNDROME: Primary | Chronic | ICD-10-CM

## 2025-03-24 DIAGNOSIS — I10 ESSENTIAL HYPERTENSION: ICD-10-CM

## 2025-03-24 DIAGNOSIS — K21.9 GASTROESOPHAGEAL REFLUX DISEASE WITHOUT ESOPHAGITIS: ICD-10-CM

## 2025-03-24 LAB
BASOPHILS # BLD AUTO: 0.08 10*3/MM3 (ref 0–0.2)
BASOPHILS NFR BLD AUTO: 1 % (ref 0–1.5)
DEPRECATED RDW RBC AUTO: 39.4 FL (ref 37–54)
EOSINOPHIL # BLD AUTO: 0.1 10*3/MM3 (ref 0–0.4)
EOSINOPHIL NFR BLD AUTO: 1.3 % (ref 0.3–6.2)
ERYTHROCYTE [DISTWIDTH] IN BLOOD BY AUTOMATED COUNT: 13.5 % (ref 12.3–15.4)
HCT VFR BLD AUTO: 44.2 % (ref 34–46.6)
HGB BLD-MCNC: 14.4 G/DL (ref 12–15.9)
IMM GRANULOCYTES # BLD AUTO: 0.11 10*3/MM3 (ref 0–0.05)
IMM GRANULOCYTES NFR BLD AUTO: 1.4 % (ref 0–0.5)
INR PPP: 4 (ref 0.9–1.1)
LYMPHOCYTES # BLD AUTO: 1.36 10*3/MM3 (ref 0.7–3.1)
LYMPHOCYTES NFR BLD AUTO: 17.7 % (ref 19.6–45.3)
MCH RBC QN AUTO: 26.3 PG (ref 26.6–33)
MCHC RBC AUTO-ENTMCNC: 32.6 G/DL (ref 31.5–35.7)
MCV RBC AUTO: 80.7 FL (ref 79–97)
MONOCYTES # BLD AUTO: 0.68 10*3/MM3 (ref 0.1–0.9)
MONOCYTES NFR BLD AUTO: 8.8 % (ref 5–12)
NEUTROPHILS NFR BLD AUTO: 5.36 10*3/MM3 (ref 1.7–7)
NEUTROPHILS NFR BLD AUTO: 69.8 % (ref 42.7–76)
NRBC BLD AUTO-RTO: 0 /100 WBC (ref 0–0.2)
PLATELET # BLD AUTO: 267 10*3/MM3 (ref 140–450)
PMV BLD AUTO: 10.8 FL (ref 6–12)
PROTHROMBIN TIME: 48.4 SECONDS (ref 11–13.5)
RBC # BLD AUTO: 5.48 10*6/MM3 (ref 3.77–5.28)
WBC NRBC COR # BLD AUTO: 7.69 10*3/MM3 (ref 3.4–10.8)

## 2025-03-24 PROCEDURE — 85025 COMPLETE CBC W/AUTO DIFF WBC: CPT

## 2025-03-24 PROCEDURE — 85610 PROTHROMBIN TIME: CPT

## 2025-03-24 PROCEDURE — 3079F DIAST BP 80-89 MM HG: CPT | Performed by: INTERNAL MEDICINE

## 2025-03-24 PROCEDURE — 3077F SYST BP >= 140 MM HG: CPT | Performed by: INTERNAL MEDICINE

## 2025-03-24 PROCEDURE — G0463 HOSPITAL OUTPT CLINIC VISIT: HCPCS

## 2025-03-24 PROCEDURE — 36415 COLL VENOUS BLD VENIPUNCTURE: CPT

## 2025-03-24 PROCEDURE — G2211 COMPLEX E/M VISIT ADD ON: HCPCS | Performed by: INTERNAL MEDICINE

## 2025-03-24 PROCEDURE — 99214 OFFICE O/P EST MOD 30 MIN: CPT | Performed by: INTERNAL MEDICINE

## 2025-03-24 PROCEDURE — 1126F AMNT PAIN NOTED NONE PRSNT: CPT | Performed by: INTERNAL MEDICINE

## 2025-03-24 NOTE — PROGRESS NOTES
Anticoagulation Clinic Progress Note    Patient's visit was held in office today.    Anticoagulation Summary  As of 3/24/2025      INR goal:  2.5-3.5   TTR:  61.3% (2 y)   INR used for dosing:  3.60 (3/13/2025)   Warfarin maintenance plan:  11.25 mg (7.5 mg x 1.5) every day   Weekly warfarin total:  78.75 mg   Plan last modified:  Kriss Melendez RPH (11/4/2024)   Next INR check:  4/7/2025   Target end date:  Indefinite    Indications    Antiphospholipid syndrome-IgG cardiolipin  [D68.61]  History of pulmonary embolus (PE) [Z86.711]  Deep vein thrombosis (DVT) of right lower extremity  unspecified chronicity  unspecified vein [I82.401]                 Anticoagulation Episode Summary       INR check location:  --    Preferred lab:  --    Send INR reminders to:   [facility] CBC ANTICOAG POOL    Comments:  [employer] home ally, every 2-4 weeks.          Anticoagulation Care Providers       Provider Role Specialty Phone number    Juanito Guerrier MD Referring Hematology and Oncology [phone]            Clinic Interview:  Patient Findings     Negatives:  Signs/symptoms of thrombosis, Signs/symptoms of bleeding,   Laboratory test error suspected, Change in health, Change in alcohol use,   Change in activity, Upcoming invasive procedure, Emergency department   visit, Upcoming dental procedure, Missed doses, Extra doses, Change in   medications, Change in diet/appetite, Hospital admission, Bruising, Other   complaints    Comments:  INR has been elevated last 4/5 times, but patient prefers to   keep warfarin dose the same. She will recheck INR in two weeks at home   instead of one month.      Clinical Outcomes     Negatives:  Major bleeding event, Thromboembolic event,   Anticoagulation-related hospital admission, Anticoagulation-related ED   visit, Anticoagulation-related fatality    Comments:  INR has been elevated last 4/5 times, but patient prefers to   keep warfarin dose the same. She will recheck INR in two weeks at  home   instead of one month.        INR History:      Latest Ref Rng & Units 1/15/2025    12:00 AM 1/15/2025     1:55 PM 2/12/2025    12:00 AM 2/18/2025    10:40 AM 3/13/2025    12:00 AM 3/24/2025    12:28 PM 3/24/2025     1:00 PM   Anticoagulation Monitoring   INR   3.60  3.30   3.60   INR Date   1/15/2025  2/12/2025   3/13/2025   INR Goal   2.5-3.5  2.5-3.5   2.5-3.5   Trend   Same  Same   Same   Last Week Total   78.75 mg  78.75 mg   78.75 mg   Next Week Total   78.75 mg  78.75 mg   67.5 mg   Sun   11.25 mg  11.25 mg   11.25 mg   Mon   11.25 mg  11.25 mg   Hold (3/24); Otherwise 11.25 mg   Tue   11.25 mg  11.25 mg   11.25 mg   Wed   11.25 mg  11.25 mg   11.25 mg   Thu   11.25 mg  11.25 mg   11.25 mg   Fri   11.25 mg  11.25 mg   11.25 mg   Sat   11.25 mg  11.25 mg   11.25 mg   Historical INR 0.90 - 1.10 3.60      3.30      3.60     4.00         This result is from an external source.       Plan:  1. INR is Supratherapeutic today- see above in Anticoagulation Summary.  Will instruct Carli Garcia to hold warfarin dose today, then Continue their warfarin regimen- see above in Anticoagulation Summary.  2. Follow up in 2 weeks  3. Patient declines warfarin refills.  4. Verbal and written information provided. Patient expresses understanding and has no further questions at this time.    Lana Olivera [facility]

## 2025-03-25 RX ORDER — WARFARIN SODIUM 7.5 MG/1
TABLET ORAL
Qty: 135 TABLET | Refills: 1 | Status: SHIPPED | OUTPATIENT
Start: 2025-03-25

## 2025-03-25 RX ORDER — HYDROXYCHLOROQUINE SULFATE 200 MG/1
200 TABLET, FILM COATED ORAL EVERY 12 HOURS
OUTPATIENT
Start: 2025-03-25

## 2025-03-25 RX ORDER — LEVOTHYROXINE SODIUM 175 UG/1
175 TABLET ORAL DAILY
Qty: 90 TABLET | Refills: 0 | OUTPATIENT
Start: 2025-03-25

## 2025-03-25 RX ORDER — SERTRALINE HYDROCHLORIDE 100 MG/1
100 TABLET, FILM COATED ORAL DAILY
Qty: 90 TABLET | Refills: 0 | OUTPATIENT
Start: 2025-03-25

## 2025-03-25 RX ORDER — POTASSIUM CHLORIDE 1500 MG/1
20 TABLET, EXTENDED RELEASE ORAL DAILY
Qty: 90 TABLET | Refills: 0 | OUTPATIENT
Start: 2025-03-25

## 2025-03-25 RX ORDER — AMLODIPINE BESYLATE 5 MG/1
5 TABLET ORAL DAILY
Qty: 90 TABLET | Refills: 0 | Status: SHIPPED | OUTPATIENT
Start: 2025-03-25

## 2025-03-25 RX ORDER — TORSEMIDE 20 MG/1
20 TABLET ORAL DAILY
Qty: 90 TABLET | Refills: 0 | OUTPATIENT
Start: 2025-03-25

## 2025-03-25 RX ORDER — ATORVASTATIN CALCIUM 10 MG/1
10 TABLET, FILM COATED ORAL DAILY
Qty: 90 TABLET | Refills: 0 | OUTPATIENT
Start: 2025-03-25

## 2025-03-25 RX ORDER — OMEPRAZOLE 20 MG/1
20 CAPSULE, DELAYED RELEASE ORAL DAILY
Qty: 90 CAPSULE | Refills: 0 | OUTPATIENT
Start: 2025-03-25

## 2025-03-26 DIAGNOSIS — F41.9 ANXIETY: ICD-10-CM

## 2025-03-26 DIAGNOSIS — K21.9 GASTROESOPHAGEAL REFLUX DISEASE WITHOUT ESOPHAGITIS: ICD-10-CM

## 2025-03-26 DIAGNOSIS — E78.00 HYPERCHOLESTEREMIA: ICD-10-CM

## 2025-03-26 DIAGNOSIS — E03.9 ACQUIRED HYPOTHYROIDISM: ICD-10-CM

## 2025-03-26 RX ORDER — OMEPRAZOLE 20 MG/1
20 CAPSULE, DELAYED RELEASE ORAL DAILY
Qty: 90 CAPSULE | Refills: 0 | OUTPATIENT
Start: 2025-03-26

## 2025-03-26 RX ORDER — ATORVASTATIN CALCIUM 10 MG/1
10 TABLET, FILM COATED ORAL DAILY
Qty: 90 TABLET | Refills: 0 | OUTPATIENT
Start: 2025-03-26

## 2025-03-26 RX ORDER — TORSEMIDE 20 MG/1
20 TABLET ORAL DAILY
Qty: 90 TABLET | Refills: 0 | OUTPATIENT
Start: 2025-03-26

## 2025-03-26 RX ORDER — LEVOTHYROXINE SODIUM 175 UG/1
175 TABLET ORAL DAILY
Qty: 90 TABLET | Refills: 0 | OUTPATIENT
Start: 2025-03-26

## 2025-03-26 RX ORDER — POTASSIUM CHLORIDE 1500 MG/1
20 TABLET, EXTENDED RELEASE ORAL DAILY
Qty: 90 TABLET | Refills: 0 | OUTPATIENT
Start: 2025-03-26

## 2025-03-26 RX ORDER — SERTRALINE HYDROCHLORIDE 100 MG/1
100 TABLET, FILM COATED ORAL DAILY
Qty: 90 TABLET | Refills: 0 | OUTPATIENT
Start: 2025-03-26

## 2025-04-07 LAB — INR PPP: 2.4

## 2025-04-08 ENCOUNTER — ANTICOAGULATION VISIT (OUTPATIENT)
Dept: PHARMACY | Facility: HOSPITAL | Age: 60
End: 2025-04-08
Payer: MEDICARE

## 2025-04-08 DIAGNOSIS — I82.401 DEEP VEIN THROMBOSIS (DVT) OF RIGHT LOWER EXTREMITY, UNSPECIFIED CHRONICITY, UNSPECIFIED VEIN: ICD-10-CM

## 2025-04-08 DIAGNOSIS — Z86.711 HISTORY OF PULMONARY EMBOLUS (PE): ICD-10-CM

## 2025-04-08 DIAGNOSIS — D68.61 ANTIPHOSPHOLIPID SYNDROME: Primary | Chronic | ICD-10-CM

## 2025-04-08 NOTE — PROGRESS NOTES
Anticoagulation Clinic Progress Note    Patient's visit was held by phone today.    Anticoagulation Summary  As of 2025      INR goal:  2.5-3.5   TTR:  61.3% (2 y)   INR used for dosin.40 (2025)   Warfarin maintenance plan:  11.25 mg (7.5 mg x 1.5) every day   Weekly warfarin total:  78.75 mg   No change documented:  Kriss Melendez RPH   Plan last modified:  Kriss Melendez RPH (2024)   Next INR check:  2025   Priority:  Maintenance   Target end date:  Indefinite    Indications    Antiphospholipid syndrome-IgG cardiolipin  [D68.61]  History of pulmonary embolus (PE) [Z86.711]  Deep vein thrombosis (DVT) of right lower extremity  unspecified chronicity  unspecified vein [I82.401]                 Anticoagulation Episode Summary       INR check location:  --    Preferred lab:  --    Send INR reminders to:   [facility] ANTICOAG POOL    Comments:  [employer] home ally, every 2-4 weeks.          Anticoagulation Care Providers       Provider Role Specialty Phone number    Juanito Guerrier MD Referring Hematology and Oncology [phone]            Drug interactions: has remained unchanged.  Diet: has remained unchanged.    Clinic Interview:  No pertinent clinical findings have been reported.    INR History:      2025    12:00 AM 2025    10:40 AM 3/13/2025    12:00 AM 3/24/2025    12:28 PM 3/24/2025     1:00 PM 2025    12:00 AM 2025     8:59 AM   Anticoagulation Monitoring   INR  3.30   3.60  2.40   INR Date  2025   3/13/2025  2025   INR Goal  2.5-3.5   2.5-3.5  2.5-3.5   Trend  Same   Same  Same   Last Week Total  78.75 mg   78.75 mg  78.75 mg   Next Week Total  78.75 mg   67.5 mg  78.75 mg   Sun  11.25 mg   11.25 mg  11.25 mg   Mon  11.25 mg   Hold (3/24); Otherwise 11.25 mg  11.25 mg   Tue  11.25 mg   11.25 mg  11.25 mg   Wed  11.25 mg   11.25 mg  11.25 mg   Thu  11.25 mg   11.25 mg  11.25 mg   Fri  11.25 mg   11.25 mg  11.25 mg   Sat  11.25 mg   11.25 mg  11.25 mg    Historical INR 3.30      3.60     4.00   2.40        Visit Report    Report Report         This result is from an external source.       Plan:  1. INR is Subtherapeutic today- see above in Anticoagulation Summary.   Will instruct Carli Garcia to Continue their warfarin regimen- see above in Anticoagulation Summary.  2. Follow up in 1 week--home ally with [employer]  3.They have been instructed to call if any changes in medications, doses, concerns, etc. Patient expresses understanding and has no further questions at this time.    Kriss Melendez [facility]

## 2025-04-15 ENCOUNTER — ANTICOAGULATION VISIT (OUTPATIENT)
Dept: PHARMACY | Facility: HOSPITAL | Age: 60
End: 2025-04-15
Payer: MEDICARE

## 2025-04-15 DIAGNOSIS — D68.61 ANTIPHOSPHOLIPID SYNDROME: Primary | Chronic | ICD-10-CM

## 2025-04-15 DIAGNOSIS — Z86.711 HISTORY OF PULMONARY EMBOLUS (PE): ICD-10-CM

## 2025-04-15 DIAGNOSIS — I82.401 DEEP VEIN THROMBOSIS (DVT) OF RIGHT LOWER EXTREMITY, UNSPECIFIED CHRONICITY, UNSPECIFIED VEIN: ICD-10-CM

## 2025-04-15 LAB — INR PPP: 4

## 2025-04-15 NOTE — PROGRESS NOTES
Anticoagulation Clinic Progress Note    Anticoagulation Summary  As of 4/15/2025      INR goal:  2.5-3.5   TTR:  61.3% (2.1 y)   INR used for dosin.00 (4/15/2025)   Warfarin maintenance plan:  11.25 mg (7.5 mg x 1.5) every day   Weekly warfarin total:  78.75 mg   Plan last modified:  Kriss Melendez RPH (2024)   Next INR check:  2025   Priority:  Maintenance   Target end date:  Indefinite    Indications    Antiphospholipid syndrome-IgG cardiolipin  [D68.61]  History of pulmonary embolus (PE) [Z86.711]  Deep vein thrombosis (DVT) of right lower extremity  unspecified chronicity  unspecified vein [I82.401]                 Anticoagulation Episode Summary       INR check location:  --    Preferred lab:  --    Send INR reminders to:   [facility] CBC ANTICOAG POOL    Comments:  [facility], every 2-4 weeks.          Anticoagulation Care Providers       Provider Role Specialty Phone number    Juanito Guerrier MD Referring Hematology and Oncology [phone]            Clinic Interview:   INR a few weeks ago was subtherapeutic (2.4) after holding one warfarin dose when INR was 4.0. Will give reduced dose of 7.5 mg today instead of holding one dose because of this.      INR History:      3/13/2025    12:00 AM 3/24/2025    12:28 PM 3/24/2025     1:00 PM 2025    12:00 AM 2025     8:59 AM 4/15/2025    12:00 AM 4/15/2025    12:49 PM   Anticoagulation Monitoring   INR   3.60  2.40  4.00   INR Date   3/13/2025  2025  4/15/2025   INR Goal   2.5-3.5  2.5-3.5  2.5-3.5   Trend   Same  Same  Same   Last Week Total   78.75 mg  78.75 mg  78.75 mg   Next Week Total   67.5 mg  78.75 mg  75 mg   Sun   11.25 mg  11.25 mg  11.25 mg   Mon   Hold (3/24); Otherwise 11.25 mg  11.25 mg  11.25 mg   Tue   11.25 mg  11.25 mg  7.5 mg (4/15)   Wed   11.25 mg  11.25 mg  11.25 mg   Thu   11.25 mg  11.25 mg  11.25 mg   Fri   11.25 mg  11.25 mg  11.25 mg   Sat   11.25 mg  11.25 mg  11.25 mg   Historical INR 3.60     4.00    2.40      4.00        Visit Report  Report Report           This result is from an external source.       Plan:  1. INR is Supratherapeutic today- see above in Anticoagulation Summary.   Will instruct Carli Garcia to take warfarin 7.5 mg today only, then continue their warfarin regimen- see above in Anticoagulation Summary.  2. Follow up in 1 week  3.They have been instructed to call if any changes in medications, doses, concerns, etc. Left voicemail with dosing instructions listed above.     Lana Olivera RPH

## 2025-04-21 ENCOUNTER — OFFICE VISIT (OUTPATIENT)
Dept: INTERNAL MEDICINE | Facility: CLINIC | Age: 60
End: 2025-04-21
Payer: MEDICARE

## 2025-04-21 VITALS
BODY MASS INDEX: 47.09 KG/M2 | WEIGHT: 293 LBS | OXYGEN SATURATION: 96 % | SYSTOLIC BLOOD PRESSURE: 124 MMHG | HEIGHT: 66 IN | HEART RATE: 65 BPM | TEMPERATURE: 97.9 F | DIASTOLIC BLOOD PRESSURE: 78 MMHG

## 2025-04-21 DIAGNOSIS — Z23 NEED FOR VACCINATION: ICD-10-CM

## 2025-04-21 DIAGNOSIS — I51.89 DIASTOLIC DYSFUNCTION: Chronic | ICD-10-CM

## 2025-04-21 DIAGNOSIS — J30.9 ALLERGIC RHINITIS, UNSPECIFIED SEASONALITY, UNSPECIFIED TRIGGER: Chronic | ICD-10-CM

## 2025-04-21 DIAGNOSIS — E78.00 HYPERCHOLESTEREMIA: Chronic | ICD-10-CM

## 2025-04-21 DIAGNOSIS — Z00.00 MEDICARE ANNUAL WELLNESS VISIT, SUBSEQUENT: Primary | ICD-10-CM

## 2025-04-21 DIAGNOSIS — I10 ESSENTIAL HYPERTENSION: Chronic | ICD-10-CM

## 2025-04-22 ENCOUNTER — ANTICOAGULATION VISIT (OUTPATIENT)
Dept: PHARMACY | Facility: HOSPITAL | Age: 60
End: 2025-04-22
Payer: MEDICARE

## 2025-04-22 DIAGNOSIS — Z86.711 HISTORY OF PULMONARY EMBOLUS (PE): ICD-10-CM

## 2025-04-22 DIAGNOSIS — D68.61 ANTIPHOSPHOLIPID SYNDROME: Primary | Chronic | ICD-10-CM

## 2025-04-22 DIAGNOSIS — I82.401 DEEP VEIN THROMBOSIS (DVT) OF RIGHT LOWER EXTREMITY, UNSPECIFIED CHRONICITY, UNSPECIFIED VEIN: ICD-10-CM

## 2025-04-22 LAB — INR PPP: 3.6

## 2025-04-22 NOTE — PROGRESS NOTES
Anticoagulation Clinic Progress Note    Patient's visit was held by phone today.    Anticoagulation Summary  As of 4/22/2025      INR goal:  2.5-3.5   TTR:  60.8% (2.1 y)   INR used for dosing:  3.60 (4/22/2025)   Warfarin maintenance plan:  7.5 mg (7.5 mg x 1) every Tue; 11.25 mg (7.5 mg x 1.5) all other days   Weekly warfarin total:  75 mg   Plan last modified:  Kriss Melendez RPH (4/22/2025)   Next INR check:  4/29/2025   Priority:  Maintenance   Target end date:  Indefinite    Indications    Antiphospholipid syndrome-IgG cardiolipin  [D68.61]  History of pulmonary embolus (PE) [Z86.711]  Deep vein thrombosis (DVT) of right lower extremity  unspecified chronicity  unspecified vein [I82.401]                 Anticoagulation Episode Summary       INR check location:  --    Preferred lab:  --    [facility] IN[facility] reminders [facility]:   [facility]    Comments:  [facility], every 2-4 weeks.          Anticoagulation Care Providers       Provider Role Specialty Phone number    Juanito Guerrier MD Referring Hematology and Oncology [phone]            Drug interactions: has remained unchanged.  Diet: has remained unchanged.    Clinic Interview:  No pertinent clinical findings have been reported.    INR History:      3/24/2025     1:00 PM 4/7/2025    12:00 AM 4/8/2025     8:59 AM 4/15/2025    12:00 AM 4/15/2025    12:49 PM 4/22/2025    12:00 AM 4/22/2025     3:46 PM   Anticoagulation Monitoring   INR 3.60  2.40  4.00  3.60   INR Date 3/13/2025  4/7/2025  4/15/2025  4/22/2025   INR Goal 2.5-3.5  2.5-3.5  2.5-3.5  2.5-3.5   Trend Same  Same  Same  Down   Last Week Total 78.75 mg  78.75 mg  78.75 mg  75 mg   Next Week Total 67.5 mg  78.75 mg  75 mg  75 mg   Sun 11.25 mg  11.25 mg  11.25 mg  11.25 mg   Mon Hold (3/24); Otherwise 11.25 mg  11.25 mg  11.25 mg  11.25 mg   Tue 11.25 mg  11.25 mg  7.5 mg (4/15)  7.5 mg (4/22)   Wed 11.25 mg  11.25 mg  11.25 mg  11.25 mg   Thu 11.25 mg  11.25 mg  11.25 mg  11.25 mg   Fri  11.25 mg  11.25 mg  11.25 mg  11.25 mg   Sat 11.25 mg  11.25 mg  11.25 mg  11.25 mg   Historical INR  2.40      4.00      3.60        Visit Report Report             This result is from an external source.       Plan:  1. INR is Supratherapeutic today but improved from last week- see above in Anticoagulation Summary.   Will instruct Carli Garcia to Change their warfarin regimen to 7.5mg on Tues, 11.25mg on all other days- see above in Anticoagulation Summary.  2. Follow up in 1 week--home test  3.They have been instructed to call if any changes in medications, doses, concerns, etc. Patient expresses understanding and has no further questions at this time.    Kriss Melendez RP

## 2025-05-04 NOTE — PROGRESS NOTES
Subjective   The ABCs of the Annual Wellness Visit  Medicare Wellness Visit      Carli Garcia is a 60 y.o. patient who presents for a Medicare Wellness Visit.    The following portions of the patient's history were reviewed and   updated as appropriate: allergies, current medications, past family history, past medical history, past social history, past surgical history, and problem list.    Compared to one year ago, the patient's physical   health is the same.  Compared to one year ago, the patient's mental   health is the same.    Recent Hospitalizations:  She was not admitted to the hospital during the last year.     Current Medical Providers:  Patient Care Team:  Lupe Simeon APRN as PCP - General (Internal Medicine)  Juanito Guerrier MD as Consulting Physician (Hematology and Oncology)  Sonido Arroyo MD as Consulting Physician (Cardiology)  Sabas eLbron MD as Consulting Physician (Pulmonary Disease)  Kyle Guillory MD (Dermatology)    Outpatient Medications Prior to Visit   Medication Sig Dispense Refill    amLODIPine (NORVASC) 5 MG tablet Take 1 tablet by mouth Daily. 90 tablet 0    atorvastatin (LIPITOR) 10 MG tablet Take 1 tablet by mouth Daily. 90 tablet 0    cetirizine (zyrTEC) 10 MG tablet Take 1 tablet by mouth Daily As Needed for Allergies. 90 tablet 1    fluticasone (FLONASE) 50 MCG/ACT nasal spray 2 sprays into the nostril(s) as directed by provider Daily for 30 days. (Patient taking differently: Administer 2 sprays into the nostril(s) as directed by provider As Needed.) 9.9 mL 0    hydroxychloroquine (PLAQUENIL) 200 MG tablet Take 1 tablet by mouth Every 12 (Twelve) Hours.      levothyroxine (SYNTHROID, LEVOTHROID) 175 MCG tablet Take 1 tablet by mouth Daily. 90 tablet 0    omeprazole (priLOSEC) 20 MG capsule Take 1 capsule by mouth Daily. 90 capsule 0    potassium chloride ER (K-TAB) 20 MEQ tablet controlled-release ER tablet Take 1 tablet by mouth Daily. 90 tablet 0     "sertraline (ZOLOFT) 100 MG tablet Take 1 tablet by mouth Daily. 90 tablet 0    torsemide (DEMADEX) 20 MG tablet Take 1 tablet by mouth Daily. 90 tablet 0    warfarin (COUMADIN) 7.5 MG tablet Take one and one-half tablets (11.25 mg) by mouth daily or as directed by clinic 135 tablet 1     No facility-administered medications prior to visit.     No opioid medication identified on active medication list. I have reviewed chart for other potential  high risk medication/s and harmful drug interactions in the elderly.      Aspirin is not on active medication list.  Aspirin use is not indicated based on review of current medical condition/s. Risk of harm outweighs potential benefits.  .    Patient Active Problem List   Diagnosis    Antiphospholipid syndrome-IgG cardiolipin     Essential hypertension    GERD (gastroesophageal reflux disease)    Depression with anxiety    Morbid obesity    Right ventricular dilation    Diastolic dysfunction    CRISTI (obstructive sleep apnea)    Hypothyroidism    Hiatal hernia    Chronic dyspnea    Hypercholesteremia    Angio-edema    Vasculitis    Allergic rhinitis    History of pulmonary embolus (PE)    Atypical hyperplasia of breast    Deep vein thrombosis (DVT) of right lower extremity, unspecified chronicity, unspecified vein     Advance Care Planning Advance Directive is not on file.  ACP discussion was held with the patient during this visit. Patient has an advance directive (not in EMR), copy requested.            Objective   Vitals:    04/21/25 1251 04/21/25 1323   BP: 140/68 124/78   BP Location: Left arm Left arm   Patient Position: Sitting Sitting   Cuff Size: Large Adult Large Adult   Pulse: 65    Temp: 97.9 °F (36.6 °C)    SpO2: 96%    Weight: (!) 177 kg (390 lb)    Height: 167.6 cm (65.98\")    PainSc: 0-No pain        Estimated body mass index is 62.99 kg/m² as calculated from the following:    Height as of this encounter: 167.6 cm (65.98\").    Weight as of this encounter: 177 kg " (390 lb).                Does the patient have evidence of cognitive impairment? No                                                                                                Health  Risk Assessment    Smoking Status:  Social History     Tobacco Use   Smoking Status Never   Smokeless Tobacco Never     Alcohol Consumption:  Social History     Substance and Sexual Activity   Alcohol Use No       Fall Risk Screen  YASMEENADI Fall Risk Assessment was completed, and patient is at LOW risk for falls.Assessment completed on:2025    Depression Screening   Little interest or pleasure in doing things? Not at all   Feeling down, depressed, or hopeless? Not at all   PHQ-2 Total Score 0      Health Habits and Functional and Cognitive Screenin/21/2025    12:57 PM   Functional & Cognitive Status   Do you have difficulty preparing food and eating? Yes   Do you have difficulty bathing yourself, getting dressed or grooming yourself? Yes   Do you have difficulty using the toilet? Yes   Do you have difficulty moving around from place to place? Yes   Do you have trouble with steps or getting out of a bed or a chair? Yes   Current Diet Well Balanced Diet   Dental Exam Up to date   Eye Exam Up to date   Exercise (times per week) 0 times per week   Current Exercises Include No Regular Exercise   Do you need help using the phone?  No   Are you deaf or do you have serious difficulty hearing?  No   Do you need help to go to places out of walking distance? No   Do you need help shopping? Yes   Do you need help preparing meals?  Yes   Do you need help with housework?  Yes   Do you need help with laundry? No   Do you need help taking your medications? No   Do you need help managing money? No   Do you ever drive or ride in a car without wearing a seat belt? Yes   Have you felt unusual stress, anger or loneliness in the last month? No   Who do you live with? Alone   If you need help, do you have trouble finding someone available to  you? No   Have you been bothered in the last four weeks by sexual problems? No   Do you have difficulty concentrating, remembering or making decisions? No           Age-appropriate Screening Schedule:  Refer to the list below for future screening recommendations based on patient's age, sex and/or medical conditions. Orders for these recommended tests are listed in the plan section. The patient has been provided with a written plan.    Health Maintenance List  Health Maintenance   Topic Date Due    ANNUAL WELLNESS VISIT  Never done    PAP SMEAR  05/11/2025    INFLUENZA VACCINE  07/01/2025    LIPID PANEL  12/20/2025    COLORECTAL CANCER SCREENING  06/05/2026    MAMMOGRAM  06/27/2026    TDAP/TD VACCINES (3 - Td or Tdap) 05/16/2033    HEPATITIS C SCREENING  Completed    COVID-19 Vaccine  Completed    Pneumococcal Vaccine 50+  Completed    ZOSTER VACCINE  Completed                                                                                                                                                CMS Preventative Services Quick Reference  Risk Factors Identified During Encounter  Depression/Dysphoria: Current medication is effective, no change recommended    The above risks/problems have been discussed with the patient.  Pertinent information has been shared with the patient in the After Visit Summary.  An After Visit Summary and PPPS were made available to the patient.    Follow Up:   Next Medicare Wellness visit to be scheduled in 1 year.         Additional E&M Note during same encounter follows:  Patient has additional, significant, and separately identifiable condition(s)/problem(s) that require work above and beyond the Medicare Wellness Visit     Chief Complaint  Medicare Wellness-subsequent    Subjective    HPI  Carli is also being seen today for additional medical problem/s.       The patient presents for evaluation of headaches, dizziness, and health maintenance.    Chief complaint includes severe  headaches, which are attributed to allergies. These headaches have been persistent since last year and are localized in the sinus area. No increase in congestion is reported. Frequent sneezing is noted but is not considered significant. A history of headaches is mentioned, which had previously resolved but have recently recurred. Symptoms are managed with Tylenol and daily Zyrtec.    Episodes of lightheadedness are reported, worse with change in position. Denies chest pain and/or palpitations.    Continuous oxygen therapy is utilized at 2 L, with an increase to 3 L during episodes of shortness of breath as recommended by pulmonology. An upcoming appointment with her pulmonologist is scheduled for 05/2025. CPAP machine usage at night is reported, with good sleep quality noted.     No heartburn or indigestion is experienced. Occasional leg swelling is managed with a diuretic. Bilateral knee pain upon standing is reported.     Follow-ups with the cardiologist have been discontinued following a normal heart catheterization.    Regular eye examinations have not been undertaken (discussed importance with Plaquenil use), and a colonoscopy is due next year. Further mammograms have been declined due to discomfort, and self-breast exams are not performed (discussed risks of not having screening colonoscopy).     Review of Systems   Constitutional:  Positive for fatigue.   HENT:  Positive for congestion, postnasal drip and rhinorrhea.    Respiratory:  Positive for cough and shortness of breath. Negative for chest tightness.    Cardiovascular:  Positive for leg swelling. Negative for chest pain and palpitations.   Gastrointestinal:  Negative for abdominal pain.   Musculoskeletal:  Positive for arthralgias.   Neurological:  Positive for dizziness and light-headedness.          Objective   Vital Signs:  /78 (BP Location: Left arm, Patient Position: Sitting, Cuff Size: Large Adult)   Pulse 65   Temp 97.9 °F (36.6 °C)   Ht  "167.6 cm (65.98\")   Wt (!) 177 kg (390 lb)   SpO2 96%   BMI 62.99 kg/m²   Physical Exam  Constitutional:       Appearance: She is well-developed. She is not ill-appearing.   HENT:      Head: Normocephalic.      Right Ear: Hearing, tympanic membrane and external ear normal.      Left Ear: Hearing, tympanic membrane and external ear normal.      Nose: Nose normal. No nasal deformity, mucosal edema or rhinorrhea.      Right Sinus: No maxillary sinus tenderness or frontal sinus tenderness.      Left Sinus: No maxillary sinus tenderness or frontal sinus tenderness.      Mouth/Throat:      Dentition: Normal dentition.   Eyes:      General: Lids are normal.         Right eye: No discharge.         Left eye: No discharge.      Conjunctiva/sclera: Conjunctivae normal.      Right eye: No exudate.     Left eye: No exudate.  Neck:      Thyroid: No thyroid mass or thyromegaly.      Vascular: No carotid bruit.      Trachea: Trachea normal.   Cardiovascular:      Rate and Rhythm: Regular rhythm.      Pulses: Normal pulses.      Heart sounds: Normal heart sounds. No murmur heard.  Pulmonary:      Effort: No respiratory distress.      Breath sounds: Normal breath sounds. No decreased breath sounds, wheezing, rhonchi or rales.   Abdominal:      General: Bowel sounds are normal.      Palpations: Abdomen is soft.      Tenderness: There is no abdominal tenderness.   Musculoskeletal:      Cervical back: Normal range of motion. No edema.   Lymphadenopathy:      Head:      Right side of head: No submental, submandibular, tonsillar, preauricular, posterior auricular or occipital adenopathy.      Left side of head: No submental, submandibular, tonsillar, preauricular, posterior auricular or occipital adenopathy.   Skin:     General: Skin is warm and dry.      Nails: There is no clubbing.   Neurological:      Mental Status: She is alert.   Psychiatric:         Behavior: Behavior is cooperative.               The following data was reviewed " by: JANINE Teague on 04/21/2025:  Data reviewed : Consultant notes hematology 3/24/25  Common labs          9/9/2024    13:17 12/20/2024    14:42 3/24/2025    12:28   Common Labs   Glucose  94     BUN  14     Creatinine  1.00     Sodium  143     Potassium  4.7     Chloride  101     Calcium  9.4     Albumin  4.3     Total Bilirubin  0.5     Alkaline Phosphatase  97     AST (SGOT)  17     ALT (SGPT)  14     WBC 6.43  8.3  7.69    Hemoglobin 14.0  14.0  14.4    Hematocrit 43.6  43.7  44.2    Platelets 241  280  267    Total Cholesterol  158     Triglycerides  146     HDL Cholesterol  54     LDL Cholesterol   79         Results             Assessment and Plan Additional age appropriate preventative wellness advice topics were discussed during today's preventative wellness exam(some topics already addressed during AWV portion of the note above):    Physical Activity: Advised cardiovascular activity 150 minutes per week as tolerated. (example brisk walk for 30 minutes, 5 days a week).     Nutrition: Discussed nutrition plan with patient. Information shared in after visit summary. Goal is for a well balanced diet to enhance overall health.     Healthy Weight: Discussed current and goal BMI with patient. Steps to attain this goal discussed. Information shared in after visit summary.       Health Maintenance.  - Weight has increased slightly since 12/2024.  - Blood pressure initially high at 140/68 but normalized to 124/78 upon recheck.  - Advised to undergo regular eye examinations due to Plaquenil use.  - Prevnar 20 vaccine will be administered today.  - Blood work to be conducted during next visit to assess kidney and liver function due to atorvastatin use, as well as thyroid function.     Diagnoses and all orders for this visit:    1. Medicare annual wellness visit, subsequent (Primary)    2. Diastolic dysfunction  Assessment & Plan:  - Echo 3/11/19: EF 68%, grade 1 diastolic dysfunction  - Currently managed on  torsemide daily, appears euvolemic on exam-check labs today.      3. Essential hypertension  Assessment & Plan:  - BP stable on exam today  - Currently managed on amlodipine along with a low sodium diet.      4. Allergic rhinitis, unspecified seasonality, unspecified trigger  Assessment & Plan:  - Reports experiencing headaches primarily in the sinus area, persistent for the past year, possibly related to allergies.  - Currently taking Zyrtec daily and uses Tylenol for pain relief.  - No changes to current medication regimen were made.  - Continues to monitor symptoms and f/u with any changes      5. Hypercholesteremia  Assessment & Plan:  - Managed on atorvastatin daily without myalgias  - Recheck lipid panel      6. Need for vaccination  -     Pneumococcal Conjugate Vaccine 20-Valent All             Follow Up   Return in about 6 months (around 10/21/2025).  Patient was given instructions and counseling regarding her condition or for health maintenance advice. Please see specific information pulled into the AVS if appropriate.  Patient or patient representative verbalized consent for the use of Ambient Listening during the visit with  JANINE Teague for chart documentation. 5/4/2025  18:35 EDT

## 2025-05-04 NOTE — PATIENT INSTRUCTIONS
Medicare Wellness  Personal Prevention Plan of Service     Date of Office Visit:    Encounter Provider:  JANINE Teague  Place of Service:  [facility] PRIMARY [facility]ARE  Patient Name: Carli Garcia  :  1965    As part of the Medicare Wellness portion of your visit today, we are providing you with this personalized preventive plan of services (PPPS). This plan is based upon recommendations of the [place] Preventive Services Task Force (USPSTF) and the Advisory Committee on Immunization Practices (ACIP).    This lists the preventive care services that should be considered, and provides dates of when you are due. Items listed as completed are up-to-date and do not require any further intervention.    Health Maintenance   Topic Date Due    ANNUAL WELLNESS VISIT  Never done    PAP SMEAR  2025    INFLUENZA VACCINE  2025    LIPID PANEL  2025    COLORECTAL CANCER SCREENING  2026    MAMMOGRAM  2026    TDAP/TD VACCINES (3 - Td or Tdap) 2033    HEPATITIS C SCREENING  Completed    COVID-19 Vaccine  Completed    Pneumococcal Vaccine 50+  Completed    ZOSTER VACCINE  Completed       Orders Placed This Encounter   Procedures    Pneumococcal Conjugate Vaccine 20-Valent All       Return in about 6 months (around 10/21/2025).

## 2025-05-04 NOTE — ASSESSMENT & PLAN NOTE
- Reports experiencing headaches primarily in the sinus area, persistent for the past year, possibly related to allergies.  - Currently taking Zyrtec daily and uses Tylenol for pain relief.  - No changes to current medication regimen were made.  - Continues to monitor symptoms and f/u with any changes

## 2025-05-04 NOTE — ASSESSMENT & PLAN NOTE
- Echo 3/11/19: EF 68%, grade 1 diastolic dysfunction  - Currently managed on torsemide daily, appears euvolemic on exam-check labs today.

## 2025-05-07 LAB — INR PPP: 3.4

## 2025-05-08 ENCOUNTER — ANTICOAGULATION VISIT (OUTPATIENT)
Dept: PHARMACY | Facility: HOSPITAL | Age: 60
End: 2025-05-08
Payer: MEDICARE

## 2025-05-08 DIAGNOSIS — D68.61 ANTIPHOSPHOLIPID SYNDROME: Primary | Chronic | ICD-10-CM

## 2025-05-08 DIAGNOSIS — I82.401 DEEP VEIN THROMBOSIS (DVT) OF RIGHT LOWER EXTREMITY, UNSPECIFIED CHRONICITY, UNSPECIFIED VEIN: ICD-10-CM

## 2025-05-08 DIAGNOSIS — Z86.711 HISTORY OF PULMONARY EMBOLUS (PE): ICD-10-CM

## 2025-05-08 NOTE — PROGRESS NOTES
Anticoagulation Clinic Progress Note    Anticoagulation Summary  As of 5/8/2025      INR goal:  2.5-3.5   TTR:  60.6% (2.1 y)   INR used for dosing:  3.40 (5/7/2025)   Warfarin maintenance plan:  7.5 mg (7.5 mg x 1) every Tue; 11.25 mg (7.5 mg x 1.5) all other days   Weekly warfarin total:  75 mg   No change documented:  Lana Olivera RPH   Plan last modified:  Kriss Melendez RPH (4/22/2025)   Next INR check:  6/5/2025   Priority:  Maintenance   Target end date:  Indefinite    Indications    Antiphospholipid syndrome-IgG cardiolipin  [D68.61]  History of pulmonary embolus (PE) [Z86.711]  Deep vein thrombosis (DVT) of right lower extremity  unspecified chronicity  unspecified vein [I82.401]                 Anticoagulation Episode Summary       INR check location:  --    Preferred lab:  --    Send INR reminders to:   [facility] ANTICOAG POOL    Comments:  [facility], every 2-4 weeks.          Anticoagulation Care Providers       Provider Role Specialty Phone number    Juanito Guerrier MD Referring Hematology and Oncology [phone]            INR History:      4/8/2025     8:59 AM 4/15/2025    12:00 AM 4/15/2025    12:49 PM 4/22/2025    12:00 AM 4/22/2025     3:46 PM 5/7/2025    12:00 AM 5/8/2025    11:34 AM   Anticoagulation Monitoring   INR 2.40  4.00  3.60  3.40   INR Date 4/7/2025  4/15/2025  4/22/2025  5/7/2025   INR Goal 2.5-3.5  2.5-3.5  2.5-3.5  2.5-3.5   Trend Same  Same  Down  Same   Last Week Total 78.75 mg  78.75 mg  75 mg  75 mg   Next Week Total 78.75 mg  75 mg  75 mg  75 mg   Sun 11.25 mg  11.25 mg  11.25 mg  11.25 mg   Mon 11.25 mg  11.25 mg  11.25 mg  11.25 mg   Tue 11.25 mg  7.5 mg (4/15)  7.5 mg (4/22)  7.5 mg   Wed 11.25 mg  11.25 mg  11.25 mg  11.25 mg   Thu 11.25 mg  11.25 mg  11.25 mg  11.25 mg   Fri 11.25 mg  11.25 mg  11.25 mg  11.25 mg   Sat 11.25 mg  11.25 mg  11.25 mg  11.25 mg   Historical INR  4.00      3.60      3.40            This result is from an external source.        Plan:  1. INR is Therapeutic today- see above in Anticoagulation Summary.   Will instruct Carli Garcia to Continue their warfarin regimen- see above in Anticoagulation Summary.  2. Follow up in 4 weeks  3.They have been instructed to call if any changes in medications, doses, concerns, etc.    Lana Olivera RPH

## 2025-05-13 LAB — INR PPP: 2.9

## 2025-05-14 ENCOUNTER — ANTICOAGULATION VISIT (OUTPATIENT)
Dept: PHARMACY | Facility: HOSPITAL | Age: 60
End: 2025-05-14
Payer: MEDICARE

## 2025-05-14 DIAGNOSIS — D68.61 ANTIPHOSPHOLIPID SYNDROME: Primary | Chronic | ICD-10-CM

## 2025-05-14 DIAGNOSIS — I82.401 DEEP VEIN THROMBOSIS (DVT) OF RIGHT LOWER EXTREMITY, UNSPECIFIED CHRONICITY, UNSPECIFIED VEIN: ICD-10-CM

## 2025-05-14 DIAGNOSIS — Z86.711 HISTORY OF PULMONARY EMBOLUS (PE): ICD-10-CM

## 2025-05-14 NOTE — PROGRESS NOTES
Anticoagulation Clinic Progress Note      Anticoagulation Summary  As of 2025      INR goal:  2.5-3.5   TTR:  60.9% (2.1 y)   INR used for dosin.90 (2025)   Warfarin maintenance plan:  7.5 mg (7.5 mg x 1) every Tue; 11.25 mg (7.5 mg x 1.5) all other days   Weekly warfarin total:  75 mg   No change documented:  Kriss Melendez RPH   Plan last modified:  Kriss Melendez RPH (2025)   Next INR check:  2025   Priority:  Maintenance   Target end date:  Indefinite    Indications    Antiphospholipid syndrome-IgG cardiolipin  [D68.61]  History of pulmonary embolus (PE) [Z86.711]  Deep vein thrombosis (DVT) of right lower extremity  unspecified chronicity  unspecified vein [I82.401]                 Anticoagulation Episode Summary       INR check location:  --    Preferred lab:  --    Send INR reminders to:   [facility] ANTICOAG POOL    Comments:  [place] [facility], every 2-4 weeks.          Anticoagulation Care Providers       Provider Role Specialty Phone number    Juanito Guerrier MD Referring Hematology and Oncology [phone]          INR History:      4/15/2025    12:49 PM 2025    12:00 AM 2025     3:46 PM 2025    12:00 AM 2025    11:34 AM 2025    12:00 AM 2025     9:25 AM   Anticoagulation Monitoring   INR 4.00  3.60  3.40  2.90   INR Date 4/15/2025  2025  2025  2025   INR Goal 2.5-3.5  2.5-3.5  2.5-3.5  2.5-3.5   Trend Same  Down  Same  Same   Last Week Total 78.75 mg  75 mg  75 mg  75 mg   Next Week Total 75 mg  75 mg  75 mg  75 mg   Sun 11.25 mg  11.25 mg  11.25 mg  11.25 mg   Mon 11.25 mg  11.25 mg  11.25 mg  11.25 mg   Tue 7.5 mg (4/15)  7.5 mg ()  7.5 mg  7.5 mg   Wed 11.25 mg  11.25 mg  11.25 mg  11.25 mg   Thu 11.25 mg  11.25 mg  11.25 mg  11.25 mg   Fri 11.25 mg  11.25 mg  11.25 mg  11.25 mg   Sat 11.25 mg  11.25 mg  11.25 mg  11.25 mg   Historical INR  3.60      3.40      2.90            This result is from an external source.        Plan:  1. INR is Therapeutic today- see above in Anticoagulation Summary.   Will instruct Carli Garcia to Continue their warfarin regimen- see above in Anticoagulation Summary.  2. Follow up in 1 week--home test  3.They have been instructed to call if any changes in medications, doses, concerns, etc. Left VM with instructions and to call back with updates or questions.     Kriss Melendez [facility]

## 2025-05-21 ENCOUNTER — ANTICOAGULATION VISIT (OUTPATIENT)
Dept: PHARMACY | Facility: HOSPITAL | Age: 60
End: 2025-05-21
Payer: MEDICARE

## 2025-05-21 DIAGNOSIS — K21.9 GASTROESOPHAGEAL REFLUX DISEASE WITHOUT ESOPHAGITIS: ICD-10-CM

## 2025-05-21 DIAGNOSIS — F41.9 ANXIETY: ICD-10-CM

## 2025-05-21 DIAGNOSIS — E78.00 HYPERCHOLESTEREMIA: ICD-10-CM

## 2025-05-21 DIAGNOSIS — D68.61 ANTIPHOSPHOLIPID SYNDROME: Primary | Chronic | ICD-10-CM

## 2025-05-21 DIAGNOSIS — I10 ESSENTIAL HYPERTENSION: ICD-10-CM

## 2025-05-21 DIAGNOSIS — Z86.711 HISTORY OF PULMONARY EMBOLUS (PE): ICD-10-CM

## 2025-05-21 DIAGNOSIS — E03.9 ACQUIRED HYPOTHYROIDISM: ICD-10-CM

## 2025-05-21 DIAGNOSIS — I82.401 DEEP VEIN THROMBOSIS (DVT) OF RIGHT LOWER EXTREMITY, UNSPECIFIED CHRONICITY, UNSPECIFIED VEIN: ICD-10-CM

## 2025-05-21 LAB — INR PPP: 3.3

## 2025-05-21 RX ORDER — POTASSIUM CHLORIDE 1500 MG/1
20 TABLET, EXTENDED RELEASE ORAL DAILY
Qty: 90 TABLET | Refills: 0 | Status: SHIPPED | OUTPATIENT
Start: 2025-05-21

## 2025-05-21 RX ORDER — TORSEMIDE 20 MG/1
20 TABLET ORAL DAILY
Qty: 90 TABLET | Refills: 0 | Status: SHIPPED | OUTPATIENT
Start: 2025-05-21

## 2025-05-21 RX ORDER — OMEPRAZOLE 20 MG/1
20 CAPSULE, DELAYED RELEASE ORAL DAILY
Qty: 90 CAPSULE | Refills: 0 | Status: SHIPPED | OUTPATIENT
Start: 2025-05-21

## 2025-05-21 RX ORDER — ATORVASTATIN CALCIUM 10 MG/1
10 TABLET, FILM COATED ORAL DAILY
Qty: 90 TABLET | Refills: 0 | Status: SHIPPED | OUTPATIENT
Start: 2025-05-21

## 2025-05-21 RX ORDER — LEVOTHYROXINE SODIUM 175 UG/1
175 TABLET ORAL DAILY
Qty: 90 TABLET | Refills: 0 | Status: SHIPPED | OUTPATIENT
Start: 2025-05-21

## 2025-05-21 RX ORDER — AMLODIPINE BESYLATE 5 MG/1
5 TABLET ORAL DAILY
Qty: 90 TABLET | Refills: 0 | Status: SHIPPED | OUTPATIENT
Start: 2025-05-21

## 2025-05-21 RX ORDER — SERTRALINE HYDROCHLORIDE 100 MG/1
100 TABLET, FILM COATED ORAL DAILY
Qty: 90 TABLET | Refills: 0 | Status: SHIPPED | OUTPATIENT
Start: 2025-05-21

## 2025-05-21 NOTE — PROGRESS NOTES
Anticoagulation Clinic Progress Note    Patient's visit was held by phone today.    Anticoagulation Summary  As of 5/21/2025      INR goal:  2.5-3.5   TTR:  61.3% (2.2 y)   INR used for dosing:  3.30 (5/21/2025)   Warfarin maintenance plan:  7.5 mg (7.5 mg x 1) every Tue; 11.25 mg (7.5 mg x 1.5) all other days   Weekly warfarin total:  75 mg   No change documented:  Lana Olivera RPH   Plan last modified:  Kriss Melendez RPH (4/22/2025)   Next INR check:  5/28/2025   Priority:  Maintenance   Target end date:  Indefinite    Indications    Antiphospholipid syndrome-IgG cardiolipin  [D68.61]  History of pulmonary embolus (PE) [Z86.711]  Deep vein thrombosis (DVT) of right lower extremity  unspecified chronicity  unspecified vein [I82.401]                 Anticoagulation Episode Summary       INR check location:  --    Preferred lab:  --    Send INR reminders to:   [facility] ANTICOAG POOL    Comments:  [place] [facility], every 2-4 weeks.          Anticoagulation Care Providers       Provider Role Specialty Phone number    Juanito Guerrier MD Referring Hematology and Oncology [phone]            Drug interactions: has remained unchanged.  Diet: has remained unchanged.    Clinic Interview:  No pertinent clinical findings have been reported.    INR History:      4/22/2025     3:46 PM 5/7/2025    12:00 AM 5/8/2025    11:34 AM 5/13/2025    12:00 AM 5/14/2025     9:25 AM 5/21/2025    12:00 AM 5/21/2025     8:04 AM   Anticoagulation Monitoring   INR 3.60  3.40  2.90  3.30   INR Date 4/22/2025 5/7/2025 5/13/2025 5/21/2025   INR Goal 2.5-3.5  2.5-3.5  2.5-3.5  2.5-3.5   Trend Down  Same  Same  Same   Last Week Total 75 mg  75 mg  75 mg  75 mg   Next Week Total 75 mg  75 mg  75 mg  75 mg   Sun 11.25 mg  11.25 mg  11.25 mg  11.25 mg   Mon 11.25 mg  11.25 mg  11.25 mg  11.25 mg   Tue 7.5 mg (4/22)  7.5 mg  7.5 mg  7.5 mg   Wed 11.25 mg  11.25 mg  11.25 mg  11.25 mg   Thu 11.25 mg  11.25 mg  11.25 mg  11.25 mg   Fri  11.25 mg  11.25 mg  11.25 mg  11.25 mg   Sat 11.25 mg  11.25 mg  11.25 mg  11.25 mg   Historical INR  3.40      2.90      3.30            This result is from an external source.       Plan:  1. INR is Therapeutic today- see above in Anticoagulation Summary.   Will instruct Carli Garcia to Continue their warfarin regimen- see above in Anticoagulation Summary.  2. Follow up in 1 week  3.They have been instructed to call if any changes in medications, doses, concerns, etc. Patient expresses understanding and has no further questions at this time.    Lana Olivera [facility]

## 2025-05-27 ENCOUNTER — ANTICOAGULATION VISIT (OUTPATIENT)
Dept: PHARMACY | Facility: HOSPITAL | Age: 60
End: 2025-05-27
Payer: MEDICARE

## 2025-05-27 DIAGNOSIS — D68.61 ANTIPHOSPHOLIPID SYNDROME: Primary | Chronic | ICD-10-CM

## 2025-05-27 DIAGNOSIS — Z86.711 HISTORY OF PULMONARY EMBOLUS (PE): ICD-10-CM

## 2025-05-27 DIAGNOSIS — I82.401 DEEP VEIN THROMBOSIS (DVT) OF RIGHT LOWER EXTREMITY, UNSPECIFIED CHRONICITY, UNSPECIFIED VEIN: ICD-10-CM

## 2025-05-27 LAB — INR PPP: 2.9

## 2025-05-27 NOTE — PROGRESS NOTES
Anticoagulation Clinic Progress Note      Anticoagulation Summary  As of 2025      INR goal:  2.5-3.5   TTR:  61.6% (2.2 y)   INR used for dosin.90 (2025)   Warfarin maintenance plan:  7.5 mg (7.5 mg x 1) every Tue; 11.25 mg (7.5 mg x 1.5) all other days   Weekly warfarin total:  75 mg   No change documented:  Lana Olivera RPH   Plan last modified:  Kriss Melendez RPH (2025)   Next INR check:  6/3/2025   Priority:  Maintenance   Target end date:  Indefinite    Indications    Antiphospholipid syndrome-IgG cardiolipin  [D68.61]  History of pulmonary embolus (PE) [Z86.711]  Deep vein thrombosis (DVT) of right lower extremity  unspecified chronicity  unspecified vein [I82.401]                 Anticoagulation Episode Summary       INR check location:  --    Preferred lab:  --    Send INR reminders to:   [facility] ANTICOAG POOL    Comments:  [facility] [facility], every 2-4 weeks.          Anticoagulation Care Providers       Provider Role Specialty Phone number    Juanito Guerrier MD Referring Hematology and Oncology [phone]            INR History:      2025    11:34 AM 2025    12:00 AM 2025     9:25 AM 2025    12:00 AM 2025     8:04 AM 2025    12:00 AM 2025     9:18 AM   Anticoagulation Monitoring   INR 3.40  2.90  3.30  2.90   INR Date 2025   INR Goal 2.5-3.5  2.5-3.5  2.5-3.5  2.5-3.5   Trend Same  Same  Same  Same   Last Week Total 75 mg  75 mg  75 mg  75 mg   Next Week Total 75 mg  75 mg  75 mg  75 mg   Sun 11.25 mg  11.25 mg  11.25 mg  11.25 mg   Mon 11.25 mg  11.25 mg  11.25 mg  11.25 mg   Tue 7.5 mg  7.5 mg  7.5 mg  7.5 mg   Wed 11.25 mg  11.25 mg  11.25 mg  11.25 mg   Thu 11.25 mg  11.25 mg  11.25 mg  11.25 mg   Fri 11.25 mg  11.25 mg  11.25 mg  11.25 mg   Sat 11.25 mg  11.25 mg  11.25 mg  11.25 mg   Historical INR  2.90      3.30      2.90            This result is from an external source.       Plan:  1. INR is  Therapeutic today- see above in Anticoagulation Summary.   Will instruct Carli Garcia to Continue their warfarin regimen- see above in Anticoagulation Summary.  2. Follow up in 1 week  3.They have been instructed to call if any changes in medications, doses, concerns, etc.     Lana Olivera RPH

## 2025-06-03 LAB — INR PPP: 3.4

## 2025-06-04 ENCOUNTER — ANTICOAGULATION VISIT (OUTPATIENT)
Dept: PHARMACY | Facility: HOSPITAL | Age: 60
End: 2025-06-04
Payer: MEDICARE

## 2025-06-04 DIAGNOSIS — Z86.711 HISTORY OF PULMONARY EMBOLUS (PE): ICD-10-CM

## 2025-06-04 DIAGNOSIS — D68.61 ANTIPHOSPHOLIPID SYNDROME: Primary | Chronic | ICD-10-CM

## 2025-06-04 DIAGNOSIS — I82.401 DEEP VEIN THROMBOSIS (DVT) OF RIGHT LOWER EXTREMITY, UNSPECIFIED CHRONICITY, UNSPECIFIED VEIN: ICD-10-CM

## 2025-06-04 NOTE — PROGRESS NOTES
Anticoagulation Clinic Progress Note    Patient's visit was held by phone today.    Anticoagulation Summary  As of 6/4/2025      INR goal:  2.5-3.5   TTR:  61.9% (2.2 y)   INR used for dosing:  3.40 (6/3/2025)   Warfarin maintenance plan:  7.5 mg (7.5 mg x 1) every Tue; 11.25 mg (7.5 mg x 1.5) all other days   Weekly warfarin total:  75 mg   No change documented:  Lana Olivera RPH   Plan last modified:  Kriss Melendez RPH (4/22/2025)   Next INR check:  6/10/2025   Priority:  Maintenance   Target end date:  Indefinite    Indications    Antiphospholipid syndrome-IgG cardiolipin  [D68.61]  History of pulmonary embolus (PE) [Z86.711]  Deep vein thrombosis (DVT) of right lower extremity  unspecified chronicity  unspecified vein [I82.401]                 Anticoagulation Episode Summary       INR check location:  --    Preferred lab:  --    Send INR reminders to:   [facility] ANTICOAG POOL    Comments:  [place] [facility], every 2-4 weeks.          Anticoagulation Care Providers       Provider Role Specialty Phone number    Juanito Guerrier MD Referring Hematology and Oncology [phone]            Drug interactions: has remained unchanged.  Diet: has remained unchanged.    Clinic Interview:  No pertinent clinical findings have been reported.    INR History:      5/14/2025     9:25 AM 5/21/2025    12:00 AM 5/21/2025     8:04 AM 5/27/2025    12:00 AM 5/27/2025     9:18 AM 6/3/2025    12:00 AM 6/4/2025    10:22 AM   Anticoagulation Monitoring   INR 2.90  3.30  2.90  3.40   INR Date 5/13/2025  5/21/2025  5/27/2025  6/3/2025   INR Goal 2.5-3.5  2.5-3.5  2.5-3.5  2.5-3.5   Trend Same  Same  Same  Same   Last Week Total 75 mg  75 mg  75 mg  75 mg   Next Week Total 75 mg  75 mg  75 mg  75 mg   Sun 11.25 mg  11.25 mg  11.25 mg  11.25 mg   Mon 11.25 mg  11.25 mg  11.25 mg  11.25 mg   Tue 7.5 mg  7.5 mg  7.5 mg  -   Wed 11.25 mg  11.25 mg  11.25 mg  11.25 mg   Thu 11.25 mg  11.25 mg  11.25 mg  11.25 mg   Fri 11.25 mg  11.25  mg  11.25 mg  11.25 mg   Sat 11.25 mg  11.25 mg  11.25 mg  11.25 mg   Historical INR  3.30      2.90      3.40            This result is from an external source.       Plan:  1. INR is Therapeutic today- see above in Anticoagulation Summary.   Will instruct Carli Garcia to Continue their warfarin regimen- see above in Anticoagulation Summary.  2. Follow up in 1 week  3.They have been instructed to call if any changes in medications, doses, concerns, etc. Patient expresses understanding and has no further questions at this time.    Lana Olivera [facility]

## 2025-06-11 ENCOUNTER — ANTICOAGULATION VISIT (OUTPATIENT)
Dept: PHARMACY | Facility: HOSPITAL | Age: 60
End: 2025-06-11
Payer: MEDICARE

## 2025-06-11 DIAGNOSIS — D68.61 ANTIPHOSPHOLIPID SYNDROME: Primary | Chronic | ICD-10-CM

## 2025-06-11 DIAGNOSIS — Z86.711 HISTORY OF PULMONARY EMBOLUS (PE): ICD-10-CM

## 2025-06-11 DIAGNOSIS — I82.401 DEEP VEIN THROMBOSIS (DVT) OF RIGHT LOWER EXTREMITY, UNSPECIFIED CHRONICITY, UNSPECIFIED VEIN: ICD-10-CM

## 2025-06-11 LAB — INR PPP: 3

## 2025-06-11 NOTE — PROGRESS NOTES
Anticoagulation Clinic Progress Note      Anticoagulation Summary  As of 6/11/2025      INR goal:  2.5-3.5   TTR:  62.3% (2.2 y)   INR used for dosing:  3.00 (6/11/2025)   Warfarin maintenance plan:  7.5 mg (7.5 mg x 1) every Tue; 11.25 mg (7.5 mg x 1.5) all other days   Weekly warfarin total:  75 mg   No change documented:  Kriss Melendez RPH   Plan last modified:  Kriss Melendez RPH (4/22/2025)   Next INR check:  6/18/2025   Priority:  Maintenance   Target end date:  Indefinite    Indications    Antiphospholipid syndrome-IgG cardiolipin  [D68.61]  History of pulmonary embolus (PE) [Z86.711]  Deep vein thrombosis (DVT) of right lower extremity  unspecified chronicity  unspecified vein [I82.401]                 Anticoagulation Episode Summary       INR check location:  --    Preferred lab:  --    Send INR reminders to:   [facility] ANTICOAG POOL    Comments:  [place] [facility], every 2-4 weeks.          Anticoagulation Care Providers       Provider Role Specialty Phone number    Juanito Guerrier MD Referring Hematology and Oncology [phone]          INR History:      5/21/2025     8:04 AM 5/27/2025    12:00 AM 5/27/2025     9:18 AM 6/3/2025    12:00 AM 6/4/2025    10:22 AM 6/11/2025    12:00 AM 6/11/2025     8:37 AM   Anticoagulation Monitoring   INR 3.30  2.90  3.40  3.00   INR Date 5/21/2025  5/27/2025  6/3/2025  6/11/2025   INR Goal 2.5-3.5  2.5-3.5  2.5-3.5  2.5-3.5   Trend Same  Same  Same  Same   Last Week Total 75 mg  75 mg  75 mg  75 mg   Next Week Total 75 mg  75 mg  75 mg  75 mg   Sun 11.25 mg  11.25 mg  11.25 mg  11.25 mg   Mon 11.25 mg  11.25 mg  11.25 mg  11.25 mg   Tue 7.5 mg  7.5 mg  -  7.5 mg   Wed 11.25 mg  11.25 mg  11.25 mg  11.25 mg   Thu 11.25 mg  11.25 mg  11.25 mg  11.25 mg   Fri 11.25 mg  11.25 mg  11.25 mg  11.25 mg   Sat 11.25 mg  11.25 mg  11.25 mg  11.25 mg   Historical INR  2.90      3.40      3.00            This result is from an external source.       Plan:  1. INR is  Therapeutic today- see above in Anticoagulation Summary.   Carli Garcia to Continue their warfarin regimen- see above in Anticoagulation Summary.  2. Follow up in 1 week--home test  3.They have been instructed to call if any changes in medications, doses, concerns, etc.     Kriss Melendez RPH

## 2025-06-17 LAB — INR PPP: 2.6

## 2025-06-18 ENCOUNTER — ANTICOAGULATION VISIT (OUTPATIENT)
Dept: PHARMACY | Facility: HOSPITAL | Age: 60
End: 2025-06-18
Payer: MEDICARE

## 2025-06-18 DIAGNOSIS — I82.401 DEEP VEIN THROMBOSIS (DVT) OF RIGHT LOWER EXTREMITY, UNSPECIFIED CHRONICITY, UNSPECIFIED VEIN: ICD-10-CM

## 2025-06-18 DIAGNOSIS — Z86.711 HISTORY OF PULMONARY EMBOLUS (PE): ICD-10-CM

## 2025-06-18 DIAGNOSIS — D68.61 ANTIPHOSPHOLIPID SYNDROME: Primary | Chronic | ICD-10-CM

## 2025-06-18 NOTE — PROGRESS NOTES
Anticoagulation Clinic Progress Note      Anticoagulation Summary  As of 2025      INR goal:  2.5-3.5   TTR:  62.6% (2.2 y)   INR used for dosin.60 (2025)   Warfarin maintenance plan:  7.5 mg (7.5 mg x 1) every Tue; 11.25 mg (7.5 mg x 1.5) all other days   Weekly warfarin total:  75 mg   No change documented:  Kriss Melendez RPH   Plan last modified:  Kriss Melendez RPH (2025)   Next INR check:  2025   Priority:  Maintenance   Target end date:  Indefinite    Indications    Antiphospholipid syndrome-IgG cardiolipin  [D68.61]  History of pulmonary embolus (PE) [Z86.711]  Deep vein thrombosis (DVT) of right lower extremity  unspecified chronicity  unspecified vein [I82.401]                 Anticoagulation Episode Summary       INR check location:  --    Preferred lab:  --    Send INR reminders to:   [facility] ANTICOAG POOL    Comments:  [place] [facility], every 2-4 weeks.          Anticoagulation Care Providers       Provider Role Specialty Phone number    Juanito Guerrier MD Referring Hematology and Oncology [phone]            INR History:      2025     9:18 AM 6/3/2025    12:00 AM 2025    10:22 AM 2025    12:00 AM 2025     8:37 AM 2025    12:00 AM 2025     8:20 AM   Anticoagulation Monitoring   INR 2.90  3.40  3.00  2.60   INR Date 2025  6/3/2025  2025  2025   INR Goal 2.5-3.5  2.5-3.5  2.5-3.5  2.5-3.5   Trend Same  Same  Same  Same   Last Week Total 75 mg  75 mg  75 mg  75 mg   Next Week Total 75 mg  75 mg  75 mg  75 mg   Sun 11.25 mg  11.25 mg  11.25 mg  11.25 mg   Mon 11.25 mg  11.25 mg  11.25 mg  11.25 mg   Tue 7.5 mg  -  7.5 mg  7.5 mg   Wed 11.25 mg  11.25 mg  11.25 mg  11.25 mg   Thu 11.25 mg  11.25 mg  11.25 mg  11.25 mg   Fri 11.25 mg  11.25 mg  11.25 mg  11.25 mg   Sat 11.25 mg  11.25 mg  11.25 mg  11.25 mg   Historical INR  3.40      3.00      2.60            This result is from an external source.       Plan:  1. INR is  Therapeutic today- see above in Anticoagulation Summary.   Carli Garcia to Continue their warfarin regimen- see above in Anticoagulation Summary.  2. Follow up in 1 week-home test  3.They have been instructed to call if any changes in medications, doses, concerns, etc.   Kriss Melendez RPH

## 2025-06-24 LAB — INR PPP: 2.4

## 2025-06-25 ENCOUNTER — ANTICOAGULATION VISIT (OUTPATIENT)
Dept: PHARMACY | Facility: HOSPITAL | Age: 60
End: 2025-06-25
Payer: MEDICARE

## 2025-06-25 DIAGNOSIS — Z86.711 HISTORY OF PULMONARY EMBOLUS (PE): ICD-10-CM

## 2025-06-25 DIAGNOSIS — I82.401 DEEP VEIN THROMBOSIS (DVT) OF RIGHT LOWER EXTREMITY, UNSPECIFIED CHRONICITY, UNSPECIFIED VEIN: ICD-10-CM

## 2025-06-25 DIAGNOSIS — D68.61 ANTIPHOSPHOLIPID SYNDROME: Primary | Chronic | ICD-10-CM

## 2025-06-25 NOTE — PROGRESS NOTES
Anticoagulation Clinic Progress Note    Anticoagulation Summary  As of 2025      INR goal:  2.5-3.5   TTR:  62.4% (2.3 y)   INR used for dosin.40 (2025)   Warfarin maintenance plan:  7.5 mg (7.5 mg x 1) every Tue; 11.25 mg (7.5 mg x 1.5) all other days   Weekly warfarin total:  75 mg   No change documented:  Lana Olivera RPH   Plan last modified:  Kriss Melendez RPH (2025)   Next INR check:  2025   Priority:  Maintenance   Target end date:  Indefinite    Indications    Antiphospholipid syndrome-IgG cardiolipin  [D68.61]  History of pulmonary embolus (PE) [Z86.711]  Deep vein thrombosis (DVT) of right lower extremity  unspecified chronicity  unspecified vein [I82.401]                 Anticoagulation Episode Summary       INR check location:  --    Preferred lab:  --    Send INR reminders to:   [facility] ANTICOAG POOL    Comments:  [place] [facility], every 2-4 weeks.          Anticoagulation Care Providers       Provider Role Specialty Phone number    Juanito Guerrier MD Referring Hematology and Oncology [phone]              INR History:      2025    10:22 AM 2025    12:00 AM 2025     8:37 AM 2025    12:00 AM 2025     8:20 AM 2025    12:00 AM 2025    12:22 PM   Anticoagulation Monitoring   INR 3.40  3.00  2.60  2.40   INR Date 6/3/2025  2025  2025  2025   INR Goal 2.5-3.5  2.5-3.5  2.5-3.5  2.5-3.5   Trend Same  Same  Same  Same   Last Week Total 75 mg  75 mg  75 mg  75 mg   Next Week Total 75 mg  75 mg  75 mg  75 mg   Sun 11.25 mg  11.25 mg  11.25 mg  11.25 mg   Mon 11.25 mg  11.25 mg  11.25 mg  11.25 mg   Tue -  7.5 mg  7.5 mg  -   Wed 11.25 mg  11.25 mg  11.25 mg  11.25 mg   Thu 11.25 mg  11.25 mg  11.25 mg  11.25 mg   Fri 11.25 mg  11.25 mg  11.25 mg  11.25 mg   Sat 11.25 mg  11.25 mg  11.25 mg  11.25 mg   Historical INR  3.00      2.60      2.40            This result is from an external source.       Plan:  1. INR is  Subtherapeutic today- see above in Anticoagulation Summary.   Will instruct Carli Garcia to Continue their warfarin regimen. If INR is subtherapeutic next week will adjust then- see above in Anticoagulation Summary.  2. Follow up in 1 week  3.They have been instructed to call if any changes in medications, doses, concerns, etc.    Lana Olivera RPH

## 2025-07-01 ENCOUNTER — ANTICOAGULATION VISIT (OUTPATIENT)
Dept: PHARMACY | Facility: HOSPITAL | Age: 60
End: 2025-07-01
Payer: MEDICARE

## 2025-07-01 DIAGNOSIS — D68.61 ANTIPHOSPHOLIPID SYNDROME: Primary | Chronic | ICD-10-CM

## 2025-07-01 DIAGNOSIS — I82.401 DEEP VEIN THROMBOSIS (DVT) OF RIGHT LOWER EXTREMITY, UNSPECIFIED CHRONICITY, UNSPECIFIED VEIN: ICD-10-CM

## 2025-07-01 DIAGNOSIS — Z86.711 HISTORY OF PULMONARY EMBOLUS (PE): ICD-10-CM

## 2025-07-01 LAB — INR PPP: 2.6

## 2025-07-01 NOTE — PROGRESS NOTES
Anticoagulation Clinic Progress Note      Anticoagulation Summary  As of 2025      INR goal:  2.5-3.5   TTR:  62.3% (2.3 y)   INR used for dosin.60 (2025)   Warfarin maintenance plan:  7.5 mg (7.5 mg x 1) every Tue; 11.25 mg (7.5 mg x 1.5) all other days   Weekly warfarin total:  75 mg   No change documented:  Lana Olivera RPH   Plan last modified:  Kriss Melendez RPH (2025)   Next INR check:  2025   Priority:  Maintenance   Target end date:  Indefinite    Indications    Antiphospholipid syndrome-IgG cardiolipin  [D68.61]  History of pulmonary embolus (PE) [Z86.711]  Deep vein thrombosis (DVT) of right lower extremity  unspecified chronicity  unspecified vein [I82.401]                 Anticoagulation Episode Summary       INR check location:  --    Preferred lab:  --    Send INR reminders to:   [facility] ANTICOAG POOL    Comments:  [place] [facility], every 2-4 weeks.          Anticoagulation Care Providers       Provider Role Specialty Phone number    Juanito Guerrier MD Referring Hematology and Oncology [phone]            INR History:      2025     8:37 AM 2025    12:00 AM 2025     8:20 AM 2025    12:00 AM 2025    12:22 PM 2025    12:00 AM 2025     2:25 PM   Anticoagulation Monitoring   INR 3.00  2.60  2.40  2.60   INR Date 2025   INR Goal 2.5-3.5  2.5-3.5  2.5-3.5  2.5-3.5   Trend Same  Same  Same  Same   Last Week Total 75 mg  75 mg  75 mg  75 mg   Next Week Total 75 mg  75 mg  75 mg  75 mg   Sun 11.25 mg  11.25 mg  11.25 mg  11.25 mg   Mon 11.25 mg  11.25 mg  11.25 mg  11.25 mg   Tue 7.5 mg  7.5 mg  -  7.5 mg   Wed 11.25 mg  11.25 mg  11.25 mg  11.25 mg   Thu 11.25 mg  11.25 mg  11.25 mg  11.25 mg   Fri 11.25 mg  11.25 mg  11.25 mg  11.25 mg   Sat 11.25 mg  11.25 mg  11.25 mg  11.25 mg   Historical INR  2.60      2.40      2.60            This result is from an external source.       Plan:  1. INR is  Therapeutic today- see above in Anticoagulation Summary.   Will instruct Carli Garcia to Continue their warfarin regimen- see above in Anticoagulation Summary.  2. Follow up in 1 week  3.They have been instructed to call if any changes in medications, doses, concerns, etc.     Lana Olivera RPH

## 2025-07-08 ENCOUNTER — ANTICOAGULATION VISIT (OUTPATIENT)
Dept: PHARMACY | Facility: HOSPITAL | Age: 60
End: 2025-07-08
Payer: MEDICARE

## 2025-07-08 DIAGNOSIS — I82.401 DEEP VEIN THROMBOSIS (DVT) OF RIGHT LOWER EXTREMITY, UNSPECIFIED CHRONICITY, UNSPECIFIED VEIN: ICD-10-CM

## 2025-07-08 DIAGNOSIS — Z86.711 HISTORY OF PULMONARY EMBOLUS (PE): ICD-10-CM

## 2025-07-08 DIAGNOSIS — D68.61 ANTIPHOSPHOLIPID SYNDROME: Primary | Chronic | ICD-10-CM

## 2025-07-08 LAB — INR PPP: 2.6

## 2025-07-08 NOTE — PROGRESS NOTES
Anticoagulation Clinic Progress Note    Patient's visit was held by phone today.    Anticoagulation Summary  As of 2025      INR goal:  2.5-3.5   TTR:  62.7% (2.3 y)   INR used for dosin.60 (2025)   Warfarin maintenance plan:  7.5 mg (7.5 mg x 1) every Tue; 11.25 mg (7.5 mg x 1.5) all other days   Weekly warfarin total:  75 mg   No change documented:  Kriss Melendez RPH   Plan last modified:  Kriss Melendez RPH (2025)   Next INR check:  --   Priority:  Maintenance   Target end date:  Indefinite    Indications    Antiphospholipid syndrome-IgG cardiolipin  [D68.61]  History of pulmonary embolus (PE) [Z86.711]  Deep vein thrombosis (DVT) of right lower extremity  unspecified chronicity  unspecified vein [I82.401]                 Anticoagulation Episode Summary       INR check location:  --    Preferred lab:  --    Send INR reminders to:   [facility] ANTICOAG POOL    Comments:  [place] [facility], every 2-4 weeks.          Anticoagulation Care Providers       Provider Role Specialty Phone number    Juanito Guerrier MD Referring Hematology and Oncology [phone]            Drug interactions: has remained unchanged.  Diet: has remained unchanged.    Clinic Interview:  No pertinent clinical findings have been reported.    INR History:      2025     8:20 AM 2025    12:00 AM 2025    12:22 PM 2025    12:00 AM 2025     2:25 PM 2025    12:00 AM 2025     9:30 AM   Anticoagulation Monitoring   INR 2.60  2.40  2.60  2.60   INR Date 2025   INR Goal 2.5-3.5  2.5-3.5  2.5-3.5  2.5-3.5   Trend Same  Same  Same  Same   Last Week Total 75 mg  75 mg  75 mg  75 mg   Next Week Total 75 mg  75 mg  75 mg  75 mg   Sun 11.25 mg  11.25 mg  11.25 mg  11.25 mg   Mon 11.25 mg  11.25 mg  11.25 mg  11.25 mg   Tue 7.5 mg  -  7.5 mg  7.5 mg   Wed 11.25 mg  11.25 mg  11.25 mg  11.25 mg   Thu 11.25 mg  11.25 mg  11.25 mg  11.25 mg   Fri 11.25 mg  11.25 mg  11.25 mg   11.25 mg   Sat 11.25 mg  11.25 mg  11.25 mg  11.25 mg   Historical INR  2.40      2.60      2.60            This result is from an external source.       Plan:  1. INR is Therapeutic today- see above in Anticoagulation Summary.   Will instruct Carli Garcia to Continue their warfarin regimen- see above in Anticoagulation Summary.  2. Follow up in 1 week--home test  3.They have been instructed to call if any changes in medications, doses, concerns, etc. Patient confirms dosing and expresses understanding and has no further questions at this time.    Kriss Melendez [facility]

## 2025-07-15 ENCOUNTER — ANTICOAGULATION VISIT (OUTPATIENT)
Dept: PHARMACY | Facility: HOSPITAL | Age: 60
End: 2025-07-15
Payer: MEDICARE

## 2025-07-15 DIAGNOSIS — Z86.711 HISTORY OF PULMONARY EMBOLUS (PE): ICD-10-CM

## 2025-07-15 DIAGNOSIS — I82.401 DEEP VEIN THROMBOSIS (DVT) OF RIGHT LOWER EXTREMITY, UNSPECIFIED CHRONICITY, UNSPECIFIED VEIN: ICD-10-CM

## 2025-07-15 DIAGNOSIS — D68.61 ANTIPHOSPHOLIPID SYNDROME: Primary | Chronic | ICD-10-CM

## 2025-07-15 LAB — INR PPP: 2.8

## 2025-07-15 NOTE — PROGRESS NOTES
Anticoagulation Clinic Progress Note      Anticoagulation Summary  As of 7/15/2025      INR goal:  2.5-3.5   TTR:  63.0% (2.3 y)   INR used for dosin.80 (7/15/2025)   Warfarin maintenance plan:  7.5 mg (7.5 mg x 1) every Tue; 11.25 mg (7.5 mg x 1.5) all other days   Weekly warfarin total:  75 mg   No change documented:  Kriss Melendez RPH   Plan last modified:  Kriss Melendez RPH (2025)   Next INR check:  2025   Priority:  Maintenance   Target end date:  Indefinite    Indications    Antiphospholipid syndrome-IgG cardiolipin  [D68.61]  History of pulmonary embolus (PE) [Z86.711]  Deep vein thrombosis (DVT) of right lower extremity  unspecified chronicity  unspecified vein [I82.401]                 Anticoagulation Episode Summary       INR check location:  --    Preferred lab:  --    Send INR reminders to:   [facility] ANTICOAG POOL    Comments:  [place] [facility], every 2-4 weeks.          Anticoagulation Care Providers       Provider Role Specialty Phone number    Juanito Guerrier MD Referring Hematology and Oncology [phone]            INR History:      2025    12:22 PM 2025    12:00 AM 2025     2:25 PM 2025    12:00 AM 2025     9:30 AM 7/15/2025    12:00 AM 7/15/2025    11:03 AM   Anticoagulation Monitoring   INR 2.40  2.60  2.60  2.80   INR Date 2025  2025  2025  7/15/2025   INR Goal 2.5-3.5  2.5-3.5  2.5-3.5  2.5-3.5   Trend Same  Same  Same  Same   Last Week Total 75 mg  75 mg  75 mg  75 mg   Next Week Total 75 mg  75 mg  75 mg  75 mg   Sun 11.25 mg  11.25 mg  11.25 mg  11.25 mg   Mon 11.25 mg  11.25 mg  11.25 mg  11.25 mg   Tue -  7.5 mg  7.5 mg  7.5 mg   Wed 11.25 mg  11.25 mg  11.25 mg  11.25 mg   Thu 11.25 mg  11.25 mg  11.25 mg  11.25 mg   Fri 11.25 mg  11.25 mg  11.25 mg  11.25 mg   Sat 11.25 mg  11.25 mg  11.25 mg  11.25 mg   Historical INR  2.60      2.60      2.80            This result is from an external source.       Plan:  1. INR is  Therapeutic today- see above in Anticoagulation Summary.    Carli Garcia to Continue their warfarin regimen- see above in Anticoagulation Summary.  2. Follow up in 1 week--home test  3.They have been instructed to call if any changes in medications, doses, concerns, etc.     Kriss Melendez RPH

## 2025-07-22 ENCOUNTER — ANTICOAGULATION VISIT (OUTPATIENT)
Dept: PHARMACY | Facility: HOSPITAL | Age: 60
End: 2025-07-22
Payer: MEDICARE

## 2025-07-22 DIAGNOSIS — Z86.711 HISTORY OF PULMONARY EMBOLUS (PE): ICD-10-CM

## 2025-07-22 DIAGNOSIS — I82.401 DEEP VEIN THROMBOSIS (DVT) OF RIGHT LOWER EXTREMITY, UNSPECIFIED CHRONICITY, UNSPECIFIED VEIN: ICD-10-CM

## 2025-07-22 DIAGNOSIS — D68.61 ANTIPHOSPHOLIPID SYNDROME: Primary | Chronic | ICD-10-CM

## 2025-07-22 LAB — INR PPP: 2.3

## 2025-07-22 NOTE — PROGRESS NOTES
Anticoagulation Clinic Progress Note    Patient's visit was held by phone today.    Anticoagulation Summary  As of 2025      INR goal:  2.5-3.5   TTR:  62.9% (2.3 y)   INR used for dosin.30 (2025)   Warfarin maintenance plan:  7.5 mg (7.5 mg x 1) every Tue; 11.25 mg (7.5 mg x 1.5) all other days   Weekly warfarin total:  75 mg   Plan last modified:  Kriss Melendez RPH (2025)   Next INR check:  2025   Priority:  Maintenance   Target end date:  Indefinite    Indications    Antiphospholipid syndrome-IgG cardiolipin  [D68.61]  History of pulmonary embolus (PE) [Z86.711]  Deep vein thrombosis (DVT) of right lower extremity  unspecified chronicity  unspecified vein [I82.401]                 Anticoagulation Episode Summary       INR check location:  --    Preferred lab:  --    Send INR reminders to:   [facility] ANTICOAG POOL    Comments:  [place] [facility], every 2-4 weeks.          Anticoagulation Care Providers       Provider Role Specialty Phone number    Juanito Guerrier MD Referring Hematology and Oncology [phone]            INR History:      2025     2:25 PM 2025    12:00 AM 2025     9:30 AM 7/15/2025    12:00 AM 7/15/2025    11:03 AM 2025    12:00 AM 2025     3:31 PM   Anticoagulation Monitoring   INR 2.60  2.60  2.80  2.30   INR Date 2025  2025  7/15/2025  2025   INR Goal 2.5-3.5  2.5-3.5  2.5-3.5  2.5-3.5   Trend Same  Same  Same  Same   Last Week Total 75 mg  75 mg  75 mg  75 mg   Next Week Total 75 mg  75 mg  75 mg  78.75 mg   Sun 11.25 mg  11.25 mg  11.25 mg  11.25 mg   Mon 11.25 mg  11.25 mg  11.25 mg  11.25 mg   Tue 7.5 mg  7.5 mg  7.5 mg  11.25 mg ()   Wed 11.25 mg  11.25 mg  11.25 mg  11.25 mg   Thu 11.25 mg  11.25 mg  11.25 mg  11.25 mg   Fri 11.25 mg  11.25 mg  11.25 mg  11.25 mg   Sat 11.25 mg  11.25 mg  11.25 mg  11.25 mg   Historical INR  2.60      2.80      2.30            This result is from an external source.       Plan:  1.  INR is Subtherapeutic today- see above in Anticoagulation Summary.   Will instruct Carli Garcia to Continue their warfarin regimen but boost dose today to 11.25mg- see above in Anticoagulation Summary.  2. Follow up in 1 weeks  3.They have been instructed to call if any changes in medications, doses, concerns, etc. Left VM with instructions and to call back with updates or questions.   Kriss Melendez RPH

## 2025-07-29 ENCOUNTER — ANTICOAGULATION VISIT (OUTPATIENT)
Dept: PHARMACY | Facility: HOSPITAL | Age: 60
End: 2025-07-29
Payer: MEDICARE

## 2025-07-29 DIAGNOSIS — D68.61 ANTIPHOSPHOLIPID SYNDROME: Primary | Chronic | ICD-10-CM

## 2025-07-29 DIAGNOSIS — I82.401 DEEP VEIN THROMBOSIS (DVT) OF RIGHT LOWER EXTREMITY, UNSPECIFIED CHRONICITY, UNSPECIFIED VEIN: ICD-10-CM

## 2025-07-29 DIAGNOSIS — Z86.711 HISTORY OF PULMONARY EMBOLUS (PE): ICD-10-CM

## 2025-07-29 LAB — INR PPP: 2.9

## 2025-07-29 NOTE — PROGRESS NOTES
Anticoagulation Clinic Progress Note    Anticoagulation Summary  As of 2025      INR goal:  2.5-3.5   TTR:  63.0% (2.3 y)   INR used for dosin.90 (2025)   Warfarin maintenance plan:  7.5 mg (7.5 mg x 1) every Tue; 11.25 mg (7.5 mg x 1.5) all other days   Weekly warfarin total:  75 mg   No change documented:  Lana Olivera RPH   Plan last modified:  Kriss Melendez RPH (2025)   Next INR check:  2025   Priority:  Maintenance   Target end date:  Indefinite    Indications    Antiphospholipid syndrome-IgG cardiolipin  [D68.61]  History of pulmonary embolus (PE) [Z86.711]  Deep vein thrombosis (DVT) of right lower extremity  unspecified chronicity  unspecified vein [I82.401]                 Anticoagulation Episode Summary       INR check location:  --    Preferred lab:  --    Send INR reminders to:   [facility] ANTICOAG POOL    Comments:  [place] [facility], every 2-4 weeks.          Anticoagulation Care Providers       Provider Role Specialty Phone number    Juanito Guerrier MD Referring Hematology and Oncology [phone]              INR History:      2025     9:30 AM 7/15/2025    12:00 AM 7/15/2025    11:03 AM 2025    12:00 AM 2025     3:31 PM 2025    12:00 AM 2025     3:08 PM   Anticoagulation Monitoring   INR 2.60  2.80  2.30  2.90   INR Date 2025  7/15/2025  2025  2025   INR Goal 2.5-3.5  2.5-3.5  2.5-3.5  2.5-3.5   Trend Same  Same  Same  Same   Last Week Total 75 mg  75 mg  75 mg  78.75 mg   Next Week Total 75 mg  75 mg  78.75 mg  75 mg   Sun 11.25 mg  11.25 mg  11.25 mg  11.25 mg   Mon 11.25 mg  11.25 mg  11.25 mg  11.25 mg   Tue 7.5 mg  7.5 mg  11.25 mg ()  7.5 mg   Wed 11.25 mg  11.25 mg  11.25 mg  11.25 mg   Thu 11.25 mg  11.25 mg  11.25 mg  11.25 mg   Fri 11.25 mg  11.25 mg  11.25 mg  11.25 mg   Sat 11.25 mg  11.25 mg  11.25 mg  11.25 mg   Historical INR  2.80      2.30      2.90            This result is from an external source.        Plan:  1. INR is Therapeutic today- see above in Anticoagulation Summary.   Will instruct Carli Garcia to Continue their warfarin regimen- see above in Anticoagulation Summary.  2. Follow up in 1 week  3.They have been instructed to call if any changes in medications, doses, concerns, etc.     Lana Olivera RPH

## 2025-08-05 DIAGNOSIS — K21.9 GASTROESOPHAGEAL REFLUX DISEASE WITHOUT ESOPHAGITIS: ICD-10-CM

## 2025-08-05 DIAGNOSIS — F41.9 ANXIETY: ICD-10-CM

## 2025-08-05 DIAGNOSIS — E03.9 ACQUIRED HYPOTHYROIDISM: ICD-10-CM

## 2025-08-05 DIAGNOSIS — E78.00 HYPERCHOLESTEREMIA: ICD-10-CM

## 2025-08-05 DIAGNOSIS — I10 ESSENTIAL HYPERTENSION: ICD-10-CM

## 2025-08-05 LAB — INR PPP: 2.3

## 2025-08-05 RX ORDER — OMEPRAZOLE 20 MG/1
20 CAPSULE, DELAYED RELEASE ORAL DAILY
Qty: 90 CAPSULE | Refills: 0 | Status: SHIPPED | OUTPATIENT
Start: 2025-08-05

## 2025-08-05 RX ORDER — POTASSIUM CHLORIDE 1500 MG/1
20 TABLET, EXTENDED RELEASE ORAL DAILY
Qty: 90 TABLET | Refills: 0 | Status: SHIPPED | OUTPATIENT
Start: 2025-08-05

## 2025-08-05 RX ORDER — AMLODIPINE BESYLATE 5 MG/1
5 TABLET ORAL DAILY
Qty: 90 TABLET | Refills: 0 | Status: SHIPPED | OUTPATIENT
Start: 2025-08-05

## 2025-08-05 RX ORDER — WARFARIN SODIUM 7.5 MG/1
TABLET ORAL
Qty: 130 TABLET | Refills: 2 | Status: SHIPPED | OUTPATIENT
Start: 2025-08-05

## 2025-08-05 RX ORDER — TORSEMIDE 20 MG/1
20 TABLET ORAL DAILY
Qty: 90 TABLET | Refills: 0 | Status: SHIPPED | OUTPATIENT
Start: 2025-08-05

## 2025-08-05 RX ORDER — ATORVASTATIN CALCIUM 10 MG/1
10 TABLET, FILM COATED ORAL DAILY
Qty: 90 TABLET | Refills: 0 | Status: SHIPPED | OUTPATIENT
Start: 2025-08-05

## 2025-08-05 RX ORDER — LEVOTHYROXINE SODIUM 175 UG/1
175 TABLET ORAL DAILY
Qty: 90 TABLET | Refills: 0 | Status: SHIPPED | OUTPATIENT
Start: 2025-08-05

## 2025-08-05 RX ORDER — SERTRALINE HYDROCHLORIDE 100 MG/1
100 TABLET, FILM COATED ORAL DAILY
Qty: 90 TABLET | Refills: 0 | Status: SHIPPED | OUTPATIENT
Start: 2025-08-05

## 2025-08-06 ENCOUNTER — ANTICOAGULATION VISIT (OUTPATIENT)
Dept: PHARMACY | Facility: HOSPITAL | Age: 60
End: 2025-08-06
Payer: MEDICARE

## 2025-08-06 DIAGNOSIS — D68.61 ANTIPHOSPHOLIPID SYNDROME: Primary | Chronic | ICD-10-CM

## 2025-08-06 DIAGNOSIS — Z86.711 HISTORY OF PULMONARY EMBOLUS (PE): ICD-10-CM

## 2025-08-06 DIAGNOSIS — I82.401 DEEP VEIN THROMBOSIS (DVT) OF RIGHT LOWER EXTREMITY, UNSPECIFIED CHRONICITY, UNSPECIFIED VEIN: ICD-10-CM

## 2025-08-12 LAB — INR PPP: 2.3

## 2025-08-13 ENCOUNTER — ANTICOAGULATION VISIT (OUTPATIENT)
Dept: PHARMACY | Facility: HOSPITAL | Age: 60
End: 2025-08-13
Payer: MEDICARE

## 2025-08-13 DIAGNOSIS — I82.401 DEEP VEIN THROMBOSIS (DVT) OF RIGHT LOWER EXTREMITY, UNSPECIFIED CHRONICITY, UNSPECIFIED VEIN: ICD-10-CM

## 2025-08-13 DIAGNOSIS — Z86.711 HISTORY OF PULMONARY EMBOLUS (PE): ICD-10-CM

## 2025-08-13 DIAGNOSIS — D68.61 ANTIPHOSPHOLIPID SYNDROME: Primary | Chronic | ICD-10-CM

## 2025-08-19 LAB — INR PPP: 2.8

## 2025-08-20 ENCOUNTER — ANTICOAGULATION VISIT (OUTPATIENT)
Dept: PHARMACY | Facility: HOSPITAL | Age: 60
End: 2025-08-20
Payer: MEDICARE

## 2025-08-20 DIAGNOSIS — Z86.711 HISTORY OF PULMONARY EMBOLUS (PE): ICD-10-CM

## 2025-08-20 DIAGNOSIS — I82.401 DEEP VEIN THROMBOSIS (DVT) OF RIGHT LOWER EXTREMITY, UNSPECIFIED CHRONICITY, UNSPECIFIED VEIN: ICD-10-CM

## 2025-08-20 DIAGNOSIS — D68.61 ANTIPHOSPHOLIPID SYNDROME: Primary | Chronic | ICD-10-CM

## 2025-08-26 LAB — INR PPP: 3

## 2025-08-27 ENCOUNTER — ANTICOAGULATION VISIT (OUTPATIENT)
Dept: PHARMACY | Facility: HOSPITAL | Age: 60
End: 2025-08-27
Payer: MEDICARE

## 2025-08-27 DIAGNOSIS — D68.61 ANTIPHOSPHOLIPID SYNDROME: Primary | Chronic | ICD-10-CM

## 2025-08-27 DIAGNOSIS — I82.401 DEEP VEIN THROMBOSIS (DVT) OF RIGHT LOWER EXTREMITY, UNSPECIFIED CHRONICITY, UNSPECIFIED VEIN: ICD-10-CM

## 2025-08-27 DIAGNOSIS — Z86.711 HISTORY OF PULMONARY EMBOLUS (PE): ICD-10-CM

## (undated) DEVICE — GLIDESHEATH BASIC HYDROPHILIC COATED INTRODUCER SHEATH: Brand: GLIDESHEATH

## (undated) DEVICE — GLIDESHEATH SLENDER STAINLESS STEEL KIT: Brand: GLIDESHEATH SLENDER

## (undated) DEVICE — GW EMR FIX EXCHG J STD .035 3MM 260CM

## (undated) DEVICE — CATH DIAG IMPULSE FL3.5 5F 100CM

## (undated) DEVICE — CATH DIAG IMPULSE FR5 5F 100CM

## (undated) DEVICE — BALN PRESS WEDGE 5F 110CM

## (undated) DEVICE — PK CATH CARD 40

## (undated) DEVICE — CATH DIAG IMPULSE PIG 5F 100CM

## (undated) DEVICE — KT MANIFLD CARDIAC